# Patient Record
Sex: MALE | Race: WHITE | Employment: OTHER | URBAN - METROPOLITAN AREA
[De-identification: names, ages, dates, MRNs, and addresses within clinical notes are randomized per-mention and may not be internally consistent; named-entity substitution may affect disease eponyms.]

---

## 2017-01-24 ENCOUNTER — TELEPHONE (OUTPATIENT)
Dept: INTERNAL MEDICINE CLINIC | Age: 72
End: 2017-01-24

## 2017-03-14 ENCOUNTER — OFFICE VISIT (OUTPATIENT)
Dept: INTERNAL MEDICINE CLINIC | Age: 72
End: 2017-03-14

## 2017-03-14 VITALS
WEIGHT: 221 LBS | SYSTOLIC BLOOD PRESSURE: 120 MMHG | DIASTOLIC BLOOD PRESSURE: 80 MMHG | BODY MASS INDEX: 30.82 KG/M2 | HEART RATE: 64 BPM

## 2017-03-14 DIAGNOSIS — R31.9 HEMATURIA: ICD-10-CM

## 2017-03-14 DIAGNOSIS — E78.49 OTHER HYPERLIPIDEMIA: ICD-10-CM

## 2017-03-14 DIAGNOSIS — D61.818 PANCYTOPENIA (HCC): ICD-10-CM

## 2017-03-14 DIAGNOSIS — E11.42 TYPE 2 DIABETES MELLITUS WITH DIABETIC POLYNEUROPATHY, WITHOUT LONG-TERM CURRENT USE OF INSULIN (HCC): Primary | ICD-10-CM

## 2017-03-14 LAB
A/G RATIO: 1.2 (ref 1.1–2.2)
ALBUMIN SERPL-MCNC: 3.3 G/DL (ref 3.4–5)
ALP BLD-CCNC: 138 U/L (ref 40–129)
ALT SERPL-CCNC: 19 U/L (ref 10–40)
ANION GAP SERPL CALCULATED.3IONS-SCNC: 17 MMOL/L (ref 3–16)
AST SERPL-CCNC: 21 U/L (ref 15–37)
BASOPHILS ABSOLUTE: 0.1 K/UL (ref 0–0.2)
BASOPHILS RELATIVE PERCENT: 0.8 %
BILIRUB SERPL-MCNC: 5.5 MG/DL (ref 0–1)
BILIRUBIN, POC: ABNORMAL
BLOOD URINE, POC: ABNORMAL
BUN BLDV-MCNC: 18 MG/DL (ref 7–20)
CALCIUM SERPL-MCNC: 8.9 MG/DL (ref 8.3–10.6)
CHLORIDE BLD-SCNC: 97 MMOL/L (ref 99–110)
CHOLESTEROL, TOTAL: 189 MG/DL (ref 0–199)
CLARITY, POC: ABNORMAL
CO2: 22 MMOL/L (ref 21–32)
COLOR, POC: ABNORMAL
CREAT SERPL-MCNC: 0.7 MG/DL (ref 0.8–1.3)
EOSINOPHILS ABSOLUTE: 0.2 K/UL (ref 0–0.6)
EOSINOPHILS RELATIVE PERCENT: 2.4 %
GFR AFRICAN AMERICAN: >60
GFR NON-AFRICAN AMERICAN: >60
GLOBULIN: 2.8 G/DL
GLUCOSE BLD-MCNC: 206 MG/DL (ref 70–99)
GLUCOSE URINE, POC: 250
HCT VFR BLD CALC: 47.4 % (ref 40.5–52.5)
HDLC SERPL-MCNC: 23 MG/DL (ref 40–60)
HEMOGLOBIN: 15.7 G/DL (ref 13.5–17.5)
KETONES, POC: 40
LDL CHOLESTEROL CALCULATED: 137 MG/DL
LEUKOCYTE EST, POC: ABNORMAL
LYMPHOCYTES ABSOLUTE: 1 K/UL (ref 1–5.1)
LYMPHOCYTES RELATIVE PERCENT: 11.4 %
MCH RBC QN AUTO: 34.8 PG (ref 26–34)
MCHC RBC AUTO-ENTMCNC: 33.1 G/DL (ref 31–36)
MCV RBC AUTO: 105.2 FL (ref 80–100)
MONOCYTES ABSOLUTE: 0.9 K/UL (ref 0–1.3)
MONOCYTES RELATIVE PERCENT: 10.7 %
NEUTROPHILS ABSOLUTE: 6.5 K/UL (ref 1.7–7.7)
NEUTROPHILS RELATIVE PERCENT: 74.7 %
NITRITE, POC: ABNORMAL
PDW BLD-RTO: 15.4 % (ref 12.4–15.4)
PH, POC: 5
PLATELET # BLD: 52 K/UL (ref 135–450)
PMV BLD AUTO: 9.2 FL (ref 5–10.5)
POTASSIUM SERPL-SCNC: 4 MMOL/L (ref 3.5–5.1)
PROTEIN, POC: 100
RBC # BLD: 4.5 M/UL (ref 4.2–5.9)
SODIUM BLD-SCNC: 136 MMOL/L (ref 136–145)
SPECIFIC GRAVITY, POC: 1.03
TOTAL PROTEIN: 6.1 G/DL (ref 6.4–8.2)
TRIGL SERPL-MCNC: 143 MG/DL (ref 0–150)
UROBILINOGEN, POC: 0.8
VLDLC SERPL CALC-MCNC: 29 MG/DL
WBC # BLD: 8.6 K/UL (ref 4–11)

## 2017-03-14 PROCEDURE — 81002 URINALYSIS NONAUTO W/O SCOPE: CPT | Performed by: INTERNAL MEDICINE

## 2017-03-14 PROCEDURE — 99214 OFFICE O/P EST MOD 30 MIN: CPT | Performed by: INTERNAL MEDICINE

## 2017-03-14 RX ORDER — CIPROFLOXACIN 500 MG/1
500 TABLET, FILM COATED ORAL 2 TIMES DAILY
Qty: 20 TABLET | Refills: 0 | Status: SHIPPED | OUTPATIENT
Start: 2017-03-14 | End: 2017-03-24

## 2017-03-15 ENCOUNTER — TELEPHONE (OUTPATIENT)
Dept: INTERNAL MEDICINE CLINIC | Age: 72
End: 2017-03-15

## 2017-03-15 DIAGNOSIS — K76.9 LIVER DISEASE: Primary | ICD-10-CM

## 2017-03-15 LAB
ESTIMATED AVERAGE GLUCOSE: 174.3 MG/DL
HBA1C MFR BLD: 7.7 %

## 2017-03-17 RX ORDER — CYANOCOBALAMIN 1000 UG/ML
INJECTION INTRAMUSCULAR; SUBCUTANEOUS
Qty: 1 VIAL | Refills: 11 | Status: SHIPPED | OUTPATIENT
Start: 2017-03-17 | End: 2018-03-17 | Stop reason: SDUPTHER

## 2017-04-10 ENCOUNTER — OFFICE VISIT (OUTPATIENT)
Dept: INTERNAL MEDICINE CLINIC | Age: 72
End: 2017-04-10

## 2017-04-10 ENCOUNTER — HOSPITAL ENCOUNTER (OUTPATIENT)
Dept: OTHER | Age: 72
Discharge: HOME | End: 2017-04-10
Attending: INTERNAL MEDICINE | Admitting: INTERNAL MEDICINE

## 2017-04-10 VITALS
SYSTOLIC BLOOD PRESSURE: 120 MMHG | WEIGHT: 222 LBS | DIASTOLIC BLOOD PRESSURE: 80 MMHG | HEART RATE: 72 BPM | BODY MASS INDEX: 30.96 KG/M2

## 2017-04-10 DIAGNOSIS — D61.818 PANCYTOPENIA (HCC): ICD-10-CM

## 2017-04-10 DIAGNOSIS — K76.9 LIVER DISEASE: ICD-10-CM

## 2017-04-10 DIAGNOSIS — K74.69 OTHER CIRRHOSIS OF LIVER (HCC): ICD-10-CM

## 2017-04-10 DIAGNOSIS — K76.9 LIVER DISEASE: Primary | ICD-10-CM

## 2017-04-10 LAB
A/G RATIO: 1.1 (ref 1.1–2.2)
ALBUMIN SERPL-MCNC: 3.5 G/DL (ref 3.4–5)
ALP BLD-CCNC: 154 U/L (ref 40–129)
ALT SERPL-CCNC: 31 U/L (ref 10–40)
AMMONIA: 73 UMOL/L (ref 16–60)
ANION GAP SERPL CALCULATED.3IONS-SCNC: 15 MMOL/L (ref 3–16)
AST SERPL-CCNC: 31 U/L (ref 15–37)
BILIRUB SERPL-MCNC: 2.3 MG/DL (ref 0–1)
BUN BLDV-MCNC: 11 MG/DL (ref 7–20)
CALCIUM SERPL-MCNC: 9.2 MG/DL (ref 8.3–10.6)
CHLORIDE BLD-SCNC: 100 MMOL/L (ref 99–110)
CO2: 25 MMOL/L (ref 21–32)
CREAT SERPL-MCNC: 0.8 MG/DL (ref 0.8–1.3)
GFR AFRICAN AMERICAN: >60
GFR NON-AFRICAN AMERICAN: >60
GLOBULIN: 3.2 G/DL
GLUCOSE BLD-MCNC: 261 MG/DL (ref 70–99)
POTASSIUM SERPL-SCNC: 4.2 MMOL/L (ref 3.5–5.1)
SODIUM BLD-SCNC: 140 MMOL/L (ref 136–145)
TOTAL PROTEIN: 6.7 G/DL (ref 6.4–8.2)

## 2017-04-10 PROCEDURE — 99214 OFFICE O/P EST MOD 30 MIN: CPT | Performed by: INTERNAL MEDICINE

## 2017-04-11 RX ORDER — LACTULOSE 10 G/15ML
10 SOLUTION ORAL 2 TIMES DAILY
Qty: 473 ML | Refills: 1 | Status: SHIPPED | OUTPATIENT
Start: 2017-04-11 | End: 2017-05-13 | Stop reason: SDUPTHER

## 2017-05-04 ENCOUNTER — TELEPHONE (OUTPATIENT)
Dept: INTERNAL MEDICINE CLINIC | Age: 72
End: 2017-05-04

## 2017-05-15 RX ORDER — LACTULOSE 10 G/15ML
SOLUTION ORAL
Qty: 1 BOTTLE | Refills: 3 | Status: ON HOLD | OUTPATIENT
Start: 2017-05-15 | End: 2018-06-04

## 2017-05-18 ENCOUNTER — OFFICE VISIT (OUTPATIENT)
Dept: INTERNAL MEDICINE CLINIC | Age: 72
End: 2017-05-18

## 2017-05-18 VITALS
HEART RATE: 72 BPM | BODY MASS INDEX: 30.82 KG/M2 | WEIGHT: 221 LBS | DIASTOLIC BLOOD PRESSURE: 78 MMHG | SYSTOLIC BLOOD PRESSURE: 110 MMHG

## 2017-05-18 DIAGNOSIS — M48.061 LUMBAR STENOSIS: ICD-10-CM

## 2017-05-18 DIAGNOSIS — D61.818 PANCYTOPENIA (HCC): ICD-10-CM

## 2017-05-18 DIAGNOSIS — K76.9 LIVER DISEASE: Primary | ICD-10-CM

## 2017-05-18 PROCEDURE — 99214 OFFICE O/P EST MOD 30 MIN: CPT | Performed by: INTERNAL MEDICINE

## 2017-05-18 RX ORDER — RIFAXIMIN 550 MG/1
TABLET ORAL 2 TIMES DAILY
COMMUNITY
Start: 2017-05-05

## 2017-05-18 RX ORDER — OXYCODONE HYDROCHLORIDE 5 MG/1
5 CAPSULE ORAL EVERY 6 HOURS PRN
Qty: 120 CAPSULE | Refills: 0 | Status: SHIPPED | OUTPATIENT
Start: 2017-05-18 | End: 2017-09-13

## 2017-05-18 ASSESSMENT — PATIENT HEALTH QUESTIONNAIRE - PHQ9
1. LITTLE INTEREST OR PLEASURE IN DOING THINGS: 1
SUM OF ALL RESPONSES TO PHQ QUESTIONS 1-9: 1
2. FEELING DOWN, DEPRESSED OR HOPELESS: 0
SUM OF ALL RESPONSES TO PHQ9 QUESTIONS 1 & 2: 1

## 2017-06-15 ENCOUNTER — HOSPITAL ENCOUNTER (OUTPATIENT)
Dept: ENDOSCOPY | Age: 72
Discharge: HOME | End: 2017-06-15
Attending: INTERNAL MEDICINE | Admitting: INTERNAL MEDICINE

## 2017-06-15 LAB
GLUCOSE BLD-MCNC: 157 MG/DL (ref 70–99)
PERFORMED ON: ABNORMAL

## 2017-06-15 RX ORDER — SODIUM CHLORIDE 9 MG/ML
INJECTION, SOLUTION INTRAVENOUS CONTINUOUS
Status: DISCONTINUED | OUTPATIENT
Start: 2017-06-15 | End: 2017-06-16 | Stop reason: HOSPADM

## 2017-06-15 RX ORDER — SODIUM CHLORIDE 0.9 % (FLUSH) 0.9 %
10 SYRINGE (ML) INJECTION EVERY 12 HOURS SCHEDULED
Status: DISCONTINUED | OUTPATIENT
Start: 2017-06-15 | End: 2017-06-16 | Stop reason: HOSPADM

## 2017-06-15 RX ORDER — SODIUM CHLORIDE 0.9 % (FLUSH) 0.9 %
10 SYRINGE (ML) INJECTION PRN
Status: DISCONTINUED | OUTPATIENT
Start: 2017-06-15 | End: 2017-06-16 | Stop reason: HOSPADM

## 2017-08-07 ENCOUNTER — TELEPHONE (OUTPATIENT)
Dept: INTERNAL MEDICINE CLINIC | Age: 72
End: 2017-08-07

## 2017-08-08 ENCOUNTER — HOSPITAL ENCOUNTER (OUTPATIENT)
Dept: OTHER | Age: 72
Discharge: HOME | End: 2017-08-08
Attending: INTERNAL MEDICINE | Admitting: INTERNAL MEDICINE

## 2017-08-08 LAB
A/G RATIO: 1.2 (ref 1.1–2.2)
ALBUMIN SERPL-MCNC: 3.4 G/DL (ref 3.4–5)
ALP BLD-CCNC: 147 U/L (ref 40–129)
ALT SERPL-CCNC: 24 U/L (ref 10–40)
ANION GAP SERPL CALCULATED.3IONS-SCNC: 12 MMOL/L (ref 3–16)
AST SERPL-CCNC: 29 U/L (ref 15–37)
BASOPHILS ABSOLUTE: 0 K/UL (ref 0–0.2)
BASOPHILS RELATIVE PERCENT: 0.8 %
BILIRUB SERPL-MCNC: 2.2 MG/DL (ref 0–1)
BUN BLDV-MCNC: 11 MG/DL (ref 7–20)
CALCIUM SERPL-MCNC: 9 MG/DL (ref 8.3–10.6)
CHLORIDE BLD-SCNC: 103 MMOL/L (ref 99–110)
CHOLESTEROL, TOTAL: 196 MG/DL (ref 0–199)
CO2: 27 MMOL/L (ref 21–32)
CREAT SERPL-MCNC: 0.8 MG/DL (ref 0.8–1.3)
EOSINOPHILS ABSOLUTE: 0.3 K/UL (ref 0–0.6)
EOSINOPHILS RELATIVE PERCENT: 8.4 %
ESTIMATED AVERAGE GLUCOSE: 191.5 MG/DL
GFR AFRICAN AMERICAN: >60
GFR NON-AFRICAN AMERICAN: >60
GLOBULIN: 2.9 G/DL
GLUCOSE BLD-MCNC: 133 MG/DL (ref 70–99)
HBA1C MFR BLD: 8.3 %
HCT VFR BLD CALC: 44.3 % (ref 40.5–52.5)
HDLC SERPL-MCNC: 41 MG/DL (ref 40–60)
HEMOGLOBIN: 14.9 G/DL (ref 13.5–17.5)
INR BLD: 1.24 (ref 0.85–1.15)
LDL CHOLESTEROL CALCULATED: 128 MG/DL
LYMPHOCYTES ABSOLUTE: 0.9 K/UL (ref 1–5.1)
LYMPHOCYTES RELATIVE PERCENT: 26.1 %
MCH RBC QN AUTO: 35.4 PG (ref 26–34)
MCHC RBC AUTO-ENTMCNC: 33.5 G/DL (ref 31–36)
MCV RBC AUTO: 105.5 FL (ref 80–100)
MONOCYTES ABSOLUTE: 0.4 K/UL (ref 0–1.3)
MONOCYTES RELATIVE PERCENT: 10.6 %
NEUTROPHILS ABSOLUTE: 1.8 K/UL (ref 1.7–7.7)
NEUTROPHILS RELATIVE PERCENT: 54.1 %
PDW BLD-RTO: 14.9 % (ref 12.4–15.4)
PLATELET # BLD: 58 K/UL (ref 135–450)
PMV BLD AUTO: 8.5 FL (ref 5–10.5)
POTASSIUM SERPL-SCNC: 5.1 MMOL/L (ref 3.5–5.1)
PROTHROMBIN TIME: 14 SEC (ref 9.6–13)
RBC # BLD: 4.2 M/UL (ref 4.2–5.9)
SODIUM BLD-SCNC: 142 MMOL/L (ref 136–145)
TOTAL PROTEIN: 6.3 G/DL (ref 6.4–8.2)
TRIGL SERPL-MCNC: 137 MG/DL (ref 0–150)
VLDLC SERPL CALC-MCNC: 27 MG/DL
WBC # BLD: 3.4 K/UL (ref 4–11)

## 2017-08-10 LAB — VITAMIN D 1,25-DIHYDROXY: 47.9 PG/ML (ref 19.9–79.3)

## 2017-09-13 ENCOUNTER — OFFICE VISIT (OUTPATIENT)
Dept: INTERNAL MEDICINE CLINIC | Age: 72
End: 2017-09-13

## 2017-09-13 VITALS
SYSTOLIC BLOOD PRESSURE: 120 MMHG | HEART RATE: 72 BPM | BODY MASS INDEX: 31.1 KG/M2 | WEIGHT: 223 LBS | DIASTOLIC BLOOD PRESSURE: 60 MMHG

## 2017-09-13 DIAGNOSIS — G89.4 CHRONIC PAIN SYNDROME: Primary | ICD-10-CM

## 2017-09-13 DIAGNOSIS — K76.9 LIVER DISEASE: ICD-10-CM

## 2017-09-13 DIAGNOSIS — D61.818 PANCYTOPENIA (HCC): ICD-10-CM

## 2017-09-13 PROCEDURE — 99214 OFFICE O/P EST MOD 30 MIN: CPT | Performed by: INTERNAL MEDICINE

## 2017-09-13 RX ORDER — OXYCODONE HYDROCHLORIDE 5 MG/1
5 TABLET ORAL EVERY 6 HOURS PRN
Qty: 120 TABLET | Refills: 0 | Status: SHIPPED | OUTPATIENT
Start: 2017-09-13 | End: 2018-01-17 | Stop reason: SDUPTHER

## 2018-01-17 ENCOUNTER — OFFICE VISIT (OUTPATIENT)
Dept: INTERNAL MEDICINE CLINIC | Age: 73
End: 2018-01-17

## 2018-01-17 VITALS
SYSTOLIC BLOOD PRESSURE: 120 MMHG | DIASTOLIC BLOOD PRESSURE: 74 MMHG | BODY MASS INDEX: 30.13 KG/M2 | WEIGHT: 216 LBS | HEART RATE: 70 BPM

## 2018-01-17 DIAGNOSIS — M48.062 SPINAL STENOSIS OF LUMBAR REGION WITH NEUROGENIC CLAUDICATION: ICD-10-CM

## 2018-01-17 DIAGNOSIS — E11.9 TYPE 2 DIABETES MELLITUS WITHOUT COMPLICATION, WITHOUT LONG-TERM CURRENT USE OF INSULIN (HCC): Primary | ICD-10-CM

## 2018-01-17 DIAGNOSIS — K76.9 LIVER DISEASE: ICD-10-CM

## 2018-01-17 DIAGNOSIS — G89.4 CHRONIC PAIN SYNDROME: ICD-10-CM

## 2018-01-17 DIAGNOSIS — D61.818 PANCYTOPENIA (HCC): ICD-10-CM

## 2018-01-17 PROCEDURE — 99214 OFFICE O/P EST MOD 30 MIN: CPT | Performed by: INTERNAL MEDICINE

## 2018-01-17 RX ORDER — OXYCODONE HYDROCHLORIDE 5 MG/1
5 TABLET ORAL EVERY 6 HOURS PRN
Qty: 120 TABLET | Refills: 0 | Status: SHIPPED | OUTPATIENT
Start: 2018-01-17 | End: 2018-05-30 | Stop reason: SDUPTHER

## 2018-01-17 NOTE — PROGRESS NOTES
Component Value Date    LDLCALC 128 (H) 08/08/2017    LDLCALC 137 (H) 03/14/2017    LDLCALC 151 (H) 02/17/2016     Lab Results   Component Value Date    LABVLDL 27 08/08/2017    LABVLDL 29 03/14/2017    LABVLDL 35 02/17/2016     Lab Results   Component Value Date    CREATININE 0.8 08/08/2017         ASSESSMENT/PLAN[de-identified]        1. Type 2 diabetes mellitus without complication, without long-term current use of insulin (HCC)  Hemoglobin A1C   2. Pancytopenia ([address]     3. Liver disease     4. Chronic pain syndrome  oxyCODONE (ROXICODONE) 5 MG immediate release tablet   5. Spinal stenosis of lumbar region with neurogenic claudication       The patient has chronic severe low back pain. Acetaminophen is contraindicated secondary to his liver disease and nonsteroidal anti-inflammatory drugs are relatively contraindicated. Lacking a better alternative for his severe pain I did renew his oxycodone. Given his pancytopenia invasive procedures such as epidural steroids would have a high complication rate and a relatively contraindicated. Narcotics are being prescribed given the lack of safe alternative     Obtain hemoglobin A1c for chronic diabetic control    Controlled Substances Monitoring: Controlled Substances Monitoring:     Attestation: The Prescription Monitoring Report for this patient was reviewed today. Fidel Rosales MD)  Documentation: No signs of potential drug abuse or diversion identified.  Fidel Rosales MD)

## 2018-03-19 RX ORDER — CYANOCOBALAMIN 1000 UG/ML
INJECTION INTRAMUSCULAR; SUBCUTANEOUS
Qty: 1 VIAL | Refills: 10 | Status: SHIPPED | OUTPATIENT
Start: 2018-03-19 | End: 2019-01-27 | Stop reason: SDUPTHER

## 2018-03-22 ENCOUNTER — TELEPHONE (OUTPATIENT)
Dept: INTERNAL MEDICINE CLINIC | Age: 73
End: 2018-03-22

## 2018-03-22 NOTE — TELEPHONE ENCOUNTER
Pt wife calling pt has 101.4 fever-chest hailey-coughing---worried about him because he has liver and heart issues---doesn't know what to give him----please call the wife---thanks

## 2018-03-27 ENCOUNTER — OFFICE VISIT (OUTPATIENT)
Dept: INTERNAL MEDICINE CLINIC | Age: 73
End: 2018-03-27

## 2018-03-27 ENCOUNTER — HOSPITAL ENCOUNTER (OUTPATIENT)
Dept: OTHER | Age: 73
Discharge: HOME | End: 2018-03-27
Attending: INTERNAL MEDICINE | Admitting: INTERNAL MEDICINE

## 2018-03-27 VITALS
HEART RATE: 72 BPM | BODY MASS INDEX: 30.82 KG/M2 | WEIGHT: 221 LBS | SYSTOLIC BLOOD PRESSURE: 120 MMHG | DIASTOLIC BLOOD PRESSURE: 80 MMHG

## 2018-03-27 DIAGNOSIS — R05.9 COUGH: ICD-10-CM

## 2018-03-27 DIAGNOSIS — G93.40 ENCEPHALOPATHY: ICD-10-CM

## 2018-03-27 DIAGNOSIS — R05.9 COUGH: Primary | ICD-10-CM

## 2018-03-27 LAB — AMMONIA: 45 UMOL/L (ref 16–60)

## 2018-03-27 PROCEDURE — 99214 OFFICE O/P EST MOD 30 MIN: CPT | Performed by: INTERNAL MEDICINE

## 2018-03-27 RX ORDER — AZITHROMYCIN 250 MG/1
TABLET, FILM COATED ORAL
Qty: 1 PACKET | Refills: 0 | Status: SHIPPED | OUTPATIENT
Start: 2018-03-27 | End: 2018-04-06

## 2018-03-27 RX ORDER — LEVOFLOXACIN 500 MG/1
TABLET, FILM COATED ORAL
COMMUNITY
Start: 2018-03-22 | End: 2018-03-27

## 2018-03-27 RX ORDER — DEXTROMETHORPHAN HYDROBROMIDE AND PROMETHAZINE HYDROCHLORIDE 15; 6.25 MG/5ML; MG/5ML
SYRUP ORAL
COMMUNITY
Start: 2018-03-22 | End: 2018-03-27

## 2018-03-28 NOTE — PROGRESS NOTES
Chief Complaint   Patient presents with    Altered Mental Status     was at [facility] for cough , congestion --- given levaquin and cough med - has had episodes of confusion per wife        Jocelyne Singleton 81 y.o. male is here for  An acute appointment    He has had an acute respiratory tract infection recently. He had cough, congestion and was seen at an urgent care    There he was given Levaquin. Soon thereafter his wife advises me he became confused, was hallucinating and had symptoms of psychosis. She advises me that he is having difficulty getting in his medications for his chronic hepatic encephalopathy secondary to cirrhosis. He continues having problems with shortness of breath and productive cough. No head trauma is reported no focal neurological deficits are reported    Current Outpatient Prescriptions on File Prior to Visit   Medication Sig Dispense Refill    cyanocobalamin 1000 MCG/ML injection INJECT ONE MILLILITER INTRAMUSCULARLY EVERY 30 DAYS 1 vial 10    Insulin Syringe-Needle U-100 25G X 1\" 1 ML MISC USE WITH VIT B12 INJECTIONS 12 each 3    XIFAXAN 550 MG tablet       lactulose (CHRONULAC) 10 GM/15ML solution TAKE 15MLS BY MOUTH TWICE DAILY 1 Bottle 3    albuterol sulfate HFA (PROAIR HFA) 108 (90 BASE) MCG/ACT inhaler Inhale 2 puffs into the lungs every 4 hours as needed for Wheezing or Shortness of Breath 1 Inhaler 3    gabapentin (NEURONTIN) 300 MG capsule Take 1 capsule by mouth 3 times daily 90 capsule 1    lisinopril (PRINIVIL;ZESTRIL) 20 MG tablet Take 1 tablet by mouth daily 90 tablet 3    sitaGLIPtin (JANUVIA) 100 MG tablet Take 1 tablet by mouth daily 90 tablet 3    zolpidem (AMBIEN) 5 MG tablet TAKE ONE TABLET BY MOUTH EVERY NIGHT AT BEDTIME 90 tablet 1    glipiZIDE (GLUCOTROL) 5 MG tablet Take 5 mg by mouth daily.  saxagliptin (ONGLYZA) 5 MG TABS tablet Take 5 mg by mouth daily.  vitamin D (ERGOCALCIFEROL) 400 UNITS CAPS Take 400 Units by mouth daily.  Omega-3 Fatty Acids (FISH OIL) 500 MG CAPS Take  by mouth.  aspirin 81 MG tablet Take 81 mg by mouth daily.  pantoprazole (PROTONIX) 40 MG tablet Take 1 tablet by mouth 2 times daily. 180 tablet 3    nadolol (CORGARD) 20 MG tablet Take 1 tablet by mouth daily. 90 tablet 3    ferrous sulfate (FE TABS) 325 (65 FE) MG EC tablet Take 1 tablet by mouth 2 times daily. 60 tablet 3    levothyroxine (SYNTHROID) 150 MCG tablet Take 1 tablet by mouth Daily. 90 tablet 3    citalopram (CELEXA) 20 MG tablet Take 1 tablet by mouth daily. 90 tablet 3    furosemide (LASIX) 20 MG tablet Take 1 tablet by mouth daily. 90 tablet 3    FREESTYLE TEST STRIPS strip TEST TWO TO THREE TIMES DAILY 100 strip 2     No current facility-administered medications on file prior to visit.         Past Medical History:   Diagnosis Date    ANEMIA     CAD (coronary artery disease) 10/12/2011    Depression     Diabetes mellitus ([facility]     Esophageal bleed, non-variceal 11/23/2012    Hypertension     HYPOTHYROIDISM     Liver disease 1/6/2012    Mixed hyperlipidemia     NEUROPATHY     Non-ST elevation MI (NSTEMI) ([facility] 11/21/2012    Paroxysmal atrial fibrillation (HCC) 1/6/2012    Pneumonia     Thyroid disease            /80   Pulse 72   Wt 221 lb (100.2 kg)   BMI 30.82 kg/m²     General Appearance:  He appears chronically but not acutely ill, morbidly obese    Head:  Normocephalic, without obvious abnormality, atraumatic   Neck: Supple, symmetrical, trachea midline, no adenopathy, thyroid: not enlarged, symmetric, no tenderness/mass/nodules, no carotid bruit or JVD   Lungs:   Diffuse wheezing and coarse rhonchi throughout all lung fields    Chest Wall:  No tenderness or deformity   Heart:  Regular rate and rhythm, S1, S2 normal, no murmur, rub or gallop   Abdomen:   Soft, non-tender, bowel sounds active all four quadrants,  no masses, no organomegaly   Skin:  he is not jaundiced    Lymph nodes: Cervical,

## 2018-05-30 ENCOUNTER — OFFICE VISIT (OUTPATIENT)
Dept: INTERNAL MEDICINE CLINIC | Age: 73
End: 2018-05-30

## 2018-05-30 VITALS
WEIGHT: 216 LBS | BODY MASS INDEX: 30.13 KG/M2 | SYSTOLIC BLOOD PRESSURE: 120 MMHG | HEART RATE: 72 BPM | DIASTOLIC BLOOD PRESSURE: 60 MMHG

## 2018-05-30 DIAGNOSIS — E11.42 TYPE 2 DIABETES MELLITUS WITH DIABETIC POLYNEUROPATHY, WITHOUT LONG-TERM CURRENT USE OF INSULIN (HCC): ICD-10-CM

## 2018-05-30 DIAGNOSIS — K74.69 OTHER CIRRHOSIS OF LIVER (HCC): Primary | ICD-10-CM

## 2018-05-30 DIAGNOSIS — D61.818 PANCYTOPENIA (HCC): ICD-10-CM

## 2018-05-30 DIAGNOSIS — G89.4 CHRONIC PAIN SYNDROME: ICD-10-CM

## 2018-05-30 LAB
ALBUMIN SERPL-MCNC: 3.9 G/DL (ref 3.4–5)
ANION GAP SERPL CALCULATED.3IONS-SCNC: 14 MMOL/L (ref 3–16)
BUN BLDV-MCNC: 20 MG/DL (ref 7–20)
CALCIUM SERPL-MCNC: 9.4 MG/DL (ref 8.3–10.6)
CHLORIDE BLD-SCNC: 100 MMOL/L (ref 99–110)
CO2: 24 MMOL/L (ref 21–32)
CREAT SERPL-MCNC: 0.9 MG/DL (ref 0.8–1.3)
GFR AFRICAN AMERICAN: >60
GFR NON-AFRICAN AMERICAN: >60
GLUCOSE BLD-MCNC: 226 MG/DL (ref 70–99)
PHOSPHORUS: 3.7 MG/DL (ref 2.5–4.9)
POTASSIUM SERPL-SCNC: 5.9 MMOL/L (ref 3.5–5.1)
SODIUM BLD-SCNC: 138 MMOL/L (ref 136–145)

## 2018-05-30 PROCEDURE — 99214 OFFICE O/P EST MOD 30 MIN: CPT | Performed by: INTERNAL MEDICINE

## 2018-05-30 RX ORDER — SPIRONOLACTONE 50 MG/1
TABLET, FILM COATED ORAL
Status: ON HOLD | COMMUNITY
Start: 2018-05-18 | End: 2018-06-04 | Stop reason: HOSPADM

## 2018-05-30 RX ORDER — OXYCODONE HYDROCHLORIDE 5 MG/1
5 TABLET ORAL EVERY 6 HOURS PRN
Qty: 120 TABLET | Refills: 0 | Status: SHIPPED | OUTPATIENT
Start: 2018-05-30 | End: 2018-08-31 | Stop reason: SDUPTHER

## 2018-05-30 ASSESSMENT — PATIENT HEALTH QUESTIONNAIRE - PHQ9
SUM OF ALL RESPONSES TO PHQ QUESTIONS 1-9: 1
2. FEELING DOWN, DEPRESSED OR HOPELESS: 0
1. LITTLE INTEREST OR PLEASURE IN DOING THINGS: 1
SUM OF ALL RESPONSES TO PHQ9 QUESTIONS 1 & 2: 1

## 2018-05-31 LAB
ESTIMATED AVERAGE GLUCOSE: 200.1 MG/DL
HBA1C MFR BLD: 8.6 %

## 2018-06-02 PROBLEM — K74.60 CIRRHOSIS OF LIVER WITHOUT ASCITES (HCC): Status: ACTIVE | Noted: 2018-06-02

## 2018-06-02 PROBLEM — R53.1 GENERALIZED WEAKNESS: Status: ACTIVE | Noted: 2018-06-02

## 2018-06-02 PROBLEM — G93.40 ACUTE ENCEPHALOPATHY: Status: ACTIVE | Noted: 2018-06-02

## 2018-06-02 PROBLEM — R79.89 INCREASED AMMONIA LEVEL: Status: ACTIVE | Noted: 2018-06-02

## 2018-06-02 PROBLEM — K76.82 HEPATIC ENCEPHALOPATHY: Status: ACTIVE | Noted: 2018-06-02

## 2018-06-04 PROBLEM — I95.1 HYPOTENSION, POSTURAL: Status: ACTIVE | Noted: 2018-06-04

## 2018-06-07 ENCOUNTER — OFFICE VISIT (OUTPATIENT)
Dept: INTERNAL MEDICINE CLINIC | Age: 73
End: 2018-06-07

## 2018-06-07 VITALS
HEART RATE: 72 BPM | SYSTOLIC BLOOD PRESSURE: 120 MMHG | WEIGHT: 218 LBS | BODY MASS INDEX: 30.4 KG/M2 | DIASTOLIC BLOOD PRESSURE: 80 MMHG

## 2018-06-07 DIAGNOSIS — M48.062 SPINAL STENOSIS OF LUMBAR REGION WITH NEUROGENIC CLAUDICATION: ICD-10-CM

## 2018-06-07 DIAGNOSIS — K76.82 HEPATIC ENCEPHALOPATHY: Primary | ICD-10-CM

## 2018-06-07 DIAGNOSIS — K74.60 CIRRHOSIS OF LIVER WITHOUT ASCITES, UNSPECIFIED HEPATIC CIRRHOSIS TYPE (HCC): ICD-10-CM

## 2018-06-07 PROCEDURE — 1111F DSCHRG MED/CURRENT MED MERGE: CPT | Performed by: INTERNAL MEDICINE

## 2018-06-07 PROCEDURE — 99214 OFFICE O/P EST MOD 30 MIN: CPT | Performed by: INTERNAL MEDICINE

## 2018-08-31 ENCOUNTER — OFFICE VISIT (OUTPATIENT)
Dept: INTERNAL MEDICINE CLINIC | Age: 73
End: 2018-08-31

## 2018-08-31 VITALS
DIASTOLIC BLOOD PRESSURE: 60 MMHG | BODY MASS INDEX: 30.4 KG/M2 | SYSTOLIC BLOOD PRESSURE: 100 MMHG | HEART RATE: 72 BPM | WEIGHT: 218 LBS

## 2018-08-31 DIAGNOSIS — D61.818 PANCYTOPENIA (HCC): ICD-10-CM

## 2018-08-31 DIAGNOSIS — G89.4 CHRONIC PAIN SYNDROME: ICD-10-CM

## 2018-08-31 PROCEDURE — 99214 OFFICE O/P EST MOD 30 MIN: CPT | Performed by: INTERNAL MEDICINE

## 2018-08-31 RX ORDER — OXYCODONE HYDROCHLORIDE 5 MG/1
5 TABLET ORAL EVERY 6 HOURS PRN
Qty: 120 TABLET | Refills: 0 | Status: SHIPPED | OUTPATIENT
Start: 2018-08-31 | End: 2019-01-01 | Stop reason: SDUPTHER

## 2018-08-31 NOTE — LETTER
[facility]  [address] [address]  Phone: [phone]  Fax: [phone]    Narigs Gomez MD        August 31, 2018     Patient: Dwyane Skiff   YOB: 1945   Date of Visit: 8/31/2018       To Whom It May Concern: It is my medical opinion that Mauricio Orozco requires a disability parking placard for the following reasons:  Patient is unable to walk 200 ft without stopping to rest   Duration of need: 5 yrs    If you have any questions or concerns, please don't hesitate to call.     Sincerely,        Nargis Gomez MD

## 2018-08-31 NOTE — PROGRESS NOTES
furosemide (LASIX) 20 MG tablet Take 1 tablet by mouth daily. 90 tablet 3    FREESTYLE TEST STRIPS strip TEST TWO TO THREE TIMES DAILY 100 strip 2     No current facility-administered medications on file prior to visit.         Past Medical History:   Diagnosis Date    ANEMIA     CAD (coronary artery disease) 10/12/2011    Depression     Diabetes mellitus ([facility]     Esophageal bleed, non-variceal 11/23/2012    Hypertension     HYPOTHYROIDISM     Liver disease 1/6/2012    Mixed hyperlipidemia     NEUROPATHY     Non-ST elevation MI (NSTEMI) ([facility] 11/21/2012    Paroxysmal atrial fibrillation ([facility] 1/6/2012    Pneumonia     Thyroid disease      Review of systems no hematemesis, melena, hematochezia, Denies recent jaundice    Known esophageal varices      /60   Pulse 72   Wt 218 lb (98.9 kg)   BMI 30.40 kg/m²     General Appearance:  He is chronically but not acutely ill    Head:  Normocephalic, without obvious abnormality, atraumatic   Neck: Supple, symmetrical, trachea midline, no adenopathy, thyroid: not enlarged, symmetric, no tenderness/mass/nodules, no carotid bruit or JVD   Lungs:   Clear to auscultation bilaterally, respirations unlabored   Chest Wall:  No tenderness or deformity   Heart:  Regular rate and rhythm, S1, S2 normal, no murmur, rub or gallop   Abdomen:   Soft, non-tender, bowel sounds active all four quadrants,  no masses, no organomegaly   Skin:  he is not jaundiced    Lymph nodes: Cervical, supraclavicular  Adenopathy is absent   Musculoskeletal:  Diminished range of motion in lumbar spine      No components found for: CHLPL  Lab Results   Component Value Date    TRIG 137 08/08/2017    TRIG 143 03/14/2017    TRIG 177 (H) 02/17/2016     Lab Results   Component Value Date    HDL 41 08/08/2017    HDL 23 (L) 03/14/2017    HDL 35 (L) 02/17/2016     Lab Results   Component Value Date    LDLCALC 128 (H) 08/08/2017    LDLCALC 137 (H) 03/14/2017    LDLCALC 151 (H) 02/17/2016     Lab

## 2018-10-02 ENCOUNTER — TELEPHONE (OUTPATIENT)
Dept: INTERNAL MEDICINE CLINIC | Age: 73
End: 2018-10-02

## 2018-10-02 RX ORDER — LACTULOSE 10 G/15ML
20 SOLUTION ORAL NIGHTLY
Qty: 1 BOTTLE | Refills: 3 | Status: SHIPPED | OUTPATIENT
Start: 2018-10-02 | End: 2019-04-13 | Stop reason: SDUPTHER

## 2018-10-12 RX ORDER — SYRINGE WITH NEEDLE, 1 ML 25GX5/8"
SYRINGE, EMPTY DISPOSABLE MISCELLANEOUS
Qty: 12 EACH | Refills: 3 | Status: SHIPPED | OUTPATIENT
Start: 2018-10-12 | End: 2020-01-01

## 2018-10-23 ENCOUNTER — TELEPHONE (OUTPATIENT)
Dept: INTERNAL MEDICINE CLINIC | Age: 73
End: 2018-10-23

## 2018-11-30 ENCOUNTER — TELEPHONE (OUTPATIENT)
Dept: INTERNAL MEDICINE CLINIC | Age: 73
End: 2018-11-30

## 2018-12-03 ENCOUNTER — OFFICE VISIT (OUTPATIENT)
Dept: INTERNAL MEDICINE CLINIC | Age: 73
End: 2018-12-03
Payer: COMMERCIAL

## 2018-12-03 VITALS
BODY MASS INDEX: 29.15 KG/M2 | WEIGHT: 209 LBS | SYSTOLIC BLOOD PRESSURE: 110 MMHG | HEART RATE: 64 BPM | DIASTOLIC BLOOD PRESSURE: 60 MMHG

## 2018-12-03 DIAGNOSIS — D61.818 PANCYTOPENIA (HCC): ICD-10-CM

## 2018-12-03 DIAGNOSIS — G89.4 CHRONIC PAIN SYNDROME: ICD-10-CM

## 2018-12-03 DIAGNOSIS — F51.01 PRIMARY INSOMNIA: ICD-10-CM

## 2018-12-03 DIAGNOSIS — K74.69 OTHER CIRRHOSIS OF LIVER (HCC): Primary | ICD-10-CM

## 2018-12-03 PROCEDURE — 99214 OFFICE O/P EST MOD 30 MIN: CPT | Performed by: INTERNAL MEDICINE

## 2018-12-03 RX ORDER — ZOLPIDEM TARTRATE 10 MG/1
10 TABLET ORAL NIGHTLY PRN
Qty: 30 TABLET | Refills: 0 | Status: SHIPPED | OUTPATIENT
Start: 2018-12-03 | End: 2019-03-11 | Stop reason: SDUPTHER

## 2018-12-04 NOTE — PROGRESS NOTES
Chief Complaint   Patient presents with    Diabetes     gets meds thru [place]     Hypothyroidism       Valentina Gómez 72 y.o. male is here for advanced cirrhosis, chronic pain, diabetes mellitus and hypothyroidism         He continues under the chronic active treatment of the hepatologist, he recently changed specialist at the local area. We discussed his ongoing drug treatment. He had been on spironolactone which was discontinued because of significant hyperkalemia. He also takes oxycodone because of severe refractory low back pain. The patient does have chronic recurrent problems with back pain. Review of his record and rationale for current treatment:   He has had a previous history of significant gastrointestinal bleeding from varices, has advanced liver disease thus nonsteroidal anti-inflammatory drugs and acetaminophen are relatively contraindicated. Controlled Substances Monitoring:     RX Monitoring 12/3/2018   Attestation The Prescription Monitoring Report for this patient was reviewed today. Documentation No signs of potential drug abuse or diversion identified. Current Outpatient Prescriptions on File Prior to Visit   Medication Sig Dispense Refill    lactulose (CHRONULAC) 10 GM/15ML solution Take 30 mLs by mouth nightly 1 Bottle 3    levothyroxine (SYNTHROID) 150 MCG tablet Take 1 tablet by mouth Daily 90 tablet 3    cyanocobalamin 1000 MCG/ML injection INJECT ONE MILLILITER INTRAMUSCULARLY EVERY 30 DAYS 1 vial 10    XIFAXAN 550 MG tablet       glipiZIDE (GLUCOTROL) 5 MG tablet Take 5 mg by mouth daily.  saxagliptin (ONGLYZA) 5 MG TABS tablet Take 5 mg by mouth daily.  aspirin 81 MG tablet Take 81 mg by mouth daily.  nadolol (CORGARD) 20 MG tablet Take 1 tablet by mouth daily. 90 tablet 3    citalopram (CELEXA) 20 MG tablet Take 1 tablet by mouth daily.  90 tablet 3    B-D 3CC LUER-ALEXIS SYR 25GX1\" 25G X 1\" 3 ML MISC USE WITH VITAMIN B12 INJECTIONS 12 each 3    vitamin D (ERGOCALCIFEROL) 400 UNITS CAPS Take 400 Units by mouth daily.  Omega-3 Fatty Acids (FISH OIL) 500 MG CAPS Take  by mouth.  furosemide (LASIX) 20 MG tablet Take 1 tablet by mouth daily. 90 tablet 3    FREESTYLE TEST STRIPS strip TEST TWO TO THREE TIMES DAILY 100 strip 2     No current facility-administered medications on file prior to visit.         Past Medical History:   Diagnosis Date    ANEMIA     CAD (coronary artery disease) 10/12/2011    Depression     Diabetes mellitus ([facility]     Esophageal bleed, non-variceal 11/23/2012    Hypertension     HYPOTHYROIDISM     Liver disease 1/6/2012    Mixed hyperlipidemia     NEUROPATHY     Non-ST elevation MI (NSTEMI) ([facility] 11/21/2012    Paroxysmal atrial fibrillation ([facility] 1/6/2012    Pneumonia     Thyroid disease      Review of systems no hematemesis, melena, hematochezia, Denies recent jaundice    Known esophageal varices      /60   Pulse 64   Wt 209 lb (94.8 kg)   BMI 29.15 kg/m²     General Appearance:  He is chronically but not acutely ill    Head:  Normocephalic, without obvious abnormality, atraumatic   Neck: Supple, symmetrical, trachea midline, no adenopathy, thyroid: not enlarged, symmetric, no tenderness/mass/nodules, no carotid bruit or JVD   Lungs:   Clear to auscultation bilaterally, respirations unlabored   Chest Wall:  No tenderness or deformity   Heart:  Regular rate and rhythm, S1, S2 normal, no murmur, rub or gallop   Abdomen:   Soft, non-tender, bowel sounds active all four quadrants,  no masses, no organomegaly   Skin:  he is not jaundiced    Lymph nodes: Cervical, supraclavicular  Adenopathy is absent   Musculoskeletal:  Diminished range of motion in lumbar spine      No components found for: CHLPL  Lab Results   Component Value Date    TRIG 137 08/08/2017    TRIG 143 03/14/2017    TRIG 177 (H) 02/17/2016     Lab Results   Component Value Date    HDL 41 08/08/2017    HDL 23 (L) 03/14/2017    HDL 35 (L) 02/17/2016     Lab Results   Component Value Date    LDLCALC 128 (H) 08/08/2017    LDLCALC 137 (H) 03/14/2017    LDLCALC 151 (H) 02/17/2016     Lab Results   Component Value Date    LABVLDL 27 08/08/2017    LABVLDL 29 03/14/2017    LABVLDL 35 02/17/2016     Lab Results   Component Value Date    CREATININE 0.8 06/04/2018         ASSESSMENT/PLAN[de-identified]         Diagnosis Orders   1. Other cirrhosis of liver ([facility]     2. Primary insomnia  zolpidem (AMBIEN) 10 MG tablet   3. Chronic pain syndrome     4. Pancytopenia ([facility]         Continue the combination of MiraLAX and lactulose for severe recurrent hepatic encephalopathy. Discussed protein restriction, T sounds like he had quite a bit of turkey over Thanksgiving and advised against extreme protein loads    I did refill his Ambien, discussed chronic effects of hypnotic medications    He has spinal stenosis with neurogenic claudication which continues to be moderately severe but controlled as well as can be expected on gabapentin he will continue on same. Controlled Substances Monitoring:     RX Monitoring 12/3/2018   Attestation The Prescription Monitoring Report for this patient was reviewed today. Documentation No signs of potential drug abuse or diversion identified.

## 2019-01-01 ENCOUNTER — OFFICE VISIT (OUTPATIENT)
Dept: INTERNAL MEDICINE CLINIC | Age: 74
End: 2019-01-01
Payer: COMMERCIAL

## 2019-01-01 ENCOUNTER — HOSPITAL ENCOUNTER (OUTPATIENT)
Dept: GENERAL RADIOLOGY | Age: 74
Discharge: HOME | End: 2019-11-18
Payer: COMMERCIAL

## 2019-01-01 ENCOUNTER — TELEPHONE (OUTPATIENT)
Dept: INTERNAL MEDICINE CLINIC | Age: 74
End: 2019-01-01

## 2019-01-01 ENCOUNTER — HOSPITAL ENCOUNTER (OUTPATIENT)
Age: 74
Discharge: HOME | End: 2019-11-18
Payer: COMMERCIAL

## 2019-01-01 VITALS
SYSTOLIC BLOOD PRESSURE: 102 MMHG | BODY MASS INDEX: 27.16 KG/M2 | HEART RATE: 70 BPM | WEIGHT: 194 LBS | HEIGHT: 71 IN | DIASTOLIC BLOOD PRESSURE: 78 MMHG
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
## 2019-01-01 DIAGNOSIS — F51.01 PRIMARY INSOMNIA: ICD-10-CM

## 2019-01-01 DIAGNOSIS — R09.89 CHEST CONGESTION: ICD-10-CM
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
## 2019-01-01 DIAGNOSIS — S20.219A CONTUSION OF CHEST WALL, UNSPECIFIED LATERALITY, INITIAL ENCOUNTER: ICD-10-CM

## 2019-01-01 DIAGNOSIS — S20.219A CONTUSION OF CHEST WALL, UNSPECIFIED LATERALITY, INITIAL ENCOUNTER: Primary | ICD-10-CM

## 2019-01-01 PROCEDURE — 71046 X-RAY EXAM CHEST 2 VIEWS: CPT

## 2019-01-01 PROCEDURE — 99213 OFFICE O/P EST LOW 20 MIN: CPT | Performed by: NURSE PRACTITIONER

## 2019-01-01 RX ORDER — ZOLPIDEM TARTRATE 10 MG/1
10 TABLET ORAL NIGHTLY PRN
Qty: 30 TABLET | Refills: 2 | Status: SHIPPED | OUTPATIENT
Start: 2019-01-01 | End: 2019-01-01

## 2019-01-01 RX ORDER — OXYCODONE HYDROCHLORIDE 5 MG/1
5 TABLET ORAL EVERY 8 HOURS PRN
Qty: 15 TABLET | Refills: 0 | Status: SHIPPED | OUTPATIENT
Start: 2019-01-01 | End: 2019-01-01

## 2019-01-01 ASSESSMENT — ENCOUNTER SYMPTOMS
COUGH: 1
CHEST TIGHTNESS: 1
BACK PAIN: 1

## 2019-01-28 RX ORDER — CYANOCOBALAMIN 1000 UG/ML
INJECTION INTRAMUSCULAR; SUBCUTANEOUS
Qty: 1 VIAL | Refills: 9 | Status: SHIPPED | OUTPATIENT
Start: 2019-01-28 | End: 2019-09-28 | Stop reason: SDUPTHER

## 2019-03-02 ENCOUNTER — APPOINTMENT (OUTPATIENT)
Dept: CT IMAGING | Age: 74
DRG: 443 | End: 2019-03-02
Payer: COMMERCIAL

## 2019-03-02 ENCOUNTER — APPOINTMENT (OUTPATIENT)
Dept: GENERAL RADIOLOGY | Age: 74
DRG: 443 | End: 2019-03-02
Payer: COMMERCIAL

## 2019-03-02 ENCOUNTER — HOSPITAL ENCOUNTER (INPATIENT)
Age: 74
LOS: 1 days | Discharge: HOME | DRG: 443 | End: 2019-03-03
Attending: EMERGENCY MEDICINE | Admitting: FAMILY MEDICINE
Payer: COMMERCIAL

## 2019-03-02 DIAGNOSIS — I65.22 STENOSIS OF LEFT CAROTID ARTERY: ICD-10-CM

## 2019-03-02 DIAGNOSIS — K76.82 HEPATIC ENCEPHALOPATHY: ICD-10-CM

## 2019-03-02 DIAGNOSIS — R41.82 ALTERED MENTAL STATUS, UNSPECIFIED ALTERED MENTAL STATUS TYPE: Primary | ICD-10-CM

## 2019-03-02 DIAGNOSIS — I48.0 PAROXYSMAL ATRIAL FIBRILLATION (HCC): ICD-10-CM

## 2019-03-02 LAB
A/G RATIO: 1 (ref 1.1–2.2)
ALBUMIN SERPL-MCNC: 3.5 G/DL (ref 3.4–5)
ALP BLD-CCNC: 163 U/L (ref 40–129)
ALT SERPL-CCNC: 24 U/L (ref 10–40)
AMMONIA: 157 UMOL/L (ref 16–60)
ANION GAP SERPL CALCULATED.3IONS-SCNC: 10 MMOL/L (ref 3–16)
APTT: 31.5 SEC (ref 26–36)
AST SERPL-CCNC: 32 U/L (ref 15–37)
BASOPHILS ABSOLUTE: 0 K/UL (ref 0–0.2)
BASOPHILS RELATIVE PERCENT: 1 %
BILIRUB SERPL-MCNC: 2.6 MG/DL (ref 0–1)
BILIRUBIN URINE: NEGATIVE
BLOOD, URINE: NEGATIVE
BUN BLDV-MCNC: 13 MG/DL (ref 7–20)
CALCIUM SERPL-MCNC: 9 MG/DL (ref 8.3–10.6)
CHLORIDE BLD-SCNC: 106 MMOL/L (ref 99–110)
CHP ED QC CHECK: YES
CLARITY: ABNORMAL
CO2: 25 MMOL/L (ref 21–32)
COLOR: YELLOW
CREAT SERPL-MCNC: 0.7 MG/DL (ref 0.8–1.3)
EOSINOPHILS ABSOLUTE: 0.2 K/UL (ref 0–0.6)
EOSINOPHILS RELATIVE PERCENT: 6.5 %
EPITHELIAL CELLS, UA: 0 /HPF (ref 0–5)
GFR AFRICAN AMERICAN: >60
GFR NON-AFRICAN AMERICAN: >60
GLOBULIN: 3.4 G/DL
GLUCOSE BLD-MCNC: 155 MG/DL (ref 70–99)
GLUCOSE BLD-MCNC: 231 MG/DL
GLUCOSE BLD-MCNC: 231 MG/DL (ref 70–99)
GLUCOSE BLD-MCNC: 237 MG/DL (ref 70–99)
GLUCOSE URINE: >=1000 MG/DL
HCT VFR BLD CALC: 45.1 % (ref 40.5–52.5)
HEMOGLOBIN: 14.8 G/DL (ref 13.5–17.5)
HYALINE CASTS: 1 /LPF (ref 0–8)
INR BLD: 1.17 (ref 0.86–1.14)
KETONES, URINE: NEGATIVE MG/DL
LACTIC ACID: 1.5 MMOL/L (ref 0.4–2)
LEUKOCYTE ESTERASE, URINE: NEGATIVE
LYMPHOCYTES ABSOLUTE: 0.9 K/UL (ref 1–5.1)
LYMPHOCYTES RELATIVE PERCENT: 27.5 %
MCH RBC QN AUTO: 32.4 PG (ref 26–34)
MCHC RBC AUTO-ENTMCNC: 32.9 G/DL (ref 31–36)
MCV RBC AUTO: 98.6 FL (ref 80–100)
MICROSCOPIC EXAMINATION: YES
MONOCYTES ABSOLUTE: 0.3 K/UL (ref 0–1.3)
MONOCYTES RELATIVE PERCENT: 10.6 %
NEUTROPHILS ABSOLUTE: 1.7 K/UL (ref 1.7–7.7)
NEUTROPHILS RELATIVE PERCENT: 54.4 %
NITRITE, URINE: NEGATIVE
PDW BLD-RTO: 16.4 % (ref 12.4–15.4)
PERFORMED ON: ABNORMAL
PERFORMED ON: ABNORMAL
PH UA: 7.5 (ref 5–8)
PLATELET # BLD: 57 K/UL (ref 135–450)
PLATELET SLIDE REVIEW: ABNORMAL
PMV BLD AUTO: 8.3 FL (ref 5–10.5)
POTASSIUM SERPL-SCNC: 4.4 MMOL/L (ref 3.5–5.1)
PROTEIN UA: NEGATIVE MG/DL
PROTHROMBIN TIME: 13.3 SEC (ref 9.8–13)
RBC # BLD: 4.57 M/UL (ref 4.2–5.9)
RBC UA: 3 /HPF (ref 0–4)
SLIDE REVIEW: ABNORMAL
SODIUM BLD-SCNC: 141 MMOL/L (ref 136–145)
SPECIFIC GRAVITY UA: 1.03 (ref 1–1.03)
TOTAL PROTEIN: 6.9 G/DL (ref 6.4–8.2)
TROPONIN: <0.01 NG/ML
URINE REFLEX TO CULTURE: ABNORMAL
URINE TYPE: ABNORMAL
UROBILINOGEN, URINE: 4 E.U./DL
WBC # BLD: 3.1 K/UL (ref 4–11)
WBC UA: 0 /HPF (ref 0–5)

## 2019-03-02 PROCEDURE — 81001 URINALYSIS AUTO W/SCOPE: CPT

## 2019-03-02 PROCEDURE — 6370000000 HC RX 637 (ALT 250 FOR IP): Performed by: FAMILY MEDICINE

## 2019-03-02 PROCEDURE — 70498 CT ANGIOGRAPHY NECK: CPT

## 2019-03-02 PROCEDURE — 99285 EMERGENCY DEPT VISIT HI MDM: CPT

## 2019-03-02 PROCEDURE — 1200000000 HC SEMI PRIVATE

## 2019-03-02 PROCEDURE — 93005 ELECTROCARDIOGRAM TRACING: CPT | Performed by: EMERGENCY MEDICINE

## 2019-03-02 PROCEDURE — 85610 PROTHROMBIN TIME: CPT

## 2019-03-02 PROCEDURE — 6370000000 HC RX 637 (ALT 250 FOR IP): Performed by: EMERGENCY MEDICINE

## 2019-03-02 PROCEDURE — G0378 HOSPITAL OBSERVATION PER HR: HCPCS

## 2019-03-02 PROCEDURE — 71045 X-RAY EXAM CHEST 1 VIEW: CPT

## 2019-03-02 PROCEDURE — 80053 COMPREHEN METABOLIC PANEL: CPT

## 2019-03-02 PROCEDURE — 85025 COMPLETE CBC W/AUTO DIFF WBC: CPT

## 2019-03-02 PROCEDURE — 70450 CT HEAD/BRAIN W/O DYE: CPT

## 2019-03-02 PROCEDURE — 82140 ASSAY OF AMMONIA: CPT

## 2019-03-02 PROCEDURE — 83036 HEMOGLOBIN GLYCOSYLATED A1C: CPT

## 2019-03-02 PROCEDURE — 85730 THROMBOPLASTIN TIME PARTIAL: CPT

## 2019-03-02 PROCEDURE — 70496 CT ANGIOGRAPHY HEAD: CPT

## 2019-03-02 PROCEDURE — 84484 ASSAY OF TROPONIN QUANT: CPT

## 2019-03-02 PROCEDURE — 6360000004 HC RX CONTRAST MEDICATION: Performed by: EMERGENCY MEDICINE

## 2019-03-02 PROCEDURE — 83605 ASSAY OF LACTIC ACID: CPT

## 2019-03-02 RX ORDER — NADOLOL 20 MG/1
20 TABLET ORAL DAILY
Status: DISCONTINUED | OUTPATIENT
Start: 2019-03-02 | End: 2019-03-02

## 2019-03-02 RX ORDER — ASPIRIN 81 MG/1
81 TABLET ORAL DAILY
Status: DISCONTINUED | OUTPATIENT
Start: 2019-03-02 | End: 2019-03-02

## 2019-03-02 RX ORDER — SODIUM CHLORIDE 0.9 % (FLUSH) 0.9 %
10 SYRINGE (ML) INJECTION PRN
Status: DISCONTINUED | OUTPATIENT
Start: 2019-03-02 | End: 2019-03-03 | Stop reason: HOSPADM

## 2019-03-02 RX ORDER — CITALOPRAM 20 MG/1
20 TABLET ORAL DAILY
Status: DISCONTINUED | OUTPATIENT
Start: 2019-03-03 | End: 2019-03-03 | Stop reason: HOSPADM

## 2019-03-02 RX ORDER — NICOTINE POLACRILEX 4 MG
15 LOZENGE BUCCAL PRN
Status: DISCONTINUED | OUTPATIENT
Start: 2019-03-02 | End: 2019-03-03 | Stop reason: HOSPADM

## 2019-03-02 RX ORDER — TRAMADOL HYDROCHLORIDE 50 MG/1
50 TABLET ORAL EVERY 6 HOURS PRN
Status: DISCONTINUED | OUTPATIENT
Start: 2019-03-02 | End: 2019-03-03 | Stop reason: HOSPADM

## 2019-03-02 RX ORDER — LEVOTHYROXINE SODIUM 0.15 MG/1
150 TABLET ORAL DAILY
Status: DISCONTINUED | OUTPATIENT
Start: 2019-03-03 | End: 2019-03-03 | Stop reason: HOSPADM

## 2019-03-02 RX ORDER — NADOLOL 20 MG/1
20 TABLET ORAL DAILY
Status: DISCONTINUED | OUTPATIENT
Start: 2019-03-03 | End: 2019-03-03 | Stop reason: HOSPADM

## 2019-03-02 RX ORDER — FUROSEMIDE 20 MG/1
20 TABLET ORAL DAILY
Status: DISCONTINUED | OUTPATIENT
Start: 2019-03-02 | End: 2019-03-02

## 2019-03-02 RX ORDER — ASPIRIN 81 MG/1
81 TABLET ORAL DAILY
Status: DISCONTINUED | OUTPATIENT
Start: 2019-03-03 | End: 2019-03-03 | Stop reason: HOSPADM

## 2019-03-02 RX ORDER — LACTULOSE 10 G/15ML
20 SOLUTION ORAL 3 TIMES DAILY
Status: DISCONTINUED | OUTPATIENT
Start: 2019-03-02 | End: 2019-03-03 | Stop reason: HOSPADM

## 2019-03-02 RX ORDER — ASPIRIN 81 MG/1
324 TABLET, CHEWABLE ORAL ONCE
Status: DISCONTINUED | OUTPATIENT
Start: 2019-03-02 | End: 2019-03-02

## 2019-03-02 RX ORDER — FUROSEMIDE 20 MG/1
20 TABLET ORAL DAILY
Status: DISCONTINUED | OUTPATIENT
Start: 2019-03-03 | End: 2019-03-03 | Stop reason: HOSPADM

## 2019-03-02 RX ORDER — CITALOPRAM 20 MG/1
20 TABLET ORAL DAILY
Status: DISCONTINUED | OUTPATIENT
Start: 2019-03-02 | End: 2019-03-02

## 2019-03-02 RX ORDER — DEXTROSE MONOHYDRATE 25 G/50ML
12.5 INJECTION, SOLUTION INTRAVENOUS PRN
Status: DISCONTINUED | OUTPATIENT
Start: 2019-03-02 | End: 2019-03-03 | Stop reason: HOSPADM

## 2019-03-02 RX ORDER — SODIUM CHLORIDE 0.9 % (FLUSH) 0.9 %
10 SYRINGE (ML) INJECTION EVERY 12 HOURS SCHEDULED
Status: DISCONTINUED | OUTPATIENT
Start: 2019-03-02 | End: 2019-03-03 | Stop reason: HOSPADM

## 2019-03-02 RX ORDER — LACTULOSE 10 G/15ML
20 SOLUTION ORAL ONCE
Status: COMPLETED | OUTPATIENT
Start: 2019-03-02 | End: 2019-03-02

## 2019-03-02 RX ORDER — DEXTROSE MONOHYDRATE 50 MG/ML
100 INJECTION, SOLUTION INTRAVENOUS PRN
Status: DISCONTINUED | OUTPATIENT
Start: 2019-03-02 | End: 2019-03-03 | Stop reason: HOSPADM

## 2019-03-02 RX ORDER — ONDANSETRON 2 MG/ML
4 INJECTION INTRAMUSCULAR; INTRAVENOUS EVERY 6 HOURS PRN
Status: DISCONTINUED | OUTPATIENT
Start: 2019-03-02 | End: 2019-03-03 | Stop reason: HOSPADM

## 2019-03-02 RX ADMIN — INSULIN LISPRO 1 UNITS: 100 INJECTION, SOLUTION INTRAVENOUS; SUBCUTANEOUS at 23:56

## 2019-03-02 RX ADMIN — LACTULOSE 20 G: 20 SOLUTION ORAL at 17:51

## 2019-03-02 RX ADMIN — IOPAMIDOL 75 ML: 755 INJECTION, SOLUTION INTRAVENOUS at 16:28

## 2019-03-02 NOTE — ED NOTES
Pt and family aware that a room has been assigned and ED is awaiting call from admitting unit.        Shaw Morrison RN  03/02/19 [id number]

## 2019-03-02 NOTE — ED PROVIDER NOTES
eMERGENCY dEPARTMENT eNCOUnter      PtName: Barby Davis  MRN: [id number]  [place] 1945  Date of evaluation: 3/2/2019  Provider: Gilda Reilly DO     CHIEF COMPLAINT       Chief Complaint   Patient presents with    Altered Mental Status     Patient was not his normal self yesterday per wife. Symptoms were worse this morning when he woke up. They state he does have cirrhosis. Patient c/o feeling shaky. Wife states he had trouble walking this morning         HISTORY OF PRESENT ILLNESS   (Location/Symptom, Timing/Onset,Context/Setting, Quality, Duration, Modifying Factors, Severity) Note limiting factors. HPI    Barby Davis is a 72 y.o. male who presents to the emergency department with chief complaint of altered mental status. He's been more shaky over the past week. He does have a history of nonalcoholic steatohepatitis aand has had similar symptoms in the past.  No slurred speech or facial droop. No numbness or weakness on one side of his body. No recent falls. Nursing Notes were reviewed. REVIEW OF SYSTEMS    (2+ forlevel 4; 10+ for level 5)     Review of Systems  See hpi for further details. Complete 10 point review of all systems performed and is otherwise negative unless noted above.     PAST MEDICAL HISTORY     Past Medical History:   Diagnosis Date    ANEMIA     CAD (coronary artery disease) 10/12/2011    Depression     Diabetes mellitus ([address]     Esophageal bleed, non-variceal 11/23/2012    Hypertension     HYPOTHYROIDISM     Liver disease 1/6/2012    Mixed hyperlipidemia     NEUROPATHY     Non-ST elevation MI (NSTEMI) ([address] 11/21/2012    Paroxysmal atrial fibrillation ([facility] 1/6/2012    Pneumonia     Thyroid disease        SURGICAL HISTORY       Past Surgical History:   Procedure Laterality Date    ACHILLES TENDON SURGERY      CARDIAC SURGERY      3 vessel cabg    CHOLECYSTECTOMY      COLONOSCOPY      CORONARY ARTERY BYPASS GRAFT  10/2011    cabg x3    ENDOSCOPY, COLON, DIAGNOSTIC      PTCA  11/23/2012    PTCA RCA & RPL    TONSILLECTOMY      UPPER GASTROINTESTINAL ENDOSCOPY  11/23/2012    multiple thin linear esophageal mucosal breaks    UPPER GASTROINTESTINAL ENDOSCOPY  3/6/2014       CURRENT MEDICATIONS       Previous Medications    ASPIRIN 81 MG TABLET    Take 81 mg by mouth daily. B-D 3CC LUER-ALEXIS SYR 25GX1\" 25G X 1\" 3 ML MISC    USE WITH VITAMIN B12 INJECTIONS    CITALOPRAM (CELEXA) 20 MG TABLET    Take 1 tablet by mouth daily. CYANOCOBALAMIN 1000 MCG/ML INJECTION    INJECT 1 ML INTRAMUSCULARLY EVERY 30 DAYS    FREESTYLE TEST STRIPS STRIP    TEST TWO TO THREE TIMES DAILY    FUROSEMIDE (LASIX) 20 MG TABLET    Take 1 tablet by mouth daily. GLIPIZIDE (GLUCOTROL) 5 MG TABLET    Take 5 mg by mouth daily. LACTULOSE (CHRONULAC) 10 GM/15ML SOLUTION    Take 30 mLs by mouth nightly    LEVOTHYROXINE (SYNTHROID) 150 MCG TABLET    Take 1 tablet by mouth Daily    MILK THISTLE PO    Take by mouth    NADOLOL (CORGARD) 20 MG TABLET    Take 1 tablet by mouth daily. OMEGA-3 FATTY ACIDS (FISH OIL) 500 MG CAPS    Take  by mouth. SAXAGLIPTIN (ONGLYZA) 5 MG TABS TABLET    Take 5 mg by mouth daily. VITAMIN D (ERGOCALCIFEROL) 400 UNITS CAPS    Take 400 Units by mouth daily. XIFAXAN 550 MG TABLET           ALLERGIES     Cephalexin; Ciprocinonide [fluocinolone]; Quinolones; and Statins    FAMILY HISTORY       Family History   Problem Relation Age of Onset    Heart Disease Mother         arrythmia    Diabetes Father     Cancer Father     Heart Disease Father     Anesth Problems Neg Hx     Malig Hyperten Neg Hx     Hypotension Neg Hx     Malig Hypertherm Neg Hx     Pseudochol.  Deficiency Neg Hx           SOCIAL HISTORY       Social History     Socioeconomic History    Marital status:      Spouse name: None    Number of children: 1    Years of education: None    Highest education level: None   Occupational History    Occupation: retired: /   Social Needs    Financial resource strain: None    Food insecurity:     Worry: None     Inability: None    Transportation needs:     Medical: None     Non-medical: None   Tobacco Use    Smoking status: Former Smoker     Packs/day: 1.00     Years: 43.00     Pack years: 43.00     Types: Cigarettes     Last attempt to quit: 10/11/2011     Years since quittin.3    Smokeless tobacco: Never Used   Substance and Sexual Activity    Alcohol use: No     Comment: RARELY;  approx once every 6 months    Drug use: No    Sexual activity: Yes     Partners: Female   Lifestyle    Physical activity:     Days per week: None     Minutes per session: None    Stress: None   Relationships    Social connections:     Talks on phone: None     Gets together: None     Attends [employer] service: None     Active member of club or organization: None     Attends meetings of clubs or organizations: None     Relationship status: None    Intimate partner violence:     Fear of current or ex partner: None     Emotionally abused: None     Physically abused: None     Forced sexual activity: None   Other Topics Concern    None   Social History Narrative    Has 2 step children and 1 biologic child       SCREENINGS    [place] Coma Scale  Eye Opening: Spontaneous  Best Verbal Response: Oriented  Best Motor Response: Obeys commands  [place] Coma Scale Score: 15      PHYSICAL EXAM    (5+ for level 4, 8+ for level 5)     ED Triage Vitals   BP Temp Temp src Pulse Resp SpO2 Height Weight   19 1431 19 1430 -- 19 1430 19 1430 19 1430 -- 19 1430   (!) 150/75 97.8 °F (36.6 °C)  81 16 95 %  190 lb (86.2 kg)       Physical Exam   Constitutional: He appears well-developed and well-nourished. No distress. HENT:   Head: Normocephalic and atraumatic.    Right Ear: External ear normal.   Left Ear: External ear normal.   Nose: Nose normal.   Mouth/Throat: Oropharynx is clear and moist. No oropharyngeal exudate. Eyes: Pupils are equal, round, and reactive to light. Conjunctivae and EOM are normal. Right eye exhibits no discharge. Left eye exhibits no discharge. No scleral icterus. Neck: Normal range of motion. Neck supple. No JVD present. No tracheal deviation present. Cardiovascular: Normal rate, regular rhythm, normal heart sounds and intact distal pulses. Exam reveals no gallop and no friction rub. No murmur heard. Pulmonary/Chest: Effort normal and breath sounds normal. No respiratory distress. He has no wheezes. He has no rales. He exhibits no tenderness. Abdominal: Soft. He exhibits no distension. There is no tenderness. Musculoskeletal: Normal range of motion. He exhibits no edema, tenderness or deformity. Neurological: He is alert. No cranial nerve deficit. He exhibits normal muscle tone. Coordination normal.   Oriented to person and place. He recognizes that his wife is in front of him but he does not remember her name.  Leena Smithvioleta NIH Stroke Scale upon arrival is:    Level of Consciousness:  0 - alert; keenly responsive    LOC Questions:  0 - answers both questions correctly    LOC Commands:  0 - performs both tasks correctly    Best Gaze:  0 - normal    Visual Fields:  0 - no visual loss    Facial Palsy:  0 - normal symmetric movement    Motor-Arm-Left:  0 - no drift, limb holds 90 (or 45) degrees for full 10 seconds    Motor-Leg-Left:  0 - no drift; leg holds 30 degree position for full 5 seconds    Motor-Arm-Right:  0 - no drift, limb holds 90 (or 45) degrees for full 10 seconds    Motor-Leg-Right:  0 - no drift; leg holds 30 degree position for full 5 seconds    Limb Ataxia:  0 - absent    Sensory:  0 - normal; no sensory loss    Best Language:  0 - no aphasia, normal    Dysarthria:  0 - normal    Extinction and Inattention:  0 - no abnormality    NIHSS = 0    No acute focal neurologic findings but he is slow to answer and follow commands     Skin: Skin is warm and dry. No rash noted. He is not diaphoretic. No erythema. No pallor. Psychiatric: He has a normal mood and affect. His behavior is normal. Judgment and thought content normal.   Nursing note and vitals reviewed. DIAGNOSTIC RESULTS     EKG (Per Emergency Physician):   EKG interpretation by ED physician: Normal axis,  A. Fib at 77 bpm.  Poor EKG quality. Difficult to determine if there ischemic changes. However there is no ST elevation. RADIOLOGY (Per Emergency Physician): Interpretation per the Radiologist below, if available at the time of this note:  Ct Head Wo Contrast    Result Date: 3/2/2019  EXAMINATION: CT OF THE HEAD WITHOUT CONTRAST  3/2/2019 4:20 pm TECHNIQUE: CT of the head was performed without the administration of intravenous contrast. Dose modulation, iterative reconstruction, and/or weight based adjustment of the mA/kV was utilized to reduce the radiation dose to as low as reasonably achievable. COMPARISON: 06/02/2018 HISTORY: ORDERING SYSTEM PROVIDED HISTORY: ams TECHNOLOGIST PROVIDED HISTORY: Has a \"code stroke\" or \"stroke alert\" been called? ->No Ordering Physician Provided Reason for Exam: Altered Mental Status (Patient was not his normal self yesterday per wife. Symptoms were worse this morning when he woke up. They state he does have cirrhosis. Patient c/o feeling shaky. Wife states he had trouble walking this morning) Acuity: Acute Type of Exam: Initial FINDINGS: BRAIN/VENTRICLES: There is no acute intracranial hemorrhage, mass effect or midline shift. No abnormal extra-axial fluid collection. The gray-white differentiation is maintained without evidence of an acute infarct. There is prominence of the ventricles and sulci due to global parenchymal volume loss. There are nonspecific areas of hypoattenuation within the periventricular and subcortical white matter, which likely represent chronic microvascular ischemic change.  ORBITS: The visualized portion of the orbits demonstrate no acute abnormality. SINUSES: The visualized paranasal sinuses and mastoid air cells demonstrate no acute abnormality. SOFT TISSUES/SKULL: No acute abnormality of the visualized skull or soft tissues. No acute intracranial abnormality. Xr Chest Portable    Result Date: 3/2/2019  EXAMINATION: SINGLE XRAY VIEW OF THE CHEST 3/2/2019 2:53 pm COMPARISON: Chest 06/02/2018 HISTORY: ORDERING SYSTEM PROVIDED HISTORY: altered TECHNOLOGIST PROVIDED HISTORY: Reason for exam:->altered Ordering Physician Provided Reason for Exam: Altered Mental Status (Patient was not his normal self yesterday per wife. Symptoms were worse this morning when he woke up. They state he does have cirrhosis. Patient c/o feeling shaky. Wife states he had trouble walking this morning) Acuity: Unknown Type of Exam: Unknown FINDINGS: The cardiac silhouette is enlarged. Small hiatal hernia is evident. Calcifications involving the aorta reflect atherosclerosis. The mediastinal and hilar silhouettes appear unremarkable. Vascular engorgement and cephalization is demonstrated with bilateral peribronchial cuffing and perivascular haziness. No consolidation or pleural effusion is evident. No pneumothorax is seen. No acute osseous abnormality is identified. Previous CABG. 1. Pulmonary findings suspicious for mild congestive heart failure, however this appearance could be physiologic, related to poor inspiration. 2. Calcific atherosclerosis aorta. 3. Cardiomegaly. 4. Previous CABG. Cta Neck W Contrast    Result Date: 3/2/2019  EXAMINATION: CTA OF THE HEAD WITH CONTRAST; CTA OF THE NECK 3/2/2019 4:28 pm; 3/2/2019 4:29 pm: TECHNIQUE: CTA of the head/brain was performed with the administration of intravenous contrast. Multiplanar reformatted images are provided for review. MIP images are provided for review.  Dose modulation, iterative reconstruction, and/or weight based adjustment of the mA/kV was utilized to reduce the radiation dose to as low as reasonably achievable.; CTA of the neck was performed with the administration of intravenous contrast. Multiplanar reformatted images are provided for review. MIP images are provided for review. Stenosis of the internal carotid arteries measured using NASCET criteria. Dose modulation, iterative reconstruction, and/or weight based adjustment of the mA/kV was utilized to reduce the radiation dose to as low as reasonably achievable. COMPARISON: None. HISTORY: ORDERING SYSTEM PROVIDED HISTORY: ams TECHNOLOGIST PROVIDED HISTORY: Has a \"code stroke\" or \"stroke alert\" been called? ->No Ordering Physician Provided Reason for Exam: Altered Mental Status (Patient was not his normal self yesterday per wife. Symptoms were worse this morning when he woke up. They state he does have cirrhosis. Patient c/o feeling shaky. Wife states he had trouble walking this morning) Acuity: Acute Type of Exam: Initial FINDINGS: CTA NECK: AORTIC ARCH/ARCH VESSELS: Vascular calcifications are noted. Otherwise, limited evaluation of the aorta and great vessel origins demonstrates no focal abnormality. Great vessel origin detail is slightly limited due to artifact CAROTID ARTERIES: Left: Proximal left common carotid artery is limited due to artifact. Left common carotid artery is patent. Calcifications are noted along the margin of the distal left common carotid artery extending to the bifurcation. Low-density plaque and calcifications are noted at the origin of the left internal carotid artery. There is focal narrowing of the origin measuring approximately 75%. No additional left internal carotid artery narrowing is noted to the skull base level. Right: Common carotid artery is patent. Small calcifications are noted along the medial aspect of the distal common carotid artery. Calcifications are noted at the bifurcation and proximal right internal carotid artery as well.  A small amount of low-density plaque is noted along the margins of the proximal right internal carotid artery. There is minimal, less than 20%, short-segment narrowing of the proximal right internal carotid artery. VERTEBRAL ARTERIES: Proximal vertebral artery detail is limited due to a large amount of artifact. Both vertebral arteries are otherwise patent without focal significant narrowing SOFT TISSUES: There is no soft tissue mass or fluid collection. Spinal canal detail is limited. Multilevel disc bulging is suggested. BONES: Degenerative changes throughout the cervical spine are noted. Calcifications are noted in the left lung apex. A few additional punctate noncalcified nodular densities are also noted. CTA HEAD: ANTERIOR CIRCULATION: Cavernous carotid segment calcifications are noted bilaterally. There is no focal narrowing of the distal internal carotid arteries. Both anterior cerebral arteries are patent without focal significant narrowing. No focal significant middle cerebral artery narrowing is noted. There is relative decreased number of peripheral branches in the posterosuperior aspect of the left MCA distribution compared to the right. POSTERIOR CIRCULATION: Distal vertebral artery calcifications are noted bilaterally. There is no focal significant associated narrowing. Basilar artery is patent without focal narrowing. Posterior cerebral arteries are patent without focal significant narrowing. BRAIN: Brain detail is limited due to artifact. There is no midline shift. Multifocal small-vessel ischemic changes are suggested bilaterally. There is questionable asymmetric low density in the left parietal vertex region. Focal narrowing of the proximal left internal carotid artery measuring approximately 75%. Minimal short-segment narrowing of the right proximal internal carotid artery measuring less than 20% No focal intracranial arterial narrowing is noted.   There is a suggestion of slight relative decrease in the overall number of peripheral small branches in the left posterior superior MCA distribution. There is no associated occlusive change, however. If stroke like symptoms continue, consider MRI. Cta Head W Contrast    Result Date: 3/2/2019  EXAMINATION: CTA OF THE HEAD WITH CONTRAST; CTA OF THE NECK 3/2/2019 4:28 pm; 3/2/2019 4:29 pm: TECHNIQUE: CTA of the head/brain was performed with the administration of intravenous contrast. Multiplanar reformatted images are provided for review. MIP images are provided for review. Dose modulation, iterative reconstruction, and/or weight based adjustment of the mA/kV was utilized to reduce the radiation dose to as low as reasonably achievable.; CTA of the neck was performed with the administration of intravenous contrast. Multiplanar reformatted images are provided for review. MIP images are provided for review. Stenosis of the internal carotid arteries measured using NASCET criteria. Dose modulation, iterative reconstruction, and/or weight based adjustment of the mA/kV was utilized to reduce the radiation dose to as low as reasonably achievable. COMPARISON: None. HISTORY: ORDERING SYSTEM PROVIDED HISTORY: ams TECHNOLOGIST PROVIDED HISTORY: Has a \"code stroke\" or \"stroke alert\" been called? ->No Ordering Physician Provided Reason for Exam: Altered Mental Status (Patient was not his normal self yesterday per wife. Symptoms were worse this morning when he woke up. They state he does have cirrhosis. Patient c/o feeling shaky. Wife states he had trouble walking this morning) Acuity: Acute Type of Exam: Initial FINDINGS: CTA NECK: AORTIC ARCH/ARCH VESSELS: Vascular calcifications are noted. Otherwise, limited evaluation of the aorta and great vessel origins demonstrates no focal abnormality. Great vessel origin detail is slightly limited due to artifact CAROTID ARTERIES: Left: Proximal left common carotid artery is limited due to artifact. Left common carotid artery is patent.   Calcifications are noted along the margin of the distal left common carotid artery extending to the bifurcation. Low-density plaque and calcifications are noted at the origin of the left internal carotid artery. There is focal narrowing of the origin measuring approximately 75%. No additional left internal carotid artery narrowing is noted to the skull base level. Right: Common carotid artery is patent. Small calcifications are noted along the medial aspect of the distal common carotid artery. Calcifications are noted at the bifurcation and proximal right internal carotid artery as well. A small amount of low-density plaque is noted along the margins of the proximal right internal carotid artery. There is minimal, less than 20%, short-segment narrowing of the proximal right internal carotid artery. VERTEBRAL ARTERIES: Proximal vertebral artery detail is limited due to a large amount of artifact. Both vertebral arteries are otherwise patent without focal significant narrowing SOFT TISSUES: There is no soft tissue mass or fluid collection. Spinal canal detail is limited. Multilevel disc bulging is suggested. BONES: Degenerative changes throughout the cervical spine are noted. Calcifications are noted in the left lung apex. A few additional punctate noncalcified nodular densities are also noted. CTA HEAD: ANTERIOR CIRCULATION: Cavernous carotid segment calcifications are noted bilaterally. There is no focal narrowing of the distal internal carotid arteries. Both anterior cerebral arteries are patent without focal significant narrowing. No focal significant middle cerebral artery narrowing is noted. There is relative decreased number of peripheral branches in the posterosuperior aspect of the left MCA distribution compared to the right. POSTERIOR CIRCULATION: Distal vertebral artery calcifications are noted bilaterally. There is no focal significant associated narrowing. Basilar artery is patent without focal narrowing.   Posterior cerebral arteries are patent without focal significant narrowing. BRAIN: Brain detail is limited due to artifact. There is no midline shift. Multifocal small-vessel ischemic changes are suggested bilaterally. There is questionable asymmetric low density in the left parietal vertex region. Focal narrowing of the proximal left internal carotid artery measuring approximately 75%. Minimal short-segment narrowing of the right proximal internal carotid artery measuring less than 20% No focal intracranial arterial narrowing is noted. There is a suggestion of slight relative decrease in the overall number of peripheral small branches in the left posterior superior MCA distribution. There is no associated occlusive change, however. If stroke like symptoms continue, consider MRI.        LABS:  Labs Reviewed   CBC WITH AUTO DIFFERENTIAL - Abnormal; Notable for the following components:       Result Value    WBC 3.1 (*)     RDW 16.4 (*)     Platelets 57 (*)     Lymphocytes # 0.9 (*)     All other components within normal limits    Narrative:     Performed at:  [facility] [address] [facility] [place]   Phone [phone]   COMPREHENSIVE METABOLIC PANEL - Abnormal; Notable for the following components:    Glucose 237 (*)     CREATININE 0.7 (*)     Albumin/Globulin Ratio 1.0 (*)     Total Bilirubin 2.6 (*)     Alkaline Phosphatase 163 (*)     All other components within normal limits    Narrative:     Performed at:  [facility] [address], [facility] [employer]   Phone [phone]   AMMONIA - Abnormal; Notable for the following components:    Ammonia 157 (*)     All other components within normal limits    Narrative:     Orinda Najjar  [facility] tel. [phone],  Chemistry results called to and read back by Dipesh GRIFFIN, 03/02/2019  16:28, by [facility]  Performed at:  [facility]  [address], [facility] [employer] Phone [phone]   URINE RT REFLEX TO CULTURE - Abnormal; Notable for the following components:    Clarity, UA CLOUDY (*)     Glucose, Ur >=1000 (*)     Urobilinogen, Urine 4.0 (*)     All other components within normal limits    Narrative:     Performed at:  [facility] [email or web address], [address]   Phone [phone]   PROTIME-INR - Abnormal; Notable for the following components:    Protime 13.3 (*)     INR 1.17 (*)     All other components within normal limits    Narrative:     Performed at:  [facility] [email or web address], [address]   Phone [phone]   POCT GLUCOSE - Abnormal; Notable for the following components:    POC Glucose 231 (*)     All other components within normal limits    Narrative:     Performed at:  [facility] [email or web address], [address]   Phone [phone]   POCT GLUCOSE - Normal   LACTIC ACID, PLASMA    Narrative:     Performed at:  [facility] [address], [address]   Phone [phone]   TROPONIN    Narrative:     Performed at:  [facility] [address], [address]   Phone [phone]   APTT    Narrative:     Performed at:  [facility] [address], [employer]   Phone [phone]   MICROSCOPIC URINALYSIS    Narrative:     Performed at:  [facility] [address], [facility]   Phone [phone]       All other labs were within normal range or not returned as of this dictation.     EMERGENCY DEPARTMENT COURSE and DIFFERENTIAL DIAGNOSIS/MDM:   Vitals:    Vitals:    03/02/19 1616 03/02/19 1715 03/02/19 1730 03/02/19 1745   BP:  139/76 116/63 113/69   Pulse: 74 79 82 79   Resp: 18 14 16 15   Temp:       SpO2: 95% 94% 95% 94%   Weight:           Medications iopamidol (ISOVUE-370) 76 % injection 75 mL (75 mLs Intravenous Given 3/2/19 1488)   lactulose (CHRONULAC) 10 GM/15ML solution 20 g (20 g Oral Given 3/2/19 1751)       MDM. Cardiac workup, CTA head and neck, CT head, ammonia level, urine ordered. There is no focal neurologic deficits so I do not feel that a stroke alert is currently indicated. Ct head non-acute. cta shows 75% stenosis L carotid. Pt w elevated ammonia. Lactulose ordered and pt to be admitted. CONSULTS:  IP CONSULT TO HOSPITALIST    PROCEDURES:  Unless otherwise noted below, none     Procedures    FINAL IMPRESSION      1. Altered mental status, unspecified altered mental status type    2. Hepatic encephalopathy (HCC)    3. Stenosis of left carotid artery          DISPOSITION/PLAN   DISPOSITION        PATIENT REFERRED TO:  No follow-up provider specified. DISCHARGE MEDICATIONS:  New Prescriptions    No medications on file          (Please note:  Portions of this note were completed with a voice recognition program. Efforts were made to edit the dictations but occasionally words and phrases are mis-transcribed.)    Form v2016. [id number].[id number]    Angie John DO (electronically signed)  Emergency Medicine Provider              Tushar Levin DO  03/02/19 [id number]

## 2019-03-02 NOTE — ED NOTES
This RN called to inquire about ammonia level - they state they are running it now.       Ina Dominguez, GABY  03/02/19 [id number]

## 2019-03-02 NOTE — ED NOTES
MD at bedside to eval d/t pts altered mental status, MD stated he feels pts altered mental is d/t hepatic reasons and not CVA.       Shaw Morrison RN  03/02/19 [id number]

## 2019-03-03 VITALS
DIASTOLIC BLOOD PRESSURE: 70 MMHG | SYSTOLIC BLOOD PRESSURE: 125 MMHG | HEIGHT: 69 IN | OXYGEN SATURATION: 92 % | HEART RATE: 76 BPM | WEIGHT: 206.3 LBS | RESPIRATION RATE: 16 BRPM | BODY MASS INDEX: 30.56 KG/M2 | TEMPERATURE: 98.3 F

## 2019-03-03 LAB
AMMONIA: 42 UMOL/L (ref 16–60)
EKG ATRIAL RATE: 77 BPM
EKG DIAGNOSIS: NORMAL
EKG Q-T INTERVAL: 398 MS
EKG QRS DURATION: 66 MS
EKG QTC CALCULATION (BAZETT): 450 MS
EKG R AXIS: 33 DEGREES
EKG T AXIS: -48 DEGREES
EKG VENTRICULAR RATE: 77 BPM
ESTIMATED AVERAGE GLUCOSE: 257.5 MG/DL
GLUCOSE BLD-MCNC: 159 MG/DL (ref 70–99)
GLUCOSE BLD-MCNC: 278 MG/DL (ref 70–99)
HBA1C MFR BLD: 10.6 %
PERFORMED ON: ABNORMAL
PERFORMED ON: ABNORMAL

## 2019-03-03 PROCEDURE — 2580000003 HC RX 258: Performed by: FAMILY MEDICINE

## 2019-03-03 PROCEDURE — 96372 THER/PROPH/DIAG INJ SC/IM: CPT

## 2019-03-03 PROCEDURE — 36415 COLL VENOUS BLD VENIPUNCTURE: CPT

## 2019-03-03 PROCEDURE — 99223 1ST HOSP IP/OBS HIGH 75: CPT | Performed by: PSYCHIATRY & NEUROLOGY

## 2019-03-03 PROCEDURE — G0378 HOSPITAL OBSERVATION PER HR: HCPCS

## 2019-03-03 PROCEDURE — 6360000002 HC RX W HCPCS: Performed by: FAMILY MEDICINE

## 2019-03-03 PROCEDURE — 93010 ELECTROCARDIOGRAM REPORT: CPT | Performed by: INTERNAL MEDICINE

## 2019-03-03 PROCEDURE — 82140 ASSAY OF AMMONIA: CPT

## 2019-03-03 PROCEDURE — 6370000000 HC RX 637 (ALT 250 FOR IP): Performed by: FAMILY MEDICINE

## 2019-03-03 RX ORDER — POLYETHYLENE GLYCOL 3350 17 G/17G
17 POWDER, FOR SOLUTION ORAL DAILY PRN
Qty: 1530 G | Refills: 1 | Status: SHIPPED | OUTPATIENT
Start: 2019-03-03 | End: 2019-04-02

## 2019-03-03 RX ADMIN — NADOLOL 20 MG: 20 TABLET ORAL at 00:08

## 2019-03-03 RX ADMIN — CITALOPRAM HYDROBROMIDE 20 MG: 20 TABLET ORAL at 00:06

## 2019-03-03 RX ADMIN — Medication 10 ML: at 00:04

## 2019-03-03 RX ADMIN — LEVOTHYROXINE SODIUM 150 MCG: 150 TABLET ORAL at 07:00

## 2019-03-03 RX ADMIN — FUROSEMIDE 20 MG: 20 TABLET ORAL at 00:07

## 2019-03-03 RX ADMIN — RIFAXIMIN 550 MG: 550 TABLET ORAL at 09:47

## 2019-03-03 RX ADMIN — RIFAXIMIN 550 MG: 550 TABLET ORAL at 00:05

## 2019-03-03 RX ADMIN — LACTULOSE 20 G: 20 SOLUTION ORAL at 09:47

## 2019-03-03 RX ADMIN — ENOXAPARIN SODIUM 40 MG: 40 INJECTION SUBCUTANEOUS at 00:04

## 2019-03-03 RX ADMIN — Medication 10 ML: at 09:48

## 2019-03-03 RX ADMIN — LACTULOSE 20 G: 20 SOLUTION ORAL at 00:05

## 2019-03-03 RX ADMIN — INSULIN LISPRO 2 UNITS: 100 INJECTION, SOLUTION INTRAVENOUS; SUBCUTANEOUS at 09:41

## 2019-03-03 RX ADMIN — ASPIRIN 81 MG: 81 TABLET, COATED ORAL at 00:06

## 2019-03-03 ASSESSMENT — PAIN SCALES - GENERAL
PAINLEVEL_OUTOF10: 0

## 2019-03-03 NOTE — CONSULTS
NEUROLOGY CONSULTATION     Patient's Name :   Sonido Nguyen        YOB: 1945                    Date of Consultation : 3/3/2019 10:50 AM    Referring Physician :  Ashely Wu MD    Reason for Consultation :    Altered mental status    HISTORY OF PRESENT ILLNESS :    Adrian Melendez is a 72 y.o. male   History was obtained from patient and from dictations in the chart. Additional history was obtained from the patient's wife at the bedside. Patient was admitted with acute onset of confusion. He became very lethargic and was unable to follow commands. He was also unable to function normally. He was brought to the hospital and was found to have a severely elevated ammonia level. Patient has known non-alcoholic liver disease. Patient states that he did not miss any lactulose doses but his wife thinks it may be possible. Patient was started back on the lactulose and he is feeling much better today. His wife thinks that he is almost close to baseline. Patient denies any focal weakness numbness vertigo or diplopia. In the emergency room yesterday patient had a CT angiogram which showed 75% stenosis in the left internal carotid artery. Patient also has known atrial fibrillation but is not on anticoagulation since he previously had  esophageal variceal bleeding      REVIEW OF SYSTEMS  Patient denies any chest pain palpitations breathlessness abdominal pain fever or weight loss. Rest of the 14 system review was reviewed and was negative except for the symptoms noted above.     Past Medical History:   Diagnosis Date    ANEMIA     CAD (coronary artery disease) 10/12/2011    Depression     Diabetes mellitus ([facility] [address]     Esophageal bleed, non-variceal 11/23/2012    Hypertension     HYPOTHYROIDISM     Liver disease 1/6/2012    Mixed hyperlipidemia     NEUROPATHY     Non-ST elevation MI (NSTEMI) ([facility] [address] 11/21/2012    Paroxysmal atrial fibrillation ([facility] [address] 2012    Pneumonia     Thyroid disease      Family History   Problem Relation Age of Onset    Heart Disease Mother         arrythmia    Diabetes Father     Cancer Father     Heart Disease Father     Anesth Problems Neg Hx     Malig Hyperten Neg Hx     Hypotension Neg Hx     Malig Hypertherm Neg Hx     Pseudochol.  Deficiency Neg Hx      Social History     Socioeconomic History    Marital status:      Spouse name: None    Number of children: 1    Years of education: None    Highest education level: None   Occupational History    Occupation: retired: /   Social Needs    Financial resource strain: None    Food insecurity:     Worry: None     Inability: None    Transportation needs:     Medical: None     Non-medical: None   Tobacco Use    Smoking status: Former Smoker     Packs/day: 1.00     Years: 43.00     Pack years: 43.00     Types: Cigarettes     Last attempt to quit: 10/11/2011     Years since quittin.3    Smokeless tobacco: Never Used   Substance and Sexual Activity    Alcohol use: No     Comment: RARELY;  approx once every 6 months    Drug use: No    Sexual activity: Yes     Partners: Female   Lifestyle    Physical activity:     Days per week: None     Minutes per session: None    Stress: None   Relationships    Social connections:     Talks on phone: None     Gets together: None     Attends Faith service: None     Active member of club or organization: None     Attends meetings of clubs or organizations: None     Relationship status: None    Intimate partner violence:     Fear of current or ex partner: None     Emotionally abused: None     Physically abused: None     Forced sexual activity: None   Other Topics Concern    None   Social History Narrative    Has 2 step children and 1 biologic child     Current Facility-Administered Medications   Medication Dose Route Frequency Provider Last Rate Last Dose    levothyroxine (SYNTHROID) tablet 150 mcg  150 mcg Oral Daily Luis Christine MD   150 mcg at 03/03/19 0700    rifaximin (XIFAXAN) tablet 550 mg  550 mg Oral BID Luis Christine MD   550 mg at 03/03/19 0947    lactulose (CHRONULAC) 10 GM/15ML solution 20 g  20 g Oral TID Luis Christine MD   20 g at 03/03/19 0947    sodium chloride flush 0.9 % injection 10 mL  10 mL Intravenous 2 times per day Luis Christine MD   10 mL at 03/03/19 0948    sodium chloride flush 0.9 % injection 10 mL  10 mL Intravenous PRN Luis Christine MD        magnesium hydroxide (MILK OF MAGNESIA) 400 MG/5ML suspension 30 mL  30 mL Oral Daily PRN Luis Christine MD        ondansetron [facility]) injection 4 mg  4 mg Intravenous Q6H PRN Luis Christine MD        glucose (GLUTOSE) 40 % oral gel 15 g  15 g Oral PRN Luis Christine MD        dextrose 50 % solution 12.5 g  12.5 g Intravenous PRN Luis Christine MD        glucagon (rDNA) injection 1 mg  1 mg Intramuscular PRN Luis Christine MD        dextrose 5 % solution  100 mL/hr Intravenous PRN Luis Christine MD        insulin lispro (HUMALOG) injection pen 0-12 Units  0-12 Units Subcutaneous TID WC Luis Christine MD   2 Units at 03/03/19 0941    insulin lispro (HUMALOG) injection pen 0-6 Units  0-6 Units Subcutaneous Nightly Luis Christine MD   1 Units at 03/02/19 2356    traMADol (ULTRAM) tablet 50 mg  50 mg Oral Q6H PRN Luis Christine MD        nadolol (CORGARD) tablet 20 mg  20 mg Oral Daily Meryl Powell MD   20 mg at 03/03/19 0008    furosemide (LASIX) tablet 20 mg  20 mg Oral Daily Luis Christine MD   20 mg at 03/03/19 0007    aspirin EC tablet 81 mg  81 mg Oral Daily Meryl Powell MD   81 mg at 03/03/19 0006    citalopram (CELEXA) tablet 20 mg  20 mg Oral Daily Luis Christine MD   20 mg at 03/03/19 0006     Allergies   Allergen Reactions    Cephalexin Hives    Ciprocinonide [Fluocinolone]     Quinolones Other (See Comments)     Confusion      Statins Other (See Comments)     Liver toxicity PHYSICAL EXAMINATION:  /72   Pulse 79   Temp 98.1 °F (36.7 °C) (Oral)   Resp 17   Ht 5' 9\" (1.753 m)   Wt 206 lb 4.8 oz (93.6 kg)   SpO2 93%   BMI 30.47 kg/m²   Appearance: Well appearing, well nourished and in no distress  Mental Status Exam: Patient is alert, oriented to person, place and time. Recent and remote memory is normal  Fund of Knowledge is normal  Attention/concentration is normal.   Speech : No dysarthria  Language : No aphasia  Funduscopic Exam: sharp disc margins  Cranial Nerves:   II: Visual fields:  Full to confrontation  III: Pupils:  equal, round, reactive to light  III,IV,VI: Extra Ocular Movements are intact. No nystagmus  V: Facial sensation is intact to pin prick and light touch  VII: Facial strength and movements: intact and symmetric smile,cheek puffing and eyebrow elevation  VIII: Hearing:  Intact to finger rub bilaterally  IX: Palate  elevation is symmetric  XI: Shoulder shrug is intact  XII: Tongue movements are normal  Motor:  Muscle tone and bulk are normal.   Strength is symmetrical 5/5 in all four extremities. Sensory: Decreased to light touch and  pin prick in bilateral distal lower extremities  Coordination:  Normal  Finger to Nose and Heel to Shin bilaterally    . Reflexes:  DTR +1 and symmetric bilaterally in the upper extremities and diminished in the lower extremities  Plantar response: Flexor bilaterally  Gait: Gait and station is normal.   Romberg: negative  Vascular: No carotid bruit bilaterally        DATA:  LABS:  General Labs:    CBC:   Lab Results   Component Value Date    WBC 3.1 03/02/2019    RBC 4.57 03/02/2019    HGB 14.8 03/02/2019    HCT 45.1 03/02/2019    MCV 98.6 03/02/2019    MCH 32.4 03/02/2019    MCHC 32.9 03/02/2019    RDW 16.4 03/02/2019    PLT 57 03/02/2019    MPV 8.3 03/02/2019     BMP:    Lab Results   Component Value Date     03/02/2019    K 4.4 03/02/2019    K 4.7 06/03/2018     03/02/2019    CO2 25 03/02/2019    BUN 13 03/02/2019    LABALBU 3.5 03/02/2019    CREATININE 0.7 03/02/2019    CALCIUM 9.0 03/02/2019    GFRAA >60 03/02/2019    GFRAA >60 02/26/2013    LABGLOM >60 03/02/2019    LABGLOM 79 03/06/2012    GLUCOSE 237 03/02/2019    GLUCOSE 123 03/06/2012     RADIOLOGY REVIEW:  I have reviewed radiology image(s) and reports(s) of:  CT head and CT angiogram    IMPRESSION :  Hepatic encephalopathy, now improved  Asymptomatic 75% stenosis in the left internal carotid artery  Chronic atrial fibrillation, not on anticoagulation because of previous GI bleeding  Thrombocytopenia  Patient Active Problem List   Diagnosis    Diabetes mellitus ([facility] [address]    CAD (coronary artery disease)    HTN (hypertension)    HLD (hyperlipidemia)    DMITRIY (obstructive sleep apnea)    Depression    Liver disease    Cholelithiasis    Thrombocytopenia (HCC)    Esophageal bleeding not due to varices    Pancytopenia (HCC)    Warfarin-induced coagulopathy (HCC)    Hepatic encephalopathy (HCC)    Acute encephalopathy    Increased ammonia level    Cirrhosis of liver without ascites (HCC)    Generalized weakness    Hypotension, postural     RECOMMENDATIONS :  Discussed at length with patient and his wife  MRI brain may be useful to decide if he has had any previous stroke on the left hemisphere and to decide if the left internal carotid stenosis is symptomatic or asymptomatic lesion at this point. Patient may also benefit from watchman  Procedure (atrial appendage closure) since patient has chronic atrial fibrillation and cannot take anticoagulation  Thrombocytopenia probably due to liver disease but needs to be closely monitored        Please note a portion of this chart was generated using dragon dictation software. Although every effort was made to ensure the accuracy of this automated transcription, some errors in transcription may have occurred.

## 2019-03-03 NOTE — PROGRESS NOTES
Messaged MD regarding pt's platelet count of 57 and low BP reading. questioned if Lasix, Nadolol, ASA 81 mg and  Lovenox should be held. Notified to hold none of the medications.

## 2019-03-03 NOTE — H&P
HOSPITALISTS HISTORY AND PHYSICAL    3/2/2019 8:07 pm     Patient Information:  Ludwin Sommer is a 72 y.o. male [id number]  PCP:  Preethi Pugh MD (Tel: [phone] )    Chief complaint:    Chief Complaint   Patient presents with    Altered Mental Status     Patient was not his normal self yesterday per wife. Symptoms were worse this morning when he woke up. They state he does have cirrhosis. Patient c/o feeling shaky. Wife states he had trouble walking this morning        History of Present Illness:  Karli Poon is a 72 y.o. male with medical h/o Non alcoholic liver cirrhosis ,  CAD, HTN was brought in by the family d/t acute onset confusion , generalized weakness, decreased appetite and lethargy. The wife at the bedside provided with the history . She thinks he might have missed her lactulose dose since he has been ill. The pt is found to have hyperammonemia with ammonia level of 157. He has been given one dose of lactulose. He is a little more conversant now but still is quiet lethargic and did not get the year correct . He is oriented to place and person. CTA head and neck showed 75 % stenosis of carotid artery . CT head is neg for hemorrhage. There is no fever chest pain , abdominal pain , n/v/d/c. Urinary complaints  The pt follows up with dr. Huerta Speaks:   Constitutional: Negative for fever,chills or night sweats  ENT: Negative for rhinorrhea, epistaxis, hoarseness, sore throat. Respiratory: Negative for shortness of breath,wheezing  Cardiovascular: Negative for chest pain, palpitations   Gastrointestinal: Negative for nausea, vomiting, diarrhea  Genitourinary: Negative for polyuria, dysuria   Hematologic/Lymphatic: Negative for bleeding tendency, easy bruising  Musculoskeletal: Negative for myalgias and arthralgias  Neurologic: +ve for confusion,dysarthria.   Skin: Negative for itching,rash  Psychiatric: Negative FREESTYLE TEST STRIPS strip TEST TWO TO THREE TIMES DAILY 100 strip 2     Current Facility-Administered Medications   Medication Dose Route Frequency Provider Last Rate Last Dose    aspirin EC tablet 81 mg  81 mg Oral Daily Renae Blake MD        citalopram (CELEXA) tablet 20 mg  20 mg Oral Daily Renae Blake MD        furosemide (LASIX) tablet 20 mg  20 mg Oral Daily Renae Blake MD       [facility] ON 3/3/2019] levothyroxine (SYNTHROID) tablet 150 mcg  150 mcg Oral Daily Renae Blake MD        nadolol (CORGARD) tablet 20 mg  20 mg Oral Daily Renae Blake MD        rifaximin Dorrine Greg) tablet 550 mg  550 mg Oral BID Renae Blake MD        lactulose (CHRONULAC) 10 GM/15ML solution 20 g  20 g Oral TID Renae Blake MD        sodium chloride flush 0.9 % injection 10 mL  10 mL Intravenous 2 times per day Renae Blake MD        sodium chloride flush 0.9 % injection 10 mL  10 mL Intravenous PRN Renae Blake MD        magnesium hydroxide (MILK OF MAGNESIA) 400 MG/5ML suspension 30 mL  30 mL Oral Daily PRN Renae Blake MD        ondansetron (ZOFRAN) injection 4 mg  4 mg Intravenous Q6H PRN Renae Blake MD        enoxaparin (LOVENOX) injection 40 mg  40 mg Subcutaneous Nightly Meryl Powell MD        glucose (GLUTOSE) 40 % oral gel 15 g  15 g Oral PRN Renae Blake MD        dextrose 50 % solution 12.5 g  12.5 g Intravenous PRN Renae Blake MD        glucagon (rDNA) injection 1 mg  1 mg Intramuscular PRN Renae Blake MD        dextrose 5 % solution  100 mL/hr Intravenous PRN Renae Blake MD       [facility] [START ON 3/3/2019] insulin lispro (HUMALOG) injection pen 0-12 Units  0-12 Units Subcutaneous TID  Meryl Doan MD        insulin lispro (HUMALOG) injection pen 0-6 Units  0-6 Units Subcutaneous Nightly Renae Blake MD        traMADol Cleatis Speck) tablet 50 mg  50 mg Oral Q6H PRN Renae Blake MD           Allergies:   Allergies   Allergen Reactions    Cephalexin Hives    Ciprocinonide [Fluocinolone]  Quinolones Other (See Comments)     Confusion      Statins Other (See Comments)     Liver toxicity        Social History:   reports that he quit smoking about 7 years ago. His smoking use included cigarettes. He has a 43.00 pack-year smoking history. He has never used smokeless tobacco. He reports that he does not drink alcohol or use drugs. Family History:  family history includes Cancer in his father; Diabetes in his father; Heart Disease in his father and mother. ,     Physical Exam:  BP (!) 103/48   Pulse 73   Temp 97.8 °F (36.6 °C)   Resp 14   Ht 5' 9\" (1.753 m)   Wt 206 lb 4.8 oz (93.6 kg)   SpO2 93%   BMI 30.47 kg/m²     General appearance:  Appears comfortable. lethargic  Eyes: Sclera clear, pupils equal  ENT: Moist mucus membranes, no thrush. Trachea midline. Cardiovascular: Regular rhythm, normal S1, S2. No murmur, gallop, rub. No edema in lower extremities  Respiratory: Clear to auscultation bilaterally, no wheeze, good inspiratory effort  Gastrointestinal: Abdomen soft, non-tender, not distended, normal bowel sounds  Musculoskeletal: No cyanosis in digits, neck supple  Neurology: Cranial nerves grossly intact. Alert and oriented in , place and person only. No speech or motor deficits  Psychiatry: Appropriate affect.  Not agitated  Skin: Warm, dry, normal turgor, no rash    Labs:  CBC:   Lab Results   Component Value Date    WBC 3.1 03/02/2019    RBC 4.57 03/02/2019    HGB 14.8 03/02/2019    HCT 45.1 03/02/2019    MCV 98.6 03/02/2019    MCH 32.4 03/02/2019    MCHC 32.9 03/02/2019    RDW 16.4 03/02/2019    PLT 57 03/02/2019    MPV 8.3 03/02/2019     BMP:    Lab Results   Component Value Date     03/02/2019    K 4.4 03/02/2019    K 4.7 06/03/2018     03/02/2019    CO2 25 03/02/2019    BUN 13 03/02/2019    CREATININE 0.7 03/02/2019    CALCIUM 9.0 03/02/2019    GFRAA >60 03/02/2019    GFRAA >60 02/26/2013    LABGLOM >60 03/02/2019    LABGLOM 79 03/06/2012    GLUCOSE 237 03/02/2019 GLUCOSE 123 03/06/2012       Chest Xray:   EKG:         Problem List  Active Problems:    Hepatic encephalopathy (HCC)  Resolved Problems:    * No resolved hospital problems. *        Assessment/Plan:         1. Acute hepatic encephalopathy   Known h/o non alcoholic liver cirrhosis  Ammonia level 157  UA is normal  Cont lactulose  Cont Rifampin, Lasix  GI consulted    Left Carotid artery narrowing seen on CTA head / neck  Cont asa  MRI brain ordered to r/out CVA  Consider neuro consult    Elevated liver enzymes with hyperbilirubinemia  Chronic at baseline      Admit as inpatient I anticipate hospitalization spanning more than two midnights for investigation and treatment of the above medically necessary diagnoses.       Danielle Cuadra MD    3/2/2019

## 2019-03-03 NOTE — DISCHARGE SUMMARY
Hospital Medicine Discharge Summary    Patient: Rohit Lake     Gender: male  : 1945   Age: 72 y.o. MRN: [id number]    Admitting Physician: Sebastien Fowler MD  Discharge Physician: Erika Giordano MD     Code Status: Full Code     Admit Date: 3/2/2019   Discharge Date:   3/3/2019    Disposition:  Home    Discharge Diagnoses: Active Hospital Problems    Diagnosis Date Noted    Hepatic encephalopathy ([facility] [address] [K72.90] 2018       Follow-up appointments:  two weeks    Outpatient to do list: discuss the MRI and possible need with your PCP. See Electrophysiology and talk about watchman  Follow up with GI re: increase lactulose ? Condition at Discharge:  Stable    Hospital Course:   Jian Dodd is a 72 y.o. male with medical h/o Non alcoholic liver cirrhosis ,  CAD, HTN was brought in by the family d/t acute onset confusion , generalized weakness, decreased appetite and lethargy. The wife at the bedside provided with the history . She thinks he might have missed her lactulose dose since he has been ill. The pt is found to have hyperammonemia with ammonia level of 157. He has been given one dose of lactulose. He is a little more conversant now but still is quiet lethargic and did not get the year correct . He is oriented to place and person. CTA head and neck showed 75 % stenosis of carotid artery . CT head is neg for hemorrhage. There is no fever chest pain , abdominal pain , n/v/d/c. Urinary. Patient was worked up for possible stroke and was also noted to have an elevated ammonia level. Today with increased lactulose his ammonia level is decreased and his is back to his baseline. He is noted to be in afib and with the CT findings an MRI will be helpful in determining if intervention on the carotid would be appropriate. He has severe liver disease and cannot be anticoagulated ( he has A.fib). His platelets are chronically low due to the liver disease so he cannot be on DAP.  Because if his lver disease he cannot be on aggressive statin therapy. So he is limited in what can be done to treat he  Apparent asymptomatic carotid stenosis with medical treatment. I have also referred him to EP as he is a candidate for the Watchman procedure as he cannot be anticoagulated for a.fib given hx of variceal bleed in the past.    He is doing well today. He is at his base line and stable to d/c home. Discharge Medications:   Current Discharge Medication List      START taking these medications    Details   polyethylene glycol (GLYCOLAX) powder Take 17 g by mouth daily as needed (needs to have 2-3 loose BMs  day)  Qty: 1530 g, Refills: 1           Current Discharge Medication List        Current Discharge Medication List      CONTINUE these medications which have NOT CHANGED    Details   MILK THISTLE PO Take by mouth      cyanocobalamin 1000 MCG/ML injection INJECT 1 ML INTRAMUSCULARLY EVERY 30 DAYS  Qty: 1 vial, Refills: 9      B-D 3CC LUER-ALEXIS SYR 25GX1\" 25G X 1\" 3 ML MISC USE WITH VITAMIN B12 INJECTIONS  Qty: 12 each, Refills: 3      lactulose (CHRONULAC) 10 GM/15ML solution Take 30 mLs by mouth nightly  Qty: 1 Bottle, Refills: 3      levothyroxine (SYNTHROID) 150 MCG tablet Take 1 tablet by mouth Daily  Qty: 90 tablet, Refills: 3      XIFAXAN 550 MG tablet       glipiZIDE (GLUCOTROL) 5 MG tablet Take 5 mg by mouth daily. vitamin D (ERGOCALCIFEROL) 400 UNITS CAPS Take 400 Units by mouth daily. Omega-3 Fatty Acids (FISH OIL) 500 MG CAPS Take  by mouth. aspirin 81 MG tablet Take 81 mg by mouth daily. nadolol (CORGARD) 20 MG tablet Take 1 tablet by mouth daily. Qty: 90 tablet, Refills: 3      citalopram (CELEXA) 20 MG tablet Take 1 tablet by mouth daily. Qty: 90 tablet, Refills: 3      furosemide (LASIX) 20 MG tablet Take 1 tablet by mouth daily.   Qty: 90 tablet, Refills: 3      FREESTYLE TEST STRIPS strip TEST TWO TO THREE TIMES DAILY  Qty: 100 strip, Refills: 2           Current Contrast    Result Date: 3/2/2019  EXAMINATION: CT OF THE HEAD WITHOUT CONTRAST  3/2/2019 4:20 pm TECHNIQUE: CT of the head was performed without the administration of intravenous contrast. Dose modulation, iterative reconstruction, and/or weight based adjustment of the mA/kV was utilized to reduce the radiation dose to as low as reasonably achievable. COMPARISON: 06/02/2018 HISTORY: ORDERING SYSTEM PROVIDED HISTORY: ams TECHNOLOGIST PROVIDED HISTORY: Has a \"code stroke\" or \"stroke alert\" been called? ->No Ordering Physician Provided Reason for Exam: Altered Mental Status (Patient was not his normal self yesterday per wife. Symptoms were worse this morning when he woke up. They state he does have cirrhosis. Patient c/o feeling shaky. Wife states he had trouble walking this morning) Acuity: Acute Type of Exam: Initial FINDINGS: BRAIN/VENTRICLES: There is no acute intracranial hemorrhage, mass effect or midline shift. No abnormal extra-axial fluid collection. The gray-white differentiation is maintained without evidence of an acute infarct. There is prominence of the ventricles and sulci due to global parenchymal volume loss. There are nonspecific areas of hypoattenuation within the periventricular and subcortical white matter, which likely represent chronic microvascular ischemic change. ORBITS: The visualized portion of the orbits demonstrate no acute abnormality. SINUSES: The visualized paranasal sinuses and mastoid air cells demonstrate no acute abnormality. SOFT TISSUES/SKULL: No acute abnormality of the visualized skull or soft tissues. No acute intracranial abnormality.      Xr Chest Portable    Result Date: 3/2/2019  EXAMINATION: SINGLE XRAY VIEW OF THE CHEST 3/2/2019 2:53 pm COMPARISON: Chest 06/02/2018 HISTORY: ORDERING SYSTEM PROVIDED HISTORY: altered TECHNOLOGIST PROVIDED HISTORY: Reason for exam:->altered Ordering Physician Provided Reason for Exam: Altered Mental Status (Patient was not his normal self yesterday per wife. Symptoms were worse this morning when he woke up. They state he does have cirrhosis. Patient c/o feeling shaky. Wife states he had trouble walking this morning) Acuity: Unknown Type of Exam: Unknown FINDINGS: The cardiac silhouette is enlarged. Small hiatal hernia is evident. Calcifications involving the aorta reflect atherosclerosis. The mediastinal and hilar silhouettes appear unremarkable. Vascular engorgement and cephalization is demonstrated with bilateral peribronchial cuffing and perivascular haziness. No consolidation or pleural effusion is evident. No pneumothorax is seen. No acute osseous abnormality is identified. Previous CABG. 1. Pulmonary findings suspicious for mild congestive heart failure, however this appearance could be physiologic, related to poor inspiration. 2. Calcific atherosclerosis aorta. 3. Cardiomegaly. 4. Previous CABG. Cta Neck W Contrast    Result Date: 3/2/2019  EXAMINATION: CTA OF THE HEAD WITH CONTRAST; CTA OF THE NECK 3/2/2019 4:28 pm; 3/2/2019 4:29 pm: TECHNIQUE: CTA of the head/brain was performed with the administration of intravenous contrast. Multiplanar reformatted images are provided for review. MIP images are provided for review. Dose modulation, iterative reconstruction, and/or weight based adjustment of the mA/kV was utilized to reduce the radiation dose to as low as reasonably achievable.; CTA of the neck was performed with the administration of intravenous contrast. Multiplanar reformatted images are provided for review. MIP images are provided for review. Stenosis of the internal carotid arteries measured using NASCET criteria. Dose modulation, iterative reconstruction, and/or weight based adjustment of the mA/kV was utilized to reduce the radiation dose to as low as reasonably achievable. COMPARISON: None.  HISTORY: ORDERING SYSTEM PROVIDED HISTORY: ams TECHNOLOGIST PROVIDED HISTORY: Has a \"code stroke\" or \"stroke alert\" been apex.  A few additional punctate noncalcified nodular densities are also noted. CTA HEAD: ANTERIOR CIRCULATION: Cavernous carotid segment calcifications are noted bilaterally. There is no focal narrowing of the distal internal carotid arteries. Both anterior cerebral arteries are patent without focal significant narrowing. No focal significant middle cerebral artery narrowing is noted. There is relative decreased number of peripheral branches in the posterosuperior aspect of the left MCA distribution compared to the right. POSTERIOR CIRCULATION: Distal vertebral artery calcifications are noted bilaterally. There is no focal significant associated narrowing. Basilar artery is patent without focal narrowing. Posterior cerebral arteries are patent without focal significant narrowing. BRAIN: Brain detail is limited due to artifact. There is no midline shift. Multifocal small-vessel ischemic changes are suggested bilaterally. There is questionable asymmetric low density in the left parietal vertex region. Focal narrowing of the proximal left internal carotid artery measuring approximately 75%. Minimal short-segment narrowing of the right proximal internal carotid artery measuring less than 20% No focal intracranial arterial narrowing is noted. There is a suggestion of slight relative decrease in the overall number of peripheral small branches in the left posterior superior MCA distribution. There is no associated occlusive change, however. If stroke like symptoms continue, consider MRI. Cta Head W Contrast    Result Date: 3/2/2019  EXAMINATION: CTA OF THE HEAD WITH CONTRAST; CTA OF THE NECK 3/2/2019 4:28 pm; 3/2/2019 4:29 pm: TECHNIQUE: CTA of the head/brain was performed with the administration of intravenous contrast. Multiplanar reformatted images are provided for review. MIP images are provided for review.  Dose modulation, iterative reconstruction, and/or weight based adjustment of the mA/kV was utilized to reduce the radiation dose to as low as reasonably achievable.; CTA of the neck was performed with the administration of intravenous contrast. Multiplanar reformatted images are provided for review. MIP images are provided for review. Stenosis of the internal carotid arteries measured using NASCET criteria. Dose modulation, iterative reconstruction, and/or weight based adjustment of the mA/kV was utilized to reduce the radiation dose to as low as reasonably achievable. COMPARISON: None. HISTORY: ORDERING SYSTEM PROVIDED HISTORY: ams TECHNOLOGIST PROVIDED HISTORY: Has a \"code stroke\" or \"stroke alert\" been called? ->No Ordering Physician Provided Reason for Exam: Altered Mental Status (Patient was not his normal self yesterday per wife. Symptoms were worse this morning when he woke up. They state he does have cirrhosis. Patient c/o feeling shaky. Wife states he had trouble walking this morning) Acuity: Acute Type of Exam: Initial FINDINGS: CTA NECK: AORTIC ARCH/ARCH VESSELS: Vascular calcifications are noted. Otherwise, limited evaluation of the aorta and great vessel origins demonstrates no focal abnormality. Great vessel origin detail is slightly limited due to artifact CAROTID ARTERIES: Left: Proximal left common carotid artery is limited due to artifact. Left common carotid artery is patent. Calcifications are noted along the margin of the distal left common carotid artery extending to the bifurcation. Low-density plaque and calcifications are noted at the origin of the left internal carotid artery. There is focal narrowing of the origin measuring approximately 75%. No additional left internal carotid artery narrowing is noted to the skull base level. Right: Common carotid artery is patent. Small calcifications are noted along the medial aspect of the distal common carotid artery. Calcifications are noted at the bifurcation and proximal right internal carotid artery as well.  A small amount of low-density plaque is noted along the margins of the proximal right internal carotid artery. There is minimal, less than 20%, short-segment narrowing of the proximal right internal carotid artery. VERTEBRAL ARTERIES: Proximal vertebral artery detail is limited due to a large amount of artifact. Both vertebral arteries are otherwise patent without focal significant narrowing SOFT TISSUES: There is no soft tissue mass or fluid collection. Spinal canal detail is limited. Multilevel disc bulging is suggested. BONES: Degenerative changes throughout the cervical spine are noted. Calcifications are noted in the left lung apex. A few additional punctate noncalcified nodular densities are also noted. CTA HEAD: ANTERIOR CIRCULATION: Cavernous carotid segment calcifications are noted bilaterally. There is no focal narrowing of the distal internal carotid arteries. Both anterior cerebral arteries are patent without focal significant narrowing. No focal significant middle cerebral artery narrowing is noted. There is relative decreased number of peripheral branches in the posterosuperior aspect of the left MCA distribution compared to the right. POSTERIOR CIRCULATION: Distal vertebral artery calcifications are noted bilaterally. There is no focal significant associated narrowing. Basilar artery is patent without focal narrowing. Posterior cerebral arteries are patent without focal significant narrowing. BRAIN: Brain detail is limited due to artifact. There is no midline shift. Multifocal small-vessel ischemic changes are suggested bilaterally. There is questionable asymmetric low density in the left parietal vertex region. Focal narrowing of the proximal left internal carotid artery measuring approximately 75%. Minimal short-segment narrowing of the right proximal internal carotid artery measuring less than 20% No focal intracranial arterial narrowing is noted.   There is a suggestion of slight relative decrease in the overall number of peripheral small branches in the left posterior superior MCA distribution. There is no associated occlusive change, however. If stroke like symptoms continue, consider MRI. EKG   Atrial fibrillationSeptal infarct , age undeterminedST & T wave abnormality, consider inferior ischemia or digitalis effectAbnormal ECG        The patient was seen and examined on day of discharge and this discharge summary is in conjunction with any daily progress note from day of discharge. Time Spent on discharge is 45 minutes  in the examination, evaluation, counseling and review of medications and discharge plan. Note that greater than 30 minutes was spent in preparing discharge papers, discussing discharge with patient, medication review, etc.       Signed:    Mamadou Ta MD   3/3/2019      Thank you Carmencita Mosqueda MD for the opportunity to be involved in this patient's care.  If you have any questions or concerns please feel free to contact me at [phone]

## 2019-03-04 ENCOUNTER — TELEPHONE (OUTPATIENT)
Dept: INTERNAL MEDICINE CLINIC | Age: 74
End: 2019-03-04

## 2019-03-08 ENCOUNTER — OFFICE VISIT (OUTPATIENT)
Dept: INTERNAL MEDICINE CLINIC | Age: 74
End: 2019-03-08
Payer: COMMERCIAL

## 2019-03-08 DIAGNOSIS — D61.818 PANCYTOPENIA (HCC): Primary | ICD-10-CM

## 2019-03-08 DIAGNOSIS — D69.6 THROMBOCYTOPENIA (HCC): ICD-10-CM

## 2019-03-08 DIAGNOSIS — K76.82 HEPATIC ENCEPHALOPATHY: ICD-10-CM

## 2019-03-08 DIAGNOSIS — I65.22 STENOSIS OF LEFT CAROTID ARTERY: ICD-10-CM

## 2019-03-08 DIAGNOSIS — K74.60 CIRRHOSIS OF LIVER WITHOUT ASCITES, UNSPECIFIED HEPATIC CIRRHOSIS TYPE (HCC): ICD-10-CM

## 2019-03-08 PROCEDURE — 99496 TRANSJ CARE MGMT HIGH F2F 7D: CPT | Performed by: INTERNAL MEDICINE

## 2019-03-08 PROCEDURE — 1111F DSCHRG MED/CURRENT MED MERGE: CPT | Performed by: INTERNAL MEDICINE

## 2019-03-08 RX ORDER — FUROSEMIDE 20 MG/1
20 TABLET ORAL
COMMUNITY
End: 2019-06-21 | Stop reason: SDUPTHER

## 2019-03-11 DIAGNOSIS — F51.01 PRIMARY INSOMNIA: ICD-10-CM

## 2019-03-11 RX ORDER — ZOLPIDEM TARTRATE 10 MG/1
10 TABLET ORAL NIGHTLY PRN
Qty: 30 TABLET | Refills: 3 | Status: SHIPPED | OUTPATIENT
Start: 2019-03-11 | End: 2019-07-08 | Stop reason: SDUPTHER

## 2019-04-04 ENCOUNTER — TELEPHONE (OUTPATIENT)
Dept: INTERNAL MEDICINE CLINIC | Age: 74
End: 2019-04-04

## 2019-04-04 NOTE — TELEPHONE ENCOUNTER
office calling ---currently seeing pt----they need last office note and labs done----please fax to [phone].

## 2019-04-15 RX ORDER — LACTULOSE 10 G/15ML
SOLUTION ORAL
Qty: 1 BOTTLE | Refills: 5 | Status: SHIPPED | OUTPATIENT
Start: 2019-04-15 | End: 2019-08-09 | Stop reason: SDUPTHER

## 2019-05-13 NOTE — PROGRESS NOTES
Pt.has hx of thrombocytopenia. Reviewing his labs in epic, platelets consistently run below 100. Call to Hedy Herrera at [facility] to see if any additional orders from Dr. Murali Workman.

## 2019-05-16 ENCOUNTER — ANESTHESIA EVENT (OUTPATIENT)
Dept: ENDOSCOPY | Age: 74
End: 2019-05-16
Payer: COMMERCIAL

## 2019-05-16 NOTE — PROGRESS NOTES
Received order from Dr. Selma Galloway for platelet count, T& S 1 week prior to procedure. Notified pt's wife that this would need to be done. They plan to come for labs on 5/30/19.

## 2019-05-30 ENCOUNTER — HOSPITAL ENCOUNTER (OUTPATIENT)
Age: 74
Discharge: HOME | End: 2019-05-30
Payer: COMMERCIAL

## 2019-05-30 DIAGNOSIS — Z01.818 PREOP TESTING: ICD-10-CM

## 2019-05-30 LAB
ABO/RH: NORMAL
ABO/RH: NORMAL
ANTIBODY SCREEN: NORMAL
PLATELET # BLD: 71 K/UL (ref 135–450)

## 2019-05-30 PROCEDURE — 86900 BLOOD TYPING SEROLOGIC ABO: CPT

## 2019-05-30 PROCEDURE — 86850 RBC ANTIBODY SCREEN: CPT

## 2019-05-30 PROCEDURE — 86901 BLOOD TYPING SEROLOGIC RH(D): CPT

## 2019-05-30 PROCEDURE — 85049 AUTOMATED PLATELET COUNT: CPT

## 2019-05-30 PROCEDURE — 36415 COLL VENOUS BLD VENIPUNCTURE: CPT

## 2019-06-04 ENCOUNTER — TELEPHONE (OUTPATIENT)
Dept: INTERNAL MEDICINE CLINIC | Age: 74
End: 2019-06-04

## 2019-06-04 RX ORDER — GLIPIZIDE 5 MG/1
5 TABLET ORAL
Qty: 180 TABLET | Refills: 3 | Status: SHIPPED | OUTPATIENT
Start: 2019-06-04 | End: 2020-01-01

## 2019-06-04 RX ORDER — LEVOTHYROXINE SODIUM 0.15 MG/1
150 TABLET ORAL DAILY
Qty: 90 TABLET | Refills: 3 | Status: ON HOLD | OUTPATIENT
Start: 2019-06-04 | End: 2019-06-07 | Stop reason: HOSPADM

## 2019-06-04 NOTE — TELEPHONE ENCOUNTER
Pt walked in to the office needing refills on his medicines. Glipizide 5 mg take one tab by mouth twice a day before meals     Levothyroxine NA 0.125 mg take one tab by mouth once daily     Pharm [facility] [address]    If any questions please call him thanks.

## 2019-06-07 ENCOUNTER — HOSPITAL ENCOUNTER (OUTPATIENT)
Age: 74
Setting detail: OUTPATIENT SURGERY
Discharge: HOME | End: 2019-06-07
Attending: INTERNAL MEDICINE | Admitting: INTERNAL MEDICINE
Payer: COMMERCIAL

## 2019-06-07 ENCOUNTER — ANESTHESIA (OUTPATIENT)
Dept: ENDOSCOPY | Age: 74
End: 2019-06-07
Payer: COMMERCIAL

## 2019-06-07 ENCOUNTER — APPOINTMENT (OUTPATIENT)
Dept: INTERVENTIONAL RADIOLOGY/VASCULAR | Age: 74
End: 2019-06-07
Attending: INTERNAL MEDICINE
Payer: COMMERCIAL

## 2019-06-07 VITALS — SYSTOLIC BLOOD PRESSURE: 131 MMHG | DIASTOLIC BLOOD PRESSURE: 100 MMHG | OXYGEN SATURATION: 99 %

## 2019-06-07 VITALS
HEIGHT: 72 IN | BODY MASS INDEX: 31.69 KG/M2 | WEIGHT: 234 LBS | HEART RATE: 91 BPM | DIASTOLIC BLOOD PRESSURE: 69 MMHG | TEMPERATURE: 97 F | RESPIRATION RATE: 20 BRPM | OXYGEN SATURATION: 96 % | SYSTOLIC BLOOD PRESSURE: 113 MMHG

## 2019-06-07 DIAGNOSIS — Z01.818 PREOP TESTING: Primary | ICD-10-CM

## 2019-06-07 LAB
A/G RATIO: 1 (ref 1.1–2.2)
ALBUMIN SERPL-MCNC: 2.9 G/DL (ref 3.4–5)
ALP BLD-CCNC: 131 U/L (ref 40–129)
ALT SERPL-CCNC: 23 U/L (ref 10–40)
AMMONIA: 70 UMOL/L (ref 16–60)
ANION GAP SERPL CALCULATED.3IONS-SCNC: 9 MMOL/L (ref 3–16)
AST SERPL-CCNC: 34 U/L (ref 15–37)
BASOPHILS ABSOLUTE: 0 K/UL (ref 0–0.2)
BASOPHILS RELATIVE PERCENT: 0.8 %
BILIRUB SERPL-MCNC: 2 MG/DL (ref 0–1)
BUN BLDV-MCNC: 10 MG/DL (ref 7–20)
CALCIUM SERPL-MCNC: 8.7 MG/DL (ref 8.3–10.6)
CHLORIDE BLD-SCNC: 105 MMOL/L (ref 99–110)
CO2: 26 MMOL/L (ref 21–32)
CREAT SERPL-MCNC: 0.7 MG/DL (ref 0.8–1.3)
EOSINOPHILS ABSOLUTE: 0.3 K/UL (ref 0–0.6)
EOSINOPHILS RELATIVE PERCENT: 8.2 %
GFR AFRICAN AMERICAN: >60
GFR NON-AFRICAN AMERICAN: >60
GLOBULIN: 2.9 G/DL
GLUCOSE BLD-MCNC: 141 MG/DL (ref 70–99)
GLUCOSE BLD-MCNC: 152 MG/DL (ref 70–99)
GLUCOSE BLD-MCNC: 162 MG/DL (ref 70–99)
HCT VFR BLD CALC: 41 % (ref 40.5–52.5)
HEMOGLOBIN: 13.7 G/DL (ref 13.5–17.5)
INR BLD: 1.41 (ref 0.86–1.14)
LYMPHOCYTES ABSOLUTE: 0.9 K/UL (ref 1–5.1)
LYMPHOCYTES RELATIVE PERCENT: 27.3 %
MCH RBC QN AUTO: 33.2 PG (ref 26–34)
MCHC RBC AUTO-ENTMCNC: 33.3 G/DL (ref 31–36)
MCV RBC AUTO: 99.7 FL (ref 80–100)
MONOCYTES ABSOLUTE: 0.4 K/UL (ref 0–1.3)
MONOCYTES RELATIVE PERCENT: 12.7 %
NEUTROPHILS ABSOLUTE: 1.6 K/UL (ref 1.7–7.7)
NEUTROPHILS RELATIVE PERCENT: 51 %
PDW BLD-RTO: 16.1 % (ref 12.4–15.4)
PERFORMED ON: ABNORMAL
PERFORMED ON: ABNORMAL
PLATELET # BLD: 68 K/UL (ref 135–450)
PMV BLD AUTO: 7.8 FL (ref 5–10.5)
POTASSIUM SERPL-SCNC: 4.2 MMOL/L (ref 3.5–5.1)
PROTHROMBIN TIME: 16.1 SEC (ref 9.8–13)
RBC # BLD: 4.12 M/UL (ref 4.2–5.9)
SODIUM BLD-SCNC: 140 MMOL/L (ref 136–145)
TOTAL PROTEIN: 5.8 G/DL (ref 6.4–8.2)
WBC # BLD: 3.2 K/UL (ref 4–11)

## 2019-06-07 PROCEDURE — C1729 CATH, DRAINAGE: HCPCS

## 2019-06-07 PROCEDURE — 7100000011 HC PHASE II RECOVERY - ADDTL 15 MIN

## 2019-06-07 PROCEDURE — 80053 COMPREHEN METABOLIC PANEL: CPT

## 2019-06-07 PROCEDURE — 3700000001 HC ADD 15 MINUTES (ANESTHESIA): Performed by: INTERNAL MEDICINE

## 2019-06-07 PROCEDURE — 6360000002 HC RX W HCPCS: Performed by: NURSE ANESTHETIST, CERTIFIED REGISTERED

## 2019-06-07 PROCEDURE — 7100000010 HC PHASE II RECOVERY - FIRST 15 MIN

## 2019-06-07 PROCEDURE — 6370000000 HC RX 637 (ALT 250 FOR IP): Performed by: INTERNAL MEDICINE

## 2019-06-07 PROCEDURE — 85610 PROTHROMBIN TIME: CPT

## 2019-06-07 PROCEDURE — 7100000010 HC PHASE II RECOVERY - FIRST 15 MIN: Performed by: INTERNAL MEDICINE

## 2019-06-07 PROCEDURE — 2709999900 HC NON-CHARGEABLE SUPPLY: Performed by: INTERNAL MEDICINE

## 2019-06-07 PROCEDURE — 3700000000 HC ANESTHESIA ATTENDED CARE: Performed by: INTERNAL MEDICINE

## 2019-06-07 PROCEDURE — 2500000003 HC RX 250 WO HCPCS: Performed by: NURSE ANESTHETIST, CERTIFIED REGISTERED

## 2019-06-07 PROCEDURE — 6360000002 HC RX W HCPCS: Performed by: ANESTHESIOLOGY

## 2019-06-07 PROCEDURE — 2580000003 HC RX 258: Performed by: FAMILY MEDICINE

## 2019-06-07 PROCEDURE — 3609012300 HC EGD BAND LIGATION ESOPHGEAL/GASTRIC VARICES: Performed by: INTERNAL MEDICINE

## 2019-06-07 PROCEDURE — 7100000000 HC PACU RECOVERY - FIRST 15 MIN: Performed by: INTERNAL MEDICINE

## 2019-06-07 PROCEDURE — 36415 COLL VENOUS BLD VENIPUNCTURE: CPT

## 2019-06-07 PROCEDURE — 2580000003 HC RX 258: Performed by: NURSE ANESTHETIST, CERTIFIED REGISTERED

## 2019-06-07 PROCEDURE — 49083 ABD PARACENTESIS W/IMAGING: CPT

## 2019-06-07 PROCEDURE — 7100000011 HC PHASE II RECOVERY - ADDTL 15 MIN: Performed by: INTERNAL MEDICINE

## 2019-06-07 PROCEDURE — 85025 COMPLETE CBC W/AUTO DIFF WBC: CPT

## 2019-06-07 PROCEDURE — 82140 ASSAY OF AMMONIA: CPT

## 2019-06-07 PROCEDURE — 2720000010 HC SURG SUPPLY STERILE: Performed by: INTERNAL MEDICINE

## 2019-06-07 RX ORDER — ALBUMIN (HUMAN) 12.5 G/50ML
SOLUTION INTRAVENOUS
Status: DISCONTINUED
Start: 2019-06-07 | End: 2019-06-07 | Stop reason: HOSPADM

## 2019-06-07 RX ORDER — VITAMIN B COMPLEX
1 CAPSULE ORAL DAILY
COMMUNITY

## 2019-06-07 RX ORDER — LIDOCAINE HYDROCHLORIDE 20 MG/ML
INJECTION, SOLUTION EPIDURAL; INFILTRATION; INTRACAUDAL; PERINEURAL PRN
Status: DISCONTINUED | OUTPATIENT
Start: 2019-06-07 | End: 2019-06-07 | Stop reason: SDUPTHER

## 2019-06-07 RX ORDER — ONDANSETRON 2 MG/ML
INJECTION INTRAMUSCULAR; INTRAVENOUS
Status: DISCONTINUED
Start: 2019-06-07 | End: 2019-06-07 | Stop reason: HOSPADM

## 2019-06-07 RX ORDER — SODIUM CHLORIDE 0.9 % (FLUSH) 0.9 %
10 SYRINGE (ML) INJECTION EVERY 12 HOURS SCHEDULED
Status: DISCONTINUED | OUTPATIENT
Start: 2019-06-07 | End: 2019-06-07 | Stop reason: HOSPADM

## 2019-06-07 RX ORDER — PROPOFOL 10 MG/ML
INJECTION, EMULSION INTRAVENOUS PRN
Status: DISCONTINUED | OUTPATIENT
Start: 2019-06-07 | End: 2019-06-07 | Stop reason: SDUPTHER

## 2019-06-07 RX ORDER — PANTOPRAZOLE SODIUM 40 MG/1
40 TABLET, DELAYED RELEASE ORAL
Qty: 10 TABLET | Refills: 1 | Status: SHIPPED | OUTPATIENT
Start: 2019-06-07 | End: 2019-06-21 | Stop reason: ALTCHOICE

## 2019-06-07 RX ORDER — ONDANSETRON 2 MG/ML
4 INJECTION INTRAMUSCULAR; INTRAVENOUS ONCE
Status: COMPLETED | OUTPATIENT
Start: 2019-06-07 | End: 2019-06-07

## 2019-06-07 RX ORDER — SPIRONOLACTONE 25 MG/1
25 TABLET ORAL DAILY
Qty: 30 TABLET | Refills: 5 | Status: ON HOLD | OUTPATIENT
Start: 2019-06-07 | End: 2019-07-15 | Stop reason: SDUPTHER

## 2019-06-07 RX ORDER — VIT C/B6/B5/MAGNESIUM/HERB 173 50-5-6-5MG
CAPSULE ORAL DAILY
COMMUNITY
End: 2019-07-08

## 2019-06-07 RX ORDER — SODIUM CHLORIDE 9 MG/ML
INJECTION, SOLUTION INTRAVENOUS CONTINUOUS PRN
Status: DISCONTINUED | OUTPATIENT
Start: 2019-06-07 | End: 2019-06-07 | Stop reason: SDUPTHER

## 2019-06-07 RX ORDER — SODIUM CHLORIDE 0.9 % (FLUSH) 0.9 %
10 SYRINGE (ML) INJECTION PRN
Status: DISCONTINUED | OUTPATIENT
Start: 2019-06-07 | End: 2019-06-07 | Stop reason: HOSPADM

## 2019-06-07 RX ORDER — SODIUM CHLORIDE 9 MG/ML
INJECTION, SOLUTION INTRAVENOUS CONTINUOUS
Status: DISCONTINUED | OUTPATIENT
Start: 2019-06-07 | End: 2019-06-07 | Stop reason: HOSPADM

## 2019-06-07 RX ORDER — LEVOTHYROXINE SODIUM 125 UG/1
CAPSULE ORAL DAILY
COMMUNITY
End: 2020-01-01

## 2019-06-07 RX ADMIN — PROPOFOL 50 MG: 10 INJECTION, EMULSION INTRAVENOUS at 09:27

## 2019-06-07 RX ADMIN — PROPOFOL 20 MG: 10 INJECTION, EMULSION INTRAVENOUS at 09:31

## 2019-06-07 RX ADMIN — PROPOFOL 30 MG: 10 INJECTION, EMULSION INTRAVENOUS at 09:29

## 2019-06-07 RX ADMIN — PROPOFOL 20 MG: 10 INJECTION, EMULSION INTRAVENOUS at 09:35

## 2019-06-07 RX ADMIN — PHENYLEPHRINE HYDROCHLORIDE 50 MCG: 10 INJECTION INTRAVENOUS at 09:27

## 2019-06-07 RX ADMIN — PHENYLEPHRINE HYDROCHLORIDE 50 MCG: 10 INJECTION INTRAVENOUS at 09:37

## 2019-06-07 RX ADMIN — PROPOFOL 30 MG: 10 INJECTION, EMULSION INTRAVENOUS at 09:45

## 2019-06-07 RX ADMIN — PHENYLEPHRINE HYDROCHLORIDE 50 MCG: 10 INJECTION INTRAVENOUS at 09:41

## 2019-06-07 RX ADMIN — ONDANSETRON 4 MG: 2 INJECTION INTRAMUSCULAR; INTRAVENOUS at 10:04

## 2019-06-07 RX ADMIN — PROPOFOL 30 MG: 10 INJECTION, EMULSION INTRAVENOUS at 09:39

## 2019-06-07 RX ADMIN — SODIUM CHLORIDE: 9 INJECTION, SOLUTION INTRAVENOUS at 08:08

## 2019-06-07 RX ADMIN — PROPOFOL 20 MG: 10 INJECTION, EMULSION INTRAVENOUS at 09:42

## 2019-06-07 RX ADMIN — PHENYLEPHRINE HYDROCHLORIDE 50 MCG: 10 INJECTION INTRAVENOUS at 09:33

## 2019-06-07 RX ADMIN — LIDOCAINE HYDROCHLORIDE 100 MG: 20 INJECTION, SOLUTION EPIDURAL; INFILTRATION; INTRACAUDAL; PERINEURAL at 09:27

## 2019-06-07 RX ADMIN — SODIUM CHLORIDE: 9 INJECTION, SOLUTION INTRAVENOUS at 09:20

## 2019-06-07 ASSESSMENT — PAIN SCALES - GENERAL: PAINLEVEL_OUTOF10: 0

## 2019-06-07 ASSESSMENT — PAIN DESCRIPTION - DESCRIPTORS: DESCRIPTORS: DISCOMFORT

## 2019-06-07 ASSESSMENT — PAIN - FUNCTIONAL ASSESSMENT: PAIN_FUNCTIONAL_ASSESSMENT: 0-10

## 2019-06-07 NOTE — ANESTHESIA PRE PROCEDURE
Department of Anesthesiology  Preprocedure Note       Name:  Yvon Camacho   Age:  72 y.o.  :  1945                                          MRN:  [id number]         Date:  2019      Surgeon: Maxim Almodovar):  Prerna Cuellar MD    Procedure: EGD (N/A )    Medications prior to admission:   Prior to Admission medications    Medication Sig Start Date End Date Taking? Authorizing Provider   Levothyroxine Sodium 125 MCG CAPS Take by mouth Daily   Yes Historical Provider, MD   UNABLE TO FIND Indications: pure lvr formula 1 tab daily    Yes Historical Provider, MD   CINNAMON PO Take 1,000 mg by mouth daily   Yes Historical Provider, MD   MAGNESIUM PO Take 25 mg by mouth daily   Yes Historical Provider, MD   Polyethylene Glycol 3350 (MIRALAX PO) Take by mouth 2 times daily   Yes Historical Provider, MD   b complex vitamins capsule Take 1 capsule by mouth daily   Yes Historical Provider, MD   Turmeric 500 MG CAPS Take by mouth daily   Yes Historical Provider, MD   glipiZIDE (GLUCOTROL) 5 MG tablet Take 1 tablet by mouth 2 times daily (before meals) 19  Yes Marilee Whaley MD   lactulose (CHRONULAC) 10 GM/15ML solution TAKE 30 MLS BY MOUTH ONCE NIGHTLY 4/15/19  Yes Marilee Whaley MD   saxagliptin (ONGLYZA) 5 MG TABS tablet Take 2.5 mg by mouth   Yes Historical Provider, MD   furosemide (LASIX) 20 MG tablet Take 20 mg by mouth   Yes Historical Provider, MD   MILK THISTLE PO Take 1,000 mg by mouth daily    Yes Historical Provider, MD   XIFAXAN 550 MG tablet 2 times daily  17  Yes Historical Provider, MD   vitamin D (ERGOCALCIFEROL) 400 UNITS CAPS Take 400 Units by mouth daily. Yes Historical Provider, MD   Omega-3 Fatty Acids (FISH OIL) 500 MG CAPS Take 1,000 mg by mouth daily    Yes Historical Provider, MD   aspirin 81 MG tablet Take 81 mg by mouth daily. Yes Historical Provider, MD   nadolol (CORGARD) 20 MG tablet Take 1 tablet by mouth daily.  14  Yes NABIL Patel - JOVANY   citalopram ascites (HCC) K74.60    Generalized weakness R53.1    Hypotension, postural I95.1       Past Medical History:        Diagnosis Date    ANEMIA     CAD (coronary artery disease) 10/12/2011    [facility]     Depression     Diabetes mellitus ([facility]     Esophageal bleed, non-variceal 2012    Hypertension     HYPOTHYROIDISM     Liver disease 2012    Mixed hyperlipidemia     NEUROPATHY     [email or web address]    Pancytopenia [email or web address]     Paroxysmal atrial fibrillation ([email or web address] 2012    Pneumonia     Sleep apnea     no cpap    Thrombocytopenia ([email or web address]     Thyroid disease        Past Surgical History:        Procedure Laterality Date    ACHILLES TENDON SURGERY      CARDIAC SURGERY      3 vessel cabg    CHOLECYSTECTOMY      COLONOSCOPY      CORONARY ARTERY BYPASS GRAFT  10/2011    cabg x3    ENDOSCOPY, COLON, DIAGNOSTIC      PTCA  2012    PTCA RCA & RPL    TONSILLECTOMY      UPPER GASTROINTESTINAL ENDOSCOPY  2012    multiple thin linear esophageal mucosal breaks    UPPER GASTROINTESTINAL ENDOSCOPY  3/6/2014       Social History:    Social History     Tobacco Use    Smoking status: Current Some Day Smoker     Packs/day: 1.00     Years: 43.00     Pack years: 43.00     Types: Cigarettes     Last attempt to quit: 10/11/2011     Years since quittin.6    Smokeless tobacco: Never Used   Substance Use Topics    Alcohol use: No     Comment: RARELY;  approx once every 6 months                                Ready to quit: Not Answered  Counseling given: Not Answered      Vital Signs (Current):   Vitals:    19 0734   BP: 122/87   Pulse: 101   Resp: 16   Temp: 97.2 °F (36.2 °C)   TempSrc: Temporal   SpO2: 96%   Weight: 234 lb (106.1 kg)   Height: 5' 11.5\" (1.816 m)                                              BP Readings from Last 3 Encounters:   19 122/87   19 125/70   18 110/60       NPO Status: Time of last liquid consumption: 2200                        Time of last solid consumption: 2000                        Date of last liquid consumption: 06/06/19                        Date of last solid food consumption: 06/06/19    BMI:   Wt Readings from Last 3 Encounters:   06/07/19 234 lb (106.1 kg)   03/02/19 206 lb 4.8 oz (93.6 kg)   12/03/18 209 lb (94.8 kg)     Body mass index is 32.18 kg/m².     CBC:   Lab Results   Component Value Date    WBC 3.1 03/02/2019    RBC 4.57 03/02/2019    HGB 14.8 03/02/2019    HCT 45.1 03/02/2019    MCV 98.6 03/02/2019    RDW 16.4 03/02/2019    PLT 71 05/30/2019       CMP:   Lab Results   Component Value Date     03/02/2019    K 4.4 03/02/2019    K 4.7 06/03/2018     03/02/2019    CO2 25 03/02/2019    BUN 13 03/02/2019    CREATININE 0.7 03/02/2019    GFRAA >60 03/02/2019    GFRAA >60 02/26/2013    AGRATIO 1.0 03/02/2019    LABGLOM >60 03/02/2019    LABGLOM 79 03/06/2012    GLUCOSE 237 03/02/2019    GLUCOSE 123 03/06/2012    PROT 6.9 03/02/2019    PROT 6.9 02/26/2013    CALCIUM 9.0 03/02/2019    BILITOT 2.6 03/02/2019    ALKPHOS 163 03/02/2019    AST 32 03/02/2019    ALT 24 03/02/2019       POC Tests:   Recent Labs     06/07/19  0804   POCGLU 152*       Coags:   Lab Results   Component Value Date    PROTIME 13.3 03/02/2019    PROTIME 23 01/08/2014    INR 1.17 03/02/2019    APTT 31.5 03/02/2019       HCG (If Applicable): No results found for: PREGTESTUR, PREGSERUM, HCG, HCGQUANT     ABGs:   Lab Results   Component Value Date    PHART 7.37 10/18/2011    PO2ART 74 10/18/2011    RCJ1YDY 45 10/18/2011    ATA7DNN 26.0 10/18/2011    BEART 1 10/18/2011    Q3XFVTFS 94 10/18/2011        Type & Screen (If Applicable):  Lab Results   Component Value Date    LABABO B 02/04/2013    LABRH Positive 02/04/2013       Anesthesia Evaluation  Patient summary reviewed and Nursing notes reviewed no history of anesthetic complications:   Airway: Mallampati: III        Dental: normal exam         Pulmonary:   (+) sleep apnea:  decreased breath sounds,                             Cardiovascular:  Exercise tolerance: poor (<4 METS),   (+) hypertension:, CAD:, CABG/stent:, dysrhythmias: atrial fibrillation, hyperlipidemia      ECG reviewed  Rhythm: regular    Echocardiogram reviewed               ROS comment: EF 55%     Neuro/Psych:   (+) depression/anxiety             GI/Hepatic/Renal:   (+) GERD:, liver disease (recent ascites and sleeping alot per wife): portal hypertension, coagulopathy from hepatic dysfunction,           Endo/Other:    (+) hypothyroidism, blood dyscrasia: thrombocytopenia, anemia and anticoagulation therapy:., .                 Abdominal:   (+) obese,         Vascular:                                        Anesthesia Plan      MAC     ASA 4       Induction: intravenous. Anesthetic plan and risks discussed with patient. Plan discussed with CRNA.             MEDICATIONS:  Current Facility-Administered Medications   Medication Dose Route Frequency Provider Last Rate Last Dose    0.9 % sodium chloride infusion   Intravenous Continuous Michelle Rodrigues MD 75 mL/hr at 06/07/19 2612      sodium chloride flush 0.9 % injection 10 mL  10 mL Intravenous 2 times per day Michelle Rodrigues MD        sodium chloride flush 0.9 % injection 10 mL  10 mL Intravenous PRN Michelle Rodrigues MD            LABS:  Lab Results   Component Value Date    WBC 3.1 (L) 03/02/2019    HGB 14.8 03/02/2019    HCT 45.1 03/02/2019    MCV 98.6 03/02/2019    PLT 71 (L) 05/30/2019    GLUCOSE 237 (H) 03/02/2019    PROTIME 13.3 (H) 03/02/2019    INR 1.17 (H) 03/02/2019    APTT 31.5 03/02/2019       Lab Results   Component Value Date     03/02/2019    K 4.4 03/02/2019     03/02/2019    CO2 25 03/02/2019    PHOS 3.7 05/30/2018    BUN 13 03/02/2019    CREATININE 0.7 (L) 03/02/2019       Problem List:  Patient Active Problem List   Diagnosis    [facility]    CAD (coronary artery disease)    HTN (hypertension)    HLD (hyperlipidemia)    DMITRIY (obstructive sleep apnea)    Depression    Liver disease    Cholelithiasis    Thrombocytopenia (HCC)    Esophageal bleeding not due to varices    Pancytopenia (HCC)    Warfarin-induced coagulopathy (HCC)    Hepatic encephalopathy (HCC)    Acute encephalopathy    Increased ammonia level    Cirrhosis of liver without ascites (HCC)    Generalized weakness    Hypotension, postural         Stacia MD Lore[place]   6/7/2019

## 2019-06-07 NOTE — PROGRESS NOTES
Pt arrived from [facility] to PACU via cart in stable condition. Drowsy, appears in no distress. abd taught, distended, and with tenderness, pt reports coming in with this. VSS. Heart irregular - a-fib, rate 100-115. Report obtained from BERENICE ORDONEZ RN and CRNA. Will continue to monitor.      Electronically signed by JONES Jenkins, RN, CAPA on 6/7/19 at 1753

## 2019-06-07 NOTE — PROGRESS NOTES
6500ml of fluid removed  Spoke to patients RN sameday and advised her the amount of fluid removed and that patient was returning to the floor.

## 2019-06-07 NOTE — PROGRESS NOTES
Name:  Dayanara Hayes  Age:  72 y.o.  CSN:  [id number]            Past Medical History:        Diagnosis Date    ANEMIA     CAD (coronary artery disease) 10/12/2011    [facility]     Depression     Diabetes mellitus ([facility]     Esophageal bleed, non-variceal 11/23/2012    Hypertension     HYPOTHYROIDISM     Liver disease 1/6/2012    Mixed hyperlipidemia     NEUROPATHY     Non-ST elevation MI (NSTEMI) ([facility] 11/21/2012    P[facility] ([facility]     Paroxysmal atrial fibrillation ([facility] 1/6/2012    Pneumonia     Sleep apnea     no cpap    Thrombocytopenia ([facility]     Thyroid disease        Past Surgical History:      Procedure Laterality Date    ACHILLES TENDON SURGERY      CARDIAC SURGERY      3 vessel cabg    CHOLECYSTECTOMY      COLONOSCOPY      CORONARY ARTERY BYPASS GRAFT  10/2011    cabg x3    ENDOSCOPY, COLON, DIAGNOSTIC      PTCA  11/23/2012    PTCA RCA & RPL    TONSILLECTOMY      UPPER GASTROINTESTINAL ENDOSCOPY  11/23/2012    multiple thin linear esophageal mucosal breaks    UPPER GASTROINTESTINAL ENDOSCOPY  3/6/2014       Medications Prior to Admission:  Medications Prior to Admission: Levothyroxine Sodium 125 MCG CAPS, Take by mouth Daily  UNABLE TO FIND, Indications: pure lvr formula 1 tab daily   CINNAMON PO, Take 1,000 mg by mouth daily  MAGNESIUM PO, Take 25 mg by mouth daily  Polyethylene Glycol 3350 (MIRALAX PO), Take by mouth 2 times daily  b complex vitamins capsule, Take 1 capsule by mouth daily  Turmeric 500 MG CAPS, Take by mouth daily  glipiZIDE (GLUCOTROL) 5 MG tablet, Take 1 tablet by mouth 2 times daily (before meals)  lactulose (CHRONULAC) 10 GM/15ML solution, TAKE 30 MLS BY MOUTH ONCE NIGHTLY  saxagliptin (ONGLYZA) 5 MG TABS tablet, Take 2.5 mg by mouth  furosemide (LASIX) 20 MG tablet, Take 20 mg by mouth  MILK THISTLE PO, Take 1,000 mg by mouth daily   XIFAXAN 550 MG tablet, 2 times daily   vitamin D (ERGOCALCIFEROL) 400 UNITS CAPS, Take 400 Units by mouth daily.  Omega-3 Fatty Acids (FISH OIL) 500 MG CAPS, Take 1,000 mg by mouth daily   aspirin 81 MG tablet, Take 81 mg by mouth daily. nadolol (CORGARD) 20 MG tablet, Take 1 tablet by mouth daily. citalopram (CELEXA) 20 MG tablet, Take 1 tablet by mouth daily. (Patient taking differently: Take 20 mg by mouth daily 1/2 tablet)  levothyroxine (SYNTHROID) 150 MCG tablet, Take 1 tablet by mouth Daily  cyanocobalamin 1000 MCG/ML injection, INJECT 1 ML INTRAMUSCULARLY EVERY 30 DAYS  B-D 3CC LUER-ALEXIS SYR 25GX1\" 25G X 1\" 3 ML MISC, USE WITH VITAMIN B12 INJECTIONS  furosemide (LASIX) 20 MG tablet, Take 1 tablet by mouth daily. FREESTYLE TEST STRIPS strip, TEST TWO TO THREE TIMES DAILY    Allergies:  Cephalexin; Ciprocinonide [fluocinolone];  Quinolones; and Statins    Social History:  Social History     Socioeconomic History    Marital status:      Spouse name: Not on file    Number of children: 1    Years of education: Not on file    Highest education level: Not on file   Occupational History    Occupation: retired: /   Social Needs    Financial resource strain: Not on file    Food insecurity:     Worry: Not on file     Inability: Not on file    Transportation needs:     Medical: Not on file     Non-medical: Not on file   Tobacco Use    Smoking status: Current Some Day Smoker     Packs/day: 1.00     Years: 43.00     Pack years: 43.00     Types: Cigarettes     Last attempt to quit: 10/11/2011     Years since quittin.6    Smokeless tobacco: Never Used   Substance and Sexual Activity    Alcohol use: No     Comment: RARELY;  approx once every 6 months    Drug use: No    Sexual activity: Yes     Partners: Female   Lifestyle    Physical activity:     Days per week: Not on file     Minutes per session: Not on file    Stress: Not on file   Relationships    Social connections:     Talks on phone: Not on file     Gets together: Not on file     Attends [employer] service: Not on file Active member of club or organization: Not on file     Attends meetings of clubs or organizations: Not on file     Relationship status: Not on file    Intimate partner violence:     Fear of current or ex partner: Not on file     Emotionally abused: Not on file     Physically abused: Not on file     Forced sexual activity: Not on file   Other Topics Concern    Not on file   Social History Narrative    Has 2 step children and 1 biologic child       Family History:      Problem Relation Age of Onset    Heart Disease Mother         arrythmia    Diabetes Father     Cancer Father     Heart Disease Father     Anesth Problems Neg Hx     Malig Hyperten Neg Hx     Hypotension Neg Hx     Malig Hypertherm Neg Hx     Pseudochol.  Deficiency Neg Hx        Vital Signs:  Vitals:    06/07/19 0734   BP: 122/87   Pulse: 101   Resp: 16   Temp: 97.2 °F (36.2 °C)   SpO2: 96%

## 2019-06-07 NOTE — PROGRESS NOTES
Patient and wife spoke with Dr. Gifty Camejo at bedside, verbalized understanding of [address]. Discharge instructions reviewed with patient and wife, verbalized understanding. IV left in as patient is discharging straight to radiology for paracentesis. Will discharge to radiology at this time.     Electronically signed by LEONA SouzaN, RN, CAPA on 6/7/19 at 10:37 AM

## 2019-06-07 NOTE — PROGRESS NOTES
Pt resting in bed, alert and oriented. Denies further nausea. abd pain remains at baseline and tolerable. Pt has met criteria to be discharged from PACU phase 1, will prepare for discharge home in phase 2.      Electronically signed by JONES Ponce, RN, CAPA on 6/7/19 at 1

## 2019-06-07 NOTE — PROGRESS NOTES
Per telephone, Dr. Arlen Lindsey stated pt did not need albumin.  Pt to be monitored for 30minutes prior to d/c

## 2019-06-21 RX ORDER — VIT C/B6/B5/MAGNESIUM/HERB 173 50-5-6-5MG
CAPSULE ORAL DAILY
Status: ON HOLD | COMMUNITY
End: 2020-01-01 | Stop reason: ALTCHOICE

## 2019-06-21 NOTE — PROGRESS NOTES
(including no eye makeup) or nail polish on your fingers or toes. 10. DO NOT wear any jewelry or piercings on day of surgery. All body piercing jewelry must be removed. 11. If you have ___dentures, they will be removed before going to the OR; we will provide you a container. If you wear ___contact lenses or ___glasses, they will be removed; please bring a case for them. 12. Please see your family doctor/pediatrician for a history & physical and/or concerning medications. Bring any test results/reports from your physician's office. PCP__________________Phone___________H&P Appt. Date________             13 If you  have a Living Will and Durable Power of  for Healthcare, please bring in a copy. 15. Notify your Surgeon if you develop any illness between now and surgery  time, cough, cold, fever, sore throat, nausea, vomiting, etc.  Please notify your surgeon if you experience dizziness, shortness of breath or blurred vision between now & the time of your surgery             15. DO NOT shave your operative site 96 hours prior to surgery. For face & neck surgery, men may use an electric razor 48 hours prior to surgery. 16. Shower the night before surgery with ___Antibacterial soap ___Hibiclens             17. To provide excellent care visitors will be limited to one in the room at any given time. 18.  Please bring picture ID and insurance card. 19.  Visit our web site for additional information:  "[email or web address]              20.During flu season no children under the age of 15 are permitted in the hospital for the safety of all patients.                               21. If you take a long acting insulin in the evening only  take half of your usual  dose the night  before your procedure              22. If you use a c-pap please bring DOS if staying overnight,             23.For your convenience [address] [facility] has a pharmacy on site to fill your prescriptions. 24. If you use oxygen and have a portable tank please bring it  with you the DOS             25. Bring a complete list of all your medications with name and dose include any supplements. 26. Other__________________________________________   *Please call pre admission testing if you any further questions   Carlos Spring [address] [place] [address]. [place]  [phone]   HOSP [facility] [address] [place]   [phone]       All above information reviewed with patient's wife by phone. Wife verbalizes understanding. All questions and concerns addressed.                                                                                                  Patient/Rep____________________                                                                                                                                    PRE OP INSTRUCTIONS

## 2019-06-24 ENCOUNTER — ANESTHESIA EVENT (OUTPATIENT)
Dept: ENDOSCOPY | Age: 74
End: 2019-06-24
Payer: COMMERCIAL

## 2019-07-08 ENCOUNTER — OFFICE VISIT (OUTPATIENT)
Dept: INTERNAL MEDICINE CLINIC | Age: 74
End: 2019-07-08
Payer: COMMERCIAL

## 2019-07-08 VITALS
DIASTOLIC BLOOD PRESSURE: 64 MMHG | SYSTOLIC BLOOD PRESSURE: 110 MMHG | WEIGHT: 222 LBS | HEART RATE: 64 BPM | BODY MASS INDEX: 30.96 KG/M2

## 2019-07-08 DIAGNOSIS — F51.01 PRIMARY INSOMNIA: ICD-10-CM

## 2019-07-08 DIAGNOSIS — D61.818 PANCYTOPENIA (HCC): Primary | ICD-10-CM

## 2019-07-08 DIAGNOSIS — G89.4 CHRONIC PAIN SYNDROME: ICD-10-CM

## 2019-07-08 DIAGNOSIS — K74.60 CIRRHOSIS OF LIVER WITHOUT ASCITES, UNSPECIFIED HEPATIC CIRRHOSIS TYPE (HCC): ICD-10-CM

## 2019-07-08 PROCEDURE — 99214 OFFICE O/P EST MOD 30 MIN: CPT | Performed by: INTERNAL MEDICINE

## 2019-07-08 RX ORDER — ZOLPIDEM TARTRATE 10 MG/1
10 TABLET ORAL NIGHTLY PRN
Qty: 30 TABLET | Refills: 3 | Status: SHIPPED | OUTPATIENT
Start: 2019-07-08 | End: 2019-01-01 | Stop reason: SDUPTHER

## 2019-07-15 ENCOUNTER — ANESTHESIA (OUTPATIENT)
Dept: ENDOSCOPY | Age: 74
End: 2019-07-15
Payer: COMMERCIAL

## 2019-07-15 ENCOUNTER — HOSPITAL ENCOUNTER (OUTPATIENT)
Age: 74
Setting detail: OUTPATIENT SURGERY
Discharge: HOME | End: 2019-07-15
Attending: INTERNAL MEDICINE | Admitting: INTERNAL MEDICINE
Payer: COMMERCIAL

## 2019-07-15 VITALS
TEMPERATURE: 98.2 F | BODY MASS INDEX: 31.92 KG/M2 | OXYGEN SATURATION: 96 % | DIASTOLIC BLOOD PRESSURE: 90 MMHG | RESPIRATION RATE: 18 BRPM | WEIGHT: 228 LBS | HEART RATE: 73 BPM | HEIGHT: 71 IN | SYSTOLIC BLOOD PRESSURE: 131 MMHG

## 2019-07-15 VITALS
OXYGEN SATURATION: 96 % | SYSTOLIC BLOOD PRESSURE: 109 MMHG | RESPIRATION RATE: 6 BRPM | DIASTOLIC BLOOD PRESSURE: 78 MMHG

## 2019-07-15 PROBLEM — R18.8 CIRRHOSIS OF LIVER WITH ASCITES (HCC): Status: ACTIVE | Noted: 2018-06-02

## 2019-07-15 LAB
GLUCOSE BLD-MCNC: 121 MG/DL (ref 70–99)
GLUCOSE BLD-MCNC: 155 MG/DL (ref 70–99)
PERFORMED ON: ABNORMAL
PERFORMED ON: ABNORMAL

## 2019-07-15 PROCEDURE — 2500000003 HC RX 250 WO HCPCS: Performed by: NURSE ANESTHETIST, CERTIFIED REGISTERED

## 2019-07-15 PROCEDURE — 7100000011 HC PHASE II RECOVERY - ADDTL 15 MIN: Performed by: INTERNAL MEDICINE

## 2019-07-15 PROCEDURE — 2720000010 HC SURG SUPPLY STERILE: Performed by: INTERNAL MEDICINE

## 2019-07-15 PROCEDURE — 88342 IMHCHEM/IMCYTCHM 1ST ANTB: CPT

## 2019-07-15 PROCEDURE — 3700000000 HC ANESTHESIA ATTENDED CARE: Performed by: INTERNAL MEDICINE

## 2019-07-15 PROCEDURE — 3609012300 HC EGD BAND LIGATION ESOPHGEAL/GASTRIC VARICES: Performed by: INTERNAL MEDICINE

## 2019-07-15 PROCEDURE — 3609012400 HC EGD TRANSORAL BIOPSY SINGLE/MULTIPLE: Performed by: INTERNAL MEDICINE

## 2019-07-15 PROCEDURE — 2580000003 HC RX 258: Performed by: FAMILY MEDICINE

## 2019-07-15 PROCEDURE — 2709999900 HC NON-CHARGEABLE SUPPLY: Performed by: INTERNAL MEDICINE

## 2019-07-15 PROCEDURE — 3700000001 HC ADD 15 MINUTES (ANESTHESIA): Performed by: INTERNAL MEDICINE

## 2019-07-15 PROCEDURE — 88305 TISSUE EXAM BY PATHOLOGIST: CPT

## 2019-07-15 PROCEDURE — 7100000010 HC PHASE II RECOVERY - FIRST 15 MIN: Performed by: INTERNAL MEDICINE

## 2019-07-15 PROCEDURE — 6360000002 HC RX W HCPCS: Performed by: NURSE ANESTHETIST, CERTIFIED REGISTERED

## 2019-07-15 RX ORDER — SPIRONOLACTONE 50 MG/1
100 TABLET, FILM COATED ORAL DAILY
Qty: 60 TABLET | Refills: 5 | Status: ON HOLD | OUTPATIENT
Start: 2019-07-15 | End: 2020-01-01 | Stop reason: SDUPTHER

## 2019-07-15 RX ORDER — LIDOCAINE HYDROCHLORIDE 20 MG/ML
INJECTION, SOLUTION INFILTRATION; PERINEURAL PRN
Status: DISCONTINUED | OUTPATIENT
Start: 2019-07-15 | End: 2019-07-15 | Stop reason: SDUPTHER

## 2019-07-15 RX ORDER — SODIUM CHLORIDE 9 MG/ML
INJECTION, SOLUTION INTRAVENOUS CONTINUOUS
Status: DISCONTINUED | OUTPATIENT
Start: 2019-07-15 | End: 2019-07-15 | Stop reason: HOSPADM

## 2019-07-15 RX ORDER — PROPOFOL 10 MG/ML
INJECTION, EMULSION INTRAVENOUS PRN
Status: DISCONTINUED | OUTPATIENT
Start: 2019-07-15 | End: 2019-07-15 | Stop reason: SDUPTHER

## 2019-07-15 RX ORDER — SODIUM CHLORIDE 0.9 % (FLUSH) 0.9 %
10 SYRINGE (ML) INJECTION EVERY 12 HOURS SCHEDULED
Status: DISCONTINUED | OUTPATIENT
Start: 2019-07-15 | End: 2019-07-15 | Stop reason: HOSPADM

## 2019-07-15 RX ORDER — SODIUM CHLORIDE 0.9 % (FLUSH) 0.9 %
10 SYRINGE (ML) INJECTION PRN
Status: DISCONTINUED | OUTPATIENT
Start: 2019-07-15 | End: 2019-07-15 | Stop reason: HOSPADM

## 2019-07-15 RX ORDER — SPIRONOLACTONE 25 MG/1
100 TABLET ORAL DAILY
Qty: 120 TABLET | Refills: 5 | Status: SHIPPED | OUTPATIENT
Start: 2019-07-15 | End: 2019-07-15 | Stop reason: SDUPTHER

## 2019-07-15 RX ADMIN — PROPOFOL 50 MG: 10 INJECTION, EMULSION INTRAVENOUS at 08:41

## 2019-07-15 RX ADMIN — LIDOCAINE HYDROCHLORIDE 100 MG: 20 INJECTION, SOLUTION INFILTRATION; PERINEURAL at 08:27

## 2019-07-15 RX ADMIN — PROPOFOL 50 MG: 10 INJECTION, EMULSION INTRAVENOUS at 08:33

## 2019-07-15 RX ADMIN — PROPOFOL 50 MG: 10 INJECTION, EMULSION INTRAVENOUS at 08:28

## 2019-07-15 RX ADMIN — PROPOFOL 50 MG: 10 INJECTION, EMULSION INTRAVENOUS at 08:46

## 2019-07-15 RX ADMIN — SODIUM CHLORIDE: 9 INJECTION, SOLUTION INTRAVENOUS at 07:07

## 2019-07-15 RX ADMIN — PROPOFOL 50 MG: 10 INJECTION, EMULSION INTRAVENOUS at 08:37

## 2019-07-15 ASSESSMENT — PAIN SCALES - GENERAL
PAINLEVEL_OUTOF10: 0

## 2019-07-15 ASSESSMENT — LIFESTYLE VARIABLES: SMOKING_STATUS: 1

## 2019-07-15 NOTE — PROGRESS NOTES
Patient present for follow up upper endoscopy. Patient had upper endoscopy with banding in June. Teaching / education initiated regarding perioperative experience, expectations, and pain management during stay. Patient verbalized understanding.

## 2019-07-15 NOTE — ANESTHESIA PRE PROCEDURE
 Generalized weakness R53.1    Hypotension, postural I95.1       Past Medical History:        Diagnosis Date    ANEMIA     CAD (coronary artery disease) 10/12/2011    [facility]     Depression     [facility]     Esophageal bleed, non-variceal 2012    Hypertension     HYPOTHYROIDISM     Liver disease 2012    Mixed hyperlipidemia     NEUROPATHY     Non-ST elevation MI (NSTEMI) ([facility] 2012    P[facility]     [facility]    Pneumonia     Sleep apnea     no cpap    Thrombocytopenia [facility]     Thyroid disease        Past Surgical History:        Procedure Laterality Date    ACHILLES TENDON SURGERY      CARDIAC SURGERY      3 vessel cabg    CHOLECYSTECTOMY      COLONOSCOPY      CORONARY ARTERY BYPASS GRAFT  10/2011    cabg x3    ENDOSCOPY, COLON, DIAGNOSTIC      PTCA  2012    PTCA RCA & RPL    TONSILLECTOMY      UPPER GASTROINTESTINAL ENDOSCOPY  2012    multiple thin linear esophageal mucosal breaks    UPPER GASTROINTESTINAL ENDOSCOPY  3/6/2014    UPPER GASTROINTESTINAL ENDOSCOPY N/A 2019    EGD BAND LIGATION performed by Meredith White MD at [address] History:    Social History     Tobacco Use    Smoking status: Current Some Day Smoker     Packs/day: 1.00     Years: 43.00     Pack years: 43.00     Types: Cigarettes     Last attempt to quit: 10/11/2011     Years since quittin.7    Smokeless tobacco: Never Used   Substance Use Topics    Alcohol use: No     Comment: RARELY;  approx once every 6 months                                Ready to quit: Not Answered  Counseling given: Not Answered      Vital Signs (Current):   Vitals:    19 1206 07/15/19 0703   BP:  107/70   Pulse:  62   Resp:  16   Temp:  97.9 °F (36.6 °C)   TempSrc:  Temporal   SpO2:  92%   Weight: 228 lb (103.4 kg)    Height: 5' 11\" (1.803 m)                                               BP Readings from

## 2019-08-08 ENCOUNTER — TELEPHONE (OUTPATIENT)
Dept: INTERNAL MEDICINE CLINIC | Age: 74
End: 2019-08-08

## 2019-08-08 ENCOUNTER — OFFICE VISIT (OUTPATIENT)
Dept: INTERNAL MEDICINE CLINIC | Age: 74
End: 2019-08-08
Payer: COMMERCIAL

## 2019-08-08 VITALS
WEIGHT: 203 LBS | HEART RATE: 61 BPM | DIASTOLIC BLOOD PRESSURE: 73 MMHG | SYSTOLIC BLOOD PRESSURE: 121 MMHG | HEIGHT: 71 IN | BODY MASS INDEX: 28.42 KG/M2

## 2019-08-08 DIAGNOSIS — K74.60 CIRRHOSIS OF LIVER WITHOUT ASCITES, UNSPECIFIED HEPATIC CIRRHOSIS TYPE (HCC): ICD-10-CM

## 2019-08-08 DIAGNOSIS — D61.818 PANCYTOPENIA (HCC): ICD-10-CM

## 2019-08-08 DIAGNOSIS — R41.0 ACUTE CONFUSION: Primary | ICD-10-CM

## 2019-08-08 LAB
A/G RATIO: 1.2 (ref 1.1–2.2)
ALBUMIN SERPL-MCNC: 3.5 G/DL (ref 3.4–5)
ALP BLD-CCNC: 154 U/L (ref 40–129)
ALT SERPL-CCNC: 25 U/L (ref 10–40)
AMMONIA: 156 UMOL/L (ref 16–60)
ANION GAP SERPL CALCULATED.3IONS-SCNC: 10 MMOL/L (ref 3–16)
AST SERPL-CCNC: 32 U/L (ref 15–37)
BASOPHILS ABSOLUTE: 0 K/UL (ref 0–0.2)
BASOPHILS RELATIVE PERCENT: 0.6 %
BILIRUB SERPL-MCNC: 1.7 MG/DL (ref 0–1)
BILIRUBIN URINE: ABNORMAL
BLOOD, URINE: NEGATIVE
BUN BLDV-MCNC: 20 MG/DL (ref 7–20)
CALCIUM SERPL-MCNC: 9.7 MG/DL (ref 8.3–10.6)
CHLORIDE BLD-SCNC: 104 MMOL/L (ref 99–110)
CLARITY: ABNORMAL
CO2: 24 MMOL/L (ref 21–32)
COLOR: ABNORMAL
CREAT SERPL-MCNC: 0.9 MG/DL (ref 0.8–1.3)
EOSINOPHILS ABSOLUTE: 0.2 K/UL (ref 0–0.6)
EOSINOPHILS RELATIVE PERCENT: 5.2 %
EPITHELIAL CELLS, UA: 1 /HPF (ref 0–5)
GFR AFRICAN AMERICAN: >60
GFR NON-AFRICAN AMERICAN: >60
GLOBULIN: 3 G/DL
GLUCOSE BLD-MCNC: 243 MG/DL (ref 70–99)
GLUCOSE URINE: 500 MG/DL
HCT VFR BLD CALC: 39.8 % (ref 40.5–52.5)
HEMOGLOBIN: 13.5 G/DL (ref 13.5–17.5)
HYALINE CASTS: 2 /LPF (ref 0–8)
KETONES, URINE: NEGATIVE MG/DL
LEUKOCYTE ESTERASE, URINE: ABNORMAL
LYMPHOCYTES ABSOLUTE: 0.8 K/UL (ref 1–5.1)
LYMPHOCYTES RELATIVE PERCENT: 21.8 %
MCH RBC QN AUTO: 34.4 PG (ref 26–34)
MCHC RBC AUTO-ENTMCNC: 34.1 G/DL (ref 31–36)
MCV RBC AUTO: 101 FL (ref 80–100)
MICROSCOPIC EXAMINATION: YES
MONOCYTES ABSOLUTE: 0.4 K/UL (ref 0–1.3)
MONOCYTES RELATIVE PERCENT: 10.7 %
NEUTROPHILS ABSOLUTE: 2.3 K/UL (ref 1.7–7.7)
NEUTROPHILS RELATIVE PERCENT: 61.7 %
NITRITE, URINE: NEGATIVE
PDW BLD-RTO: 16.1 % (ref 12.4–15.4)
PH UA: 6.5 (ref 5–8)
PLATELET # BLD: 77 K/UL (ref 135–450)
PMV BLD AUTO: 8.2 FL (ref 5–10.5)
POTASSIUM SERPL-SCNC: 4.9 MMOL/L (ref 3.5–5.1)
PROTEIN UA: NEGATIVE MG/DL
RBC # BLD: 3.94 M/UL (ref 4.2–5.9)
RBC UA: NORMAL /HPF (ref 0–2)
SODIUM BLD-SCNC: 138 MMOL/L (ref 136–145)
SPECIFIC GRAVITY UA: 1.02 (ref 1–1.03)
TOTAL PROTEIN: 6.5 G/DL (ref 6.4–8.2)
URINE REFLEX TO CULTURE: YES
URINE TYPE: ABNORMAL
UROBILINOGEN, URINE: 2 E.U./DL
WBC # BLD: 3.8 K/UL (ref 4–11)
WBC UA: 5 /HPF (ref 0–5)

## 2019-08-08 PROCEDURE — 99214 OFFICE O/P EST MOD 30 MIN: CPT | Performed by: NURSE PRACTITIONER

## 2019-08-08 ASSESSMENT — ENCOUNTER SYMPTOMS
ABDOMINAL DISTENTION: 0
RESPIRATORY NEGATIVE: 1
CONSTIPATION: 0
VOMITING: 1
ABDOMINAL PAIN: 0
BLOOD IN STOOL: 0
DIARRHEA: 0

## 2019-08-08 NOTE — Clinical Note
[facility] patient-family brought him in due to slow speech and gait. My concern was encephalopathy. Ammonia is 156. Confirmed with his wife that he is getting both lactulose and Xifaxan.

## 2019-08-08 NOTE — PROGRESS NOTES
SUBJECTIVE:    Patient ID: Saran Loving is a 69 y.o. male. CC: Vomiting, weakness, confusion, difficulty speaking    HPI: The patient presents to the office for an acute visit. He is accompanied by family. They report 6-day history of vomiting, weakness, confusion, difficulty speaking. Family first noticed him walking slowing. On Friday, he fell at home. On Saturday he had episode of vomiting, they thought was related to stomach bug. He has been more weak than normal.  Speech has been slow. He has been more confused than normal.  He has known cirrhosis of the liver and is seen regularly by GI. Wife is administering his medications and he has been getting lactulose twice daily and Xifaxan twice daily. On 7/15, the patient underwent EGD with findings of large esophageal varices with banding, gastric erosions which were biopsied.       Current Outpatient Medications   Medication Sig Dispense Refill    spironolactone (ALDACTONE) 50 MG tablet Take 2 tablets by mouth daily 60 tablet 5    Turmeric (CURCUMIN 95) 500 MG CAPS Take by mouth daily      Levothyroxine Sodium 125 MCG CAPS Take by mouth Daily      CINNAMON PO Take 1,000 mg by mouth daily      MAGNESIUM PO Take 25 mg by mouth daily      b complex vitamins capsule Take 1 capsule by mouth daily      glipiZIDE (GLUCOTROL) 5 MG tablet Take 1 tablet by mouth 2 times daily (before meals) 180 tablet 3    lactulose (CHRONULAC) 10 GM/15ML solution TAKE 30 MLS BY MOUTH ONCE NIGHTLY (Patient taking differently: TAKE 30 MLS BY MOUTH TID) 1 Bottle 5    saxagliptin (ONGLYZA) 5 MG TABS tablet Take 5 mg by mouth daily       MILK THISTLE PO Take 1,000 mg by mouth daily       cyanocobalamin 1000 MCG/ML injection INJECT 1 ML INTRAMUSCULARLY EVERY 30 DAYS 1 vial 9    B-D 3CC LUER-ALEXIS SYR 25GX1\" 25G X 1\" 3 ML MISC USE WITH VITAMIN B12 INJECTIONS 12 each 3    XIFAXAN 550 MG tablet 2 times daily       vitamin D (ERGOCALCIFEROL) 400 UNITS CAPS Take 400 Units by mouth daily.  Omega-3 Fatty Acids (FISH OIL) 500 MG CAPS Take 1,000 mg by mouth daily       aspirin 81 MG tablet Take 81 mg by mouth daily.  nadolol (CORGARD) 20 MG tablet Take 1 tablet by mouth daily. 90 tablet 3    citalopram (CELEXA) 20 MG tablet Take 1 tablet by mouth daily. (Patient taking differently: Take 20 mg by mouth daily 1/2 tablet) 90 tablet 3    furosemide (LASIX) 20 MG tablet Take 1 tablet by mouth daily. (Patient taking differently: Take 20 mg by mouth 2 times daily ) 90 tablet 3     No current facility-administered medications for this visit. Review of Systems   Constitutional: Negative for fever. Respiratory: Negative. Cardiovascular: Negative. Gastrointestinal: Positive for vomiting. Negative for abdominal distention, abdominal pain, blood in stool, constipation and diarrhea. Skin: Negative. Neurological: Positive for speech difficulty and weakness. Psychiatric/Behavioral: Positive for confusion. OBJECTIVE:  Physical Exam   Constitutional: He is oriented to person, place, and time. He appears well-developed and well-nourished. HENT:   Head: Normocephalic and atraumatic. Eyes: Lids are normal. No scleral icterus. Cardiovascular: Normal rate and regular rhythm. Pulmonary/Chest: Effort normal and breath sounds normal.   Abdominal: Soft. There is no guarding. Neurological: He is alert and oriented to person, place, and time. Appropriately answering orientation questions. Movement and speech are slowed. Skin: Skin is warm and dry. Psychiatric: He has a normal mood and affect.  His behavior is normal.      Ht 5' 11\" (1.803 m)   Wt 203 lb (92.1 kg)   BMI 28.31 kg/m²      PHQ Scores 5/30/2018 5/18/2017   PHQ2 Score 1 1   PHQ9 Score 1 1     Interpretation of Total Score Depression Severity: 1-4 = Minimal depression, 5-9 = Mild depression, 10-14 = Moderate depression, 15-19 = Moderately severe depression, 20-27 =Severe

## 2019-08-09 LAB — URINE CULTURE, ROUTINE: NORMAL

## 2019-09-19 ENCOUNTER — TELEPHONE (OUTPATIENT)
Dept: INTERNAL MEDICINE CLINIC | Age: 74
End: 2019-09-19

## 2019-09-20 ENCOUNTER — TELEPHONE (OUTPATIENT)
Dept: INTERNAL MEDICINE CLINIC | Age: 74
End: 2019-09-20

## 2019-09-20 RX ORDER — NADOLOL 20 MG/1
20 TABLET ORAL DAILY
Qty: 90 TABLET | Refills: 3 | Status: ON HOLD | OUTPATIENT
Start: 2019-09-20 | End: 2020-01-01 | Stop reason: SDUPTHER

## 2019-09-20 RX ORDER — CITALOPRAM 20 MG/1
20 TABLET ORAL DAILY
Qty: 90 TABLET | Refills: 3 | Status: SHIPPED | OUTPATIENT
Start: 2019-09-20 | End: 2020-01-01

## 2019-09-30 ENCOUNTER — TELEPHONE (OUTPATIENT)
Dept: INTERNAL MEDICINE CLINIC | Age: 74
End: 2019-09-30

## 2019-09-30 RX ORDER — CYANOCOBALAMIN 1000 UG/ML
INJECTION INTRAMUSCULAR; SUBCUTANEOUS
Qty: 3 VIAL | Refills: 2 | Status: SHIPPED | OUTPATIENT
Start: 2019-09-30 | End: 2020-01-01

## 2019-09-30 RX ORDER — FUROSEMIDE 20 MG/1
20 TABLET ORAL DAILY
Qty: 90 TABLET | Refills: 3 | Status: ON HOLD | OUTPATIENT
Start: 2019-09-30 | End: 2020-01-01 | Stop reason: SDUPTHER

## 2019-10-02 ENCOUNTER — APPOINTMENT (OUTPATIENT)
Dept: GENERAL RADIOLOGY | Age: 74
DRG: 443 | End: 2019-10-02
Payer: COMMERCIAL

## 2019-10-02 ENCOUNTER — APPOINTMENT (OUTPATIENT)
Dept: CT IMAGING | Age: 74
DRG: 443 | End: 2019-10-02
Payer: COMMERCIAL

## 2019-10-02 ENCOUNTER — OFFICE VISIT (OUTPATIENT)
Dept: INTERNAL MEDICINE CLINIC | Age: 74
End: 2019-10-02
Payer: COMMERCIAL

## 2019-10-02 ENCOUNTER — HOSPITAL ENCOUNTER (INPATIENT)
Age: 74
LOS: 1 days | Discharge: HOME | DRG: 443 | End: 2019-10-03
Attending: EMERGENCY MEDICINE | Admitting: INTERNAL MEDICINE
Payer: COMMERCIAL

## 2019-10-02 VITALS
WEIGHT: 197 LBS | BODY MASS INDEX: 27.48 KG/M2 | SYSTOLIC BLOOD PRESSURE: 90 MMHG | DIASTOLIC BLOOD PRESSURE: 70 MMHG | HEART RATE: 78 BPM

## 2019-10-02 DIAGNOSIS — R41.0 ACUTE CONFUSION: Primary | ICD-10-CM

## 2019-10-02 DIAGNOSIS — R79.89 INCREASED AMMONIA LEVEL: Primary | ICD-10-CM

## 2019-10-02 DIAGNOSIS — R41.82 ALTERED MENTAL STATUS, UNSPECIFIED ALTERED MENTAL STATUS TYPE: ICD-10-CM

## 2019-10-02 DIAGNOSIS — I95.9 HYPOTENSION, UNSPECIFIED HYPOTENSION TYPE: ICD-10-CM

## 2019-10-02 DIAGNOSIS — K76.82 HEPATIC ENCEPHALOPATHY: ICD-10-CM

## 2019-10-02 DIAGNOSIS — R53.1 GENERALIZED WEAKNESS: ICD-10-CM

## 2019-10-02 DIAGNOSIS — R53.83 FATIGUE, UNSPECIFIED TYPE: ICD-10-CM

## 2019-10-02 PROBLEM — E72.20 HYPERAMMONEMIA (HCC): Status: ACTIVE | Noted: 2019-10-02

## 2019-10-02 LAB
A/G RATIO: 1.1 (ref 1.1–2.2)
ALBUMIN SERPL-MCNC: 3.4 G/DL (ref 3.4–5)
ALP BLD-CCNC: 161 U/L (ref 40–129)
ALT SERPL-CCNC: 26 U/L (ref 10–40)
AMMONIA: 151 UMOL/L (ref 16–60)
ANION GAP SERPL CALCULATED.3IONS-SCNC: 9 MMOL/L (ref 3–16)
APTT: 31.7 SEC (ref 26–36)
AST SERPL-CCNC: 32 U/L (ref 15–37)
BASOPHILS ABSOLUTE: 0 K/UL (ref 0–0.2)
BASOPHILS RELATIVE PERCENT: 1.2 %
BILIRUB SERPL-MCNC: 2.4 MG/DL (ref 0–1)
BILIRUBIN URINE: ABNORMAL
BLOOD, URINE: NEGATIVE
BUN BLDV-MCNC: 17 MG/DL (ref 7–20)
CALCIUM SERPL-MCNC: 9 MG/DL (ref 8.3–10.6)
CHLORIDE BLD-SCNC: 102 MMOL/L (ref 99–110)
CLARITY: CLEAR
CO2: 24 MMOL/L (ref 21–32)
COLOR: ABNORMAL
CREAT SERPL-MCNC: 1 MG/DL (ref 0.8–1.3)
EOSINOPHILS ABSOLUTE: 0.4 K/UL (ref 0–0.6)
EOSINOPHILS RELATIVE PERCENT: 9.6 %
EPITHELIAL CELLS, UA: 1 /HPF (ref 0–5)
GFR AFRICAN AMERICAN: >60
GFR NON-AFRICAN AMERICAN: >60
GLOBULIN: 3.2 G/DL
GLUCOSE BLD-MCNC: 362 MG/DL (ref 70–99)
GLUCOSE BLD-MCNC: 371 MG/DL (ref 70–99)
GLUCOSE BLD-MCNC: 373 MG/DL (ref 70–99)
GLUCOSE URINE: 250 MG/DL
HCT VFR BLD CALC: 41.4 % (ref 40.5–52.5)
HEMOGLOBIN: 13.5 G/DL (ref 13.5–17.5)
HYALINE CASTS: 6 /LPF (ref 0–8)
INR BLD: 1.25 (ref 0.86–1.14)
KETONES, URINE: ABNORMAL MG/DL
LACTIC ACID, SEPSIS: 2 MMOL/L (ref 0.4–1.9)
LACTIC ACID, SEPSIS: 2.3 MMOL/L (ref 0.4–1.9)
LEUKOCYTE ESTERASE, URINE: ABNORMAL
LIPASE: 56 U/L (ref 13–60)
LYMPHOCYTES ABSOLUTE: 1.1 K/UL (ref 1–5.1)
LYMPHOCYTES RELATIVE PERCENT: 27.5 %
MCH RBC QN AUTO: 33.3 PG (ref 26–34)
MCHC RBC AUTO-ENTMCNC: 32.6 G/DL (ref 31–36)
MCV RBC AUTO: 102.1 FL (ref 80–100)
MICROSCOPIC EXAMINATION: YES
MONOCYTES ABSOLUTE: 0.5 K/UL (ref 0–1.3)
MONOCYTES RELATIVE PERCENT: 11.3 %
NEUTROPHILS ABSOLUTE: 2.1 K/UL (ref 1.7–7.7)
NEUTROPHILS RELATIVE PERCENT: 50.4 %
NITRITE, URINE: NEGATIVE
PDW BLD-RTO: 15 % (ref 12.4–15.4)
PERFORMED ON: ABNORMAL
PERFORMED ON: ABNORMAL
PH UA: 5 (ref 5–8)
PLATELET # BLD: 72 K/UL (ref 135–450)
PMV BLD AUTO: 8.7 FL (ref 5–10.5)
POTASSIUM REFLEX MAGNESIUM: 4.6 MMOL/L (ref 3.5–5.1)
PRO-BNP: 632 PG/ML (ref 0–124)
PROTEIN UA: NEGATIVE MG/DL
PROTHROMBIN TIME: 14.2 SEC (ref 9.8–13)
RBC # BLD: 4.06 M/UL (ref 4.2–5.9)
RBC UA: 3 /HPF (ref 0–4)
SODIUM BLD-SCNC: 135 MMOL/L (ref 136–145)
SPECIFIC GRAVITY UA: 1.02 (ref 1–1.03)
TOTAL PROTEIN: 6.6 G/DL (ref 6.4–8.2)
TROPONIN: <0.01 NG/ML
URINE TYPE: ABNORMAL
UROBILINOGEN, URINE: 1 E.U./DL
WBC # BLD: 4.1 K/UL (ref 4–11)
WBC UA: 6 /HPF (ref 0–5)

## 2019-10-02 PROCEDURE — 83690 ASSAY OF LIPASE: CPT

## 2019-10-02 PROCEDURE — 82140 ASSAY OF AMMONIA: CPT

## 2019-10-02 PROCEDURE — 83880 ASSAY OF NATRIURETIC PEPTIDE: CPT

## 2019-10-02 PROCEDURE — 2580000003 HC RX 258: Performed by: NURSE PRACTITIONER

## 2019-10-02 PROCEDURE — 6370000000 HC RX 637 (ALT 250 FOR IP): Performed by: INTERNAL MEDICINE

## 2019-10-02 PROCEDURE — 71045 X-RAY EXAM CHEST 1 VIEW: CPT

## 2019-10-02 PROCEDURE — 85025 COMPLETE CBC W/AUTO DIFF WBC: CPT

## 2019-10-02 PROCEDURE — 85610 PROTHROMBIN TIME: CPT

## 2019-10-02 PROCEDURE — 70450 CT HEAD/BRAIN W/O DYE: CPT

## 2019-10-02 PROCEDURE — 2060000000 HC ICU INTERMEDIATE R&B

## 2019-10-02 PROCEDURE — 99285 EMERGENCY DEPT VISIT HI MDM: CPT

## 2019-10-02 PROCEDURE — 96374 THER/PROPH/DIAG INJ IV PUSH: CPT

## 2019-10-02 PROCEDURE — 2580000003 HC RX 258: Performed by: INTERNAL MEDICINE

## 2019-10-02 PROCEDURE — 6370000000 HC RX 637 (ALT 250 FOR IP): Performed by: EMERGENCY MEDICINE

## 2019-10-02 PROCEDURE — 6370000000 HC RX 637 (ALT 250 FOR IP): Performed by: PHYSICIAN ASSISTANT

## 2019-10-02 PROCEDURE — 99214 OFFICE O/P EST MOD 30 MIN: CPT | Performed by: INTERNAL MEDICINE

## 2019-10-02 PROCEDURE — 87040 BLOOD CULTURE FOR BACTERIA: CPT

## 2019-10-02 PROCEDURE — 85730 THROMBOPLASTIN TIME PARTIAL: CPT

## 2019-10-02 PROCEDURE — 81001 URINALYSIS AUTO W/SCOPE: CPT

## 2019-10-02 PROCEDURE — 93005 ELECTROCARDIOGRAM TRACING: CPT | Performed by: EMERGENCY MEDICINE

## 2019-10-02 PROCEDURE — 83605 ASSAY OF LACTIC ACID: CPT

## 2019-10-02 PROCEDURE — 2580000003 HC RX 258: Performed by: PHYSICIAN ASSISTANT

## 2019-10-02 PROCEDURE — 84484 ASSAY OF TROPONIN QUANT: CPT

## 2019-10-02 PROCEDURE — 80053 COMPREHEN METABOLIC PANEL: CPT

## 2019-10-02 RX ORDER — NICOTINE POLACRILEX 4 MG
15 LOZENGE BUCCAL PRN
Status: DISCONTINUED | OUTPATIENT
Start: 2019-10-02 | End: 2019-10-03 | Stop reason: HOSPADM

## 2019-10-02 RX ORDER — SODIUM CHLORIDE 0.9 % (FLUSH) 0.9 %
10 SYRINGE (ML) INJECTION PRN
Status: DISCONTINUED | OUTPATIENT
Start: 2019-10-02 | End: 2019-10-03 | Stop reason: HOSPADM

## 2019-10-02 RX ORDER — LEVOTHYROXINE SODIUM 0.12 MG/1
125 TABLET ORAL DAILY
Status: DISCONTINUED | OUTPATIENT
Start: 2019-10-03 | End: 2019-10-03 | Stop reason: HOSPADM

## 2019-10-02 RX ORDER — LACTULOSE 10 G/15ML
20 SOLUTION ORAL 3 TIMES DAILY
Status: DISCONTINUED | OUTPATIENT
Start: 2019-10-02 | End: 2019-10-03

## 2019-10-02 RX ORDER — SPIRONOLACTONE 25 MG/1
100 TABLET ORAL DAILY
Status: DISCONTINUED | OUTPATIENT
Start: 2019-10-02 | End: 2019-10-03 | Stop reason: HOSPADM

## 2019-10-02 RX ORDER — FUROSEMIDE 20 MG/1
20 TABLET ORAL DAILY
Status: DISCONTINUED | OUTPATIENT
Start: 2019-10-02 | End: 2019-10-03 | Stop reason: HOSPADM

## 2019-10-02 RX ORDER — GLIPIZIDE 5 MG/1
5 TABLET ORAL
Status: DISCONTINUED | OUTPATIENT
Start: 2019-10-03 | End: 2019-10-03 | Stop reason: HOSPADM

## 2019-10-02 RX ORDER — DEXTROSE MONOHYDRATE 25 G/50ML
12.5 INJECTION, SOLUTION INTRAVENOUS PRN
Status: DISCONTINUED | OUTPATIENT
Start: 2019-10-02 | End: 2019-10-03 | Stop reason: HOSPADM

## 2019-10-02 RX ORDER — DEXTROSE MONOHYDRATE 50 MG/ML
100 INJECTION, SOLUTION INTRAVENOUS PRN
Status: DISCONTINUED | OUTPATIENT
Start: 2019-10-02 | End: 2019-10-03 | Stop reason: HOSPADM

## 2019-10-02 RX ORDER — CITALOPRAM 20 MG/1
20 TABLET ORAL DAILY
Status: DISCONTINUED | OUTPATIENT
Start: 2019-10-02 | End: 2019-10-03 | Stop reason: HOSPADM

## 2019-10-02 RX ORDER — NADOLOL 40 MG/1
20 TABLET ORAL DAILY
Status: DISCONTINUED | OUTPATIENT
Start: 2019-10-02 | End: 2019-10-03 | Stop reason: HOSPADM

## 2019-10-02 RX ORDER — SODIUM CHLORIDE 9 MG/ML
INJECTION, SOLUTION INTRAVENOUS CONTINUOUS
Status: DISCONTINUED | OUTPATIENT
Start: 2019-10-02 | End: 2019-10-03 | Stop reason: HOSPADM

## 2019-10-02 RX ORDER — LACTULOSE 10 G/15ML
20 SOLUTION ORAL ONCE
Status: COMPLETED | OUTPATIENT
Start: 2019-10-02 | End: 2019-10-02

## 2019-10-02 RX ORDER — 0.9 % SODIUM CHLORIDE 0.9 %
1000 INTRAVENOUS SOLUTION INTRAVENOUS ONCE
Status: COMPLETED | OUTPATIENT
Start: 2019-10-02 | End: 2019-10-02

## 2019-10-02 RX ORDER — SODIUM CHLORIDE 0.9 % (FLUSH) 0.9 %
10 SYRINGE (ML) INJECTION EVERY 12 HOURS SCHEDULED
Status: DISCONTINUED | OUTPATIENT
Start: 2019-10-02 | End: 2019-10-03 | Stop reason: HOSPADM

## 2019-10-02 RX ORDER — ASPIRIN 81 MG/1
81 TABLET ORAL DAILY
Status: DISCONTINUED | OUTPATIENT
Start: 2019-10-02 | End: 2019-10-03 | Stop reason: HOSPADM

## 2019-10-02 RX ADMIN — INSULIN HUMAN 4 UNITS: 100 INJECTION, SOLUTION PARENTERAL at 19:20

## 2019-10-02 RX ADMIN — Medication 10 ML: at 21:44

## 2019-10-02 RX ADMIN — FUROSEMIDE 20 MG: 20 TABLET ORAL at 21:44

## 2019-10-02 RX ADMIN — CITALOPRAM HYDROBROMIDE 20 MG: 20 TABLET ORAL at 21:44

## 2019-10-02 RX ADMIN — SPIRONOLACTONE 100 MG: 25 TABLET ORAL at 21:44

## 2019-10-02 RX ADMIN — LACTULOSE 20 G: 20 SOLUTION ORAL at 21:42

## 2019-10-02 RX ADMIN — LACTULOSE 20 G: 20 SOLUTION ORAL at 19:16

## 2019-10-02 RX ADMIN — ASPIRIN 81 MG: 81 TABLET, COATED ORAL at 21:43

## 2019-10-02 RX ADMIN — SODIUM CHLORIDE 1000 ML: 9 INJECTION, SOLUTION INTRAVENOUS at 19:16

## 2019-10-02 RX ADMIN — NADOLOL 20 MG: 40 TABLET ORAL at 21:43

## 2019-10-02 RX ADMIN — SODIUM CHLORIDE: 9 INJECTION, SOLUTION INTRAVENOUS at 21:58

## 2019-10-02 RX ADMIN — RIFAXIMIN 550 MG: 550 TABLET ORAL at 21:44

## 2019-10-02 ASSESSMENT — ENCOUNTER SYMPTOMS
ABDOMINAL PAIN: 0
NAUSEA: 1
NAUSEA: 0
VOMITING: 0
DIARRHEA: 0
RHINORRHEA: 0
VOICE CHANGE: 0
WHEEZING: 0
ABDOMINAL PAIN: 0
SHORTNESS OF BREATH: 0
TROUBLE SWALLOWING: 0
COUGH: 0

## 2019-10-02 ASSESSMENT — PAIN SCALES - GENERAL: PAINLEVEL_OUTOF10: 0

## 2019-10-03 ENCOUNTER — APPOINTMENT (OUTPATIENT)
Dept: ULTRASOUND IMAGING | Age: 74
DRG: 443 | End: 2019-10-03
Payer: COMMERCIAL

## 2019-10-03 VITALS
RESPIRATION RATE: 16 BRPM | SYSTOLIC BLOOD PRESSURE: 116 MMHG | TEMPERATURE: 97.8 F | WEIGHT: 194.8 LBS | HEIGHT: 71 IN | HEART RATE: 71 BPM | OXYGEN SATURATION: 95 % | BODY MASS INDEX: 27.27 KG/M2 | DIASTOLIC BLOOD PRESSURE: 49 MMHG

## 2019-10-03 PROBLEM — R53.81 MALAISE AND FATIGUE: Status: ACTIVE | Noted: 2019-10-03

## 2019-10-03 PROBLEM — R53.83 MALAISE AND FATIGUE: Status: ACTIVE | Noted: 2019-10-03

## 2019-10-03 LAB
GLUCOSE BLD-MCNC: 270 MG/DL (ref 70–99)
GLUCOSE BLD-MCNC: 277 MG/DL (ref 70–99)
GLUCOSE BLD-MCNC: 307 MG/DL (ref 70–99)
LACTIC ACID: 1.4 MMOL/L (ref 0.4–2)
PERFORMED ON: ABNORMAL

## 2019-10-03 PROCEDURE — 6370000000 HC RX 637 (ALT 250 FOR IP): Performed by: PHYSICIAN ASSISTANT

## 2019-10-03 PROCEDURE — 76705 ECHO EXAM OF ABDOMEN: CPT

## 2019-10-03 PROCEDURE — 6370000000 HC RX 637 (ALT 250 FOR IP): Performed by: INTERNAL MEDICINE

## 2019-10-03 PROCEDURE — 97161 PT EVAL LOW COMPLEX 20 MIN: CPT

## 2019-10-03 PROCEDURE — 83605 ASSAY OF LACTIC ACID: CPT

## 2019-10-03 PROCEDURE — 97530 THERAPEUTIC ACTIVITIES: CPT

## 2019-10-03 PROCEDURE — 97165 OT EVAL LOW COMPLEX 30 MIN: CPT

## 2019-10-03 PROCEDURE — 36415 COLL VENOUS BLD VENIPUNCTURE: CPT

## 2019-10-03 PROCEDURE — 94760 N-INVAS EAR/PLS OXIMETRY 1: CPT

## 2019-10-03 PROCEDURE — 97116 GAIT TRAINING THERAPY: CPT

## 2019-10-03 RX ORDER — LACTULOSE 10 G/15ML
20 SOLUTION ORAL
Status: DISCONTINUED | OUTPATIENT
Start: 2019-10-03 | End: 2019-10-03 | Stop reason: HOSPADM

## 2019-10-03 RX ORDER — LACTULOSE 10 G/15ML
20 SOLUTION ORAL 3 TIMES DAILY
Qty: 5 ML | Refills: 0
Start: 2019-10-03

## 2019-10-03 RX ADMIN — LEVOTHYROXINE SODIUM 125 MCG: 125 TABLET ORAL at 05:13

## 2019-10-03 RX ADMIN — RIFAXIMIN 550 MG: 550 TABLET ORAL at 08:47

## 2019-10-03 RX ADMIN — CITALOPRAM HYDROBROMIDE 20 MG: 20 TABLET ORAL at 08:47

## 2019-10-03 RX ADMIN — ASPIRIN 81 MG: 81 TABLET, COATED ORAL at 08:47

## 2019-10-03 RX ADMIN — LACTULOSE 20 G: 20 SOLUTION ORAL at 12:48

## 2019-10-03 RX ADMIN — LACTULOSE 20 G: 20 SOLUTION ORAL at 08:47

## 2019-10-03 RX ADMIN — FUROSEMIDE 20 MG: 20 TABLET ORAL at 08:46

## 2019-10-03 RX ADMIN — NADOLOL 20 MG: 40 TABLET ORAL at 08:47

## 2019-10-03 RX ADMIN — GLIPIZIDE 5 MG: 5 TABLET ORAL at 08:51

## 2019-10-03 RX ADMIN — LACTULOSE 20 G: 20 SOLUTION ORAL at 16:49

## 2019-10-03 RX ADMIN — GLIPIZIDE 5 MG: 5 TABLET ORAL at 16:49

## 2019-10-03 RX ADMIN — SPIRONOLACTONE 100 MG: 25 TABLET ORAL at 08:46

## 2019-10-03 ASSESSMENT — PAIN SCALES - GENERAL
PAINLEVEL_OUTOF10: 0

## 2019-10-04 ENCOUNTER — TELEPHONE (OUTPATIENT)
Dept: INTERNAL MEDICINE CLINIC | Age: 74
End: 2019-10-04

## 2019-10-04 LAB
EKG ATRIAL RATE: 79 BPM
EKG DIAGNOSIS: NORMAL
EKG Q-T INTERVAL: 416 MS
EKG QRS DURATION: 64 MS
EKG QTC CALCULATION (BAZETT): 468 MS
EKG R AXIS: -13 DEGREES
EKG T AXIS: 33 DEGREES
EKG VENTRICULAR RATE: 76 BPM

## 2019-10-04 PROCEDURE — 93010 ELECTROCARDIOGRAM REPORT: CPT | Performed by: INTERNAL MEDICINE

## 2019-10-07 LAB
BLOOD CULTURE, ROUTINE: NORMAL
CULTURE, BLOOD 2: NORMAL

## 2019-10-09 ENCOUNTER — OFFICE VISIT (OUTPATIENT)
Dept: INTERNAL MEDICINE CLINIC | Age: 74
End: 2019-10-09
Payer: COMMERCIAL

## 2019-10-09 VITALS
DIASTOLIC BLOOD PRESSURE: 62 MMHG | BODY MASS INDEX: 27.72 KG/M2 | SYSTOLIC BLOOD PRESSURE: 92 MMHG | HEART RATE: 68 BPM | WEIGHT: 198 LBS | HEIGHT: 71 IN

## 2019-10-09 DIAGNOSIS — K76.82 HEPATIC ENCEPHALOPATHY: ICD-10-CM

## 2019-10-09 DIAGNOSIS — E11.42 TYPE 2 DIABETES MELLITUS WITH DIABETIC POLYNEUROPATHY, WITHOUT LONG-TERM CURRENT USE OF INSULIN (HCC): ICD-10-CM

## 2019-10-09 DIAGNOSIS — F33.1 MODERATE EPISODE OF RECURRENT MAJOR DEPRESSIVE DISORDER (HCC): ICD-10-CM

## 2019-10-09 DIAGNOSIS — K74.60 CIRRHOSIS OF LIVER WITHOUT ASCITES, UNSPECIFIED HEPATIC CIRRHOSIS TYPE (HCC): ICD-10-CM

## 2019-10-09 DIAGNOSIS — Z09 HOSPITAL DISCHARGE FOLLOW-UP: Primary | ICD-10-CM

## 2019-10-09 DIAGNOSIS — R41.82 ALTERED MENTAL STATUS, UNSPECIFIED ALTERED MENTAL STATUS TYPE: ICD-10-CM

## 2019-10-09 DIAGNOSIS — E72.20 HYPERAMMONEMIA (HCC): ICD-10-CM

## 2019-10-09 PROCEDURE — 1111F DSCHRG MED/CURRENT MED MERGE: CPT | Performed by: NURSE PRACTITIONER

## 2019-10-09 PROCEDURE — 99496 TRANSJ CARE MGMT HIGH F2F 7D: CPT | Performed by: NURSE PRACTITIONER

## 2019-11-06 ENCOUNTER — OFFICE VISIT (OUTPATIENT)
Dept: INTERNAL MEDICINE CLINIC | Age: 74
End: 2019-11-06
Payer: COMMERCIAL

## 2019-11-06 VITALS
HEART RATE: 64 BPM | DIASTOLIC BLOOD PRESSURE: 80 MMHG | HEIGHT: 71 IN | SYSTOLIC BLOOD PRESSURE: 124 MMHG | BODY MASS INDEX: 27.02 KG/M2 | WEIGHT: 193 LBS

## 2019-11-06 DIAGNOSIS — Z23 NEED FOR INFLUENZA VACCINATION: ICD-10-CM

## 2019-11-06 DIAGNOSIS — K74.60 CIRRHOSIS OF LIVER WITH ASCITES, UNSPECIFIED HEPATIC CIRRHOSIS TYPE (HCC): Primary | ICD-10-CM

## 2019-11-06 DIAGNOSIS — D61.818 PANCYTOPENIA (HCC): ICD-10-CM

## 2019-11-06 DIAGNOSIS — R18.8 CIRRHOSIS OF LIVER WITH ASCITES, UNSPECIFIED HEPATIC CIRRHOSIS TYPE (HCC): Primary | ICD-10-CM

## 2019-11-06 DIAGNOSIS — K76.82 HEPATIC ENCEPHALOPATHY: ICD-10-CM

## 2019-11-06 PROCEDURE — 90653 IIV ADJUVANT VACCINE IM: CPT | Performed by: INTERNAL MEDICINE

## 2019-11-06 PROCEDURE — 90471 IMMUNIZATION ADMIN: CPT | Performed by: INTERNAL MEDICINE

## 2019-11-06 PROCEDURE — 99214 OFFICE O/P EST MOD 30 MIN: CPT | Performed by: INTERNAL MEDICINE

## 2020-01-01 ENCOUNTER — VIRTUAL VISIT (OUTPATIENT)
Dept: INTERNAL MEDICINE CLINIC | Age: 75
End: 2020-01-01
Payer: COMMERCIAL

## 2020-01-01 ENCOUNTER — TELEPHONE (OUTPATIENT)
Dept: INTERNAL MEDICINE CLINIC | Age: 75
End: 2020-01-01

## 2020-01-01 ENCOUNTER — CARE COORDINATION (OUTPATIENT)
Dept: CASE MANAGEMENT | Age: 75
End: 2020-01-01

## 2020-01-01 ENCOUNTER — APPOINTMENT (OUTPATIENT)
Dept: GENERAL RADIOLOGY | Age: 75
DRG: 291 | End: 2020-01-01
Payer: COMMERCIAL

## 2020-01-01 ENCOUNTER — HOSPITAL ENCOUNTER (INPATIENT)
Age: 75
LOS: 7 days | Discharge: HOME | DRG: 432 | End: 2020-09-07
Attending: EMERGENCY MEDICINE | Admitting: HOSPITALIST
Payer: COMMERCIAL

## 2020-01-01 ENCOUNTER — OFFICE VISIT (OUTPATIENT)
Dept: PULMONOLOGY | Age: 75
End: 2020-01-01
Payer: COMMERCIAL

## 2020-01-01 ENCOUNTER — APPOINTMENT (OUTPATIENT)
Dept: CT IMAGING | Age: 75
DRG: 291 | End: 2020-01-01
Payer: COMMERCIAL

## 2020-01-01 ENCOUNTER — APPOINTMENT (OUTPATIENT)
Dept: ULTRASOUND IMAGING | Age: 75
DRG: 291 | End: 2020-01-01
Payer: COMMERCIAL

## 2020-01-01 ENCOUNTER — TELEPHONE (OUTPATIENT)
Dept: OTHER | Age: 75
End: 2020-01-01

## 2020-01-01 ENCOUNTER — OFFICE VISIT (OUTPATIENT)
Dept: CARDIOLOGY CLINIC | Age: 75
End: 2020-01-01
Payer: COMMERCIAL

## 2020-01-01 ENCOUNTER — TELEPHONE (OUTPATIENT)
Dept: CARDIOLOGY CLINIC | Age: 75
End: 2020-01-01

## 2020-01-01 ENCOUNTER — NURSE TRIAGE (OUTPATIENT)
Dept: OTHER | Facility: CLINIC | Age: 75
End: 2020-01-01

## 2020-01-01 ENCOUNTER — APPOINTMENT (OUTPATIENT)
Dept: ULTRASOUND IMAGING | Age: 75
DRG: 432 | End: 2020-01-01
Payer: COMMERCIAL

## 2020-01-01 ENCOUNTER — HOSPITAL ENCOUNTER (INPATIENT)
Age: 75
LOS: 11 days | Discharge: HOME | DRG: 291 | End: 2020-04-19
Attending: EMERGENCY MEDICINE | Admitting: INTERNAL MEDICINE
Payer: COMMERCIAL

## 2020-01-01 ENCOUNTER — APPOINTMENT (OUTPATIENT)
Dept: GENERAL RADIOLOGY | Age: 75
DRG: 441 | End: 2020-01-01
Payer: COMMERCIAL

## 2020-01-01 ENCOUNTER — HOSPITAL ENCOUNTER (OUTPATIENT)
Dept: GENERAL RADIOLOGY | Age: 75
Discharge: HOME | End: 2020-05-07
Payer: COMMERCIAL

## 2020-01-01 ENCOUNTER — HOSPITAL ENCOUNTER (INPATIENT)
Age: 75
LOS: 4 days | Discharge: HOME | DRG: 441 | End: 2020-10-29
Attending: EMERGENCY MEDICINE | Admitting: INTERNAL MEDICINE
Payer: COMMERCIAL

## 2020-01-01 ENCOUNTER — OFFICE VISIT (OUTPATIENT)
Dept: PRIMARY CARE CLINIC | Age: 75
End: 2020-01-01
Payer: COMMERCIAL

## 2020-01-01 ENCOUNTER — VIRTUAL VISIT (OUTPATIENT)
Dept: CARDIOLOGY CLINIC | Age: 75
End: 2020-01-01
Payer: COMMERCIAL

## 2020-01-01 ENCOUNTER — OFFICE VISIT (OUTPATIENT)
Dept: INTERNAL MEDICINE CLINIC | Age: 75
End: 2020-01-01
Payer: COMMERCIAL

## 2020-01-01 ENCOUNTER — HOSPITAL ENCOUNTER (OUTPATIENT)
Age: 75
Discharge: HOME | End: 2020-09-11
Payer: COMMERCIAL

## 2020-01-01 ENCOUNTER — APPOINTMENT (OUTPATIENT)
Dept: GENERAL RADIOLOGY | Age: 75
DRG: 432 | End: 2020-01-01
Payer: COMMERCIAL

## 2020-01-01 ENCOUNTER — APPOINTMENT (OUTPATIENT)
Dept: ULTRASOUND IMAGING | Age: 75
DRG: 441 | End: 2020-01-01
Payer: COMMERCIAL

## 2020-01-01 ENCOUNTER — HOSPITAL ENCOUNTER (OUTPATIENT)
Age: 75
Discharge: HOME | End: 2020-05-07
Payer: COMMERCIAL

## 2020-01-01 ENCOUNTER — APPOINTMENT (OUTPATIENT)
Dept: INTERVENTIONAL RADIOLOGY/VASCULAR | Age: 75
DRG: 432 | End: 2020-01-01
Payer: COMMERCIAL

## 2020-01-01 VITALS — HEART RATE: 67 BPM | SYSTOLIC BLOOD PRESSURE: 118 MMHG | OXYGEN SATURATION: 93 % | DIASTOLIC BLOOD PRESSURE: 62 MMHG

## 2020-01-01 VITALS
BODY MASS INDEX: 25.49 KG/M2 | DIASTOLIC BLOOD PRESSURE: 69 MMHG | HEIGHT: 71 IN | OXYGEN SATURATION: 92 % | TEMPERATURE: 98.3 F | RESPIRATION RATE: 18 BRPM | SYSTOLIC BLOOD PRESSURE: 95 MMHG | HEART RATE: 69 BPM | WEIGHT: 182.1 LBS

## 2020-01-01 VITALS
DIASTOLIC BLOOD PRESSURE: 66 MMHG | TEMPERATURE: 98.2 F | OXYGEN SATURATION: 92 % | WEIGHT: 191 LBS | SYSTOLIC BLOOD PRESSURE: 120 MMHG | BODY MASS INDEX: 26.64 KG/M2 | HEART RATE: 76 BPM

## 2020-01-01 VITALS
WEIGHT: 201.8 LBS | TEMPERATURE: 98.5 F | SYSTOLIC BLOOD PRESSURE: 103 MMHG | HEART RATE: 78 BPM | DIASTOLIC BLOOD PRESSURE: 69 MMHG | HEIGHT: 71 IN | OXYGEN SATURATION: 94 % | RESPIRATION RATE: 16 BRPM | BODY MASS INDEX: 28.25 KG/M2

## 2020-01-01 VITALS
WEIGHT: 207.23 LBS | DIASTOLIC BLOOD PRESSURE: 72 MMHG | OXYGEN SATURATION: 98 % | RESPIRATION RATE: 18 BRPM | TEMPERATURE: 98.2 F | HEIGHT: 71 IN | SYSTOLIC BLOOD PRESSURE: 106 MMHG | BODY MASS INDEX: 29.01 KG/M2 | HEART RATE: 99 BPM

## 2020-01-01 VITALS
HEIGHT: 71 IN | SYSTOLIC BLOOD PRESSURE: 94 MMHG | DIASTOLIC BLOOD PRESSURE: 68 MMHG | WEIGHT: 196 LBS | HEART RATE: 80 BPM | BODY MASS INDEX: 27.44 KG/M2

## 2020-01-01 VITALS
HEART RATE: 89 BPM | DIASTOLIC BLOOD PRESSURE: 74 MMHG | SYSTOLIC BLOOD PRESSURE: 102 MMHG | OXYGEN SATURATION: 99 % | BODY MASS INDEX: 28.42 KG/M2 | WEIGHT: 203 LBS | HEIGHT: 71 IN

## 2020-01-01 VITALS
HEART RATE: 67 BPM | DIASTOLIC BLOOD PRESSURE: 64 MMHG | BODY MASS INDEX: 25.9 KG/M2 | WEIGHT: 185 LBS | OXYGEN SATURATION: 99 % | SYSTOLIC BLOOD PRESSURE: 98 MMHG | HEIGHT: 71 IN

## 2020-01-01 VITALS
OXYGEN SATURATION: 97 % | SYSTOLIC BLOOD PRESSURE: 106 MMHG | TEMPERATURE: 98.3 F | HEART RATE: 83 BPM | DIASTOLIC BLOOD PRESSURE: 60 MMHG

## 2020-01-01 DIAGNOSIS — I10 ESSENTIAL HYPERTENSION: ICD-10-CM

## 2020-01-01 DIAGNOSIS — I25.10 CORONARY ARTERY DISEASE INVOLVING NATIVE CORONARY ARTERY OF NATIVE HEART WITHOUT ANGINA PECTORIS: ICD-10-CM

## 2020-01-01 DIAGNOSIS — I50.33 ACUTE ON CHRONIC DIASTOLIC CONGESTIVE HEART FAILURE (HCC): ICD-10-CM

## 2020-01-01 DIAGNOSIS — I50.32 CHRONIC DIASTOLIC HEART FAILURE (HCC): ICD-10-CM

## 2020-01-01 LAB
24HR URINE VOLUME (ML): 1600 ML
A/G RATIO: 0.9 (ref 1.1–2.2)
A/G RATIO: 1 (ref 1.1–2.2)
A/G RATIO: 1.1 (ref 1.1–2.2)
A/G RATIO: 1.2 (ref 1.1–2.2)
A/G RATIO: 1.6 (ref 1.1–2.2)
A/G RATIO: 1.9 (ref 1.1–2.2)
ALBUMIN FLUID: <0.2 G/DL
ALBUMIN SERPL-MCNC: 2.6 G/DL (ref 3.1–4.9)
ALBUMIN SERPL-MCNC: 2.7 G/DL (ref 3.4–5)
ALBUMIN SERPL-MCNC: 2.8 G/DL (ref 3.4–5)
ALBUMIN SERPL-MCNC: 2.8 G/DL (ref 3.4–5)
ALBUMIN SERPL-MCNC: 2.9 G/DL (ref 3.4–5)
ALBUMIN SERPL-MCNC: 2.9 G/DL (ref 3.4–5)
ALBUMIN SERPL-MCNC: 3 G/DL (ref 3.4–5)
ALBUMIN SERPL-MCNC: 3.6 G/DL (ref 3.4–5)
ALBUMIN SERPL-MCNC: 3.8 G/DL (ref 3.4–5)
ALBUMIN SERPL-MCNC: 4.2 G/DL (ref 3.4–5)
ALP BLD-CCNC: 103 U/L (ref 40–129)
ALP BLD-CCNC: 103 U/L (ref 40–129)
ALP BLD-CCNC: 107 U/L (ref 40–129)
ALP BLD-CCNC: 108 U/L (ref 40–129)
ALP BLD-CCNC: 109 U/L (ref 40–129)
ALP BLD-CCNC: 114 U/L (ref 40–129)
ALP BLD-CCNC: 134 U/L (ref 40–129)
ALP BLD-CCNC: 144 U/L (ref 40–129)
ALP BLD-CCNC: 148 U/L (ref 40–129)
ALP BLD-CCNC: 89 U/L (ref 40–129)
ALP BLD-CCNC: 91 U/L (ref 40–129)
ALP BLD-CCNC: 99 U/L (ref 40–129)
ALPHA-1-GLOBULIN: 0.2 G/DL (ref 0.2–0.4)
ALPHA-2-GLOBULIN: 0.6 G/DL (ref 0.4–1.1)
ALT SERPL-CCNC: 16 U/L (ref 10–40)
ALT SERPL-CCNC: 16 U/L (ref 10–40)
ALT SERPL-CCNC: 18 U/L (ref 10–40)
ALT SERPL-CCNC: 18 U/L (ref 10–40)
ALT SERPL-CCNC: 19 U/L (ref 10–40)
ALT SERPL-CCNC: 19 U/L (ref 10–40)
ALT SERPL-CCNC: 20 U/L (ref 10–40)
ALT SERPL-CCNC: 21 U/L (ref 10–40)
ALT SERPL-CCNC: 22 U/L (ref 10–40)
ALT SERPL-CCNC: 27 U/L (ref 10–40)
ALT SERPL-CCNC: 30 U/L (ref 10–40)
ALT SERPL-CCNC: 33 U/L (ref 10–40)
AMMONIA: 22 UMOL/L (ref 16–60)
AMMONIA: 57 UMOL/L (ref 16–60)
ANION GAP SERPL CALCULATED.3IONS-SCNC: 10 MMOL/L (ref 3–16)
ANION GAP SERPL CALCULATED.3IONS-SCNC: 11 MMOL/L (ref 3–16)
ANION GAP SERPL CALCULATED.3IONS-SCNC: 12 MMOL/L (ref 3–16)
ANION GAP SERPL CALCULATED.3IONS-SCNC: 5 MMOL/L (ref 3–16)
ANION GAP SERPL CALCULATED.3IONS-SCNC: 6 MMOL/L (ref 3–16)
ANION GAP SERPL CALCULATED.3IONS-SCNC: 7 MMOL/L (ref 3–16)
ANION GAP SERPL CALCULATED.3IONS-SCNC: 8 MMOL/L (ref 3–16)
ANION GAP SERPL CALCULATED.3IONS-SCNC: 9 MMOL/L (ref 3–16)
ANISOCYTOSIS: ABNORMAL
ANISOCYTOSIS: ABNORMAL
APPEARANCE FLUID: NORMAL
APTT: 20.8 SEC (ref 24.2–36.2)
APTT: 32 SEC (ref 24.2–36.2)
APTT: 32.5 SEC (ref 24.2–36.2)
AST SERPL-CCNC: 19 U/L (ref 15–37)
AST SERPL-CCNC: 20 U/L (ref 15–37)
AST SERPL-CCNC: 22 U/L (ref 15–37)
AST SERPL-CCNC: 23 U/L (ref 15–37)
AST SERPL-CCNC: 24 U/L (ref 15–37)
AST SERPL-CCNC: 25 U/L (ref 15–37)
AST SERPL-CCNC: 25 U/L (ref 15–37)
AST SERPL-CCNC: 26 U/L (ref 15–37)
AST SERPL-CCNC: 27 U/L (ref 15–37)
AST SERPL-CCNC: 30 U/L (ref 15–37)
AST SERPL-CCNC: 35 U/L (ref 15–37)
AST SERPL-CCNC: 40 U/L (ref 15–37)
BANDED NEUTROPHILS RELATIVE PERCENT: 5 % (ref 0–7)
BASOPHILS ABSOLUTE: 0 K/UL (ref 0–0.2)
BASOPHILS ABSOLUTE: 0.1 K/UL (ref 0–0.2)
BASOPHILS RELATIVE PERCENT: 0 %
BASOPHILS RELATIVE PERCENT: 0.1 %
BASOPHILS RELATIVE PERCENT: 0.1 %
BASOPHILS RELATIVE PERCENT: 0.2 %
BASOPHILS RELATIVE PERCENT: 0.3 %
BASOPHILS RELATIVE PERCENT: 0.3 %
BASOPHILS RELATIVE PERCENT: 0.4 %
BASOPHILS RELATIVE PERCENT: 0.5 %
BASOPHILS RELATIVE PERCENT: 0.6 %
BASOPHILS RELATIVE PERCENT: 0.7 %
BASOPHILS RELATIVE PERCENT: 0.8 %
BASOPHILS RELATIVE PERCENT: 0.9 %
BASOPHILS RELATIVE PERCENT: 1.3 %
BETA GLOBULIN: 0.7 G/DL (ref 0.9–1.6)
BILIRUB SERPL-MCNC: 1.7 MG/DL (ref 0–1)
BILIRUB SERPL-MCNC: 1.9 MG/DL (ref 0–1)
BILIRUB SERPL-MCNC: 2.1 MG/DL (ref 0–1)
BILIRUB SERPL-MCNC: 2.1 MG/DL (ref 0–1)
BILIRUB SERPL-MCNC: 2.3 MG/DL (ref 0–1)
BILIRUB SERPL-MCNC: 2.3 MG/DL (ref 0–1)
BILIRUB SERPL-MCNC: 2.7 MG/DL (ref 0–1)
BILIRUB SERPL-MCNC: 2.9 MG/DL (ref 0–1)
BILIRUB SERPL-MCNC: 3.2 MG/DL (ref 0–1)
BILIRUB SERPL-MCNC: 3.7 MG/DL (ref 0–1)
BILIRUBIN DIRECT: 0.6 MG/DL (ref 0–0.3)
BILIRUBIN DIRECT: 0.7 MG/DL (ref 0–0.3)
BILIRUBIN DIRECT: 0.7 MG/DL (ref 0–0.3)
BILIRUBIN DIRECT: 0.8 MG/DL (ref 0–0.3)
BILIRUBIN DIRECT: 1 MG/DL (ref 0–0.3)
BILIRUBIN DIRECT: 1 MG/DL (ref 0–0.3)
BILIRUBIN, INDIRECT: 1.1 MG/DL (ref 0–1)
BILIRUBIN, INDIRECT: 1.2 MG/DL (ref 0–1)
BILIRUBIN, INDIRECT: 1.4 MG/DL (ref 0–1)
BILIRUBIN, INDIRECT: 1.5 MG/DL (ref 0–1)
BILIRUBIN, INDIRECT: 2.2 MG/DL (ref 0–1)
BILIRUBIN, INDIRECT: 2.7 MG/DL (ref 0–1)
BLOOD CULTURE, ROUTINE: NORMAL
BLOOD CULTURE, ROUTINE: NORMAL
BODY FLUID CULTURE, STERILE: NORMAL
BUN BLDV-MCNC: 14 MG/DL (ref 7–20)
BUN BLDV-MCNC: 14 MG/DL (ref 7–20)
BUN BLDV-MCNC: 15 MG/DL (ref 7–20)
BUN BLDV-MCNC: 15 MG/DL (ref 7–20)
BUN BLDV-MCNC: 16 MG/DL (ref 7–20)
BUN BLDV-MCNC: 18 MG/DL (ref 7–20)
BUN BLDV-MCNC: 19 MG/DL (ref 7–20)
BUN BLDV-MCNC: 19 MG/DL (ref 7–20)
BUN BLDV-MCNC: 20 MG/DL (ref 7–20)
BUN BLDV-MCNC: 22 MG/DL (ref 7–20)
BUN BLDV-MCNC: 22 MG/DL (ref 7–20)
BUN BLDV-MCNC: 23 MG/DL (ref 7–20)
BUN BLDV-MCNC: 24 MG/DL (ref 7–20)
BUN BLDV-MCNC: 30 MG/DL (ref 7–20)
BUN BLDV-MCNC: 34 MG/DL (ref 7–20)
BUN BLDV-MCNC: 35 MG/DL (ref 7–20)
BUN BLDV-MCNC: 36 MG/DL (ref 7–20)
BUN BLDV-MCNC: 38 MG/DL (ref 7–20)
BUN BLDV-MCNC: 40 MG/DL (ref 7–20)
BUN BLDV-MCNC: 43 MG/DL (ref 7–20)
BUN BLDV-MCNC: 43 MG/DL (ref 7–20)
BUN BLDV-MCNC: 44 MG/DL (ref 7–20)
BUN BLDV-MCNC: 46 MG/DL (ref 7–20)
CALCIUM SERPL-MCNC: 8.4 MG/DL (ref 8.3–10.6)
CALCIUM SERPL-MCNC: 8.4 MG/DL (ref 8.3–10.6)
CALCIUM SERPL-MCNC: 8.5 MG/DL (ref 8.3–10.6)
CALCIUM SERPL-MCNC: 8.6 MG/DL (ref 8.3–10.6)
CALCIUM SERPL-MCNC: 8.7 MG/DL (ref 8.3–10.6)
CALCIUM SERPL-MCNC: 8.8 MG/DL (ref 8.3–10.6)
CALCIUM SERPL-MCNC: 8.9 MG/DL (ref 8.3–10.6)
CALCIUM SERPL-MCNC: 9 MG/DL (ref 8.3–10.6)
CALCIUM SERPL-MCNC: 9.1 MG/DL (ref 8.3–10.6)
CALCIUM SERPL-MCNC: 9.2 MG/DL (ref 8.3–10.6)
CALCIUM SERPL-MCNC: 9.2 MG/DL (ref 8.3–10.6)
CALCIUM SERPL-MCNC: 9.4 MG/DL (ref 8.3–10.6)
CALCIUM SERPL-MCNC: 9.5 MG/DL (ref 8.3–10.6)
CALCIUM SERPL-MCNC: 9.6 MG/DL (ref 8.3–10.6)
CELL COUNT FLUID TYPE: NORMAL
CHLORIDE BLD-SCNC: 100 MMOL/L (ref 99–110)
CHLORIDE BLD-SCNC: 101 MMOL/L (ref 99–110)
CHLORIDE BLD-SCNC: 90 MMOL/L (ref 99–110)
CHLORIDE BLD-SCNC: 91 MMOL/L (ref 99–110)
CHLORIDE BLD-SCNC: 92 MMOL/L (ref 99–110)
CHLORIDE BLD-SCNC: 93 MMOL/L (ref 99–110)
CHLORIDE BLD-SCNC: 94 MMOL/L (ref 99–110)
CHLORIDE BLD-SCNC: 95 MMOL/L (ref 99–110)
CHLORIDE BLD-SCNC: 95 MMOL/L (ref 99–110)
CHLORIDE BLD-SCNC: 96 MMOL/L (ref 99–110)
CHLORIDE BLD-SCNC: 97 MMOL/L (ref 99–110)
CHLORIDE BLD-SCNC: 98 MMOL/L (ref 99–110)
CHLORIDE BLD-SCNC: 98 MMOL/L (ref 99–110)
CHLORIDE BLD-SCNC: 99 MMOL/L (ref 99–110)
CLOT EVALUATION: NORMAL
CO2: 27 MMOL/L (ref 21–32)
CO2: 29 MMOL/L (ref 21–32)
CO2: 30 MMOL/L (ref 21–32)
CO2: 31 MMOL/L (ref 21–32)
CO2: 32 MMOL/L (ref 21–32)
CO2: 33 MMOL/L (ref 21–32)
CO2: 34 MMOL/L (ref 21–32)
CO2: 37 MMOL/L (ref 21–32)
CO2: 38 MMOL/L (ref 21–32)
CO2: 38 MMOL/L (ref 21–32)
CO2: 39 MMOL/L (ref 21–32)
CO2: 39 MMOL/L (ref 21–32)
COLOR FLUID: NORMAL
COLOR FLUID: NORMAL
COLOR FLUID: YELLOW
CREAT SERPL-MCNC: 0.8 MG/DL (ref 0.8–1.3)
CREAT SERPL-MCNC: 1 MG/DL (ref 0.8–1.3)
CREAT SERPL-MCNC: 1.1 MG/DL (ref 0.8–1.3)
CREAT SERPL-MCNC: 1.2 MG/DL (ref 0.8–1.3)
CREAT SERPL-MCNC: 1.3 MG/DL (ref 0.8–1.3)
CREAT SERPL-MCNC: 1.4 MG/DL (ref 0.8–1.3)
CREAT SERPL-MCNC: 1.5 MG/DL (ref 0.8–1.3)
CREAT SERPL-MCNC: 1.5 MG/DL (ref 0.8–1.3)
CREAT SERPL-MCNC: 1.6 MG/DL (ref 0.8–1.3)
CREAT SERPL-MCNC: 1.6 MG/DL (ref 0.8–1.3)
CREATININE URINE: 240.5 MG/DL (ref 39–259)
CRYSTALS, UA: ABNORMAL /HPF
CULTURE, BLOOD 2: NORMAL
CULTURE, BLOOD 2: NORMAL
D DIMER: 2678 NG/ML DDU (ref 0–229)
EKG ATRIAL RATE: 111 BPM
EKG ATRIAL RATE: 113 BPM
EKG ATRIAL RATE: 56 BPM
EKG DIAGNOSIS: NORMAL
EKG Q-T INTERVAL: 340 MS
EKG Q-T INTERVAL: 362 MS
EKG Q-T INTERVAL: 410 MS
EKG QRS DURATION: 62 MS
EKG QRS DURATION: 62 MS
EKG QRS DURATION: 78 MS
EKG QTC CALCULATION (BAZETT): 455 MS
EKG QTC CALCULATION (BAZETT): 457 MS
EKG QTC CALCULATION (BAZETT): 501 MS
EKG R AXIS: -17 DEGREES
EKG R AXIS: -4 DEGREES
EKG R AXIS: -7 DEGREES
EKG T AXIS: -21 DEGREES
EKG T AXIS: 0 DEGREES
EKG T AXIS: 4 DEGREES
EKG VENTRICULAR RATE: 108 BPM
EKG VENTRICULAR RATE: 90 BPM
EKG VENTRICULAR RATE: 96 BPM
EOSINOPHILS ABSOLUTE: 0 K/UL (ref 0–0.6)
EOSINOPHILS ABSOLUTE: 0.2 K/UL (ref 0–0.6)
EOSINOPHILS ABSOLUTE: 0.3 K/UL (ref 0–0.6)
EOSINOPHILS ABSOLUTE: 0.3 K/UL (ref 0–0.6)
EOSINOPHILS ABSOLUTE: 0.4 K/UL (ref 0–0.6)
EOSINOPHILS ABSOLUTE: 0.5 K/UL (ref 0–0.6)
EOSINOPHILS RELATIVE PERCENT: 0 %
EOSINOPHILS RELATIVE PERCENT: 0 %
EOSINOPHILS RELATIVE PERCENT: 0.1 %
EOSINOPHILS RELATIVE PERCENT: 0.1 %
EOSINOPHILS RELATIVE PERCENT: 10 %
EOSINOPHILS RELATIVE PERCENT: 10.7 %
EOSINOPHILS RELATIVE PERCENT: 11.6 %
EOSINOPHILS RELATIVE PERCENT: 3.3 %
EOSINOPHILS RELATIVE PERCENT: 4 %
EOSINOPHILS RELATIVE PERCENT: 5.1 %
EOSINOPHILS RELATIVE PERCENT: 6.4 %
EOSINOPHILS RELATIVE PERCENT: 8 %
EOSINOPHILS RELATIVE PERCENT: 8 %
EOSINOPHILS RELATIVE PERCENT: 9.7 %
EOSINOPHILS RELATIVE PERCENT: 9.7 %
EPITHELIAL CELLS, UA: 2 /HPF (ref 0–5)
ESTIMATED AVERAGE GLUCOSE: 102.5 MG/DL
FERRITIN: 44 NG/ML (ref 30–400)
FLUID PATH CONSULT: YES
FLUID TYPE: NORMAL
GAMMA GLOBULIN: 1.3 G/DL (ref 0.6–1.8)
GFR AFRICAN AMERICAN: 51
GFR AFRICAN AMERICAN: 51
GFR AFRICAN AMERICAN: 55
GFR AFRICAN AMERICAN: 55
GFR AFRICAN AMERICAN: >60
GFR NON-AFRICAN AMERICAN: 43
GFR NON-AFRICAN AMERICAN: 43
GFR NON-AFRICAN AMERICAN: 46
GFR NON-AFRICAN AMERICAN: 46
GFR NON-AFRICAN AMERICAN: 50
GFR NON-AFRICAN AMERICAN: 54
GFR NON-AFRICAN AMERICAN: 59
GFR NON-AFRICAN AMERICAN: >60
GLOBULIN: 1.5 G/DL
GLOBULIN: 1.7 G/DL
GLOBULIN: 2.6 G/DL
GLOBULIN: 2.8 G/DL
GLOBULIN: 2.8 G/DL
GLOBULIN: 3.2 G/DL
GLUCOSE BLD-MCNC: 109 MG/DL (ref 70–99)
GLUCOSE BLD-MCNC: 111 MG/DL (ref 70–99)
GLUCOSE BLD-MCNC: 116 MG/DL (ref 70–99)
GLUCOSE BLD-MCNC: 120 MG/DL (ref 70–99)
GLUCOSE BLD-MCNC: 120 MG/DL (ref 70–99)
GLUCOSE BLD-MCNC: 121 MG/DL (ref 70–99)
GLUCOSE BLD-MCNC: 132 MG/DL (ref 70–99)
GLUCOSE BLD-MCNC: 136 MG/DL (ref 70–99)
GLUCOSE BLD-MCNC: 137 MG/DL (ref 70–99)
GLUCOSE BLD-MCNC: 138 MG/DL (ref 70–99)
GLUCOSE BLD-MCNC: 138 MG/DL (ref 70–99)
GLUCOSE BLD-MCNC: 142 MG/DL (ref 70–99)
GLUCOSE BLD-MCNC: 143 MG/DL (ref 70–99)
GLUCOSE BLD-MCNC: 143 MG/DL (ref 70–99)
GLUCOSE BLD-MCNC: 144 MG/DL (ref 70–99)
GLUCOSE BLD-MCNC: 144 MG/DL (ref 70–99)
GLUCOSE BLD-MCNC: 146 MG/DL (ref 70–99)
GLUCOSE BLD-MCNC: 147 MG/DL (ref 70–99)
GLUCOSE BLD-MCNC: 151 MG/DL (ref 70–99)
GLUCOSE BLD-MCNC: 152 MG/DL (ref 70–99)
GLUCOSE BLD-MCNC: 154 MG/DL (ref 70–99)
GLUCOSE BLD-MCNC: 155 MG/DL (ref 70–99)
GLUCOSE BLD-MCNC: 157 MG/DL (ref 70–99)
GLUCOSE BLD-MCNC: 159 MG/DL (ref 70–99)
GLUCOSE BLD-MCNC: 160 MG/DL (ref 70–99)
GLUCOSE BLD-MCNC: 161 MG/DL (ref 70–99)
GLUCOSE BLD-MCNC: 162 MG/DL (ref 70–99)
GLUCOSE BLD-MCNC: 166 MG/DL (ref 70–99)
GLUCOSE BLD-MCNC: 168 MG/DL (ref 70–99)
GLUCOSE BLD-MCNC: 169 MG/DL (ref 70–99)
GLUCOSE BLD-MCNC: 170 MG/DL (ref 70–99)
GLUCOSE BLD-MCNC: 171 MG/DL (ref 70–99)
GLUCOSE BLD-MCNC: 172 MG/DL (ref 70–99)
GLUCOSE BLD-MCNC: 174 MG/DL (ref 70–99)
GLUCOSE BLD-MCNC: 175 MG/DL (ref 70–99)
GLUCOSE BLD-MCNC: 176 MG/DL (ref 70–99)
GLUCOSE BLD-MCNC: 177 MG/DL (ref 70–99)
GLUCOSE BLD-MCNC: 177 MG/DL (ref 70–99)
GLUCOSE BLD-MCNC: 178 MG/DL (ref 70–99)
GLUCOSE BLD-MCNC: 179 MG/DL (ref 70–99)
GLUCOSE BLD-MCNC: 180 MG/DL (ref 70–99)
GLUCOSE BLD-MCNC: 181 MG/DL (ref 70–99)
GLUCOSE BLD-MCNC: 182 MG/DL (ref 70–99)
GLUCOSE BLD-MCNC: 183 MG/DL (ref 70–99)
GLUCOSE BLD-MCNC: 183 MG/DL (ref 70–99)
GLUCOSE BLD-MCNC: 184 MG/DL (ref 70–99)
GLUCOSE BLD-MCNC: 189 MG/DL (ref 70–99)
GLUCOSE BLD-MCNC: 189 MG/DL (ref 70–99)
GLUCOSE BLD-MCNC: 194 MG/DL (ref 70–99)
GLUCOSE BLD-MCNC: 194 MG/DL (ref 70–99)
GLUCOSE BLD-MCNC: 196 MG/DL (ref 70–99)
GLUCOSE BLD-MCNC: 199 MG/DL (ref 70–99)
GLUCOSE BLD-MCNC: 199 MG/DL (ref 70–99)
GLUCOSE BLD-MCNC: 203 MG/DL (ref 70–99)
GLUCOSE BLD-MCNC: 204 MG/DL (ref 70–99)
GLUCOSE BLD-MCNC: 205 MG/DL (ref 70–99)
GLUCOSE BLD-MCNC: 205 MG/DL (ref 70–99)
GLUCOSE BLD-MCNC: 209 MG/DL (ref 70–99)
GLUCOSE BLD-MCNC: 210 MG/DL (ref 70–99)
GLUCOSE BLD-MCNC: 211 MG/DL (ref 70–99)
GLUCOSE BLD-MCNC: 216 MG/DL (ref 70–99)
GLUCOSE BLD-MCNC: 221 MG/DL (ref 70–99)
GLUCOSE BLD-MCNC: 225 MG/DL (ref 70–99)
GLUCOSE BLD-MCNC: 226 MG/DL (ref 70–99)
GLUCOSE BLD-MCNC: 227 MG/DL (ref 70–99)
GLUCOSE BLD-MCNC: 228 MG/DL (ref 70–99)
GLUCOSE BLD-MCNC: 231 MG/DL (ref 70–99)
GLUCOSE BLD-MCNC: 234 MG/DL (ref 70–99)
GLUCOSE BLD-MCNC: 237 MG/DL (ref 70–99)
GLUCOSE BLD-MCNC: 237 MG/DL (ref 70–99)
GLUCOSE BLD-MCNC: 242 MG/DL (ref 70–99)
GLUCOSE BLD-MCNC: 243 MG/DL (ref 70–99)
GLUCOSE BLD-MCNC: 244 MG/DL (ref 70–99)
GLUCOSE BLD-MCNC: 244 MG/DL (ref 70–99)
GLUCOSE BLD-MCNC: 245 MG/DL (ref 70–99)
GLUCOSE BLD-MCNC: 249 MG/DL (ref 70–99)
GLUCOSE BLD-MCNC: 253 MG/DL (ref 70–99)
GLUCOSE BLD-MCNC: 255 MG/DL (ref 70–99)
GLUCOSE BLD-MCNC: 256 MG/DL (ref 70–99)
GLUCOSE BLD-MCNC: 257 MG/DL (ref 70–99)
GLUCOSE BLD-MCNC: 262 MG/DL (ref 70–99)
GLUCOSE BLD-MCNC: 264 MG/DL (ref 70–99)
GLUCOSE BLD-MCNC: 266 MG/DL (ref 70–99)
GLUCOSE BLD-MCNC: 274 MG/DL (ref 70–99)
GLUCOSE BLD-MCNC: 275 MG/DL (ref 70–99)
GLUCOSE BLD-MCNC: 282 MG/DL (ref 70–99)
GLUCOSE BLD-MCNC: 290 MG/DL (ref 70–99)
GLUCOSE BLD-MCNC: 295 MG/DL (ref 70–99)
GLUCOSE BLD-MCNC: 301 MG/DL (ref 70–99)
GLUCOSE BLD-MCNC: 307 MG/DL (ref 70–99)
GLUCOSE BLD-MCNC: 315 MG/DL (ref 70–99)
GLUCOSE BLD-MCNC: 316 MG/DL (ref 70–99)
GLUCOSE BLD-MCNC: 318 MG/DL (ref 70–99)
GLUCOSE BLD-MCNC: 332 MG/DL (ref 70–99)
GLUCOSE BLD-MCNC: 339 MG/DL (ref 70–99)
GLUCOSE BLD-MCNC: 340 MG/DL (ref 70–99)
GLUCOSE BLD-MCNC: 351 MG/DL (ref 70–99)
GLUCOSE BLD-MCNC: 362 MG/DL (ref 70–99)
GLUCOSE BLD-MCNC: 364 MG/DL (ref 70–99)
GLUCOSE BLD-MCNC: 366 MG/DL (ref 70–99)
GLUCOSE BLD-MCNC: 471 MG/DL (ref 70–99)
GLUCOSE BLD-MCNC: 60 MG/DL (ref 70–99)
GLUCOSE BLD-MCNC: 69 MG/DL (ref 70–99)
GLUCOSE BLD-MCNC: 79 MG/DL (ref 70–99)
GLUCOSE BLD-MCNC: 81 MG/DL (ref 70–99)
GLUCOSE BLD-MCNC: 96 MG/DL (ref 70–99)
GLUCOSE BLD-MCNC: 97 MG/DL (ref 70–99)
GLUCOSE BLD-MCNC: 98 MG/DL (ref 70–99)
GLUCOSE, FLUID: 157 MG/DL
GLUCOSE, FLUID: 243 MG/DL
GLUCOSE, FLUID: 372 MG/DL
GRAM STAIN RESULT: NORMAL
HBA1C MFR BLD: 5.2 %
HBA1C MFR BLD: 6.3 %
HCT VFR BLD CALC: 28.5 % (ref 40.5–52.5)
HCT VFR BLD CALC: 29.8 % (ref 40.5–52.5)
HCT VFR BLD CALC: 31 % (ref 40.5–52.5)
HCT VFR BLD CALC: 31.4 % (ref 40.5–52.5)
HCT VFR BLD CALC: 31.5 % (ref 40.5–52.5)
HCT VFR BLD CALC: 31.6 % (ref 40.5–52.5)
HCT VFR BLD CALC: 32.5 % (ref 40.5–52.5)
HCT VFR BLD CALC: 34 % (ref 40.5–52.5)
HCT VFR BLD CALC: 34.2 % (ref 40.5–52.5)
HCT VFR BLD CALC: 35.7 % (ref 40.5–52.5)
HCT VFR BLD CALC: 36.8 % (ref 40.5–52.5)
HCT VFR BLD CALC: 37 % (ref 40.5–52.5)
HCT VFR BLD CALC: 38.1 % (ref 40.5–52.5)
HCT VFR BLD CALC: 38.3 % (ref 40.5–52.5)
HCT VFR BLD CALC: 38.9 % (ref 40.5–52.5)
HCT VFR BLD CALC: 39.2 % (ref 40.5–52.5)
HCT VFR BLD CALC: 42 % (ref 40.5–52.5)
HCT VFR BLD CALC: 43.3 % (ref 40.5–52.5)
HEMOGLOBIN: 10.1 G/DL (ref 13.5–17.5)
HEMOGLOBIN: 10.1 G/DL (ref 13.5–17.5)
HEMOGLOBIN: 10.3 G/DL (ref 13.5–17.5)
HEMOGLOBIN: 10.4 G/DL (ref 13.5–17.5)
HEMOGLOBIN: 11 G/DL (ref 13.5–17.5)
HEMOGLOBIN: 11.2 G/DL (ref 13.5–17.5)
HEMOGLOBIN: 11.6 G/DL (ref 13.5–17.5)
HEMOGLOBIN: 11.7 G/DL (ref 13.5–17.5)
HEMOGLOBIN: 12.2 G/DL (ref 13.5–17.5)
HEMOGLOBIN: 12.4 G/DL (ref 13.5–17.5)
HEMOGLOBIN: 12.7 G/DL (ref 13.5–17.5)
HEMOGLOBIN: 12.7 G/DL (ref 13.5–17.5)
HEMOGLOBIN: 13.1 G/DL (ref 13.5–17.5)
HEMOGLOBIN: 13.8 G/DL (ref 13.5–17.5)
HEMOGLOBIN: 14.2 G/DL (ref 13.5–17.5)
HEMOGLOBIN: 9.1 G/DL (ref 13.5–17.5)
HEMOGLOBIN: 9.8 G/DL (ref 13.5–17.5)
HEMOGLOBIN: 9.9 G/DL (ref 13.5–17.5)
HYALINE CASTS: 15 /LPF (ref 0–8)
INR BLD: 1.3 (ref 0.86–1.14)
INR BLD: 1.32 (ref 0.86–1.14)
INR BLD: 1.34 (ref 0.86–1.14)
INR BLD: 1.78 (ref 0.86–1.14)
INR BLD: 1.84 (ref 0.86–1.14)
IRON SATURATION: 37 % (ref 20–50)
IRON: 89 UG/DL (ref 59–158)
KAPPA, FREE LIGHT CHAINS, SERUM: 41.37 MG/L (ref 3.3–19.4)
KAPPA/LAMBDA RATIO: 1.54 (ref 0.26–1.65)
KAPPA/LAMBDA TEST COMMENT: ABNORMAL
L. PNEUMOPHILA SEROGP 1 UR AG: NORMAL
LACTATE DEHYDROGENASE: 219 U/L (ref 100–190)
LACTATE DEHYDROGENASE: 227 U/L (ref 100–190)
LACTIC ACID, SEPSIS: 1.3 MMOL/L (ref 0.4–1.9)
LACTIC ACID, SEPSIS: 1.5 MMOL/L (ref 0.4–1.9)
LACTIC ACID, SEPSIS: 2.1 MMOL/L (ref 0.4–1.9)
LACTIC ACID: 1.9 MMOL/L (ref 0.4–2)
LAMBDA, FREE LIGHT CHAINS, SERUM: 26.93 MG/L (ref 5.71–26.3)
LD, FLUID: 32 U/L
LD, FLUID: 55 U/L
LD, FLUID: 84 U/L
LYMPHOCYTES ABSOLUTE: 0.5 K/UL (ref 1–5.1)
LYMPHOCYTES ABSOLUTE: 0.6 K/UL (ref 1–5.1)
LYMPHOCYTES ABSOLUTE: 0.6 K/UL (ref 1–5.1)
LYMPHOCYTES ABSOLUTE: 0.8 K/UL (ref 1–5.1)
LYMPHOCYTES ABSOLUTE: 0.8 K/UL (ref 1–5.1)
LYMPHOCYTES ABSOLUTE: 0.9 K/UL (ref 1–5.1)
LYMPHOCYTES ABSOLUTE: 0.9 K/UL (ref 1–5.1)
LYMPHOCYTES ABSOLUTE: 1 K/UL (ref 1–5.1)
LYMPHOCYTES ABSOLUTE: 1 K/UL (ref 1–5.1)
LYMPHOCYTES ABSOLUTE: 1.1 K/UL (ref 1–5.1)
LYMPHOCYTES ABSOLUTE: 1.3 K/UL (ref 1–5.1)
LYMPHOCYTES RELATIVE PERCENT: 11.2 %
LYMPHOCYTES RELATIVE PERCENT: 12.8 %
LYMPHOCYTES RELATIVE PERCENT: 13.1 %
LYMPHOCYTES RELATIVE PERCENT: 14.6 %
LYMPHOCYTES RELATIVE PERCENT: 18.6 %
LYMPHOCYTES RELATIVE PERCENT: 19.6 %
LYMPHOCYTES RELATIVE PERCENT: 21.1 %
LYMPHOCYTES RELATIVE PERCENT: 21.1 %
LYMPHOCYTES RELATIVE PERCENT: 22.5 %
LYMPHOCYTES RELATIVE PERCENT: 24 %
LYMPHOCYTES RELATIVE PERCENT: 24.2 %
LYMPHOCYTES RELATIVE PERCENT: 27.1 %
LYMPHOCYTES RELATIVE PERCENT: 7 %
LYMPHOCYTES RELATIVE PERCENT: 7 %
LYMPHOCYTES RELATIVE PERCENT: 8.8 %
LYMPHOCYTES, BODY FLUID: 19 %
LYMPHOCYTES, BODY FLUID: 19 %
LYMPHOCYTES, BODY FLUID: 79 %
Lab: NORMAL
MACROCYTES: ABNORMAL
MACROPHAGE FLUID: 7 %
MACROPHAGE FLUID: 76 %
MACROPHAGE FLUID: 79 %
MAGNESIUM: 1.9 MG/DL (ref 1.8–2.4)
MAGNESIUM: 2 MG/DL (ref 1.8–2.4)
MAGNESIUM: 2 MG/DL (ref 1.8–2.4)
MAGNESIUM: 2.2 MG/DL (ref 1.8–2.4)
MAGNESIUM: 2.5 MG/DL (ref 1.8–2.4)
MAGNESIUM: 2.5 MG/DL (ref 1.8–2.4)
MAGNESIUM: 2.6 MG/DL (ref 1.8–2.4)
MCH RBC QN AUTO: 30.2 PG (ref 26–34)
MCH RBC QN AUTO: 30.3 PG (ref 26–34)
MCH RBC QN AUTO: 30.3 PG (ref 26–34)
MCH RBC QN AUTO: 30.5 PG (ref 26–34)
MCH RBC QN AUTO: 30.7 PG (ref 26–34)
MCH RBC QN AUTO: 30.8 PG (ref 26–34)
MCH RBC QN AUTO: 31.1 PG (ref 26–34)
MCH RBC QN AUTO: 31.2 PG (ref 26–34)
MCH RBC QN AUTO: 31.3 PG (ref 26–34)
MCH RBC QN AUTO: 32 PG (ref 26–34)
MCH RBC QN AUTO: 32.4 PG (ref 26–34)
MCH RBC QN AUTO: 32.5 PG (ref 26–34)
MCH RBC QN AUTO: 32.5 PG (ref 26–34)
MCH RBC QN AUTO: 32.6 PG (ref 26–34)
MCH RBC QN AUTO: 32.6 PG (ref 26–34)
MCH RBC QN AUTO: 32.9 PG (ref 26–34)
MCHC RBC AUTO-ENTMCNC: 31.8 G/DL (ref 31–36)
MCHC RBC AUTO-ENTMCNC: 31.9 G/DL (ref 31–36)
MCHC RBC AUTO-ENTMCNC: 31.9 G/DL (ref 31–36)
MCHC RBC AUTO-ENTMCNC: 32 G/DL (ref 31–36)
MCHC RBC AUTO-ENTMCNC: 32 G/DL (ref 31–36)
MCHC RBC AUTO-ENTMCNC: 32.2 G/DL (ref 31–36)
MCHC RBC AUTO-ENTMCNC: 32.3 G/DL (ref 31–36)
MCHC RBC AUTO-ENTMCNC: 32.5 G/DL (ref 31–36)
MCHC RBC AUTO-ENTMCNC: 32.5 G/DL (ref 31–36)
MCHC RBC AUTO-ENTMCNC: 32.7 G/DL (ref 31–36)
MCHC RBC AUTO-ENTMCNC: 32.7 G/DL (ref 31–36)
MCHC RBC AUTO-ENTMCNC: 32.8 G/DL (ref 31–36)
MCHC RBC AUTO-ENTMCNC: 32.8 G/DL (ref 31–36)
MCHC RBC AUTO-ENTMCNC: 32.9 G/DL (ref 31–36)
MCHC RBC AUTO-ENTMCNC: 33 G/DL (ref 31–36)
MCHC RBC AUTO-ENTMCNC: 33 G/DL (ref 31–36)
MCHC RBC AUTO-ENTMCNC: 33.3 G/DL (ref 31–36)
MCHC RBC AUTO-ENTMCNC: 33.4 G/DL (ref 31–36)
MCV RBC AUTO: 94.2 FL (ref 80–100)
MCV RBC AUTO: 94.4 FL (ref 80–100)
MCV RBC AUTO: 94.4 FL (ref 80–100)
MCV RBC AUTO: 94.9 FL (ref 80–100)
MCV RBC AUTO: 94.9 FL (ref 80–100)
MCV RBC AUTO: 95.1 FL (ref 80–100)
MCV RBC AUTO: 95.3 FL (ref 80–100)
MCV RBC AUTO: 95.6 FL (ref 80–100)
MCV RBC AUTO: 96.1 FL (ref 80–100)
MCV RBC AUTO: 96.3 FL (ref 80–100)
MCV RBC AUTO: 97.8 FL (ref 80–100)
MCV RBC AUTO: 98.1 FL (ref 80–100)
MCV RBC AUTO: 98.3 FL (ref 80–100)
MCV RBC AUTO: 98.4 FL (ref 80–100)
MCV RBC AUTO: 98.6 FL (ref 80–100)
MCV RBC AUTO: 98.7 FL (ref 80–100)
MCV RBC AUTO: 99 FL (ref 80–100)
MCV RBC AUTO: 99.2 FL (ref 80–100)
MESOTHELIAL FLUID: 1 %
MESOTHELIAL FLUID: 1 %
MICROCYTES: ABNORMAL
MONOCYTE, FLUID: 1 %
MONOCYTES ABSOLUTE: 0.1 K/UL (ref 0–1.3)
MONOCYTES ABSOLUTE: 0.4 K/UL (ref 0–1.3)
MONOCYTES ABSOLUTE: 0.4 K/UL (ref 0–1.3)
MONOCYTES ABSOLUTE: 0.5 K/UL (ref 0–1.3)
MONOCYTES ABSOLUTE: 0.6 K/UL (ref 0–1.3)
MONOCYTES ABSOLUTE: 0.7 K/UL (ref 0–1.3)
MONOCYTES ABSOLUTE: 0.8 K/UL (ref 0–1.3)
MONOCYTES ABSOLUTE: 0.9 K/UL (ref 0–1.3)
MONOCYTES RELATIVE PERCENT: 10.8 %
MONOCYTES RELATIVE PERCENT: 11.6 %
MONOCYTES RELATIVE PERCENT: 12.6 %
MONOCYTES RELATIVE PERCENT: 12.7 %
MONOCYTES RELATIVE PERCENT: 12.8 %
MONOCYTES RELATIVE PERCENT: 12.9 %
MONOCYTES RELATIVE PERCENT: 13.7 %
MONOCYTES RELATIVE PERCENT: 13.9 %
MONOCYTES RELATIVE PERCENT: 14.3 %
MONOCYTES RELATIVE PERCENT: 14.4 %
MONOCYTES RELATIVE PERCENT: 14.6 %
MONOCYTES RELATIVE PERCENT: 3.5 %
MONOCYTES RELATIVE PERCENT: 5.7 %
MONOCYTES RELATIVE PERCENT: 6 %
MONOCYTES RELATIVE PERCENT: 8 %
NEUTROPHIL, FLUID: 13 %
NEUTROPHIL, FLUID: 2 %
NEUTROPHIL, FLUID: 3 %
NEUTROPHILS ABSOLUTE: 2 K/UL (ref 1.7–7.7)
NEUTROPHILS ABSOLUTE: 2 K/UL (ref 1.7–7.7)
NEUTROPHILS ABSOLUTE: 2.3 K/UL (ref 1.7–7.7)
NEUTROPHILS ABSOLUTE: 2.6 K/UL (ref 1.7–7.7)
NEUTROPHILS ABSOLUTE: 2.7 K/UL (ref 1.7–7.7)
NEUTROPHILS ABSOLUTE: 2.8 K/UL (ref 1.7–7.7)
NEUTROPHILS ABSOLUTE: 3.3 K/UL (ref 1.7–7.7)
NEUTROPHILS ABSOLUTE: 4 K/UL (ref 1.7–7.7)
NEUTROPHILS ABSOLUTE: 4.3 K/UL (ref 1.7–7.7)
NEUTROPHILS ABSOLUTE: 5 K/UL (ref 1.7–7.7)
NEUTROPHILS ABSOLUTE: 5.7 K/UL (ref 1.7–7.7)
NEUTROPHILS ABSOLUTE: 6.2 K/UL (ref 1.7–7.7)
NEUTROPHILS ABSOLUTE: 6.4 K/UL (ref 1.7–7.7)
NEUTROPHILS RELATIVE PERCENT: 48.6 %
NEUTROPHILS RELATIVE PERCENT: 49.7 %
NEUTROPHILS RELATIVE PERCENT: 52.4 %
NEUTROPHILS RELATIVE PERCENT: 53.9 %
NEUTROPHILS RELATIVE PERCENT: 54.1 %
NEUTROPHILS RELATIVE PERCENT: 55.5 %
NEUTROPHILS RELATIVE PERCENT: 57.2 %
NEUTROPHILS RELATIVE PERCENT: 68.1 %
NEUTROPHILS RELATIVE PERCENT: 70.4 %
NEUTROPHILS RELATIVE PERCENT: 70.7 %
NEUTROPHILS RELATIVE PERCENT: 74.4 %
NEUTROPHILS RELATIVE PERCENT: 76 %
NEUTROPHILS RELATIVE PERCENT: 77.6 %
NEUTROPHILS RELATIVE PERCENT: 84.9 %
NEUTROPHILS RELATIVE PERCENT: 86.8 %
NUCLEATED CELLS FLUID: 275 /CUMM
NUCLEATED CELLS FLUID: 436 /CUMM
NUCLEATED CELLS FLUID: 94 /CUMM
NUMBER OF CELLS COUNTED FLUID: 100
OSMOLALITY URINE: 577 MOSM/KG (ref 390–1070)
OVALOCYTES: ABNORMAL
PDW BLD-RTO: 16.8 % (ref 12.4–15.4)
PDW BLD-RTO: 17.1 % (ref 12.4–15.4)
PDW BLD-RTO: 17.2 % (ref 12.4–15.4)
PDW BLD-RTO: 17.2 % (ref 12.4–15.4)
PDW BLD-RTO: 17.3 % (ref 12.4–15.4)
PDW BLD-RTO: 17.4 % (ref 12.4–15.4)
PDW BLD-RTO: 17.5 % (ref 12.4–15.4)
PDW BLD-RTO: 17.6 % (ref 12.4–15.4)
PDW BLD-RTO: 17.7 % (ref 12.4–15.4)
PDW BLD-RTO: 17.8 % (ref 12.4–15.4)
PDW BLD-RTO: 17.8 % (ref 12.4–15.4)
PDW BLD-RTO: 17.9 % (ref 12.4–15.4)
PDW BLD-RTO: 22.8 % (ref 12.4–15.4)
PDW BLD-RTO: 22.9 % (ref 12.4–15.4)
PERFORMED ON: ABNORMAL
PERFORMED ON: NORMAL
PH, BODY FLUID: 7.9
PHOSPHORUS: 2.6 MG/DL (ref 2.5–4.9)
PHOSPHORUS: 2.9 MG/DL (ref 2.5–4.9)
PHOSPHORUS: 4 MG/DL (ref 2.5–4.9)
PLATELET # BLD: 45 K/UL (ref 135–450)
PLATELET # BLD: 55 K/UL (ref 135–450)
PLATELET # BLD: 58 K/UL (ref 135–450)
PLATELET # BLD: 64 K/UL (ref 135–450)
PLATELET # BLD: 66 K/UL (ref 135–450)
PLATELET # BLD: 67 K/UL (ref 135–450)
PLATELET # BLD: 69 K/UL (ref 135–450)
PLATELET # BLD: 69 K/UL (ref 135–450)
PLATELET # BLD: 72 K/UL (ref 135–450)
PLATELET # BLD: 73 K/UL (ref 135–450)
PLATELET # BLD: 76 K/UL (ref 135–450)
PLATELET # BLD: 80 K/UL (ref 135–450)
PLATELET # BLD: 81 K/UL (ref 135–450)
PLATELET # BLD: 83 K/UL (ref 135–450)
PLATELET # BLD: 84 K/UL (ref 135–450)
PLATELET # BLD: 85 K/UL (ref 135–450)
PLATELET # BLD: 85 K/UL (ref 135–450)
PLATELET # BLD: 90 K/UL (ref 135–450)
PLATELET SLIDE REVIEW: ABNORMAL
PMV BLD AUTO: 7.2 FL (ref 5–10.5)
PMV BLD AUTO: 7.2 FL (ref 5–10.5)
PMV BLD AUTO: 7.4 FL (ref 5–10.5)
PMV BLD AUTO: 7.6 FL (ref 5–10.5)
PMV BLD AUTO: 7.6 FL (ref 5–10.5)
PMV BLD AUTO: 7.7 FL (ref 5–10.5)
PMV BLD AUTO: 7.8 FL (ref 5–10.5)
PMV BLD AUTO: 7.8 FL (ref 5–10.5)
PMV BLD AUTO: 7.9 FL (ref 5–10.5)
PMV BLD AUTO: 7.9 FL (ref 5–10.5)
PMV BLD AUTO: 8 FL (ref 5–10.5)
PMV BLD AUTO: 8.2 FL (ref 5–10.5)
PMV BLD AUTO: 8.2 FL (ref 5–10.5)
PMV BLD AUTO: 8.3 FL (ref 5–10.5)
PMV BLD AUTO: 8.4 FL (ref 5–10.5)
PMV BLD AUTO: 8.6 FL (ref 5–10.5)
POTASSIUM REFLEX MAGNESIUM: 4 MMOL/L (ref 3.5–5.1)
POTASSIUM REFLEX MAGNESIUM: 4.4 MMOL/L (ref 3.5–5.1)
POTASSIUM SERPL-SCNC: 3.7 MMOL/L (ref 3.5–5.1)
POTASSIUM SERPL-SCNC: 3.8 MMOL/L (ref 3.5–5.1)
POTASSIUM SERPL-SCNC: 3.9 MMOL/L (ref 3.5–5.1)
POTASSIUM SERPL-SCNC: 3.9 MMOL/L (ref 3.5–5.1)
POTASSIUM SERPL-SCNC: 4 MMOL/L (ref 3.5–5.1)
POTASSIUM SERPL-SCNC: 4.1 MMOL/L (ref 3.5–5.1)
POTASSIUM SERPL-SCNC: 4.2 MMOL/L (ref 3.5–5.1)
POTASSIUM SERPL-SCNC: 4.3 MMOL/L (ref 3.5–5.1)
POTASSIUM SERPL-SCNC: 4.4 MMOL/L (ref 3.5–5.1)
POTASSIUM SERPL-SCNC: 4.5 MMOL/L (ref 3.5–5.1)
POTASSIUM SERPL-SCNC: 4.6 MMOL/L (ref 3.5–5.1)
POTASSIUM SERPL-SCNC: 4.7 MMOL/L (ref 3.5–5.1)
POTASSIUM SERPL-SCNC: 4.7 MMOL/L (ref 3.5–5.1)
POTASSIUM SERPL-SCNC: 4.8 MMOL/L (ref 3.5–5.1)
POTASSIUM SERPL-SCNC: 5 MMOL/L (ref 3.5–5.1)
POTASSIUM SERPL-SCNC: 5.1 MMOL/L (ref 3.5–5.1)
POTASSIUM SERPL-SCNC: 5.2 MMOL/L (ref 3.5–5.1)
PRO-BNP: 1106 PG/ML (ref 0–124)
PRO-BNP: 1247 PG/ML (ref 0–124)
PRO-BNP: 2184 PG/ML (ref 0–124)
PRO-BNP: 336 PG/ML (ref 0–124)
PRO-BNP: 540 PG/ML (ref 0–124)
PRO-BNP: 650 PG/ML (ref 0–124)
PRO-BNP: 661 PG/ML (ref 0–124)
PRO-BNP: 900 PG/ML (ref 0–124)
PRO-BNP: 927 PG/ML (ref 0–124)
PROCALCITONIN: 0.06 NG/ML (ref 0–0.15)
PROTEIN 24 HOUR URINE: 0.13 G/24HR
PROTEIN FLUID: 0.3 G/DL
PROTEIN FLUID: 0.9 G/DL
PROTEIN FLUID: 1.6 G/DL
PROTHROMBIN TIME: 15.1 SEC (ref 10–13.2)
PROTHROMBIN TIME: 15.4 SEC (ref 10–13.2)
PROTHROMBIN TIME: 15.6 SEC (ref 10–13.2)
PROTHROMBIN TIME: 20.8 SEC (ref 10–13.2)
PROTHROMBIN TIME: 21.5 SEC (ref 10–13.2)
RBC # BLD: 2.96 M/UL (ref 4.2–5.9)
RBC # BLD: 3.14 M/UL (ref 4.2–5.9)
RBC # BLD: 3.28 M/UL (ref 4.2–5.9)
RBC # BLD: 3.29 M/UL (ref 4.2–5.9)
RBC # BLD: 3.3 M/UL (ref 4.2–5.9)
RBC # BLD: 3.34 M/UL (ref 4.2–5.9)
RBC # BLD: 3.44 M/UL (ref 4.2–5.9)
RBC # BLD: 3.59 M/UL (ref 4.2–5.9)
RBC # BLD: 3.61 M/UL (ref 4.2–5.9)
RBC # BLD: 3.73 M/UL (ref 4.2–5.9)
RBC # BLD: 3.75 M/UL (ref 4.2–5.9)
RBC # BLD: 3.76 M/UL (ref 4.2–5.9)
RBC # BLD: 3.88 M/UL (ref 4.2–5.9)
RBC # BLD: 3.9 M/UL (ref 4.2–5.9)
RBC # BLD: 3.93 M/UL (ref 4.2–5.9)
RBC # BLD: 3.98 M/UL (ref 4.2–5.9)
RBC # BLD: 4.27 M/UL (ref 4.2–5.9)
RBC # BLD: 4.36 M/UL (ref 4.2–5.9)
RBC FLUID: 2500 /CUMM
RBC FLUID: 6800 /CUMM
RBC FLUID: <1000 /CUMM
RBC UA: 12 /HPF (ref 0–4)
RENAL EPITHELIAL, UA: ABNORMAL /HPF (ref 0–1)
REPORT: NORMAL
SARS-COV-2: NOT DETECTED
SLIDE REVIEW: ABNORMAL
SODIUM BLD-SCNC: 133 MMOL/L (ref 136–145)
SODIUM BLD-SCNC: 134 MMOL/L (ref 136–145)
SODIUM BLD-SCNC: 135 MMOL/L (ref 136–145)
SODIUM BLD-SCNC: 136 MMOL/L (ref 136–145)
SODIUM BLD-SCNC: 137 MMOL/L (ref 136–145)
SODIUM BLD-SCNC: 139 MMOL/L (ref 136–145)
SODIUM BLD-SCNC: 139 MMOL/L (ref 136–145)
SODIUM BLD-SCNC: 140 MMOL/L (ref 136–145)
SODIUM BLD-SCNC: 140 MMOL/L (ref 136–145)
SODIUM BLD-SCNC: 141 MMOL/L (ref 136–145)
SODIUM BLD-SCNC: 141 MMOL/L (ref 136–145)
SODIUM BLD-SCNC: 143 MMOL/L (ref 136–145)
SODIUM BLD-SCNC: 143 MMOL/L (ref 136–145)
SODIUM URINE: <20 MMOL/L
SPE/IFE INTERPRETATION: NORMAL
STOMATOCYTES: ABNORMAL
STREP PNEUMONIAE ANTIGEN, URINE: NORMAL
T4 FREE: 1.4 NG/DL (ref 0.9–1.8)
THIS TEST SENT TO: NORMAL
TOTAL CK: 78 U/L (ref 39–308)
TOTAL IRON BINDING CAPACITY: 240 UG/DL (ref 260–445)
TOTAL PROTEIN: 4.4 G/DL (ref 6.4–8.2)
TOTAL PROTEIN: 4.5 G/DL (ref 6.4–8.2)
TOTAL PROTEIN: 5.2 G/DL (ref 6.4–8.2)
TOTAL PROTEIN: 5.3 G/DL (ref 6.4–8.2)
TOTAL PROTEIN: 5.4 G/DL (ref 6.4–8.2)
TOTAL PROTEIN: 5.5 G/DL (ref 6.4–8.2)
TOTAL PROTEIN: 5.5 G/DL (ref 6.4–8.2)
TOTAL PROTEIN: 5.6 G/DL (ref 6.4–8.2)
TOTAL PROTEIN: 5.8 G/DL (ref 6.4–8.2)
TOTAL PROTEIN: 6 G/DL (ref 6.4–8.2)
TOTAL PROTEIN: 6.1 G/DL (ref 6.4–8.2)
TOTAL PROTEIN: 6.1 G/DL (ref 6.4–8.2)
TOTAL PROTEIN: 6.2 G/DL (ref 6.4–8.2)
TROPONIN: 0.01 NG/ML
TROPONIN: 0.02 NG/ML
TROPONIN: 0.02 NG/ML
TROPONIN: 0.03 NG/ML
TROPONIN: 0.03 NG/ML
TROPONIN: <0.01 NG/ML
TSH REFLEX FT4: 0.04 UIU/ML (ref 0.27–4.2)
UREA NITROGEN, UR: 732.8 MG/DL (ref 800–1666)
URINE ELECTROPHORESIS INTERP: NORMAL
URINE TYPE: ABNORMAL
WBC # BLD: 2.5 K/UL (ref 4–11)
WBC # BLD: 4.1 K/UL (ref 4–11)
WBC # BLD: 4.6 K/UL (ref 4–11)
WBC # BLD: 4.7 K/UL (ref 4–11)
WBC # BLD: 4.7 K/UL (ref 4–11)
WBC # BLD: 4.8 K/UL (ref 4–11)
WBC # BLD: 4.9 K/UL (ref 4–11)
WBC # BLD: 5.3 K/UL (ref 4–11)
WBC # BLD: 5.3 K/UL (ref 4–11)
WBC # BLD: 5.9 K/UL (ref 4–11)
WBC # BLD: 6 K/UL (ref 4–11)
WBC # BLD: 6.2 K/UL (ref 4–11)
WBC # BLD: 6.7 K/UL (ref 4–11)
WBC # BLD: 6.8 K/UL (ref 4–11)
WBC # BLD: 7.4 K/UL (ref 4–11)
WBC # BLD: 7.6 K/UL (ref 4–11)
WBC UA: 5 /HPF (ref 0–5)

## 2020-01-01 PROCEDURE — 2700000000 HC OXYGEN THERAPY PER DAY

## 2020-01-01 PROCEDURE — 6370000000 HC RX 637 (ALT 250 FOR IP): Performed by: INTERNAL MEDICINE

## 2020-01-01 PROCEDURE — 80048 BASIC METABOLIC PNL TOTAL CA: CPT

## 2020-01-01 PROCEDURE — 94761 N-INVAS EAR/PLS OXIMETRY MLT: CPT

## 2020-01-01 PROCEDURE — 84300 ASSAY OF URINE SODIUM: CPT

## 2020-01-01 PROCEDURE — 88112 CYTOPATH CELL ENHANCE TECH: CPT

## 2020-01-01 PROCEDURE — 6360000002 HC RX W HCPCS: Performed by: INTERNAL MEDICINE

## 2020-01-01 PROCEDURE — 99214 OFFICE O/P EST MOD 30 MIN: CPT | Performed by: NURSE PRACTITIONER

## 2020-01-01 PROCEDURE — C1729 CATH, DRAINAGE: HCPCS

## 2020-01-01 PROCEDURE — 2580000003 HC RX 258: Performed by: INTERNAL MEDICINE

## 2020-01-01 PROCEDURE — 80053 COMPREHEN METABOLIC PANEL: CPT

## 2020-01-01 PROCEDURE — 6370000000 HC RX 637 (ALT 250 FOR IP): Performed by: HOSPITALIST

## 2020-01-01 PROCEDURE — 97162 PT EVAL MOD COMPLEX 30 MIN: CPT

## 2020-01-01 PROCEDURE — 6370000000 HC RX 637 (ALT 250 FOR IP): Performed by: NURSE PRACTITIONER

## 2020-01-01 PROCEDURE — 85610 PROTHROMBIN TIME: CPT

## 2020-01-01 PROCEDURE — 83735 ASSAY OF MAGNESIUM: CPT

## 2020-01-01 PROCEDURE — 85730 THROMBOPLASTIN TIME PARTIAL: CPT

## 2020-01-01 PROCEDURE — 82042 OTHER SOURCE ALBUMIN QUAN EA: CPT

## 2020-01-01 PROCEDURE — 6360000002 HC RX W HCPCS: Performed by: HOSPITALIST

## 2020-01-01 PROCEDURE — 88305 TISSUE EXAM BY PATHOLOGIST: CPT

## 2020-01-01 PROCEDURE — 0W9G3ZZ DRAINAGE OF PERITONEAL CAVITY, PERCUTANEOUS APPROACH: ICD-10-PCS | Performed by: FAMILY MEDICINE

## 2020-01-01 PROCEDURE — 99232 SBSQ HOSP IP/OBS MODERATE 35: CPT | Performed by: NURSE PRACTITIONER

## 2020-01-01 PROCEDURE — 97161 PT EVAL LOW COMPLEX 20 MIN: CPT

## 2020-01-01 PROCEDURE — 71045 X-RAY EXAM CHEST 1 VIEW: CPT

## 2020-01-01 PROCEDURE — 1200000000 HC SEMI PRIVATE

## 2020-01-01 PROCEDURE — 85025 COMPLETE CBC W/AUTO DIFF WBC: CPT

## 2020-01-01 PROCEDURE — 89051 BODY FLUID CELL COUNT: CPT

## 2020-01-01 PROCEDURE — 36415 COLL VENOUS BLD VENIPUNCTURE: CPT

## 2020-01-01 PROCEDURE — 71046 X-RAY EXAM CHEST 2 VIEWS: CPT

## 2020-01-01 PROCEDURE — 97530 THERAPEUTIC ACTIVITIES: CPT

## 2020-01-01 PROCEDURE — 83036 HEMOGLOBIN GLYCOSYLATED A1C: CPT | Performed by: INTERNAL MEDICINE

## 2020-01-01 PROCEDURE — 83615 LACTATE (LD) (LDH) ENZYME: CPT

## 2020-01-01 PROCEDURE — 2060000000 HC ICU INTERMEDIATE R&B

## 2020-01-01 PROCEDURE — 99255 IP/OBS CONSLTJ NEW/EST HI 80: CPT | Performed by: INTERNAL MEDICINE

## 2020-01-01 PROCEDURE — 83605 ASSAY OF LACTIC ACID: CPT

## 2020-01-01 PROCEDURE — 83880 ASSAY OF NATRIURETIC PEPTIDE: CPT

## 2020-01-01 PROCEDURE — 2580000003 HC RX 258: Performed by: FAMILY MEDICINE

## 2020-01-01 PROCEDURE — 80076 HEPATIC FUNCTION PANEL: CPT

## 2020-01-01 PROCEDURE — 99233 SBSQ HOSP IP/OBS HIGH 50: CPT | Performed by: NURSE PRACTITIONER

## 2020-01-01 PROCEDURE — 94640 AIRWAY INHALATION TREATMENT: CPT

## 2020-01-01 PROCEDURE — 99285 EMERGENCY DEPT VISIT HI MDM: CPT

## 2020-01-01 PROCEDURE — 87040 BLOOD CULTURE FOR BACTERIA: CPT

## 2020-01-01 PROCEDURE — 99232 SBSQ HOSP IP/OBS MODERATE 35: CPT | Performed by: INTERNAL MEDICINE

## 2020-01-01 PROCEDURE — 82570 ASSAY OF URINE CREATININE: CPT

## 2020-01-01 PROCEDURE — 85027 COMPLETE CBC AUTOMATED: CPT

## 2020-01-01 PROCEDURE — 96374 THER/PROPH/DIAG INJ IV PUSH: CPT

## 2020-01-01 PROCEDURE — 99496 TRANSJ CARE MGMT HIGH F2F 7D: CPT | Performed by: CLINICAL NURSE SPECIALIST

## 2020-01-01 PROCEDURE — 71250 CT THORAX DX C-: CPT

## 2020-01-01 PROCEDURE — 2580000003 HC RX 258: Performed by: HOSPITALIST

## 2020-01-01 PROCEDURE — 87205 SMEAR GRAM STAIN: CPT

## 2020-01-01 PROCEDURE — 99214 OFFICE O/P EST MOD 30 MIN: CPT | Performed by: INTERNAL MEDICINE

## 2020-01-01 PROCEDURE — 97116 GAIT TRAINING THERAPY: CPT

## 2020-01-01 PROCEDURE — 97110 THERAPEUTIC EXERCISES: CPT

## 2020-01-01 PROCEDURE — 99233 SBSQ HOSP IP/OBS HIGH 50: CPT | Performed by: INTERNAL MEDICINE

## 2020-01-01 PROCEDURE — 84166 PROTEIN E-PHORESIS/URINE/CSF: CPT

## 2020-01-01 PROCEDURE — 97535 SELF CARE MNGMENT TRAINING: CPT

## 2020-01-01 PROCEDURE — 94760 N-INVAS EAR/PLS OXIMETRY 1: CPT

## 2020-01-01 PROCEDURE — 84100 ASSAY OF PHOSPHORUS: CPT

## 2020-01-01 PROCEDURE — 84155 ASSAY OF PROTEIN SERUM: CPT

## 2020-01-01 PROCEDURE — 99223 1ST HOSP IP/OBS HIGH 75: CPT | Performed by: INTERNAL MEDICINE

## 2020-01-01 PROCEDURE — 82945 GLUCOSE OTHER FLUID: CPT

## 2020-01-01 PROCEDURE — P9047 ALBUMIN (HUMAN), 25%, 50ML: HCPCS | Performed by: INTERNAL MEDICINE

## 2020-01-01 PROCEDURE — 84156 ASSAY OF PROTEIN URINE: CPT

## 2020-01-01 PROCEDURE — 99213 OFFICE O/P EST LOW 20 MIN: CPT | Performed by: INTERNAL MEDICINE

## 2020-01-01 PROCEDURE — 87070 CULTURE OTHR SPECIMN AEROBIC: CPT

## 2020-01-01 PROCEDURE — 83986 ASSAY PH BODY FLUID NOS: CPT

## 2020-01-01 PROCEDURE — 84157 ASSAY OF PROTEIN OTHER: CPT

## 2020-01-01 PROCEDURE — 93005 ELECTROCARDIOGRAM TRACING: CPT | Performed by: EMERGENCY MEDICINE

## 2020-01-01 PROCEDURE — 81015 MICROSCOPIC EXAM OF URINE: CPT

## 2020-01-01 PROCEDURE — 82728 ASSAY OF FERRITIN: CPT

## 2020-01-01 PROCEDURE — 2500000003 HC RX 250 WO HCPCS: Performed by: FAMILY MEDICINE

## 2020-01-01 PROCEDURE — 0W993ZZ DRAINAGE OF RIGHT PLEURAL CAVITY, PERCUTANEOUS APPROACH: ICD-10-PCS | Performed by: RADIOLOGY

## 2020-01-01 PROCEDURE — 96375 TX/PRO/DX INJ NEW DRUG ADDON: CPT

## 2020-01-01 PROCEDURE — 83540 ASSAY OF IRON: CPT

## 2020-01-01 PROCEDURE — 93306 TTE W/DOPPLER COMPLETE: CPT

## 2020-01-01 PROCEDURE — 99291 CRITICAL CARE FIRST HOUR: CPT

## 2020-01-01 PROCEDURE — 84145 PROCALCITONIN (PCT): CPT

## 2020-01-01 PROCEDURE — 2500000003 HC RX 250 WO HCPCS: Performed by: INTERNAL MEDICINE

## 2020-01-01 PROCEDURE — P9047 ALBUMIN (HUMAN), 25%, 50ML: HCPCS | Performed by: PHYSICIAN ASSISTANT

## 2020-01-01 PROCEDURE — 6370000000 HC RX 637 (ALT 250 FOR IP): Performed by: PHYSICIAN ASSISTANT

## 2020-01-01 PROCEDURE — 97166 OT EVAL MOD COMPLEX 45 MIN: CPT

## 2020-01-01 PROCEDURE — 2709999900 US THORACENTESIS

## 2020-01-01 PROCEDURE — 84484 ASSAY OF TROPONIN QUANT: CPT

## 2020-01-01 PROCEDURE — 93975 VASCULAR STUDY: CPT

## 2020-01-01 PROCEDURE — 76700 US EXAM ABDOM COMPLETE: CPT

## 2020-01-01 PROCEDURE — 93010 ELECTROCARDIOGRAM REPORT: CPT | Performed by: INTERNAL MEDICINE

## 2020-01-01 PROCEDURE — 99442 PR PHYS/QHP TELEPHONE EVALUATION 11-20 MIN: CPT | Performed by: NURSE PRACTITIONER

## 2020-01-01 PROCEDURE — 76705 ECHO EXAM OF ABDOMEN: CPT

## 2020-01-01 PROCEDURE — 87015 SPECIMEN INFECT AGNT CONCNTJ: CPT

## 2020-01-01 PROCEDURE — 97165 OT EVAL LOW COMPLEX 30 MIN: CPT

## 2020-01-01 PROCEDURE — 2580000003 HC RX 258: Performed by: NURSE PRACTITIONER

## 2020-01-01 PROCEDURE — 99496 TRANSJ CARE MGMT HIGH F2F 7D: CPT | Performed by: NURSE PRACTITIONER

## 2020-01-01 PROCEDURE — 82550 ASSAY OF CK (CPK): CPT

## 2020-01-01 PROCEDURE — 6360000002 HC RX W HCPCS: Performed by: PHYSICIAN ASSISTANT

## 2020-01-01 PROCEDURE — 84540 ASSAY OF URINE/UREA-N: CPT

## 2020-01-01 PROCEDURE — 85379 FIBRIN DEGRADATION QUANT: CPT

## 2020-01-01 PROCEDURE — 96365 THER/PROPH/DIAG IV INF INIT: CPT

## 2020-01-01 PROCEDURE — 82140 ASSAY OF AMMONIA: CPT

## 2020-01-01 PROCEDURE — 84165 PROTEIN E-PHORESIS SERUM: CPT

## 2020-01-01 PROCEDURE — 87449 NOS EACH ORGANISM AG IA: CPT

## 2020-01-01 PROCEDURE — 94150 VITAL CAPACITY TEST: CPT

## 2020-01-01 PROCEDURE — 0W993ZZ DRAINAGE OF RIGHT PLEURAL CAVITY, PERCUTANEOUS APPROACH: ICD-10-PCS | Performed by: INTERNAL MEDICINE

## 2020-01-01 PROCEDURE — 6360000002 HC RX W HCPCS: Performed by: NURSE PRACTITIONER

## 2020-01-01 PROCEDURE — 99214 OFFICE O/P EST MOD 30 MIN: CPT | Performed by: FAMILY MEDICINE

## 2020-01-01 PROCEDURE — 83036 HEMOGLOBIN GLYCOSYLATED A1C: CPT

## 2020-01-01 PROCEDURE — 93308 TTE F-UP OR LMTD: CPT

## 2020-01-01 PROCEDURE — 83550 IRON BINDING TEST: CPT

## 2020-01-01 PROCEDURE — 83935 ASSAY OF URINE OSMOLALITY: CPT

## 2020-01-01 PROCEDURE — 32554 ASPIRATE PLEURA W/O IMAGING: CPT | Performed by: INTERNAL MEDICINE

## 2020-01-01 PROCEDURE — 49083 ABD PARACENTESIS W/IMAGING: CPT

## 2020-01-01 PROCEDURE — 83883 ASSAY NEPHELOMETRY NOT SPEC: CPT

## 2020-01-01 PROCEDURE — 94680 O2 UPTK RST&XERS DIR SIMPLE: CPT

## 2020-01-01 RX ORDER — OMEGA-3S/DHA/EPA/FISH OIL/D3 300MG-1000
400 CAPSULE ORAL DAILY
Qty: 30 TABLET | Refills: 0 | Status: CANCELLED | COMMUNITY
Start: 2020-01-01

## 2020-01-01 RX ORDER — METHYLPREDNISOLONE SODIUM SUCCINATE 40 MG/ML
40 INJECTION, POWDER, LYOPHILIZED, FOR SOLUTION INTRAMUSCULAR; INTRAVENOUS EVERY 8 HOURS
Status: DISCONTINUED | OUTPATIENT
Start: 2020-01-01 | End: 2020-01-01

## 2020-01-01 RX ORDER — NADOLOL 20 MG/1
TABLET ORAL
Qty: 90 TABLET | Refills: 2 | Status: SHIPPED | OUTPATIENT
Start: 2020-01-01 | End: 2020-01-01

## 2020-01-01 RX ORDER — POTASSIUM CHLORIDE 20 MEQ/1
40 TABLET, EXTENDED RELEASE ORAL PRN
Status: DISCONTINUED | OUTPATIENT
Start: 2020-01-01 | End: 2020-01-01 | Stop reason: HOSPADM

## 2020-01-01 RX ORDER — ACETAMINOPHEN 650 MG/1
650 SUPPOSITORY RECTAL EVERY 6 HOURS PRN
Status: DISCONTINUED | OUTPATIENT
Start: 2020-01-01 | End: 2020-01-01 | Stop reason: HOSPADM

## 2020-01-01 RX ORDER — AZITHROMYCIN 250 MG/1
TABLET, FILM COATED ORAL
Qty: 1 PACKET | Refills: 0 | Status: ON HOLD
Start: 2020-01-01 | End: 2020-01-01 | Stop reason: HOSPADM

## 2020-01-01 RX ORDER — PANTOPRAZOLE SODIUM 40 MG/1
40 TABLET, DELAYED RELEASE ORAL
Qty: 30 TABLET | Refills: 3 | Status: SHIPPED | OUTPATIENT
Start: 2020-01-01

## 2020-01-01 RX ORDER — AZITHROMYCIN 250 MG/1
500 TABLET, FILM COATED ORAL DAILY
Status: COMPLETED | OUTPATIENT
Start: 2020-01-01 | End: 2020-01-01

## 2020-01-01 RX ORDER — ACETAMINOPHEN 325 MG/1
650 TABLET ORAL EVERY 6 HOURS PRN
Status: DISCONTINUED | OUTPATIENT
Start: 2020-01-01 | End: 2020-01-01 | Stop reason: HOSPADM

## 2020-01-01 RX ORDER — SODIUM CHLORIDE 0.9 % (FLUSH) 0.9 %
10 SYRINGE (ML) INJECTION PRN
Status: DISCONTINUED | OUTPATIENT
Start: 2020-01-01 | End: 2020-01-01 | Stop reason: HOSPADM

## 2020-01-01 RX ORDER — LIDOCAINE HYDROCHLORIDE 10 MG/ML
5 INJECTION, SOLUTION EPIDURAL; INFILTRATION; INTRACAUDAL; PERINEURAL ONCE
Status: COMPLETED | OUTPATIENT
Start: 2020-01-01 | End: 2020-01-01

## 2020-01-01 RX ORDER — LORAZEPAM 1 MG/1
1 TABLET ORAL EVERY 6 HOURS PRN
Qty: 10 TABLET | Refills: 0 | Status: SHIPPED | OUTPATIENT
Start: 2020-01-01 | End: 2020-11-27

## 2020-01-01 RX ORDER — GLIPIZIDE 5 MG/1
TABLET ORAL
Qty: 180 TABLET | Refills: 2 | Status: SHIPPED | OUTPATIENT
Start: 2020-01-01 | End: 2020-01-01

## 2020-01-01 RX ORDER — INSULIN LISPRO 100 [IU]/ML
0-9 INJECTION, SOLUTION INTRAVENOUS; SUBCUTANEOUS NIGHTLY
Status: DISCONTINUED | OUTPATIENT
Start: 2020-01-01 | End: 2020-01-01 | Stop reason: HOSPADM

## 2020-01-01 RX ORDER — CYANOCOBALAMIN 1000 UG/ML
INJECTION INTRAMUSCULAR; SUBCUTANEOUS
Qty: 10 ML | Refills: 5 | Status: SHIPPED | OUTPATIENT
Start: 2020-01-01 | End: 2020-01-01

## 2020-01-01 RX ORDER — MIDODRINE HYDROCHLORIDE 5 MG/1
15 TABLET ORAL
Qty: 90 TABLET | Refills: 3 | Status: SHIPPED | OUTPATIENT
Start: 2020-01-01

## 2020-01-01 RX ORDER — MIDODRINE HYDROCHLORIDE 10 MG/1
10 TABLET ORAL
Qty: 90 TABLET | Refills: 1 | Status: ON HOLD | OUTPATIENT
Start: 2020-01-01 | End: 2020-01-01 | Stop reason: HOSPADM

## 2020-01-01 RX ORDER — GLIPIZIDE 5 MG/1
TABLET ORAL
Qty: 180 TABLET | Refills: 2 | Status: SHIPPED | OUTPATIENT
Start: 2020-01-01

## 2020-01-01 RX ORDER — SPIRONOLACTONE 50 MG/1
25 TABLET, FILM COATED ORAL 2 TIMES DAILY
Qty: 60 TABLET | Refills: 5 | Status: ON HOLD
Start: 2020-01-01 | End: 2020-01-01 | Stop reason: SDUPTHER

## 2020-01-01 RX ORDER — LACTULOSE 10 G/15ML
20 SOLUTION ORAL 3 TIMES DAILY
Status: DISCONTINUED | OUTPATIENT
Start: 2020-01-01 | End: 2020-01-01 | Stop reason: HOSPADM

## 2020-01-01 RX ORDER — DEXTROSE MONOHYDRATE 25 G/50ML
12.5 INJECTION, SOLUTION INTRAVENOUS PRN
Status: DISCONTINUED | OUTPATIENT
Start: 2020-01-01 | End: 2020-01-01 | Stop reason: HOSPADM

## 2020-01-01 RX ORDER — ALBUMIN (HUMAN) 12.5 G/50ML
25 SOLUTION INTRAVENOUS EVERY 6 HOURS
Status: COMPLETED | OUTPATIENT
Start: 2020-01-01 | End: 2020-01-01

## 2020-01-01 RX ORDER — LEVOTHYROXINE SODIUM 0.15 MG/1
TABLET ORAL
Qty: 90 TABLET | Refills: 2 | Status: SHIPPED
Start: 2020-01-01 | End: 2020-01-01 | Stop reason: DRUGHIGH

## 2020-01-01 RX ORDER — NICOTINE POLACRILEX 4 MG
15 LOZENGE BUCCAL PRN
Status: DISCONTINUED | OUTPATIENT
Start: 2020-01-01 | End: 2020-01-01 | Stop reason: HOSPADM

## 2020-01-01 RX ORDER — SPIRONOLACTONE 25 MG/1
100 TABLET ORAL DAILY
Status: DISCONTINUED | OUTPATIENT
Start: 2020-01-01 | End: 2020-01-01

## 2020-01-01 RX ORDER — FUROSEMIDE 20 MG/1
20 TABLET ORAL DAILY
Status: DISCONTINUED | OUTPATIENT
Start: 2020-01-01 | End: 2020-01-01

## 2020-01-01 RX ORDER — GLIPIZIDE 5 MG/1
5 TABLET ORAL
Status: DISCONTINUED | OUTPATIENT
Start: 2020-01-01 | End: 2020-01-01

## 2020-01-01 RX ORDER — FUROSEMIDE 20 MG/1
40 TABLET ORAL DAILY
Qty: 180 TABLET | Refills: 3 | Status: ON HOLD
Start: 2020-01-01 | End: 2020-01-01 | Stop reason: HOSPADM

## 2020-01-01 RX ORDER — FUROSEMIDE 10 MG/ML
20 INJECTION INTRAMUSCULAR; INTRAVENOUS ONCE
Status: COMPLETED | OUTPATIENT
Start: 2020-01-01 | End: 2020-01-01

## 2020-01-01 RX ORDER — INSULIN LISPRO 100 [IU]/ML
0-18 INJECTION, SOLUTION INTRAVENOUS; SUBCUTANEOUS
Status: DISCONTINUED | OUTPATIENT
Start: 2020-01-01 | End: 2020-01-01 | Stop reason: HOSPADM

## 2020-01-01 RX ORDER — NADOLOL 20 MG/1
TABLET ORAL
Qty: 90 TABLET | Refills: 2 | Status: ON HOLD
Start: 2020-01-01 | End: 2020-01-01 | Stop reason: HOSPADM

## 2020-01-01 RX ORDER — ASPIRIN 81 MG/1
81 TABLET, CHEWABLE ORAL DAILY
Status: DISCONTINUED | OUTPATIENT
Start: 2020-01-01 | End: 2020-01-01 | Stop reason: HOSPADM

## 2020-01-01 RX ORDER — IPRATROPIUM BROMIDE AND ALBUTEROL SULFATE 2.5; .5 MG/3ML; MG/3ML
1 SOLUTION RESPIRATORY (INHALATION) 4 TIMES DAILY
Status: DISCONTINUED | OUTPATIENT
Start: 2020-01-01 | End: 2020-01-01 | Stop reason: HOSPADM

## 2020-01-01 RX ORDER — LIDOCAINE HYDROCHLORIDE 10 MG/ML
10 INJECTION, SOLUTION EPIDURAL; INFILTRATION; INTRACAUDAL; PERINEURAL ONCE
Status: COMPLETED | OUTPATIENT
Start: 2020-01-01 | End: 2020-01-01

## 2020-01-01 RX ORDER — SODIUM CHLORIDE 0.9 % (FLUSH) 0.9 %
10 SYRINGE (ML) INJECTION EVERY 12 HOURS SCHEDULED
Status: DISCONTINUED | OUTPATIENT
Start: 2020-01-01 | End: 2020-01-01 | Stop reason: HOSPADM

## 2020-01-01 RX ORDER — LEVOTHYROXINE SODIUM 137 UG/1
137 TABLET ORAL DAILY
Qty: 90 TABLET | Refills: 3 | Status: SHIPPED | OUTPATIENT
Start: 2020-01-01 | End: 2020-01-01

## 2020-01-01 RX ORDER — DEXTROSE MONOHYDRATE 50 MG/ML
100 INJECTION, SOLUTION INTRAVENOUS PRN
Status: DISCONTINUED | OUTPATIENT
Start: 2020-01-01 | End: 2020-01-01 | Stop reason: HOSPADM

## 2020-01-01 RX ORDER — HYDROXYCHLOROQUINE SULFATE 200 MG/1
200 TABLET, FILM COATED ORAL 2 TIMES DAILY
Status: DISCONTINUED | OUTPATIENT
Start: 2020-01-01 | End: 2020-01-01

## 2020-01-01 RX ORDER — OMEGA-3S/DHA/EPA/FISH OIL/D3 300MG-1000
400 CAPSULE ORAL DAILY
Status: DISCONTINUED | OUTPATIENT
Start: 2020-01-01 | End: 2020-01-01 | Stop reason: HOSPADM

## 2020-01-01 RX ORDER — ONDANSETRON 4 MG/1
4 TABLET, ORALLY DISINTEGRATING ORAL EVERY 8 HOURS PRN
Status: DISCONTINUED | OUTPATIENT
Start: 2020-01-01 | End: 2020-01-01 | Stop reason: HOSPADM

## 2020-01-01 RX ORDER — FUROSEMIDE 10 MG/ML
40 INJECTION INTRAMUSCULAR; INTRAVENOUS 2 TIMES DAILY
Status: DISCONTINUED | OUTPATIENT
Start: 2020-01-01 | End: 2020-01-01

## 2020-01-01 RX ORDER — HYDROXYCHLOROQUINE SULFATE 200 MG/1
400 TABLET, FILM COATED ORAL ONCE
Status: COMPLETED | OUTPATIENT
Start: 2020-01-01 | End: 2020-01-01

## 2020-01-01 RX ORDER — LEVOTHYROXINE SODIUM 0.12 MG/1
125 TABLET ORAL DAILY
Status: DISCONTINUED | OUTPATIENT
Start: 2020-01-01 | End: 2020-01-01 | Stop reason: HOSPADM

## 2020-01-01 RX ORDER — AZITHROMYCIN 250 MG/1
250 TABLET, FILM COATED ORAL DAILY
Status: COMPLETED | OUTPATIENT
Start: 2020-01-01 | End: 2020-01-01

## 2020-01-01 RX ORDER — MAGNESIUM SULFATE 1 G/100ML
1 INJECTION INTRAVENOUS PRN
Status: DISCONTINUED | OUTPATIENT
Start: 2020-01-01 | End: 2020-01-01 | Stop reason: HOSPADM

## 2020-01-01 RX ORDER — ONDANSETRON 2 MG/ML
4 INJECTION INTRAMUSCULAR; INTRAVENOUS EVERY 6 HOURS PRN
Status: DISCONTINUED | OUTPATIENT
Start: 2020-01-01 | End: 2020-01-01 | Stop reason: HOSPADM

## 2020-01-01 RX ORDER — CYANOCOBALAMIN 1000 UG/ML
INJECTION INTRAMUSCULAR; SUBCUTANEOUS
Qty: 10 ML | Refills: 1 | Status: SHIPPED | OUTPATIENT
Start: 2020-01-01

## 2020-01-01 RX ORDER — NADOLOL 20 MG/1
20 TABLET ORAL DAILY
Status: DISCONTINUED | OUTPATIENT
Start: 2020-01-01 | End: 2020-01-01

## 2020-01-01 RX ORDER — INSULIN LISPRO 100 [IU]/ML
0-6 INJECTION, SOLUTION INTRAVENOUS; SUBCUTANEOUS
Status: DISCONTINUED | OUTPATIENT
Start: 2020-01-01 | End: 2020-01-01 | Stop reason: HOSPADM

## 2020-01-01 RX ORDER — PANTOPRAZOLE SODIUM 40 MG/1
40 TABLET, DELAYED RELEASE ORAL
Status: DISCONTINUED | OUTPATIENT
Start: 2020-01-01 | End: 2020-01-01 | Stop reason: HOSPADM

## 2020-01-01 RX ORDER — SPIRONOLACTONE 25 MG/1
25 TABLET ORAL DAILY
Qty: 30 TABLET | Refills: 0 | Status: SHIPPED | OUTPATIENT
Start: 2020-01-01

## 2020-01-01 RX ORDER — 0.9 % SODIUM CHLORIDE 0.9 %
1000 INTRAVENOUS SOLUTION INTRAVENOUS ONCE
Status: COMPLETED | OUTPATIENT
Start: 2020-01-01 | End: 2020-01-01

## 2020-01-01 RX ORDER — VITAMIN C
1 TAB ORAL DAILY
Status: DISCONTINUED | OUTPATIENT
Start: 2020-01-01 | End: 2020-01-01 | Stop reason: HOSPADM

## 2020-01-01 RX ORDER — NADOLOL 20 MG/1
40 TABLET ORAL DAILY
Qty: 90 TABLET | Refills: 3 | Status: SHIPPED
Start: 2020-01-01 | End: 2020-01-01

## 2020-01-01 RX ORDER — MIDODRINE HYDROCHLORIDE 5 MG/1
15 TABLET ORAL
Status: DISCONTINUED | OUTPATIENT
Start: 2020-01-01 | End: 2020-01-01 | Stop reason: HOSPADM

## 2020-01-01 RX ORDER — MIDODRINE HYDROCHLORIDE 5 MG/1
10 TABLET ORAL
Status: DISCONTINUED | OUTPATIENT
Start: 2020-01-01 | End: 2020-01-01 | Stop reason: HOSPADM

## 2020-01-01 RX ORDER — SPIRONOLACTONE 25 MG/1
150 TABLET ORAL DAILY
Status: DISCONTINUED | OUTPATIENT
Start: 2020-01-01 | End: 2020-01-01 | Stop reason: HOSPADM

## 2020-01-01 RX ORDER — SPIRONOLACTONE 25 MG/1
25 TABLET ORAL DAILY
Status: DISCONTINUED | OUTPATIENT
Start: 2020-01-01 | End: 2020-01-01 | Stop reason: HOSPADM

## 2020-01-01 RX ORDER — BACITRACIN, NEOMYCIN, POLYMYXIN B 400; 3.5; 5 [USP'U]/G; MG/G; [USP'U]/G
OINTMENT TOPICAL ONCE
Status: COMPLETED | OUTPATIENT
Start: 2020-01-01 | End: 2020-01-01

## 2020-01-01 RX ORDER — SPIRONOLACTONE 25 MG/1
25 TABLET ORAL 2 TIMES DAILY
Status: DISCONTINUED | OUTPATIENT
Start: 2020-01-01 | End: 2020-01-01 | Stop reason: HOSPADM

## 2020-01-01 RX ORDER — FUROSEMIDE 10 MG/ML
60 INJECTION INTRAMUSCULAR; INTRAVENOUS 2 TIMES DAILY
Status: DISCONTINUED | OUTPATIENT
Start: 2020-01-01 | End: 2020-01-01

## 2020-01-01 RX ORDER — FUROSEMIDE 10 MG/ML
80 INJECTION INTRAMUSCULAR; INTRAVENOUS 2 TIMES DAILY
Status: DISCONTINUED | OUTPATIENT
Start: 2020-01-01 | End: 2020-01-01

## 2020-01-01 RX ORDER — ASPIRIN 81 MG/1
81 TABLET ORAL DAILY
Status: DISCONTINUED | OUTPATIENT
Start: 2020-01-01 | End: 2020-01-01 | Stop reason: HOSPADM

## 2020-01-01 RX ORDER — ALBUMIN (HUMAN) 12.5 G/50ML
25 SOLUTION INTRAVENOUS EVERY 8 HOURS
Status: COMPLETED | OUTPATIENT
Start: 2020-01-01 | End: 2020-01-01

## 2020-01-01 RX ORDER — HYDROXYCHLOROQUINE SULFATE 200 MG/1
400 TABLET, FILM COATED ORAL EVERY 12 HOURS
Status: DISCONTINUED | OUTPATIENT
Start: 2020-01-01 | End: 2020-01-01 | Stop reason: SDUPTHER

## 2020-01-01 RX ORDER — CITALOPRAM 20 MG/1
TABLET ORAL
Qty: 90 TABLET | Refills: 2 | Status: SHIPPED | OUTPATIENT
Start: 2020-01-01

## 2020-01-01 RX ORDER — INSULIN LISPRO 100 [IU]/ML
0-3 INJECTION, SOLUTION INTRAVENOUS; SUBCUTANEOUS NIGHTLY
Status: DISCONTINUED | OUTPATIENT
Start: 2020-01-01 | End: 2020-01-01 | Stop reason: HOSPADM

## 2020-01-01 RX ORDER — SAXAGLIPTIN 5 MG/1
5 TABLET, FILM COATED ORAL DAILY
COMMUNITY
Start: 2020-01-01

## 2020-01-01 RX ORDER — IPRATROPIUM BROMIDE AND ALBUTEROL SULFATE 2.5; .5 MG/3ML; MG/3ML
1 SOLUTION RESPIRATORY (INHALATION) EVERY 4 HOURS PRN
Status: DISCONTINUED | OUTPATIENT
Start: 2020-01-01 | End: 2020-01-01 | Stop reason: HOSPADM

## 2020-01-01 RX ORDER — IPRATROPIUM BROMIDE AND ALBUTEROL SULFATE 2.5; .5 MG/3ML; MG/3ML
SOLUTION RESPIRATORY (INHALATION)
Status: DISPENSED
Start: 2020-01-01 | End: 2020-01-01

## 2020-01-01 RX ORDER — MIDODRINE HYDROCHLORIDE 5 MG/1
2.5 TABLET ORAL
Status: DISCONTINUED | OUTPATIENT
Start: 2020-01-01 | End: 2020-01-01

## 2020-01-01 RX ORDER — PROMETHAZINE HYDROCHLORIDE 25 MG/1
12.5 TABLET ORAL EVERY 6 HOURS PRN
Status: DISCONTINUED | OUTPATIENT
Start: 2020-01-01 | End: 2020-01-01 | Stop reason: HOSPADM

## 2020-01-01 RX ORDER — LEVOTHYROXINE SODIUM 0.15 MG/1
TABLET ORAL
Qty: 90 TABLET | Refills: 2 | Status: ON HOLD
Start: 2020-01-01 | End: 2020-01-01 | Stop reason: HOSPADM

## 2020-01-01 RX ORDER — METHYLPREDNISOLONE SODIUM SUCCINATE 125 MG/2ML
125 INJECTION, POWDER, LYOPHILIZED, FOR SOLUTION INTRAMUSCULAR; INTRAVENOUS ONCE
Status: COMPLETED | OUTPATIENT
Start: 2020-01-01 | End: 2020-01-01

## 2020-01-01 RX ORDER — IPRATROPIUM BROMIDE AND ALBUTEROL SULFATE 2.5; .5 MG/3ML; MG/3ML
1 SOLUTION RESPIRATORY (INHALATION) ONCE
Status: COMPLETED | OUTPATIENT
Start: 2020-01-01 | End: 2020-01-01

## 2020-01-01 RX ORDER — MIDODRINE HYDROCHLORIDE 5 MG/1
7.5 TABLET ORAL ONCE
Status: COMPLETED | OUTPATIENT
Start: 2020-01-01 | End: 2020-01-01

## 2020-01-01 RX ORDER — MIDODRINE HYDROCHLORIDE 5 MG/1
15 TABLET ORAL ONCE
Status: DISCONTINUED | OUTPATIENT
Start: 2020-01-01 | End: 2020-01-01

## 2020-01-01 RX ORDER — CHOLECALCIFEROL (VITAMIN D3) 10 MCG
TABLET ORAL DAILY
Status: DISCONTINUED | OUTPATIENT
Start: 2020-01-01 | End: 2020-01-01 | Stop reason: HOSPADM

## 2020-01-01 RX ORDER — POLYETHYLENE GLYCOL 3350 17 G/17G
17 POWDER, FOR SOLUTION ORAL DAILY PRN
Status: DISCONTINUED | OUTPATIENT
Start: 2020-01-01 | End: 2020-01-01 | Stop reason: HOSPADM

## 2020-01-01 RX ORDER — HYDROXYCHLOROQUINE SULFATE 200 MG/1
200 TABLET, FILM COATED ORAL EVERY 12 HOURS
Status: DISCONTINUED | OUTPATIENT
Start: 2020-01-01 | End: 2020-01-01 | Stop reason: SDUPTHER

## 2020-01-01 RX ORDER — CITALOPRAM 20 MG/1
20 TABLET ORAL DAILY
Status: DISCONTINUED | OUTPATIENT
Start: 2020-01-01 | End: 2020-01-01 | Stop reason: HOSPADM

## 2020-01-01 RX ORDER — FUROSEMIDE 20 MG/1
40 TABLET ORAL DAILY
Qty: 180 TABLET | Refills: 3 | Status: SHIPPED | OUTPATIENT
Start: 2020-01-01 | End: 2020-01-01 | Stop reason: SDUPTHER

## 2020-01-01 RX ORDER — MIDODRINE HYDROCHLORIDE 5 MG/1
5 TABLET ORAL 3 TIMES DAILY
Qty: 90 TABLET | Refills: 1 | Status: CANCELLED | OUTPATIENT
Start: 2020-01-01

## 2020-01-01 RX ORDER — OMEGA-3/DHA/EPA/FISH OIL 60 MG-90MG
1000 CAPSULE ORAL DAILY
Status: DISCONTINUED | OUTPATIENT
Start: 2020-01-01 | End: 2020-01-01 | Stop reason: RX

## 2020-01-01 RX ORDER — HYDROXYCHLOROQUINE SULFATE 200 MG/1
400 TABLET, FILM COATED ORAL 2 TIMES DAILY
Status: COMPLETED | OUTPATIENT
Start: 2020-01-01 | End: 2020-01-01

## 2020-01-01 RX ORDER — LEVOFLOXACIN 5 MG/ML
500 INJECTION, SOLUTION INTRAVENOUS ONCE
Status: COMPLETED | OUTPATIENT
Start: 2020-01-01 | End: 2020-01-01

## 2020-01-01 RX ORDER — FUROSEMIDE 20 MG/1
40 TABLET ORAL DAILY
Qty: 90 TABLET | Refills: 3 | Status: SHIPPED | OUTPATIENT
Start: 2020-01-01 | End: 2020-01-01 | Stop reason: SDUPTHER

## 2020-01-01 RX ORDER — SULFAMETHOXAZOLE AND TRIMETHOPRIM 800; 160 MG/1; MG/1
1 TABLET ORAL 2 TIMES DAILY
Qty: 14 TABLET | Refills: 0 | Status: SHIPPED | OUTPATIENT
Start: 2020-01-01 | End: 2020-01-01

## 2020-01-01 RX ORDER — FUROSEMIDE 40 MG/1
40 TABLET ORAL 2 TIMES DAILY
Status: DISCONTINUED | OUTPATIENT
Start: 2020-01-01 | End: 2020-01-01 | Stop reason: HOSPADM

## 2020-01-01 RX ORDER — OCTREOTIDE ACETATE 100 UG/ML
50 INJECTION, SOLUTION INTRAVENOUS; SUBCUTANEOUS EVERY 8 HOURS
Status: DISCONTINUED | OUTPATIENT
Start: 2020-01-01 | End: 2020-01-01 | Stop reason: HOSPADM

## 2020-01-01 RX ORDER — INSULIN LISPRO 100 [IU]/ML
0-12 INJECTION, SOLUTION INTRAVENOUS; SUBCUTANEOUS EVERY 4 HOURS
Status: DISCONTINUED | OUTPATIENT
Start: 2020-01-01 | End: 2020-01-01 | Stop reason: DRUGHIGH

## 2020-01-01 RX ORDER — IVERMECTIN 3 MG/1
3 TABLET ORAL ONCE
Status: COMPLETED | OUTPATIENT
Start: 2020-01-01 | End: 2020-01-01

## 2020-01-01 RX ORDER — NADOLOL 40 MG/1
40 TABLET ORAL DAILY
Status: DISCONTINUED | OUTPATIENT
Start: 2020-01-01 | End: 2020-01-01 | Stop reason: HOSPADM

## 2020-01-01 RX ORDER — KETOCONAZOLE 20 MG/G
CREAM TOPICAL 2 TIMES DAILY
Status: DISCONTINUED | OUTPATIENT
Start: 2020-01-01 | End: 2020-01-01 | Stop reason: HOSPADM

## 2020-01-01 RX ORDER — LEVOTHYROXINE SODIUM 137 UG/1
137 TABLET ORAL DAILY
Qty: 30 TABLET | Refills: 0 | COMMUNITY
Start: 2020-01-01

## 2020-01-01 RX ORDER — ALBUMIN (HUMAN) 12.5 G/50ML
25 SOLUTION INTRAVENOUS ONCE
Status: COMPLETED | OUTPATIENT
Start: 2020-01-01 | End: 2020-01-01

## 2020-01-01 RX ORDER — FUROSEMIDE 10 MG/ML
40 INJECTION INTRAMUSCULAR; INTRAVENOUS ONCE
Status: COMPLETED | OUTPATIENT
Start: 2020-01-01 | End: 2020-01-01

## 2020-01-01 RX ORDER — POTASSIUM CHLORIDE 7.45 MG/ML
10 INJECTION INTRAVENOUS PRN
Status: DISCONTINUED | OUTPATIENT
Start: 2020-01-01 | End: 2020-01-01 | Stop reason: HOSPADM

## 2020-01-01 RX ORDER — FUROSEMIDE 40 MG/1
40 TABLET ORAL DAILY
Qty: 30 TABLET | Refills: 0
Start: 2020-01-01

## 2020-01-01 RX ORDER — MORPHINE SULFATE 100 MG/5ML
10 SOLUTION ORAL
Qty: 30 ML | Refills: 0 | Status: SHIPPED | OUTPATIENT
Start: 2020-01-01 | End: 2020-01-01

## 2020-01-01 RX ADMIN — CITALOPRAM HYDROBROMIDE 20 MG: 20 TABLET ORAL at 08:22

## 2020-01-01 RX ADMIN — INSULIN LISPRO 1 UNITS: 100 INJECTION, SOLUTION INTRAVENOUS; SUBCUTANEOUS at 12:11

## 2020-01-01 RX ADMIN — ALBUMIN (HUMAN) 25 G: 0.25 INJECTION, SOLUTION INTRAVENOUS at 20:08

## 2020-01-01 RX ADMIN — FUROSEMIDE 80 MG: 10 INJECTION, SOLUTION INTRAMUSCULAR; INTRAVENOUS at 21:03

## 2020-01-01 RX ADMIN — IVERMECTIN 3 MG: 3 TABLET ORAL at 20:37

## 2020-01-01 RX ADMIN — Medication 10 ML: at 08:47

## 2020-01-01 RX ADMIN — IPRATROPIUM BROMIDE AND ALBUTEROL SULFATE 1 AMPULE: .5; 3 SOLUTION RESPIRATORY (INHALATION) at 20:24

## 2020-01-01 RX ADMIN — CITALOPRAM HYDROBROMIDE 20 MG: 20 TABLET ORAL at 08:20

## 2020-01-01 RX ADMIN — ASPIRIN 81 MG: 81 TABLET, COATED ORAL at 09:29

## 2020-01-01 RX ADMIN — INSULIN LISPRO 12 UNITS: 100 INJECTION, SOLUTION INTRAVENOUS; SUBCUTANEOUS at 12:11

## 2020-01-01 RX ADMIN — PANTOPRAZOLE SODIUM 40 MG: 40 TABLET, DELAYED RELEASE ORAL at 06:16

## 2020-01-01 RX ADMIN — MIDODRINE HYDROCHLORIDE 2.5 MG: 5 TABLET ORAL at 08:20

## 2020-01-01 RX ADMIN — MIDODRINE HYDROCHLORIDE 15 MG: 5 TABLET ORAL at 17:39

## 2020-01-01 RX ADMIN — OCTREOTIDE ACETATE 50 MCG: 100 INJECTION, SOLUTION INTRAVENOUS; SUBCUTANEOUS at 11:46

## 2020-01-01 RX ADMIN — Medication 10 ML: at 08:42

## 2020-01-01 RX ADMIN — CITALOPRAM HYDROBROMIDE 20 MG: 20 TABLET ORAL at 10:25

## 2020-01-01 RX ADMIN — SPIRONOLACTONE 150 MG: 25 TABLET ORAL at 09:01

## 2020-01-01 RX ADMIN — RIFAXIMIN 550 MG: 550 TABLET ORAL at 21:28

## 2020-01-01 RX ADMIN — INSULIN LISPRO 2 UNITS: 100 INJECTION, SOLUTION INTRAVENOUS; SUBCUTANEOUS at 22:27

## 2020-01-01 RX ADMIN — LACTULOSE 20 G: 20 SOLUTION ORAL at 20:07

## 2020-01-01 RX ADMIN — INSULIN LISPRO 1 UNITS: 100 INJECTION, SOLUTION INTRAVENOUS; SUBCUTANEOUS at 14:12

## 2020-01-01 RX ADMIN — CITALOPRAM HYDROBROMIDE 20 MG: 20 TABLET ORAL at 10:02

## 2020-01-01 RX ADMIN — INSULIN LISPRO 3 UNITS: 100 INJECTION, SOLUTION INTRAVENOUS; SUBCUTANEOUS at 08:14

## 2020-01-01 RX ADMIN — INSULIN LISPRO 8 UNITS: 100 INJECTION, SOLUTION INTRAVENOUS; SUBCUTANEOUS at 13:13

## 2020-01-01 RX ADMIN — CITALOPRAM HYDROBROMIDE 20 MG: 20 TABLET ORAL at 08:40

## 2020-01-01 RX ADMIN — ASPIRIN 81 MG: 81 TABLET, COATED ORAL at 08:23

## 2020-01-01 RX ADMIN — LACTULOSE 20 G: 20 SOLUTION ORAL at 12:29

## 2020-01-01 RX ADMIN — LEVOTHYROXINE SODIUM 137 MCG: 0.03 TABLET ORAL at 07:08

## 2020-01-01 RX ADMIN — INSULIN LISPRO 1 UNITS: 100 INJECTION, SOLUTION INTRAVENOUS; SUBCUTANEOUS at 17:30

## 2020-01-01 RX ADMIN — FUROSEMIDE 80 MG: 10 INJECTION, SOLUTION INTRAMUSCULAR; INTRAVENOUS at 10:25

## 2020-01-01 RX ADMIN — KETOCONAZOLE: 20 CREAM TOPICAL at 07:51

## 2020-01-01 RX ADMIN — FUROSEMIDE 40 MG: 10 INJECTION, SOLUTION INTRAMUSCULAR; INTRAVENOUS at 18:26

## 2020-01-01 RX ADMIN — SPIRONOLACTONE 25 MG: 25 TABLET ORAL at 10:37

## 2020-01-01 RX ADMIN — FUROSEMIDE 80 MG: 10 INJECTION, SOLUTION INTRAMUSCULAR; INTRAVENOUS at 09:09

## 2020-01-01 RX ADMIN — IPRATROPIUM BROMIDE AND ALBUTEROL SULFATE 1 AMPULE: .5; 3 SOLUTION RESPIRATORY (INHALATION) at 07:08

## 2020-01-01 RX ADMIN — NEPHROCAP 1 MG: 1 CAP ORAL at 08:53

## 2020-01-01 RX ADMIN — Medication 10 ML: at 08:24

## 2020-01-01 RX ADMIN — METHYLPREDNISOLONE SODIUM SUCCINATE 40 MG: 40 INJECTION, POWDER, FOR SOLUTION INTRAMUSCULAR; INTRAVENOUS at 23:51

## 2020-01-01 RX ADMIN — MIDODRINE HYDROCHLORIDE 15 MG: 5 TABLET ORAL at 08:42

## 2020-01-01 RX ADMIN — INSULIN LISPRO 2 UNITS: 100 INJECTION, SOLUTION INTRAVENOUS; SUBCUTANEOUS at 17:32

## 2020-01-01 RX ADMIN — FUROSEMIDE 60 MG: 10 INJECTION, SOLUTION INTRAMUSCULAR; INTRAVENOUS at 17:34

## 2020-01-01 RX ADMIN — IPRATROPIUM BROMIDE AND ALBUTEROL SULFATE 1 AMPULE: .5; 3 SOLUTION RESPIRATORY (INHALATION) at 19:20

## 2020-01-01 RX ADMIN — CITALOPRAM HYDROBROMIDE 20 MG: 20 TABLET ORAL at 08:32

## 2020-01-01 RX ADMIN — RIFAXIMIN 550 MG: 550 TABLET ORAL at 21:06

## 2020-01-01 RX ADMIN — Medication 10 ML: at 22:14

## 2020-01-01 RX ADMIN — INSULIN LISPRO 3 UNITS: 100 INJECTION, SOLUTION INTRAVENOUS; SUBCUTANEOUS at 13:15

## 2020-01-01 RX ADMIN — Medication 10 ML: at 20:38

## 2020-01-01 RX ADMIN — Medication 10 ML: at 08:12

## 2020-01-01 RX ADMIN — INSULIN LISPRO 1 UNITS: 100 INJECTION, SOLUTION INTRAVENOUS; SUBCUTANEOUS at 21:02

## 2020-01-01 RX ADMIN — MIDODRINE HYDROCHLORIDE 10 MG: 5 TABLET ORAL at 08:12

## 2020-01-01 RX ADMIN — INSULIN GLARGINE 14 UNITS: 100 INJECTION, SOLUTION SUBCUTANEOUS at 21:43

## 2020-01-01 RX ADMIN — INSULIN LISPRO 1 UNITS: 100 INJECTION, SOLUTION INTRAVENOUS; SUBCUTANEOUS at 03:20

## 2020-01-01 RX ADMIN — IPRATROPIUM BROMIDE AND ALBUTEROL SULFATE 1 AMPULE: .5; 3 SOLUTION RESPIRATORY (INHALATION) at 20:36

## 2020-01-01 RX ADMIN — RIFAXIMIN 550 MG: 550 TABLET ORAL at 08:13

## 2020-01-01 RX ADMIN — RIFAXIMIN 550 MG: 550 TABLET ORAL at 09:08

## 2020-01-01 RX ADMIN — CITALOPRAM HYDROBROMIDE 20 MG: 20 TABLET ORAL at 08:43

## 2020-01-01 RX ADMIN — ALBUMIN (HUMAN) 25 G: 0.25 INJECTION, SOLUTION INTRAVENOUS at 04:05

## 2020-01-01 RX ADMIN — RIFAXIMIN 550 MG: 550 TABLET ORAL at 10:04

## 2020-01-01 RX ADMIN — INSULIN LISPRO 12 UNITS: 100 INJECTION, SOLUTION INTRAVENOUS; SUBCUTANEOUS at 11:46

## 2020-01-01 RX ADMIN — NEPHROCAP 1 MG: 1 CAP ORAL at 10:03

## 2020-01-01 RX ADMIN — IPRATROPIUM BROMIDE AND ALBUTEROL SULFATE 1 AMPULE: .5; 3 SOLUTION RESPIRATORY (INHALATION) at 20:27

## 2020-01-01 RX ADMIN — INSULIN LISPRO 1 UNITS: 100 INJECTION, SOLUTION INTRAVENOUS; SUBCUTANEOUS at 22:37

## 2020-01-01 RX ADMIN — CITALOPRAM HYDROBROMIDE 20 MG: 20 TABLET ORAL at 09:01

## 2020-01-01 RX ADMIN — CITALOPRAM HYDROBROMIDE 20 MG: 20 TABLET ORAL at 09:49

## 2020-01-01 RX ADMIN — MIDODRINE HYDROCHLORIDE 2.5 MG: 5 TABLET ORAL at 12:15

## 2020-01-01 RX ADMIN — LACTULOSE 20 G: 20 SOLUTION ORAL at 08:32

## 2020-01-01 RX ADMIN — INSULIN LISPRO 2 UNITS: 100 INJECTION, SOLUTION INTRAVENOUS; SUBCUTANEOUS at 20:43

## 2020-01-01 RX ADMIN — PANTOPRAZOLE SODIUM 40 MG: 40 TABLET, DELAYED RELEASE ORAL at 02:42

## 2020-01-01 RX ADMIN — GLIPIZIDE 5 MG: 5 TABLET ORAL at 06:35

## 2020-01-01 RX ADMIN — MIDODRINE HYDROCHLORIDE 10 MG: 5 TABLET ORAL at 16:43

## 2020-01-01 RX ADMIN — LACTULOSE 20 G: 20 SOLUTION ORAL at 15:16

## 2020-01-01 RX ADMIN — INSULIN LISPRO 3 UNITS: 100 INJECTION, SOLUTION INTRAVENOUS; SUBCUTANEOUS at 12:24

## 2020-01-01 RX ADMIN — LEVOTHYROXINE SODIUM 125 MCG: 125 TABLET ORAL at 06:37

## 2020-01-01 RX ADMIN — LACTULOSE 20 G: 20 SOLUTION ORAL at 13:15

## 2020-01-01 RX ADMIN — RIFAXIMIN 550 MG: 550 TABLET ORAL at 09:49

## 2020-01-01 RX ADMIN — INSULIN LISPRO 6 UNITS: 100 INJECTION, SOLUTION INTRAVENOUS; SUBCUTANEOUS at 16:42

## 2020-01-01 RX ADMIN — CITALOPRAM HYDROBROMIDE 20 MG: 20 TABLET ORAL at 09:09

## 2020-01-01 RX ADMIN — METHYLPREDNISOLONE SODIUM SUCCINATE 40 MG: 40 INJECTION, POWDER, FOR SOLUTION INTRAMUSCULAR; INTRAVENOUS at 09:35

## 2020-01-01 RX ADMIN — CITALOPRAM HYDROBROMIDE 20 MG: 20 TABLET ORAL at 09:29

## 2020-01-01 RX ADMIN — ASPIRIN 81 MG: 81 TABLET, CHEWABLE ORAL at 08:06

## 2020-01-01 RX ADMIN — KETOCONAZOLE: 20 CREAM TOPICAL at 09:00

## 2020-01-01 RX ADMIN — FUROSEMIDE 20 MG: 10 INJECTION, SOLUTION INTRAMUSCULAR; INTRAVENOUS at 14:35

## 2020-01-01 RX ADMIN — AZITHROMYCIN 250 MG: 250 TABLET, FILM COATED ORAL at 08:53

## 2020-01-01 RX ADMIN — LACTULOSE 20 G: 20 SOLUTION ORAL at 21:03

## 2020-01-01 RX ADMIN — Medication 10 ML: at 17:07

## 2020-01-01 RX ADMIN — RIFAXIMIN 550 MG: 550 TABLET ORAL at 20:56

## 2020-01-01 RX ADMIN — INSULIN LISPRO 6 UNITS: 100 INJECTION, SOLUTION INTRAVENOUS; SUBCUTANEOUS at 08:19

## 2020-01-01 RX ADMIN — OCTREOTIDE ACETATE 50 MCG: 100 INJECTION, SOLUTION INTRAVENOUS; SUBCUTANEOUS at 05:17

## 2020-01-01 RX ADMIN — MIDODRINE HYDROCHLORIDE 15 MG: 5 TABLET ORAL at 08:40

## 2020-01-01 RX ADMIN — CITALOPRAM HYDROBROMIDE 20 MG: 20 TABLET ORAL at 08:54

## 2020-01-01 RX ADMIN — SPIRONOLACTONE 100 MG: 25 TABLET ORAL at 08:22

## 2020-01-01 RX ADMIN — FUROSEMIDE 60 MG: 10 INJECTION, SOLUTION INTRAMUSCULAR; INTRAVENOUS at 08:54

## 2020-01-01 RX ADMIN — RIFAXIMIN 550 MG: 550 TABLET ORAL at 08:06

## 2020-01-01 RX ADMIN — INSULIN GLARGINE 14 UNITS: 100 INJECTION, SOLUTION SUBCUTANEOUS at 21:04

## 2020-01-01 RX ADMIN — Medication 10 ML: at 09:01

## 2020-01-01 RX ADMIN — RIFAXIMIN 550 MG: 550 TABLET ORAL at 22:34

## 2020-01-01 RX ADMIN — KETOCONAZOLE: 20 CREAM TOPICAL at 08:46

## 2020-01-01 RX ADMIN — Medication 10 ML: at 21:06

## 2020-01-01 RX ADMIN — LACTULOSE 20 G: 20 SOLUTION ORAL at 20:14

## 2020-01-01 RX ADMIN — ASPIRIN 81 MG: 81 TABLET, COATED ORAL at 08:33

## 2020-01-01 RX ADMIN — INSULIN LISPRO 2 UNITS: 100 INJECTION, SOLUTION INTRAVENOUS; SUBCUTANEOUS at 13:00

## 2020-01-01 RX ADMIN — INSULIN LISPRO 2 UNITS: 100 INJECTION, SOLUTION INTRAVENOUS; SUBCUTANEOUS at 13:19

## 2020-01-01 RX ADMIN — IPRATROPIUM BROMIDE AND ALBUTEROL SULFATE 1 AMPULE: .5; 3 SOLUTION RESPIRATORY (INHALATION) at 11:49

## 2020-01-01 RX ADMIN — INSULIN LISPRO 1 UNITS: 100 INJECTION, SOLUTION INTRAVENOUS; SUBCUTANEOUS at 09:02

## 2020-01-01 RX ADMIN — KETOCONAZOLE: 20 CREAM TOPICAL at 20:53

## 2020-01-01 RX ADMIN — INSULIN LISPRO 2 UNITS: 100 INJECTION, SOLUTION INTRAVENOUS; SUBCUTANEOUS at 16:41

## 2020-01-01 RX ADMIN — Medication 10 ML: at 21:04

## 2020-01-01 RX ADMIN — RIFAXIMIN 550 MG: 550 TABLET ORAL at 02:42

## 2020-01-01 RX ADMIN — Medication 10 ML: at 21:29

## 2020-01-01 RX ADMIN — IPRATROPIUM BROMIDE AND ALBUTEROL SULFATE 1 AMPULE: .5; 3 SOLUTION RESPIRATORY (INHALATION) at 08:03

## 2020-01-01 RX ADMIN — INSULIN LISPRO 4 UNITS: 100 INJECTION, SOLUTION INTRAVENOUS; SUBCUTANEOUS at 09:28

## 2020-01-01 RX ADMIN — RIFAXIMIN 550 MG: 550 TABLET ORAL at 21:03

## 2020-01-01 RX ADMIN — INSULIN LISPRO 1 UNITS: 100 INJECTION, SOLUTION INTRAVENOUS; SUBCUTANEOUS at 20:56

## 2020-01-01 RX ADMIN — ASPIRIN 81 MG: 81 TABLET, COATED ORAL at 08:38

## 2020-01-01 RX ADMIN — RIFAXIMIN 550 MG: 550 TABLET ORAL at 08:32

## 2020-01-01 RX ADMIN — Medication 10 ML: at 08:43

## 2020-01-01 RX ADMIN — INSULIN LISPRO 8 UNITS: 100 INJECTION, SOLUTION INTRAVENOUS; SUBCUTANEOUS at 18:15

## 2020-01-01 RX ADMIN — LACTULOSE 20 G: 20 SOLUTION ORAL at 16:54

## 2020-01-01 RX ADMIN — NEPHROCAP 1 MG: 1 CAP ORAL at 08:23

## 2020-01-01 RX ADMIN — KETOCONAZOLE: 20 CREAM TOPICAL at 09:09

## 2020-01-01 RX ADMIN — ASPIRIN 81 MG: 81 TABLET, CHEWABLE ORAL at 08:42

## 2020-01-01 RX ADMIN — HYDROXYCHLOROQUINE SULFATE 200 MG: 200 TABLET ORAL at 19:48

## 2020-01-01 RX ADMIN — LEVOTHYROXINE SODIUM 137 MCG: 0.03 TABLET ORAL at 06:12

## 2020-01-01 RX ADMIN — LACTULOSE 20 G: 20 SOLUTION ORAL at 13:19

## 2020-01-01 RX ADMIN — Medication 10 ML: at 08:23

## 2020-01-01 RX ADMIN — LACTULOSE 20 G: 20 SOLUTION ORAL at 08:54

## 2020-01-01 RX ADMIN — LACTULOSE 20 G: 20 SOLUTION ORAL at 14:25

## 2020-01-01 RX ADMIN — INSULIN LISPRO 6 UNITS: 100 INJECTION, SOLUTION INTRAVENOUS; SUBCUTANEOUS at 17:02

## 2020-01-01 RX ADMIN — LACTULOSE 20 G: 20 SOLUTION ORAL at 20:52

## 2020-01-01 RX ADMIN — KETOCONAZOLE: 20 CREAM TOPICAL at 22:35

## 2020-01-01 RX ADMIN — RIFAXIMIN 550 MG: 550 TABLET ORAL at 09:28

## 2020-01-01 RX ADMIN — KETOCONAZOLE: 20 CREAM TOPICAL at 22:24

## 2020-01-01 RX ADMIN — INSULIN LISPRO 15 UNITS: 100 INJECTION, SOLUTION INTRAVENOUS; SUBCUTANEOUS at 16:59

## 2020-01-01 RX ADMIN — FUROSEMIDE 80 MG: 10 INJECTION, SOLUTION INTRAMUSCULAR; INTRAVENOUS at 16:40

## 2020-01-01 RX ADMIN — CITALOPRAM HYDROBROMIDE 20 MG: 20 TABLET ORAL at 08:13

## 2020-01-01 RX ADMIN — LACTULOSE 20 G: 20 SOLUTION ORAL at 16:55

## 2020-01-01 RX ADMIN — METHYLPREDNISOLONE SODIUM SUCCINATE 40 MG: 40 INJECTION, POWDER, FOR SOLUTION INTRAMUSCULAR; INTRAVENOUS at 10:55

## 2020-01-01 RX ADMIN — KETOCONAZOLE: 20 CREAM TOPICAL at 14:11

## 2020-01-01 RX ADMIN — NEPHROCAP 1 MG: 1 CAP ORAL at 09:29

## 2020-01-01 RX ADMIN — MIDODRINE HYDROCHLORIDE 15 MG: 5 TABLET ORAL at 10:12

## 2020-01-01 RX ADMIN — Medication 10 ML: at 20:53

## 2020-01-01 RX ADMIN — RIFAXIMIN 550 MG: 550 TABLET ORAL at 22:10

## 2020-01-01 RX ADMIN — LACTULOSE 20 G: 20 SOLUTION ORAL at 22:11

## 2020-01-01 RX ADMIN — INSULIN LISPRO 2 UNITS: 100 INJECTION, SOLUTION INTRAVENOUS; SUBCUTANEOUS at 11:57

## 2020-01-01 RX ADMIN — INSULIN LISPRO 1 UNITS: 100 INJECTION, SOLUTION INTRAVENOUS; SUBCUTANEOUS at 08:43

## 2020-01-01 RX ADMIN — Medication 10 ML: at 20:08

## 2020-01-01 RX ADMIN — CITALOPRAM HYDROBROMIDE 20 MG: 20 TABLET ORAL at 10:12

## 2020-01-01 RX ADMIN — INSULIN LISPRO 2 UNITS: 100 INJECTION, SOLUTION INTRAVENOUS; SUBCUTANEOUS at 08:08

## 2020-01-01 RX ADMIN — GLIPIZIDE 5 MG: 5 TABLET ORAL at 10:03

## 2020-01-01 RX ADMIN — LINAGLIPTIN 5 MG: 5 TABLET, FILM COATED ORAL at 10:02

## 2020-01-01 RX ADMIN — INSULIN LISPRO 6 UNITS: 100 INJECTION, SOLUTION INTRAVENOUS; SUBCUTANEOUS at 21:44

## 2020-01-01 RX ADMIN — INSULIN LISPRO 2 UNITS: 100 INJECTION, SOLUTION INTRAVENOUS; SUBCUTANEOUS at 20:56

## 2020-01-01 RX ADMIN — CITALOPRAM HYDROBROMIDE 20 MG: 20 TABLET ORAL at 08:39

## 2020-01-01 RX ADMIN — MIDODRINE HYDROCHLORIDE 15 MG: 5 TABLET ORAL at 17:31

## 2020-01-01 RX ADMIN — ALBUMIN (HUMAN) 25 G: 0.25 INJECTION, SOLUTION INTRAVENOUS at 20:14

## 2020-01-01 RX ADMIN — LEVOTHYROXINE SODIUM 125 MCG: 125 TABLET ORAL at 06:03

## 2020-01-01 RX ADMIN — RIFAXIMIN 550 MG: 550 TABLET ORAL at 08:53

## 2020-01-01 RX ADMIN — MIDODRINE HYDROCHLORIDE 10 MG: 5 TABLET ORAL at 08:38

## 2020-01-01 RX ADMIN — LEVOTHYROXINE SODIUM 125 MCG: 125 TABLET ORAL at 06:35

## 2020-01-01 RX ADMIN — ASPIRIN 81 MG: 81 TABLET, COATED ORAL at 09:28

## 2020-01-01 RX ADMIN — NEPHROCAP 1 MG: 1 CAP ORAL at 09:09

## 2020-01-01 RX ADMIN — SPIRONOLACTONE 150 MG: 25 TABLET ORAL at 09:28

## 2020-01-01 RX ADMIN — NADOLOL 20 MG: 20 TABLET ORAL at 08:48

## 2020-01-01 RX ADMIN — LACTULOSE 20 G: 20 SOLUTION ORAL at 14:50

## 2020-01-01 RX ADMIN — LEVOTHYROXINE SODIUM 125 MCG: 125 TABLET ORAL at 05:29

## 2020-01-01 RX ADMIN — SPIRONOLACTONE 100 MG: 25 TABLET ORAL at 08:49

## 2020-01-01 RX ADMIN — FUROSEMIDE 40 MG: 40 TABLET ORAL at 09:01

## 2020-01-01 RX ADMIN — ASPIRIN 81 MG: 81 TABLET, COATED ORAL at 08:20

## 2020-01-01 RX ADMIN — ASPIRIN 81 MG: 81 TABLET, COATED ORAL at 08:13

## 2020-01-01 RX ADMIN — SPIRONOLACTONE 100 MG: 25 TABLET ORAL at 08:54

## 2020-01-01 RX ADMIN — LACTULOSE 20 G: 20 SOLUTION ORAL at 12:51

## 2020-01-01 RX ADMIN — FUROSEMIDE 40 MG: 10 INJECTION, SOLUTION INTRAMUSCULAR; INTRAVENOUS at 16:55

## 2020-01-01 RX ADMIN — LACTULOSE 20 G: 20 SOLUTION ORAL at 14:37

## 2020-01-01 RX ADMIN — RIFAXIMIN 550 MG: 550 TABLET ORAL at 08:45

## 2020-01-01 RX ADMIN — RIFAXIMIN 550 MG: 550 TABLET ORAL at 22:04

## 2020-01-01 RX ADMIN — INSULIN LISPRO 6 UNITS: 100 INJECTION, SOLUTION INTRAVENOUS; SUBCUTANEOUS at 04:26

## 2020-01-01 RX ADMIN — INSULIN LISPRO 8 UNITS: 100 INJECTION, SOLUTION INTRAVENOUS; SUBCUTANEOUS at 00:55

## 2020-01-01 RX ADMIN — LEVOTHYROXINE SODIUM 137 MCG: 0.03 TABLET ORAL at 08:06

## 2020-01-01 RX ADMIN — INSULIN GLARGINE 14 UNITS: 100 INJECTION, SOLUTION SUBCUTANEOUS at 20:42

## 2020-01-01 RX ADMIN — SPIRONOLACTONE 150 MG: 25 TABLET ORAL at 10:24

## 2020-01-01 RX ADMIN — LACTULOSE 20 G: 20 SOLUTION ORAL at 08:41

## 2020-01-01 RX ADMIN — INSULIN LISPRO 10 UNITS: 100 INJECTION, SOLUTION INTRAVENOUS; SUBCUTANEOUS at 17:58

## 2020-01-01 RX ADMIN — HYDROXYCHLOROQUINE SULFATE 200 MG: 200 TABLET ORAL at 08:23

## 2020-01-01 RX ADMIN — MIDODRINE HYDROCHLORIDE 15 MG: 5 TABLET ORAL at 12:29

## 2020-01-01 RX ADMIN — NADOLOL 40 MG: 40 TABLET ORAL at 12:15

## 2020-01-01 RX ADMIN — RIFAXIMIN 550 MG: 550 TABLET ORAL at 19:47

## 2020-01-01 RX ADMIN — RIFAXIMIN 550 MG: 550 TABLET ORAL at 22:28

## 2020-01-01 RX ADMIN — MIDODRINE HYDROCHLORIDE 2.5 MG: 5 TABLET ORAL at 11:47

## 2020-01-01 RX ADMIN — Medication 10 ML: at 20:37

## 2020-01-01 RX ADMIN — LACTULOSE 20 G: 20 SOLUTION ORAL at 09:09

## 2020-01-01 RX ADMIN — CHOLECALCIFEROL (VITAMIN D3) 10 MCG (400 UNIT) TABLET 400 UNITS: at 12:15

## 2020-01-01 RX ADMIN — PANTOPRAZOLE SODIUM 40 MG: 40 TABLET, DELAYED RELEASE ORAL at 07:08

## 2020-01-01 RX ADMIN — IPRATROPIUM BROMIDE AND ALBUTEROL SULFATE 1 AMPULE: .5; 3 SOLUTION RESPIRATORY (INHALATION) at 15:34

## 2020-01-01 RX ADMIN — LACTULOSE 20 G: 20 SOLUTION ORAL at 08:38

## 2020-01-01 RX ADMIN — LACTULOSE 20 G: 20 SOLUTION ORAL at 09:01

## 2020-01-01 RX ADMIN — LACTULOSE 20 G: 20 SOLUTION ORAL at 09:28

## 2020-01-01 RX ADMIN — ACETAMINOPHEN 650 MG: 325 TABLET ORAL at 04:47

## 2020-01-01 RX ADMIN — RIFAXIMIN 550 MG: 550 TABLET ORAL at 08:41

## 2020-01-01 RX ADMIN — RIFAXIMIN 550 MG: 550 TABLET ORAL at 10:12

## 2020-01-01 RX ADMIN — ASPIRIN 81 MG: 81 TABLET, COATED ORAL at 09:09

## 2020-01-01 RX ADMIN — SPIRONOLACTONE 100 MG: 25 TABLET ORAL at 10:04

## 2020-01-01 RX ADMIN — FUROSEMIDE 40 MG: 10 INJECTION, SOLUTION INTRAMUSCULAR; INTRAVENOUS at 16:56

## 2020-01-01 RX ADMIN — RIFAXIMIN 550 MG: 550 TABLET ORAL at 20:36

## 2020-01-01 RX ADMIN — LEVOTHYROXINE SODIUM 125 MCG: 125 TABLET ORAL at 06:33

## 2020-01-01 RX ADMIN — RIFAXIMIN 550 MG: 550 TABLET ORAL at 10:24

## 2020-01-01 RX ADMIN — IPRATROPIUM BROMIDE AND ALBUTEROL SULFATE 1 AMPULE: .5; 3 SOLUTION RESPIRATORY (INHALATION) at 07:19

## 2020-01-01 RX ADMIN — METHYLPREDNISOLONE SODIUM SUCCINATE 125 MG: 125 INJECTION, POWDER, FOR SOLUTION INTRAMUSCULAR; INTRAVENOUS at 18:26

## 2020-01-01 RX ADMIN — Medication 10 ML: at 09:29

## 2020-01-01 RX ADMIN — RIFAXIMIN 550 MG: 550 TABLET ORAL at 08:54

## 2020-01-01 RX ADMIN — INSULIN LISPRO 1 UNITS: 100 INJECTION, SOLUTION INTRAVENOUS; SUBCUTANEOUS at 22:28

## 2020-01-01 RX ADMIN — INSULIN LISPRO 1 UNITS: 100 INJECTION, SOLUTION INTRAVENOUS; SUBCUTANEOUS at 22:03

## 2020-01-01 RX ADMIN — INSULIN GLARGINE 14 UNITS: 100 INJECTION, SOLUTION SUBCUTANEOUS at 00:55

## 2020-01-01 RX ADMIN — Medication 10 ML: at 09:09

## 2020-01-01 RX ADMIN — IPRATROPIUM BROMIDE AND ALBUTEROL SULFATE 1 AMPULE: .5; 3 SOLUTION RESPIRATORY (INHALATION) at 16:59

## 2020-01-01 RX ADMIN — RIFAXIMIN 550 MG: 550 TABLET ORAL at 09:26

## 2020-01-01 RX ADMIN — LACTULOSE 20 G: 20 SOLUTION ORAL at 22:34

## 2020-01-01 RX ADMIN — NADOLOL 40 MG: 40 TABLET ORAL at 08:39

## 2020-01-01 RX ADMIN — GLIPIZIDE 5 MG: 5 TABLET ORAL at 06:37

## 2020-01-01 RX ADMIN — LEVOTHYROXINE SODIUM 137 MCG: 0.03 TABLET ORAL at 05:15

## 2020-01-01 RX ADMIN — Medication 10 ML: at 22:36

## 2020-01-01 RX ADMIN — IPRATROPIUM BROMIDE AND ALBUTEROL SULFATE 1 AMPULE: .5; 3 SOLUTION RESPIRATORY (INHALATION) at 07:26

## 2020-01-01 RX ADMIN — INSULIN LISPRO 1 UNITS: 100 INJECTION, SOLUTION INTRAVENOUS; SUBCUTANEOUS at 19:12

## 2020-01-01 RX ADMIN — LINAGLIPTIN 5 MG: 5 TABLET, FILM COATED ORAL at 08:53

## 2020-01-01 RX ADMIN — SPIRONOLACTONE 100 MG: 25 TABLET ORAL at 08:23

## 2020-01-01 RX ADMIN — Medication 10 ML: at 22:34

## 2020-01-01 RX ADMIN — NEPHROCAP 1 MG: 1 CAP ORAL at 08:21

## 2020-01-01 RX ADMIN — CHOLECALCIFEROL (VITAMIN D3) 10 MCG (400 UNIT) TABLET 400 UNITS: at 08:32

## 2020-01-01 RX ADMIN — INSULIN LISPRO 1 UNITS: 100 INJECTION, SOLUTION INTRAVENOUS; SUBCUTANEOUS at 12:30

## 2020-01-01 RX ADMIN — KETOCONAZOLE: 20 CREAM TOPICAL at 08:54

## 2020-01-01 RX ADMIN — NADOLOL 40 MG: 40 TABLET ORAL at 08:33

## 2020-01-01 RX ADMIN — ALBUMIN (HUMAN) 25 G: 0.25 INJECTION, SOLUTION INTRAVENOUS at 13:14

## 2020-01-01 RX ADMIN — OCTREOTIDE ACETATE 50 MCG: 100 INJECTION, SOLUTION INTRAVENOUS; SUBCUTANEOUS at 21:04

## 2020-01-01 RX ADMIN — HYDROXYCHLOROQUINE SULFATE 400 MG: 200 TABLET ORAL at 17:48

## 2020-01-01 RX ADMIN — CHOLECALCIFEROL (VITAMIN D3) 10 MCG (400 UNIT) TABLET 400 UNITS: at 12:32

## 2020-01-01 RX ADMIN — RIFAXIMIN 550 MG: 550 TABLET ORAL at 20:07

## 2020-01-01 RX ADMIN — IPRATROPIUM BROMIDE AND ALBUTEROL SULFATE 1 AMPULE: .5; 3 SOLUTION RESPIRATORY (INHALATION) at 16:36

## 2020-01-01 RX ADMIN — INSULIN LISPRO 2 UNITS: 100 INJECTION, SOLUTION INTRAVENOUS; SUBCUTANEOUS at 05:10

## 2020-01-01 RX ADMIN — MIDODRINE HYDROCHLORIDE 10 MG: 5 TABLET ORAL at 10:37

## 2020-01-01 RX ADMIN — ASPIRIN 81 MG: 81 TABLET, COATED ORAL at 09:01

## 2020-01-01 RX ADMIN — FUROSEMIDE 80 MG: 10 INJECTION, SOLUTION INTRAMUSCULAR; INTRAVENOUS at 18:42

## 2020-01-01 RX ADMIN — INSULIN LISPRO 6 UNITS: 100 INJECTION, SOLUTION INTRAVENOUS; SUBCUTANEOUS at 21:04

## 2020-01-01 RX ADMIN — HYDROXYCHLOROQUINE SULFATE 200 MG: 200 TABLET ORAL at 08:54

## 2020-01-01 RX ADMIN — LEVOTHYROXINE SODIUM 125 MCG: 125 TABLET ORAL at 05:45

## 2020-01-01 RX ADMIN — LACTULOSE 20 G: 20 SOLUTION ORAL at 19:47

## 2020-01-01 RX ADMIN — LIDOCAINE HYDROCHLORIDE 5 ML: 10 INJECTION, SOLUTION EPIDURAL; INFILTRATION; INTRACAUDAL; PERINEURAL at 13:30

## 2020-01-01 RX ADMIN — CITALOPRAM HYDROBROMIDE 20 MG: 20 TABLET ORAL at 08:06

## 2020-01-01 RX ADMIN — ASPIRIN 81 MG: 81 TABLET, COATED ORAL at 08:41

## 2020-01-01 RX ADMIN — MIDODRINE HYDROCHLORIDE 15 MG: 5 TABLET ORAL at 13:14

## 2020-01-01 RX ADMIN — RIFAXIMIN 550 MG: 550 TABLET ORAL at 22:11

## 2020-01-01 RX ADMIN — GLIPIZIDE 5 MG: 5 TABLET ORAL at 17:13

## 2020-01-01 RX ADMIN — HYDROXYCHLOROQUINE SULFATE 200 MG: 200 TABLET ORAL at 08:53

## 2020-01-01 RX ADMIN — MIDODRINE HYDROCHLORIDE 10 MG: 5 TABLET ORAL at 17:02

## 2020-01-01 RX ADMIN — CITALOPRAM HYDROBROMIDE 20 MG: 20 TABLET ORAL at 09:28

## 2020-01-01 RX ADMIN — INSULIN LISPRO 1 UNITS: 100 INJECTION, SOLUTION INTRAVENOUS; SUBCUTANEOUS at 18:42

## 2020-01-01 RX ADMIN — INSULIN LISPRO 1 UNITS: 100 INJECTION, SOLUTION INTRAVENOUS; SUBCUTANEOUS at 22:35

## 2020-01-01 RX ADMIN — FUROSEMIDE 80 MG: 10 INJECTION, SOLUTION INTRAMUSCULAR; INTRAVENOUS at 17:06

## 2020-01-01 RX ADMIN — FUROSEMIDE 40 MG: 10 INJECTION, SOLUTION INTRAMUSCULAR; INTRAVENOUS at 10:02

## 2020-01-01 RX ADMIN — KETOCONAZOLE: 20 CREAM TOPICAL at 22:11

## 2020-01-01 RX ADMIN — LEVOTHYROXINE SODIUM 137 MCG: 0.03 TABLET ORAL at 06:29

## 2020-01-01 RX ADMIN — FUROSEMIDE 60 MG: 10 INJECTION, SOLUTION INTRAMUSCULAR; INTRAVENOUS at 18:00

## 2020-01-01 RX ADMIN — IPRATROPIUM BROMIDE AND ALBUTEROL SULFATE 1 AMPULE: .5; 3 SOLUTION RESPIRATORY (INHALATION) at 08:17

## 2020-01-01 RX ADMIN — LEVOTHYROXINE SODIUM 125 MCG: 125 TABLET ORAL at 10:02

## 2020-01-01 RX ADMIN — PANTOPRAZOLE SODIUM 40 MG: 40 TABLET, DELAYED RELEASE ORAL at 06:29

## 2020-01-01 RX ADMIN — MIDODRINE HYDROCHLORIDE 2.5 MG: 5 TABLET ORAL at 16:58

## 2020-01-01 RX ADMIN — KETOCONAZOLE: 20 CREAM TOPICAL at 20:51

## 2020-01-01 RX ADMIN — LACTULOSE 20 G: 20 SOLUTION ORAL at 14:55

## 2020-01-01 RX ADMIN — OCTREOTIDE ACETATE 50 MCG: 100 INJECTION, SOLUTION INTRAVENOUS; SUBCUTANEOUS at 11:51

## 2020-01-01 RX ADMIN — NEPHROCAP 1 MG: 1 CAP ORAL at 08:45

## 2020-01-01 RX ADMIN — MIDODRINE HYDROCHLORIDE 15 MG: 5 TABLET ORAL at 12:52

## 2020-01-01 RX ADMIN — RIFAXIMIN 550 MG: 550 TABLET ORAL at 20:48

## 2020-01-01 RX ADMIN — Medication 10 ML: at 08:33

## 2020-01-01 RX ADMIN — LEVOTHYROXINE SODIUM 137 MCG: 0.03 TABLET ORAL at 05:46

## 2020-01-01 RX ADMIN — MIDODRINE HYDROCHLORIDE 15 MG: 5 TABLET ORAL at 08:07

## 2020-01-01 RX ADMIN — Medication 10 ML: at 20:51

## 2020-01-01 RX ADMIN — Medication 10 ML: at 22:57

## 2020-01-01 RX ADMIN — OCTREOTIDE ACETATE 50 MCG: 100 INJECTION, SOLUTION INTRAVENOUS; SUBCUTANEOUS at 14:35

## 2020-01-01 RX ADMIN — FUROSEMIDE 60 MG: 10 INJECTION, SOLUTION INTRAMUSCULAR; INTRAVENOUS at 16:53

## 2020-01-01 RX ADMIN — VITAMIN C 1 TABLET: TAB at 08:06

## 2020-01-01 RX ADMIN — KETOCONAZOLE: 20 CREAM TOPICAL at 20:52

## 2020-01-01 RX ADMIN — RIFAXIMIN 550 MG: 550 TABLET ORAL at 20:52

## 2020-01-01 RX ADMIN — INSULIN GLARGINE 14 UNITS: 100 INJECTION, SOLUTION SUBCUTANEOUS at 22:12

## 2020-01-01 RX ADMIN — Medication 10 ML: at 22:11

## 2020-01-01 RX ADMIN — RIFAXIMIN 550 MG: 550 TABLET ORAL at 20:14

## 2020-01-01 RX ADMIN — Medication 10 ML: at 22:22

## 2020-01-01 RX ADMIN — LACTULOSE 20 G: 20 SOLUTION ORAL at 21:43

## 2020-01-01 RX ADMIN — SPIRONOLACTONE 150 MG: 25 TABLET ORAL at 08:45

## 2020-01-01 RX ADMIN — INSULIN LISPRO 2 UNITS: 100 INJECTION, SOLUTION INTRAVENOUS; SUBCUTANEOUS at 12:47

## 2020-01-01 RX ADMIN — Medication 10 ML: at 08:57

## 2020-01-01 RX ADMIN — INSULIN LISPRO 10 UNITS: 100 INJECTION, SOLUTION INTRAVENOUS; SUBCUTANEOUS at 04:41

## 2020-01-01 RX ADMIN — Medication 10 ML: at 08:20

## 2020-01-01 RX ADMIN — ALBUMIN (HUMAN) 25 G: 0.25 INJECTION, SOLUTION INTRAVENOUS at 17:31

## 2020-01-01 RX ADMIN — ASPIRIN 81 MG: 81 TABLET, COATED ORAL at 10:25

## 2020-01-01 RX ADMIN — ASPIRIN 81 MG: 81 TABLET, COATED ORAL at 10:04

## 2020-01-01 RX ADMIN — INSULIN LISPRO 6 UNITS: 100 INJECTION, SOLUTION INTRAVENOUS; SUBCUTANEOUS at 11:48

## 2020-01-01 RX ADMIN — NADOLOL 20 MG: 20 TABLET ORAL at 08:24

## 2020-01-01 RX ADMIN — LEVOTHYROXINE SODIUM 137 MCG: 0.03 TABLET ORAL at 06:33

## 2020-01-01 RX ADMIN — LEVOTHYROXINE SODIUM 137 MCG: 0.03 TABLET ORAL at 06:16

## 2020-01-01 RX ADMIN — IPRATROPIUM BROMIDE AND ALBUTEROL SULFATE 1 AMPULE: .5; 3 SOLUTION RESPIRATORY (INHALATION) at 12:37

## 2020-01-01 RX ADMIN — INSULIN LISPRO 2 UNITS: 100 INJECTION, SOLUTION INTRAVENOUS; SUBCUTANEOUS at 11:49

## 2020-01-01 RX ADMIN — LACTULOSE 20 G: 20 SOLUTION ORAL at 09:20

## 2020-01-01 RX ADMIN — KETOCONAZOLE: 20 CREAM TOPICAL at 00:08

## 2020-01-01 RX ADMIN — INSULIN LISPRO 9 UNITS: 100 INJECTION, SOLUTION INTRAVENOUS; SUBCUTANEOUS at 22:10

## 2020-01-01 RX ADMIN — INSULIN LISPRO 1 UNITS: 100 INJECTION, SOLUTION INTRAVENOUS; SUBCUTANEOUS at 08:42

## 2020-01-01 RX ADMIN — ALBUMIN (HUMAN) 25 G: 0.25 INJECTION, SOLUTION INTRAVENOUS at 22:06

## 2020-01-01 RX ADMIN — FUROSEMIDE 80 MG: 10 INJECTION, SOLUTION INTRAMUSCULAR; INTRAVENOUS at 08:32

## 2020-01-01 RX ADMIN — BACITRACIN ZINC, NEOMYCIN SULFATE, POLYMYXIN B SULFATE: 3.5; 5000; 4 OINTMENT TOPICAL at 14:45

## 2020-01-01 RX ADMIN — FUROSEMIDE 80 MG: 10 INJECTION, SOLUTION INTRAMUSCULAR; INTRAVENOUS at 09:28

## 2020-01-01 RX ADMIN — ASPIRIN 81 MG: 81 TABLET, CHEWABLE ORAL at 10:12

## 2020-01-01 RX ADMIN — Medication 10 ML: at 20:14

## 2020-01-01 RX ADMIN — OCTREOTIDE ACETATE 50 MCG: 100 INJECTION, SOLUTION INTRAVENOUS; SUBCUTANEOUS at 04:31

## 2020-01-01 RX ADMIN — IPRATROPIUM BROMIDE AND ALBUTEROL SULFATE 1 AMPULE: .5; 3 SOLUTION RESPIRATORY (INHALATION) at 12:50

## 2020-01-01 RX ADMIN — INSULIN LISPRO 1 UNITS: 100 INJECTION, SOLUTION INTRAVENOUS; SUBCUTANEOUS at 08:45

## 2020-01-01 RX ADMIN — IPRATROPIUM BROMIDE AND ALBUTEROL SULFATE 1 AMPULE: .5; 3 SOLUTION RESPIRATORY (INHALATION) at 12:35

## 2020-01-01 RX ADMIN — VITAMIN C 1 TABLET: TAB at 08:40

## 2020-01-01 RX ADMIN — FUROSEMIDE 40 MG: 10 INJECTION, SOLUTION INTRAMUSCULAR; INTRAVENOUS at 18:14

## 2020-01-01 RX ADMIN — KETOCONAZOLE: 20 CREAM TOPICAL at 22:36

## 2020-01-01 RX ADMIN — METHYLPREDNISOLONE SODIUM SUCCINATE 40 MG: 40 INJECTION, POWDER, FOR SOLUTION INTRAMUSCULAR; INTRAVENOUS at 17:56

## 2020-01-01 RX ADMIN — LEVOTHYROXINE SODIUM 125 MCG: 125 TABLET ORAL at 05:41

## 2020-01-01 RX ADMIN — LACTULOSE 20 G: 20 SOLUTION ORAL at 11:57

## 2020-01-01 RX ADMIN — NEPHROCAP 1 MG: 1 CAP ORAL at 08:54

## 2020-01-01 RX ADMIN — INSULIN LISPRO 4 UNITS: 100 INJECTION, SOLUTION INTRAVENOUS; SUBCUTANEOUS at 01:06

## 2020-01-01 RX ADMIN — OCTREOTIDE ACETATE 50 MCG: 100 INJECTION, SOLUTION INTRAVENOUS; SUBCUTANEOUS at 22:09

## 2020-01-01 RX ADMIN — Medication 10 ML: at 08:39

## 2020-01-01 RX ADMIN — ALBUMIN (HUMAN) 25 G: 0.25 INJECTION, SOLUTION INTRAVENOUS at 13:01

## 2020-01-01 RX ADMIN — IPRATROPIUM BROMIDE AND ALBUTEROL SULFATE 1 AMPULE: .5; 3 SOLUTION RESPIRATORY (INHALATION) at 19:24

## 2020-01-01 RX ADMIN — RIFAXIMIN 550 MG: 550 TABLET ORAL at 21:44

## 2020-01-01 RX ADMIN — RIFAXIMIN 550 MG: 550 TABLET ORAL at 08:20

## 2020-01-01 RX ADMIN — ALBUMIN (HUMAN) 25 G: 0.25 INJECTION, SOLUTION INTRAVENOUS at 04:30

## 2020-01-01 RX ADMIN — CITALOPRAM HYDROBROMIDE 20 MG: 20 TABLET ORAL at 08:53

## 2020-01-01 RX ADMIN — NADOLOL 20 MG: 20 TABLET ORAL at 08:54

## 2020-01-01 RX ADMIN — MIDODRINE HYDROCHLORIDE 7.5 MG: 5 TABLET ORAL at 14:50

## 2020-01-01 RX ADMIN — SODIUM CHLORIDE 1000 ML: 9 INJECTION, SOLUTION INTRAVENOUS at 21:41

## 2020-01-01 RX ADMIN — HYDROXYCHLOROQUINE SULFATE 400 MG: 200 TABLET ORAL at 10:03

## 2020-01-01 RX ADMIN — INSULIN LISPRO 6 UNITS: 100 INJECTION, SOLUTION INTRAVENOUS; SUBCUTANEOUS at 00:24

## 2020-01-01 RX ADMIN — INSULIN LISPRO 2 UNITS: 100 INJECTION, SOLUTION INTRAVENOUS; SUBCUTANEOUS at 08:49

## 2020-01-01 RX ADMIN — Medication 10 ML: at 10:13

## 2020-01-01 RX ADMIN — Medication 10 ML: at 22:28

## 2020-01-01 RX ADMIN — INSULIN LISPRO 1 UNITS: 100 INJECTION, SOLUTION INTRAVENOUS; SUBCUTANEOUS at 16:57

## 2020-01-01 RX ADMIN — LACTULOSE 20 G: 20 SOLUTION ORAL at 21:06

## 2020-01-01 RX ADMIN — INSULIN LISPRO 8 UNITS: 100 INJECTION, SOLUTION INTRAVENOUS; SUBCUTANEOUS at 17:07

## 2020-01-01 RX ADMIN — FUROSEMIDE 80 MG: 10 INJECTION, SOLUTION INTRAMUSCULAR; INTRAVENOUS at 08:45

## 2020-01-01 RX ADMIN — LACTULOSE 20 G: 20 SOLUTION ORAL at 08:48

## 2020-01-01 RX ADMIN — METHYLPREDNISOLONE SODIUM SUCCINATE 40 MG: 40 INJECTION, POWDER, FOR SOLUTION INTRAMUSCULAR; INTRAVENOUS at 09:49

## 2020-01-01 RX ADMIN — LINAGLIPTIN 5 MG: 5 TABLET, FILM COATED ORAL at 08:23

## 2020-01-01 RX ADMIN — INSULIN LISPRO 9 UNITS: 100 INJECTION, SOLUTION INTRAVENOUS; SUBCUTANEOUS at 11:44

## 2020-01-01 RX ADMIN — HYDROXYCHLOROQUINE SULFATE 200 MG: 200 TABLET ORAL at 20:52

## 2020-01-01 RX ADMIN — NEPHROCAP 1 MG: 1 CAP ORAL at 08:32

## 2020-01-01 RX ADMIN — LEVOTHYROXINE SODIUM 137 MCG: 0.03 TABLET ORAL at 05:00

## 2020-01-01 RX ADMIN — CITALOPRAM HYDROBROMIDE 20 MG: 20 TABLET ORAL at 08:23

## 2020-01-01 RX ADMIN — VITAMIN C 1 TABLET: TAB at 10:12

## 2020-01-01 RX ADMIN — SPIRONOLACTONE 150 MG: 25 TABLET ORAL at 08:37

## 2020-01-01 RX ADMIN — ASPIRIN 81 MG: 81 TABLET, COATED ORAL at 20:37

## 2020-01-01 RX ADMIN — HYDROXYCHLOROQUINE SULFATE 200 MG: 200 TABLET ORAL at 20:48

## 2020-01-01 RX ADMIN — LACTULOSE 20 G: 20 SOLUTION ORAL at 22:09

## 2020-01-01 RX ADMIN — CITALOPRAM HYDROBROMIDE 20 MG: 20 TABLET ORAL at 08:41

## 2020-01-01 RX ADMIN — IPRATROPIUM BROMIDE AND ALBUTEROL SULFATE 1 AMPULE: .5; 3 SOLUTION RESPIRATORY (INHALATION) at 07:46

## 2020-01-01 RX ADMIN — INSULIN LISPRO 2 UNITS: 100 INJECTION, SOLUTION INTRAVENOUS; SUBCUTANEOUS at 17:35

## 2020-01-01 RX ADMIN — FUROSEMIDE 20 MG: 20 TABLET ORAL at 20:37

## 2020-01-01 RX ADMIN — LACTULOSE 20 G: 20 SOLUTION ORAL at 14:21

## 2020-01-01 RX ADMIN — IPRATROPIUM BROMIDE AND ALBUTEROL SULFATE 1 AMPULE: .5; 3 SOLUTION RESPIRATORY (INHALATION) at 09:22

## 2020-01-01 RX ADMIN — NADOLOL 20 MG: 20 TABLET ORAL at 10:05

## 2020-01-01 RX ADMIN — LACTULOSE 20 G: 20 SOLUTION ORAL at 08:23

## 2020-01-01 RX ADMIN — LACTULOSE 20 G: 20 SOLUTION ORAL at 10:25

## 2020-01-01 RX ADMIN — RIFAXIMIN 550 MG: 550 TABLET ORAL at 20:37

## 2020-01-01 RX ADMIN — Medication 10 ML: at 21:00

## 2020-01-01 RX ADMIN — IPRATROPIUM BROMIDE AND ALBUTEROL SULFATE 1 AMPULE: .5; 3 SOLUTION RESPIRATORY (INHALATION) at 15:55

## 2020-01-01 RX ADMIN — ASPIRIN 81 MG: 81 TABLET, COATED ORAL at 08:54

## 2020-01-01 RX ADMIN — OCTREOTIDE ACETATE 50 MCG: 100 INJECTION, SOLUTION INTRAVENOUS; SUBCUTANEOUS at 20:07

## 2020-01-01 RX ADMIN — ASPIRIN 81 MG: 81 TABLET, COATED ORAL at 08:53

## 2020-01-01 RX ADMIN — ALBUMIN (HUMAN) 25 G: 0.25 INJECTION, SOLUTION INTRAVENOUS at 00:35

## 2020-01-01 RX ADMIN — LACTULOSE 20 G: 20 SOLUTION ORAL at 20:36

## 2020-01-01 RX ADMIN — METHYLPREDNISOLONE SODIUM SUCCINATE 40 MG: 40 INJECTION, POWDER, FOR SOLUTION INTRAMUSCULAR; INTRAVENOUS at 08:41

## 2020-01-01 RX ADMIN — LACTULOSE 20 G: 20 SOLUTION ORAL at 08:12

## 2020-01-01 RX ADMIN — METHYLPREDNISOLONE SODIUM SUCCINATE 40 MG: 40 INJECTION, POWDER, FOR SOLUTION INTRAMUSCULAR; INTRAVENOUS at 00:25

## 2020-01-01 RX ADMIN — ALBUMIN (HUMAN) 25 G: 0.25 INJECTION, SOLUTION INTRAVENOUS at 12:52

## 2020-01-01 RX ADMIN — FUROSEMIDE 40 MG: 10 INJECTION, SOLUTION INTRAMUSCULAR; INTRAVENOUS at 08:54

## 2020-01-01 RX ADMIN — FUROSEMIDE 40 MG: 10 INJECTION, SOLUTION INTRAMUSCULAR; INTRAVENOUS at 10:55

## 2020-01-01 RX ADMIN — INSULIN GLARGINE 14 UNITS: 100 INJECTION, SOLUTION SUBCUTANEOUS at 20:54

## 2020-01-01 RX ADMIN — KETOCONAZOLE: 20 CREAM TOPICAL at 08:35

## 2020-01-01 RX ADMIN — INSULIN LISPRO 3 UNITS: 100 INJECTION, SOLUTION INTRAVENOUS; SUBCUTANEOUS at 08:40

## 2020-01-01 RX ADMIN — FUROSEMIDE 60 MG: 10 INJECTION, SOLUTION INTRAMUSCULAR; INTRAVENOUS at 08:17

## 2020-01-01 RX ADMIN — RIFAXIMIN 550 MG: 550 TABLET ORAL at 08:38

## 2020-01-01 RX ADMIN — LACTULOSE 20 G: 20 SOLUTION ORAL at 14:45

## 2020-01-01 RX ADMIN — FUROSEMIDE 80 MG: 10 INJECTION, SOLUTION INTRAMUSCULAR; INTRAVENOUS at 17:07

## 2020-01-01 RX ADMIN — INSULIN LISPRO 4 UNITS: 100 INJECTION, SOLUTION INTRAVENOUS; SUBCUTANEOUS at 23:51

## 2020-01-01 RX ADMIN — LACTULOSE 20 G: 20 SOLUTION ORAL at 16:40

## 2020-01-01 RX ADMIN — MIDODRINE HYDROCHLORIDE 15 MG: 5 TABLET ORAL at 18:39

## 2020-01-01 RX ADMIN — LEVOTHYROXINE SODIUM 125 MCG: 125 TABLET ORAL at 06:21

## 2020-01-01 RX ADMIN — METHYLPREDNISOLONE SODIUM SUCCINATE 40 MG: 40 INJECTION, POWDER, FOR SOLUTION INTRAMUSCULAR; INTRAVENOUS at 01:05

## 2020-01-01 RX ADMIN — LACTULOSE 20 G: 20 SOLUTION ORAL at 22:28

## 2020-01-01 RX ADMIN — INSULIN LISPRO 10 UNITS: 100 INJECTION, SOLUTION INTRAVENOUS; SUBCUTANEOUS at 04:28

## 2020-01-01 RX ADMIN — INSULIN LISPRO 1 UNITS: 100 INJECTION, SOLUTION INTRAVENOUS; SUBCUTANEOUS at 17:07

## 2020-01-01 RX ADMIN — INSULIN LISPRO 2 UNITS: 100 INJECTION, SOLUTION INTRAVENOUS; SUBCUTANEOUS at 12:50

## 2020-01-01 RX ADMIN — FUROSEMIDE 40 MG: 40 TABLET ORAL at 18:40

## 2020-01-01 RX ADMIN — RIFAXIMIN 550 MG: 550 TABLET ORAL at 09:01

## 2020-01-01 RX ADMIN — INSULIN LISPRO 1 UNITS: 100 INJECTION, SOLUTION INTRAVENOUS; SUBCUTANEOUS at 12:56

## 2020-01-01 RX ADMIN — LEVOTHYROXINE SODIUM 137 MCG: 0.03 TABLET ORAL at 10:05

## 2020-01-01 RX ADMIN — Medication 10 ML: at 10:04

## 2020-01-01 RX ADMIN — INSULIN LISPRO 1 UNITS: 100 INJECTION, SOLUTION INTRAVENOUS; SUBCUTANEOUS at 18:56

## 2020-01-01 RX ADMIN — IPRATROPIUM BROMIDE AND ALBUTEROL SULFATE 1 AMPULE: .5; 3 SOLUTION RESPIRATORY (INHALATION) at 19:43

## 2020-01-01 RX ADMIN — SPIRONOLACTONE 150 MG: 25 TABLET ORAL at 09:09

## 2020-01-01 RX ADMIN — Medication 10 ML: at 09:49

## 2020-01-01 RX ADMIN — INSULIN LISPRO 8 UNITS: 100 INJECTION, SOLUTION INTRAVENOUS; SUBCUTANEOUS at 20:54

## 2020-01-01 RX ADMIN — INSULIN LISPRO 1 UNITS: 100 INJECTION, SOLUTION INTRAVENOUS; SUBCUTANEOUS at 09:10

## 2020-01-01 RX ADMIN — LACTULOSE 20 G: 20 SOLUTION ORAL at 08:05

## 2020-01-01 RX ADMIN — IPRATROPIUM BROMIDE AND ALBUTEROL SULFATE 1 AMPULE: .5; 3 SOLUTION RESPIRATORY (INHALATION) at 18:15

## 2020-01-01 RX ADMIN — INSULIN LISPRO 2 UNITS: 100 INJECTION, SOLUTION INTRAVENOUS; SUBCUTANEOUS at 12:33

## 2020-01-01 RX ADMIN — CHOLECALCIFEROL (VITAMIN D3) 10 MCG (400 UNIT) TABLET 400 UNITS: at 08:48

## 2020-01-01 RX ADMIN — MIDODRINE HYDROCHLORIDE 10 MG: 5 TABLET ORAL at 11:56

## 2020-01-01 RX ADMIN — ASPIRIN 81 MG: 81 TABLET, CHEWABLE ORAL at 08:40

## 2020-01-01 RX ADMIN — INSULIN LISPRO 1 UNITS: 100 INJECTION, SOLUTION INTRAVENOUS; SUBCUTANEOUS at 20:52

## 2020-01-01 RX ADMIN — ASPIRIN 81 MG: 81 TABLET, COATED ORAL at 08:45

## 2020-01-01 RX ADMIN — FUROSEMIDE 60 MG: 10 INJECTION, SOLUTION INTRAMUSCULAR; INTRAVENOUS at 08:22

## 2020-01-01 RX ADMIN — CITALOPRAM HYDROBROMIDE 20 MG: 20 TABLET ORAL at 08:49

## 2020-01-01 RX ADMIN — ASPIRIN 81 MG: 81 TABLET, COATED ORAL at 09:49

## 2020-01-01 RX ADMIN — RIFAXIMIN 550 MG: 550 TABLET ORAL at 08:23

## 2020-01-01 RX ADMIN — INSULIN LISPRO 1 UNITS: 100 INJECTION, SOLUTION INTRAVENOUS; SUBCUTANEOUS at 17:06

## 2020-01-01 RX ADMIN — LACTULOSE 20 G: 20 SOLUTION ORAL at 08:53

## 2020-01-01 RX ADMIN — GLIPIZIDE 5 MG: 5 TABLET ORAL at 16:55

## 2020-01-01 RX ADMIN — NEPHROCAP 1 MG: 1 CAP ORAL at 09:01

## 2020-01-01 RX ADMIN — RIFAXIMIN 550 MG: 550 TABLET ORAL at 20:51

## 2020-01-01 RX ADMIN — INSULIN LISPRO 1 UNITS: 100 INJECTION, SOLUTION INTRAVENOUS; SUBCUTANEOUS at 20:21

## 2020-01-01 RX ADMIN — AZITHROMYCIN 250 MG: 250 TABLET, FILM COATED ORAL at 08:23

## 2020-01-01 RX ADMIN — ALBUMIN (HUMAN) 25 G: 0.25 INJECTION, SOLUTION INTRAVENOUS at 06:29

## 2020-01-01 RX ADMIN — ONDANSETRON 4 MG: 2 INJECTION INTRAMUSCULAR; INTRAVENOUS at 03:08

## 2020-01-01 RX ADMIN — LACTULOSE 20 G: 20 SOLUTION ORAL at 08:19

## 2020-01-01 RX ADMIN — LACTULOSE 20 G: 20 SOLUTION ORAL at 10:04

## 2020-01-01 RX ADMIN — INSULIN LISPRO 1 UNITS: 100 INJECTION, SOLUTION INTRAVENOUS; SUBCUTANEOUS at 10:15

## 2020-01-01 RX ADMIN — Medication 10 ML: at 08:54

## 2020-01-01 RX ADMIN — NADOLOL 40 MG: 40 TABLET ORAL at 10:05

## 2020-01-01 RX ADMIN — AZITHROMYCIN 500 MG: 250 TABLET, FILM COATED ORAL at 17:49

## 2020-01-01 RX ADMIN — RIFAXIMIN 550 MG: 550 TABLET ORAL at 08:40

## 2020-01-01 RX ADMIN — RIFAXIMIN 550 MG: 550 TABLET ORAL at 22:22

## 2020-01-01 RX ADMIN — AZITHROMYCIN 250 MG: 250 TABLET, FILM COATED ORAL at 10:04

## 2020-01-01 RX ADMIN — ALBUMIN (HUMAN) 25 G: 0.25 INJECTION, SOLUTION INTRAVENOUS at 16:53

## 2020-01-01 RX ADMIN — RIFAXIMIN 550 MG: 550 TABLET ORAL at 08:21

## 2020-01-01 RX ADMIN — ASPIRIN 81 MG: 81 TABLET, COATED ORAL at 08:49

## 2020-01-01 RX ADMIN — NEPHROCAP 1 MG: 1 CAP ORAL at 10:24

## 2020-01-01 RX ADMIN — INSULIN LISPRO 4 UNITS: 100 INJECTION, SOLUTION INTRAVENOUS; SUBCUTANEOUS at 08:43

## 2020-01-01 RX ADMIN — OCTREOTIDE ACETATE 50 MCG: 100 INJECTION, SOLUTION INTRAVENOUS; SUBCUTANEOUS at 12:07

## 2020-01-01 RX ADMIN — LACTULOSE 20 G: 20 SOLUTION ORAL at 08:45

## 2020-01-01 RX ADMIN — VITAMIN C 1 TABLET: TAB at 08:42

## 2020-01-01 RX ADMIN — AZITHROMYCIN 250 MG: 250 TABLET, FILM COATED ORAL at 08:54

## 2020-01-01 RX ADMIN — CITALOPRAM HYDROBROMIDE 20 MG: 20 TABLET ORAL at 08:45

## 2020-01-01 RX ADMIN — KETOCONAZOLE: 20 CREAM TOPICAL at 08:24

## 2020-01-01 RX ADMIN — LEVOTHYROXINE SODIUM 125 MCG: 125 TABLET ORAL at 09:08

## 2020-01-01 RX ADMIN — METHYLPREDNISOLONE SODIUM SUCCINATE 40 MG: 40 INJECTION, POWDER, FOR SOLUTION INTRAMUSCULAR; INTRAVENOUS at 16:54

## 2020-01-01 RX ADMIN — Medication 10 ML: at 08:08

## 2020-01-01 RX ADMIN — NADOLOL 20 MG: 20 TABLET ORAL at 09:06

## 2020-01-01 RX ADMIN — LEVOFLOXACIN 500 MG: 5 INJECTION, SOLUTION INTRAVENOUS at 21:41

## 2020-01-01 RX ADMIN — RIFAXIMIN 550 MG: 550 TABLET ORAL at 08:49

## 2020-01-01 RX ADMIN — LEVOTHYROXINE SODIUM 137 MCG: 0.03 TABLET ORAL at 05:17

## 2020-01-01 RX ADMIN — LIDOCAINE HYDROCHLORIDE 10 ML: 10 INJECTION, SOLUTION EPIDURAL; INFILTRATION; INTRACAUDAL; PERINEURAL at 15:25

## 2020-01-01 RX ADMIN — PANTOPRAZOLE SODIUM 40 MG: 40 TABLET, DELAYED RELEASE ORAL at 08:06

## 2020-01-01 RX ADMIN — Medication 10 ML: at 20:56

## 2020-01-01 RX ADMIN — INSULIN LISPRO 1 UNITS: 100 INJECTION, SOLUTION INTRAVENOUS; SUBCUTANEOUS at 17:19

## 2020-01-01 RX ADMIN — METHYLPREDNISOLONE SODIUM SUCCINATE 40 MG: 40 INJECTION, POWDER, FOR SOLUTION INTRAMUSCULAR; INTRAVENOUS at 18:14

## 2020-01-01 RX ADMIN — OCTREOTIDE ACETATE 50 MCG: 100 INJECTION, SOLUTION INTRAVENOUS; SUBCUTANEOUS at 05:15

## 2020-01-01 RX ADMIN — INSULIN LISPRO 1 UNITS: 100 INJECTION, SOLUTION INTRAVENOUS; SUBCUTANEOUS at 22:15

## 2020-01-01 RX ADMIN — IPRATROPIUM BROMIDE AND ALBUTEROL SULFATE 1 AMPULE: .5; 3 SOLUTION RESPIRATORY (INHALATION) at 12:32

## 2020-01-01 RX ADMIN — LIDOCAINE HYDROCHLORIDE 5 ML: 10 INJECTION, SOLUTION EPIDURAL; INFILTRATION; INTRACAUDAL; PERINEURAL at 14:30

## 2020-01-01 RX ADMIN — KETOCONAZOLE: 20 CREAM TOPICAL at 21:07

## 2020-01-01 RX ADMIN — ALBUMIN (HUMAN) 25 G: 0.25 INJECTION, SOLUTION INTRAVENOUS at 12:02

## 2020-01-01 RX ADMIN — INSULIN LISPRO 1 UNITS: 100 INJECTION, SOLUTION INTRAVENOUS; SUBCUTANEOUS at 20:54

## 2020-01-01 RX ADMIN — INSULIN LISPRO 2 UNITS: 100 INJECTION, SOLUTION INTRAVENOUS; SUBCUTANEOUS at 21:04

## 2020-01-01 RX ADMIN — SPIRONOLACTONE 100 MG: 25 TABLET ORAL at 08:53

## 2020-01-01 ASSESSMENT — PAIN DESCRIPTION - LOCATION
LOCATION: CHEST
LOCATION: NECK
LOCATION: CHEST

## 2020-01-01 ASSESSMENT — PAIN SCALES - GENERAL
PAINLEVEL_OUTOF10: 0
PAINLEVEL_OUTOF10: 10
PAINLEVEL_OUTOF10: 0
PAINLEVEL_OUTOF10: 6
PAINLEVEL_OUTOF10: 0
PAINLEVEL_OUTOF10: 10
PAINLEVEL_OUTOF10: 0
PAINLEVEL_OUTOF10: 4
PAINLEVEL_OUTOF10: 0
PAINLEVEL_OUTOF10: 4
PAINLEVEL_OUTOF10: 0
PAINLEVEL_OUTOF10: 6
PAINLEVEL_OUTOF10: 0
PAINLEVEL_OUTOF10: 8
PAINLEVEL_OUTOF10: 0
PAINLEVEL_OUTOF10: 4
PAINLEVEL_OUTOF10: 0
PAINLEVEL_OUTOF10: 4
PAINLEVEL_OUTOF10: 0
PAINLEVEL_OUTOF10: 10
PAINLEVEL_OUTOF10: 0
PAINLEVEL_OUTOF10: 0

## 2020-01-01 ASSESSMENT — PAIN DESCRIPTION - PROGRESSION
CLINICAL_PROGRESSION: NOT CHANGED
CLINICAL_PROGRESSION: GRADUALLY WORSENING
CLINICAL_PROGRESSION: NOT CHANGED

## 2020-01-01 ASSESSMENT — PAIN SCALES - WONG BAKER

## 2020-01-01 ASSESSMENT — ENCOUNTER SYMPTOMS
COUGH: 1
VOMITING: 0
ABDOMINAL PAIN: 0
COUGH: 0
NAUSEA: 0
RHINORRHEA: 0
COUGH: 0
GASTROINTESTINAL NEGATIVE: 1
CONSTIPATION: 0
ABDOMINAL DISTENTION: 1
ABDOMINAL PAIN: 0
DIARRHEA: 0
SHORTNESS OF BREATH: 1
NAUSEA: 0
CHEST TIGHTNESS: 1
NAUSEA: 0
CHEST TIGHTNESS: 1
WHEEZING: 0
VOMITING: 0
VOMITING: 0
COLOR CHANGE: 0
WHEEZING: 1
SHORTNESS OF BREATH: 1
ABDOMINAL DISTENTION: 1
DIARRHEA: 0
WHEEZING: 0
DIARRHEA: 0
SHORTNESS OF BREATH: 1
ABDOMINAL PAIN: 0
SHORTNESS OF BREATH: 1
ABDOMINAL PAIN: 0
COUGH: 1

## 2020-01-01 ASSESSMENT — PAIN DESCRIPTION - ONSET
ONSET: GRADUAL
ONSET: GRADUAL
ONSET: PROGRESSIVE
ONSET: GRADUAL
ONSET: GRADUAL

## 2020-01-01 ASSESSMENT — PAIN DESCRIPTION - DESCRIPTORS
DESCRIPTORS: ACHING;CONSTANT
DESCRIPTORS: TIGHTNESS

## 2020-01-01 ASSESSMENT — PAIN - FUNCTIONAL ASSESSMENT
PAIN_FUNCTIONAL_ASSESSMENT: PREVENTS OR INTERFERES SOME ACTIVE ACTIVITIES AND ADLS

## 2020-01-01 ASSESSMENT — PAIN DESCRIPTION - ORIENTATION
ORIENTATION: RIGHT;LEFT;MID
ORIENTATION: RIGHT;LEFT;MID
ORIENTATION: RIGHT;LEFT
ORIENTATION: RIGHT;LEFT;MID
ORIENTATION: POSTERIOR
ORIENTATION: RIGHT;LEFT;MID

## 2020-01-01 ASSESSMENT — PATIENT HEALTH QUESTIONNAIRE - PHQ9
2. FEELING DOWN, DEPRESSED OR HOPELESS: 0
1. LITTLE INTEREST OR PLEASURE IN DOING THINGS: 1
SUM OF ALL RESPONSES TO PHQ9 QUESTIONS 1 & 2: 1
SUM OF ALL RESPONSES TO PHQ QUESTIONS 1-9: 1
SUM OF ALL RESPONSES TO PHQ QUESTIONS 1-9: 1

## 2020-01-01 ASSESSMENT — PAIN DESCRIPTION - PAIN TYPE
TYPE: ACUTE PAIN
TYPE: CHRONIC PAIN
TYPE: ACUTE PAIN
TYPE: ACUTE PAIN

## 2020-01-01 ASSESSMENT — PAIN DESCRIPTION - FREQUENCY
FREQUENCY: INTERMITTENT

## 2020-01-14 NOTE — TELEPHONE ENCOUNTER
Patient still needs prior authorization for saxagliptin (ONGLYZA) 5 MG TABS tablet . Patient currently only has a few left and will be without if matters with the insurance company do not get resolved. Please Advise patient or pharmacy.

## 2020-04-08 PROBLEM — J96.00 ACUTE RESPIRATORY FAILURE (HCC): Status: ACTIVE | Noted: 2020-01-01

## 2020-04-08 NOTE — PATIENT INSTRUCTIONS
Advance Care Planning  People with COVID-19 may have no symptoms, mild symptoms, such as fever, cough, and shortness of breath or they may have more severe illness, developing severe and fatal pneumonia. As a result, Advance Care Planning with attention to naming a health care decision maker (someone you trust to make healthcare decisions for you if you could not speak for yourself) and sharing other health care preferences is important BEFORE a possible health crisis. Please contact your Primary Care Provider to discuss Advance Care Planning. Preventing the Spread of Coronavirus Disease 2019 in Homes and Residential Communities  For the most recent information go to [email or web address]    Prevention steps for People with confirmed or suspected COVID-19 (including persons under investigation) who do not need to be hospitalized  and   People with confirmed COVID-19 who were hospitalized and determined to be medically stable to go home    Your healthcare provider and public health staff will evaluate whether you can be cared for at home. If it is determined that you do not need to be hospitalized and can be isolated at home, you will be monitored by staff from your local or state health department. You should follow the prevention steps below until a healthcare provider or local or state health department says you can return to your normal activities. Stay home except to get medical care  People who are mildly ill with COVID-19 are able to isolate at home during their illness. You should restrict activities outside your home, except for getting medical care. Do not go to work, school, or public areas. Avoid using public transportation, ride-sharing, or taxis. Separate yourself from other people and animals in your home  People: As much as possible, you should stay in a specific room and away from other people in your home.  Also, you should use a separate medical record. Additional Information  If you have questions, please contact your physician practice where you receive care. Remember, MyChart is NOT to be used for urgent needs. For medical emergencies, dial 911.

## 2020-04-08 NOTE — PROGRESS NOTES
Pt admitted to room 3318 from ED. VSS on 5L O2 NC. O x 2. Pt lives at home with wife and was experiencing SOB at rest. Admission Dx: Acute respiratory failure, COVID r/o. Pt is assist/with walker. Pt oriented to room and updated on POC. Pt understands and has no further questions. Call light and bedside table within reach. Safety socks on and  bed in lowest position with 2/4 siderails up. bed alarm on. Camera in room. MT verified telemetry. Report form Nurse Nona Peguero.

## 2020-04-08 NOTE — ED PROVIDER NOTES
[facility]        Pt Name: Tommie Cota  MRN: [id number]  [place] 1945  Date of evaluation: 4/8/2020  Provider: NABIL Archuleta - CNP  PCP: Luis Manuel Dunlap MD     I have seen and evaluated this patient with my supervising physician bindu, [address] [place] [address]       Chief Complaint   Patient presents with    Shortness of Breath     pt sob x3 weeks, worse with exertion, 79% on RA with squad, cough as well off and on, no fevers. HISTORY OF PRESENT ILLNESS   (Location, Timing/Onset, Context/Setting, Quality, Duration, Modifying Factors, Severity, Associated Signs and Symptoms)  Note limiting factors. Tommie Cota is a 69 y.o. male presents to the emergency department with complaint of cough, hypoxia. He has been ill over the past 2 or 3 weeks. He denies any mitigating exacerbating factors. He reports fever. He does have an increased work of breathing. He does appear ill. Denies any lightheadedness, dizziness, visual disturbances. No neck or back pain. No abdominal pain, nausea, vomiting, diarrhea, constipation, or dysuria. No rash. Nursing Notes were all reviewed and agreed with or any disagreements were addressed in the HPI. REVIEW OF SYSTEMS    (2-9 systems for level 4, 10 or more for level 5)     Review of Systems   Constitutional: Positive for chills, fatigue and fever. Negative for activity change. HENT: Positive for congestion. Respiratory: Positive for cough, chest tightness and shortness of breath. Cardiovascular: Positive for chest pain. Gastrointestinal: Negative for abdominal pain, diarrhea, nausea and vomiting. Genitourinary: Negative for dysuria. Musculoskeletal: Positive for arthralgias and myalgias. All other systems reviewed and are negative. Positives and Pertinent negatives as per HPI. Except as noted above in the ROS, all other systems were reviewed and negative.        PAST nadolol (CORGARD) 20 MG tablet Take 1 tablet by mouth daily  Qty: 90 tablet, Refills: 3      citalopram (CELEXA) 20 MG tablet Take 1 tablet by mouth daily  Qty: 90 tablet, Refills: 3      spironolactone (ALDACTONE) 50 MG tablet Take 2 tablets by mouth daily  Qty: 60 tablet, Refills: 5      Turmeric (CURCUMIN 95) 500 MG CAPS Take by mouth daily      Levothyroxine Sodium 125 MCG CAPS Take by mouth Daily      CINNAMON PO Take 1,000 mg by mouth daily      MAGNESIUM PO Take 250 mg by mouth daily       b complex vitamins capsule Take 1 capsule by mouth daily      glipiZIDE (GLUCOTROL) 5 MG tablet Take 1 tablet by mouth 2 times daily (before meals)  Qty: 180 tablet, Refills: 3      MILK THISTLE PO Take 1,000 mg by mouth daily       B-D 3CC LUER-ALEXIS SYR 25GX1\" 25G X 1\" 3 ML MISC USE WITH VITAMIN B12 INJECTIONS  Qty: 12 each, Refills: 3      XIFAXAN 550 MG tablet 2 times daily       vitamin D (ERGOCALCIFEROL) 400 UNITS CAPS Take 400 Units by mouth daily. Omega-3 Fatty Acids (FISH OIL) 500 MG CAPS Take 1,000 mg by mouth daily       aspirin 81 MG tablet Take 81 mg by mouth daily. ALLERGIES     Cephalexin; Ciprocinonide [fluocinolone]; Quinolones; and Statins    FAMILYHISTORY       Family History   Problem Relation Age of Onset    Heart Disease Mother         arrythmia    Diabetes Father     Cancer Father     Heart Disease Father     Anesth Problems Neg Hx     Malig Hyperten Neg Hx     Hypotension Neg Hx     Malig Hypertherm Neg Hx     Pseudochol.  Deficiency Neg Hx           SOCIAL HISTORY       Social History     Tobacco Use    Smoking status: Current Some Day Smoker     Packs/day: 0.45     Years: 43.00     Pack years: 19.35     Types: Cigarettes     Start date: 1/1/1965    Smokeless tobacco: Never Used   Substance Use Topics    Alcohol use: No     Comment: RARELY;  approx once every 6 months    Drug use: No       SCREENINGS             PHYSICAL EXAM    (up to 7 for level 4, 8 or more for Laboratory  [address] [employer], [place] [employer]   Phone [phone]   LACTATE DEHYDROGENASE - Abnormal; Notable for the following components:     (*)     All other components within normal limits    Narrative:     Performed at:  [facility]  [address] [employer] [employer]   Phone [phone]   D-DIMER, QUANTITATIVE - Abnormal; Notable for the following components:    D-Dimer, Quant 2678 (*)     All other components within normal limits    Narrative:     Performed at:  [facility] [address] [facility] [employer] [employer]   Phone [phone]   CULTURE, BLOOD 1   CULTURE, BLOOD 2   LEGIONELLA ANTIGEN, URINE   STREP PNEUMONIAE ANTIGEN   TROPONIN    Narrative:     Performed at:  [facility] [address] [place] [employer] [employer]   Phone [phone]   LACTATE, SEPSIS    Narrative:     Performed at:  [facility] [address] [facility] [place] [employer], [facility] [employer]   Phone [phone]   PROCALCITONIN    Narrative:     Performed at:  [facility] [address] [place] [employer] [employer]   Phone [phone]   CK    Narrative:     Performed at:  [facility] [address], [employer]   Phone [phone]   FERRITIN   EMERGENT DISEASE PANEL   BASIC METABOLIC PANEL W/ REFLEX TO MG FOR LOW K   CBC       All other labs were within normal range or not returned as of this dictation. EKG: All EKG's are interpreted by the Emergency Department Physician in the absence of a cardiologist.  Please see their note for interpretation of EKG.       RADIOLOGY:   Non-plain film images such as CT, Ultrasound and MRI are read by the radiologist. Plain radiographic images are visualized and preliminarily interpreted by the ED Provider with the below findings:        Interpretation per the Radiologist below, if available at the time of this note:    XR CHEST PORTABLE   Final Result   Cardiomegaly with vascular congestion with right lower lobe airspace disease   and bilateral pleural effusions suggestive of CHF. Superimposed pneumonia   cannot be excluded. XR CHEST STANDARD (2 VW)    (Results Pending)     Xr Chest Portable    Result Date: 4/8/2020  EXAMINATION: ONE XRAY VIEW OF THE CHEST 4/8/2020 4:40 pm COMPARISON: Chest November 18, 2019. HISTORY: ORDERING SYSTEM PROVIDED HISTORY: sob TECHNOLOGIST PROVIDED HISTORY: Reason for exam:->sob FINDINGS: Sternotomy wires are noted. Cardiomegaly with vascular congestion with right lower lobe airspace disease and bilateral pleural effusions are noted. No pneumothorax or free air. Cardiomegaly with vascular congestion with right lower lobe airspace disease and bilateral pleural effusions suggestive of CHF. Superimposed pneumonia cannot be excluded. PROCEDURES   Unless otherwise noted below, none     Procedures    CRITICAL CARE TIME   The total critical care time spent while evaluating and treating this patient was at least 52 minutes. This excludes time spent doing separately billable procedures. This includes time at the bedside, data interpretation, medication management, obtaining critical history from collateral sources if the patient is unable to provide it directly, and physician consultation. Specifics of interventions taken and potentially life-threatening diagnostic considerations are listed above in the medical decision making.       CONSULTS:  None      EMERGENCY DEPARTMENT COURSE and DIFFERENTIAL DIAGNOSIS/MDM:   Vitals:    Vitals:    04/08/20 1540 04/08/20 1600 04/08/20 1700 04/08/20 1900   BP:  115/74 (!) 117/92 124/86   Pulse:  94 96    Resp:  23 20 22   Temp: 99.1 °F (37.3 °C)   97.3 °F (36.3 °C)   TempSrc: Rectal   Temporal   SpO2:  97% 99% 92%   Weight:    208 lb 12.8 oz (94.7 kg)   Height:    5' 11\" (1.803 m)       Patient was given the following solution (has no administration in time range)   glucagon (rDNA) injection 1 mg (has no administration in time range)   dextrose 5 % solution (has no administration in time range)   hydroxychloroquine (PLAQUENIL) tablet 400 mg (400 mg Oral Given 4/8/20 9636)       Briefly, this is a 69year old male that presents to the emergency department with complaint of cough, hypoxia. He has been ill over the past 2 or 3 weeks. He denies any mitigating exacerbating factors. He reports fever. He does have an increased work of breathing. He does appear ill. He is concerning for COVID-19 infection. Labs are as described above. Right shoulderXR CHEST PORTABLE (Final result)   Result time 04/08/20 17:13:19   Final result by Laura Walton DO (04/08/20 17:13:19)                Impression:    Cardiomegaly with vascular congestion with right lower lobe airspace disease  and bilateral pleural effusions suggestive of CHF.  Superimposed pneumonia  cannot be excluded. He will be admitted. Started on Plaquenil and azithromycin. hospitalist to admit    FINAL IMPRESSION       1. Suspected COVID-19 virus infection    2.  Hypoxia          DISPOSITION/PLAN   DISPOSITION Admitted 04/08/2020 05:31:19 PM      PATIENT REFERREDTO:  Graciela Banks MD  [facility] [address]  [phone]            DISCHARGE MEDICATIONS:  Current Discharge Medication List          DISCONTINUED MEDICATIONS:  Current Discharge Medication List                 (Please note that portions of this note were completed with a voice recognition program.  Efforts were made to edit the dictations but occasionally words are mis-transcribed.)    NABIL Brady CNP (electronically signed)           NABIL Brady CNP  04/08/20 1912

## 2020-04-08 NOTE — PROGRESS NOTES
2020  Earnest De Paz (:  1945)    FLU/COVID-19 CLINIC EVALUATION  Chief Complaint   Patient presents with    Shortness of Breath       HPI:   SYMPTOMS: Patient has been at home for the last month denies contact with ill persons. No travel.      Symptom duration, days:  [] 1   [] 2   [] 3   [] 4 - 7   [] 8 - 10   [] 11 - 13   [x] >14-   3 weeks ago  [] Fevers- none, states has been checking at home and not had any  [] Symptom (not measured)  [] Measured (Result:  degrees)  [] Chills  [x] Cough- occasionally, sometimes productive (phelgm), cough sounds moist    [] Coughing up blood  }  [] Chest Congestion  [] Nasal Congestion- denies  [] Sneezing  [x] Feeling short of breath with wheezing  [] Sometimes  [x] Frequently - worse when lying down  [] All the time     [x] Chest pain - constant right now, more central and right chest  [] Headaches  []Tolerable  [] Severe     [] Sore throat- denies  [] Muscle aches  [] Nausea  [] Vomiting  []Unable to keep fluids down     [] Diarrhea  []Severe       [] OTHER SYMPTOMS:    loss of appetite, feeling light headed     Symptom course:   [x] Worsening     [] Stable     [] Improving    RISK FACTORS:1}  [] Pregnant or possibly pregnant  [x] Age over 61  [x] Diabetes  [x] Heart disease- stent   [] Asthma  [] COPD/Other chronic lung diseases  [] Active Cancer  [] On Chemotherapy  [] Taking oral steroids  [] History Lymphoma/Leukemia  [] Close contact with a lab confirmed COVID-19 patient within 14 days of symptom onset  [] History of travel from affected geographical areas within 14 days of symptom onset  Former smoker  Cirrhosis of Liver     SOCIAL HISTORY:  Number of people living in patient's home (counting the patient as 1):  [] 1   [] 2   [] 3   [] 4   [] 5   [] >6      PHYSICAL EXAMINATION:    Vitals:    20 1413 20 1422   BP: 106/60    Site: Left Upper Arm    Position: Sitting    Cuff Size: Medium Adult    Pulse: 90 83   Temp:  98.3 °F (36.8 °C)   SpO2: (!)

## 2020-04-08 NOTE — ED NOTES
Pt wifes name is Natty. Home phone: [phone]. Cell Phone: [phone] to call for an update.       [place] RN  04/08/20 0161

## 2020-04-08 NOTE — ED NOTES
Pt co SOB for several weeks. Pt hypoxic upon exertion. Pt currently on 5L nasal cannula due to low O2 sat. Pt has wheezing and accessory muscle use. Pt hooked up to the monitor. Labs collected and sent. Will continue to monitor.       Sunil Goyal RN  04/08/20 1986

## 2020-04-08 NOTE — TELEPHONE ENCOUNTER
I have reviewed the triage nurse note. Also talked with patient's wife. Patient is having gradually worsening leg swelling for the last 7 days, shortness of breath abdominal distention and unable to walk more than couple of feet without worsening shortness of breath. I agree patient should be evaluated in the emergency room.

## 2020-04-09 NOTE — PLAN OF CARE
Problem: Falls - Risk of:  Goal: Will remain free from falls  Description: Will remain free from falls  Outcome: Met This Shift  Note: Pt is wearing the fall bracelet, bed in lowest position and locked, bed alarm and video monitoring in place. Pt informed of fall risks, verbalizes understanding, and agrees to ask for help to ambulate.       Goal: Absence of physical injury  Description: Absence of physical injury  Outcome: Met This Shift     Problem: Breathing Pattern - Ineffective:  Goal: Ability to achieve and maintain a regular respiratory rate will improve  Description: Ability to achieve and maintain a regular respiratory rate will improve  Outcome: Ongoing

## 2020-04-09 NOTE — PROGRESS NOTES
Discharge Planning Assessment  Discharge Planning Assessment  RN/SW discharge planner met with patient/ (and family member) to discuss reason for admission, current living situation, and potential needs at the time of discharge    Demographics/Insurance verified Yes/No: Yes, BCBS    Current type of dwelling:cape code style home , patient is able to get around home ok with cane if needed, bedroom and bathroom accessible, no issues getting to and from per patient wife    Patient from ECF/SW confirmed with:Patient is from home, private residence    Living arrangements:patient lives in private residence with wife, wife stated she is currently off work and will be home with patient 24 hrs    Level of function/Support: patient does not drive, wife does the driving  Bathing, eating, dressing are all done independently per patient wife     PCP: Dr. Doyle Lazo, patient had his last quarterly visit about 3 months ago    Last Visit to PCP:approximately 3 months ago per patient wife    DME: [facility] (patient wife stated he only uses can occasionally), grab bars in place in the breeze way of the home. No other assistive devices are in the home. Active with any community resources/agencies/skilled home care:  No, patient has no community resources, no skilled or unskilled home care and that they do not have a need for any services    Medication compliance issues: No. Patient wife stated he has a 2 week pill dispenser that she sets up and patient takes pills daily independently   Patient uses the [employer] in  [address] issues that could impact healthcare:  No health care cost concerns or financial concerns per patient wife      Tentative discharge plan: Patient wife stated plan is for him to return home. Patient wife stated that she will be picking him up at time of discharge.   I Discussed and provided facilities of choice if transition to a skilled nursing facility is required at the time of discharge    Patient wife stated she will look into it if need be, but that there is no preference at this time     I Discussed with patient and/or family that on the day of discharge home tentative time of discharge will be between 10 AM and noon. Patient wife stated she will be picking patient up     Transportation at the time of discharge:Patient Wife, Fredisdelbert Sebastian will be picking up the patient.

## 2020-04-09 NOTE — H&P
TONSILLECTOMY      UPPER GASTROINTESTINAL ENDOSCOPY  11/23/2012    multiple thin linear esophageal mucosal breaks    UPPER GASTROINTESTINAL ENDOSCOPY  3/6/2014    UPPER GASTROINTESTINAL ENDOSCOPY N/A 6/7/2019    EGD BAND LIGATION performed by Samantha Campbell MD at [address] 7/15/2019    EGD BIOPSY performed by Samantha Campbell MD at [address] N/A 7/15/2019    EGD BAND LIGATION performed by Samantha Campbell MD at [address]       Medications Prior to Admission:    Prior to Admission medications    Medication Sig Start Date End Date Taking?  Authorizing Provider   SITagliptin (JANUVIA) 100 MG tablet Take 1 tablet by mouth daily 1/14/20   Parker Martinez MD   lactulose [facility]) 10 GM/15ML solution Take 30 mLs by mouth 3 times daily 10/3/19   Karyle Grumbles, MD   cyanocobalamin 1000 MCG/ML injection INJECT 1 ML INTRAMUSCULARLY EVERY 30 DAYS 9/30/19   Parker Martinez MD   furosemide (LASIX) 20 MG tablet Take 1 tablet by mouth daily 9/30/19 6/22/25  Parker Martinez MD   nadolol (CORGARD) 20 MG tablet Take 1 tablet by mouth daily 9/20/19   Parker Martinez MD   citalopram (CELEXA) 20 MG tablet Take 1 tablet by mouth daily 9/20/19   Parker Martinez MD   spironolactone (ALDACTONE) 50 MG tablet Take 2 tablets by mouth daily 7/15/19   Samantha Campbell MD   Turmeric (CURCUMIN 95) 500 MG CAPS Take by mouth daily    Historical Provider, MD   Levothyroxine Sodium 125 MCG CAPS Take by mouth Daily    Historical Provider, MD   CINNAMON PO Take 1,000 mg by mouth daily    Historical Provider, MD   MAGNESIUM PO Take 250 mg by mouth daily     Historical Provider, MD   b complex vitamins capsule Take 1 capsule by mouth daily    Historical Provider, MD   glipiZIDE (GLUCOTROL) 5 MG tablet Take 1 tablet by mouth 2 times daily (before meals) 6/4/19   Parker Martinez MD   MILK THISTLE PO Take 1,000 mg by mouth daily Historical Provider, MD MARRERO 3CC LUER-ALEXIS SYR 25GX1\" 25G X 1\" 3 ML MISC USE WITH VITAMIN B12 INJECTIONS 10/12/18   Luis Manuel Dunlap MD   XIFAXAN 550 MG tablet 2 times daily  5/5/17   Historical Provider, MD   vitamin D (ERGOCALCIFEROL) 400 UNITS CAPS Take 400 Units by mouth daily. Historical Provider, MD   Omega-3 Fatty Acids (FISH OIL) 500 MG CAPS Take 1,000 mg by mouth daily     Historical Provider, MD   aspirin 81 MG tablet Take 81 mg by mouth daily. Historical Provider, MD       Allergies:  Cephalexin; Ciprocinonide [fluocinolone]; Quinolones; and Statins    Social History:  The patient currently lives at home    TOBACCO:   reports that he has been smoking cigarettes. He started smoking about 55 years ago. He has a 19.35 pack-year smoking history. He has never used smokeless tobacco.  ETOH:   reports no history of alcohol use. Family History:  Reviewed in detail and negative for DM, Early CAD, Cancer, CVA. Positive as follows:        Problem Relation Age of Onset    Heart Disease Mother         arrythmia    Diabetes Father     Cancer Father     Heart Disease Father     Anesth Problems Neg Hx     Malig Hyperten Neg Hx     Hypotension Neg Hx     Malig Hypertherm Neg Hx     Pseudochol. Deficiency Neg Hx        REVIEW OF SYSTEMS:   Positive for chills fatigue congestion cough shortness of breath with exertion and arthralgias, as noted in the HPI. All other systems reviewed and negative. PHYSICAL EXAM:    /86   Pulse 96   Temp 97.3 °F (36.3 °C) (Temporal)   Resp 22   Ht 5' 11\" (1.803 m)   Wt 208 lb 12.8 oz (94.7 kg)   SpO2 92%   BMI 29.12 kg/m²     General appearance: No apparent distress appears stated age and cooperative. He does appear chronically ill  HEENT Normal cephalic, atraumatic without obvious deformity. Pupils equal, round, and reactive to light. Extra ocular muscles intact. Conjunctivae/corneas clear. Neck: Supple, No jugular venous distention/bruits.   Trachea midline without thyromegaly or adenopathy with full range of motion. Lungs: Rales at the bilateral bases but otherwise no rhonchi and no wheezes are noted. Heart: Irregular rate and rhythm with Normal S1/S2 without murmurs, rubs or gallops, point of maximum impulse non-displaced  Abdomen: Soft, non-tender or non-distended without rigidity or guarding and positive bowel sounds all four quadrants. Extremities: No clubbing, cyanosis, or edema bilaterally. Full range of motion without deformity and normal gait intact. Skin: Skin color, texture, turgor normal.  No rashes or lesions. Neurologic: Alert and oriented X 3, neurovascularly intact with sensory/motor intact upper extremities/lower extremities, bilaterally. Cranial nerves: II-XII intact, grossly non-focal.  Mental status: Alert, oriented, thought content appropriate. Capillary Refill: Acceptable  < 3 seconds  Peripheral Pulses: +3 Easily felt, not easily obliterated with pressure      CXR:  I have reviewed the CXR with the following interpretation:   Cardiomegaly with vascular congestion with right lower lobe airspace disease   and bilateral pleural effusions suggestive of CHF.  Superimposed pneumonia   cannot be excluded.           EKG:  I have reviewed the EKG with the following interpretation: Atrial fibrillationLow voltage QRSPossible Inferior infarct , age undeterminedAbnormal ECG    CBC   Recent Labs     04/08/20  1530   WBC 4.8   HGB 14.2   HCT 43.3   PLT 80*      RENAL  Recent Labs     04/08/20  1530      K 4.1      CO2 31   BUN 14   CREATININE 1.0     LFT'S  No results for input(s): AST, ALT, ALB, BILIDIR, BILITOT, ALKPHOS in the last 72 hours. COAG  No results for input(s): INR in the last 72 hours.   CARDIAC ENZYMES  Recent Labs     04/08/20  1530   CKTOTAL 78   TROPONINI 0.01       U/A:    Lab Results   Component Value Date    NITRITE pos 03/14/2017    COLORU DK YELLOW 10/02/2019    WBCUA 6 10/02/2019    RBCUA 3 10/02/2019

## 2020-04-10 NOTE — PROGRESS NOTES
Shift assessment completed and documented. Fall precautions in place, hourly rounding, call light and belongings in reach, bed in lowest position, wheels locked in place, side rails up x 2, walkways free of clutter and bed alarm activated. Reminded to use call light for assistance. Denies pain or discomfort. Able to make needs known. Will continue to monitor.

## 2020-04-10 NOTE — PROGRESS NOTES
Hospitalist Progress Note      PCP: Davida See MD    Date of Admission: 4/8/2020        Subjective:     Patient again asking about going home today. . Still complains of orthopnea and exertional dyspnea. . Still requiring up to 5 L oxygen. . No fevers or chills      Medications:  Reviewed    Infusion Medications    dextrose       Scheduled Medications    furosemide  40 mg Intravenous BID    insulin lispro  0-6 Units Subcutaneous TID WC    insulin lispro  0-3 Units Subcutaneous Nightly    ketoconazole   Topical BID    azithromycin  250 mg Oral Daily    aspirin  81 mg Oral Daily    b complex-C-folic acid   Oral Daily    citalopram  20 mg Oral Daily    glipiZIDE  5 mg Oral BID AC    lactulose  20 g Oral TID    levothyroxine  125 mcg Oral Daily    nadolol  20 mg Oral Daily    linagliptin  5 mg Oral Daily    spironolactone  100 mg Oral Daily    rifaximin  550 mg Oral BID    sodium chloride flush  10 mL Intravenous 2 times per day    hydroxychloroquine  200 mg Oral BID     PRN Meds: sodium chloride flush, acetaminophen **OR** acetaminophen, polyethylene glycol, promethazine **OR** ondansetron, glucose, dextrose, glucagon (rDNA), dextrose      Intake/Output Summary (Last 24 hours) at 4/10/2020 0831  Last data filed at 4/10/2020 0636  Gross per 24 hour   Intake 480 ml   Output 1150 ml   Net -670 ml       Exam:    BP 97/64   Pulse 72   Temp 97.9 °F (36.6 °C) (Oral)   Resp 18   Ht 5' 11\" (1.803 m)   Wt 205 lb 6.4 oz (93.2 kg)   SpO2 94%   BMI 28.65 kg/m²     General appearance: No apparent distress, appears stated age and cooperative. HEENT: Pupils equal, round, and reactive to light. Conjunctivae/corneas clear. Neck: Supple, with full range of motion. No jugular venous distention. Trachea midline. Respiratory:  Normal respiratory effort. Clear to auscultation, bilaterally without Rales/Wheezes/Rhonchi.   Cardiovascular: Regular rate and rhythm with normal S1/S2 without murmurs, rubs or gallops. Abdomen: Soft, non-tender, non-distended with normal bowel sounds. Musculoskeletal: No clubbing, cyanosis ,edema bilaterally 2+. Full range of motion without deformity. Skin: Skin color, texture, turgor normal.  No rashes or lesions. Neurologic:  Neurovascularly intact without any focal sensory/motor deficits. Cranial nerves: II-XII intact, grossly non-focal.  Psychiatric: Alert and oriented, thought content appropriate, normal insight  Capillary Refill: Brisk,< 3 seconds   Peripheral Pulses: +2 palpable, equal bilaterally       Labs:   Recent Labs     04/08/20  1530 04/09/20  0601 04/10/20  0502   WBC 4.8 4.6 4.6   HGB 14.2 12.7* 12.4*   HCT 43.3 38.9* 38.3*   PLT 80* 69* 76*     Recent Labs     04/08/20  1530 04/09/20  0601 04/10/20  0502    139 143   K 4.1 4.0 4.2    99 100   CO2 31 33* 38*   BUN 14 14 15   CREATININE 1.0 0.8 1.0   CALCIUM 8.6 8.5 8.8     No results for input(s): AST, ALT, BILIDIR, BILITOT, ALKPHOS in the last 72 hours. No results for input(s): INR in the last 72 hours. Recent Labs     04/08/20  1530   CKTOTAL 66   TROPONINI 0.01       Assessment/Plan:      -Acute decompensated heart failure. . Patient has elevated BNP, cardiomegaly with mild pulmonary edema and small-moderate bilateral pleural effusions on chest x-ray--continue IV Lasix-increase dose to 60 twice daily,Continue strict I's and O's, fluid restriction daily weights. [facility] [facility] echocardiogram pending, likely will not be done until COVID ruled out  -bibasilar infiltrate, more likely compressive atelectasis, associated with pleural effusions-less likely pneumonia but is being ruled out for COVID 19 and  Is on hydroxychloroquine and azithromycin empirically  -Acute hypoxic respiratory failure due to above-on 5 L nasal cannula-continue supplemental oxygen and wean as tolerated  -Decompensated liver cirrhosis with portal hypertension and history of ascites. . Clinically does not seem to have ascites now. . Continue Lasix,

## 2020-04-11 NOTE — CONSULTS
15 g, Oral, PRN  dextrose 50 % IV solution, 12.5 g, Intravenous, PRN  glucagon (rDNA) injection 1 mg, 1 mg, Intramuscular, PRN  dextrose 5 % solution, 100 mL/hr, Intravenous, PRN    Allergies   Allergen Reactions    Cephalexin Hives    Ciprocinonide [Fluocinolone]     Quinolones Other (See Comments)     Confusion      Statins Other (See Comments)     Liver toxicity       Social History:    TOBACCO:   reports that he has been smoking cigarettes. He started smoking about 55 years ago. He has a 19.35 pack-year smoking history. He has never used smokeless tobacco.  ETOH:   reports no history of alcohol use. Patient currently lives independently  Environmental/chemical exposure: None known    Family History:       Problem Relation Age of Onset    Heart Disease Mother         arrythmia    Diabetes Father     Cancer Father     Heart Disease Father     Anesth Problems Neg Hx     Malig Hyperten Neg Hx     Hypotension Neg Hx     Malig Hypertherm Neg Hx     Pseudochol. Deficiency Neg Hx      REVIEW OF SYSTEMS:    CONSTITUTIONAL:  negative for fevers, chills, diaphoresis, activity change, appetite change, fatigue, night sweats and unexpected weight change.    EYES:  negative for blurred vision, eye discharge, visual disturbance and icterus  HEENT:  negative for hearing loss, tinnitus, ear drainage, sinus pressure, nasal congestion, epistaxis and snoring  RESPIRATORY:  See HPI  CARDIOVASCULAR:  negative for chest pain, palpitations, exertional chest pressure/discomfort, edema, syncope  GASTROINTESTINAL:  negative for nausea, vomiting, diarrhea, constipation, blood in stool and abdominal pain  GENITOURINARY:  negative for frequency, dysuria, urinary incontinence, decreased urine volume, and hematuria  HEMATOLOGIC/LYMPHATIC:  negative for easy bruising, bleeding and lymphadenopathy  ALLERGIC/IMMUNOLOGIC:  negative for recurrent infections, angioedema, anaphylaxis and drug reactions  ENDOCRINE:  negative for weight 04/09/20  0601 04/10/20  0502    139 143   K 4.1 4.0 4.2    99 100   CO2 31 33* 38*   BUN 14 14 15   CREATININE 1.0 0.8 1.0   CALCIUM 8.6 8.5 8.8   GLUCOSE 151* 120* 81      ABG:  No results for input(s): PHART, TUB1AYI, PO2ART, NHI5DGR, Q7LYIAVL, BEART in the last 72 hours. Lab Results   Component Value Date     (H) 11/08/2011     Lab Results   Component Value Date    CKTOTAL 78 04/08/2020    TROPONINI 0.01 04/08/2020       Cultures:     Abx:    Radiology Review:  All pertinent images / reports were reviewed as a part of this visit. Assessment:     1. Acute hypoxemic respiratory failure  · I have reviewed laboratories, medical records and images for this visit  · Normally does not require supplemental oxygen at home  · Now requiring up to 5 L/min oxygen supplement to maintain saturations in the mid 90s    2. Pleural effusion  · Bilateral moderate to large pleural effusions  · Likely on the basis of heart failure  · I think he would benefit from thoracentesis  · We will try to organize for Monday  · Hopefully by then, coronavirus status will be known    3. COVID Status Unkown  · Was 99.6 degrees on admission but has been afebrile ever since  · Procalcitonin is normal  · LDH is mildly elevated  · Ferritin is normal  · Troponin is normal  · Suspicion for coronavirus is low, testing is pending  · He is azithromycin and hydroxychloroquine  · Recommend closely monitoring QTC in this patient.

## 2020-04-12 NOTE — PROGRESS NOTES
Assessment completed. Please see flowsheets. Pt resting quietly in bed. Saturating 93% on 5L nasal cannula. Video monitoring in room.  Will continue to monitor

## 2020-04-12 NOTE — PROGRESS NOTES
Hospitalist Progress Note      PCP: Dino Shanks MD    Date of Admission: 4/8/2020        Subjective:     Complains of shortness of breath. . No fevers or chest pain      Medications:  Reviewed    Infusion Medications    dextrose       Scheduled Medications    furosemide  60 mg Intravenous BID    insulin lispro  0-6 Units Subcutaneous TID WC    insulin lispro  0-3 Units Subcutaneous Nightly    ketoconazole   Topical BID    aspirin  81 mg Oral Daily    b complex-C-folic acid   Oral Daily    citalopram  20 mg Oral Daily    lactulose  20 g Oral TID    levothyroxine  125 mcg Oral Daily    nadolol  20 mg Oral Daily    spironolactone  100 mg Oral Daily    rifaximin  550 mg Oral BID    sodium chloride flush  10 mL Intravenous 2 times per day    hydroxychloroquine  200 mg Oral BID     PRN Meds: sodium chloride flush, acetaminophen **OR** acetaminophen, polyethylene glycol, promethazine **OR** ondansetron, glucose, dextrose, glucagon (rDNA), dextrose      Intake/Output Summary (Last 24 hours) at 4/12/2020 1446  Last data filed at 4/12/2020 0519  Gross per 24 hour   Intake --   Output 900 ml   Net -900 ml       Exam:    /74   Pulse 68   Temp 98.6 °F (37 °C) (Oral)   Resp 16   Ht 5' 11\" (1.803 m)   Wt 202 lb 14.4 oz (92 kg)   SpO2 91%   BMI 28.30 kg/m²     General appearance: No apparent distress, appears stated age and cooperative. HEENT: Pupils equal, round, and reactive to light. Conjunctivae/corneas clear. Neck: Supple, with full range of motion. No jugular venous distention. Trachea midline. Respiratory:  Normal respiratory effort. Clear to auscultation, bilaterally without Rales/Wheezes/Rhonchi. Cardiovascular: Regular rate and rhythm with normal S1/S2 without murmurs, rubs or gallops. Abdomen: Soft, non-tender, non-distended with normal bowel sounds. Musculoskeletal: No clubbing, cyanosis ,edema bilaterally 2+. Full range of motion without deformity.   Skin: Skin color, texture, turgor normal.  No rashes or lesions. Neurologic:  Neurovascularly intact without any focal sensory/motor deficits. Cranial nerves: II-XII intact, grossly non-focal.  Psychiatric: Alert and oriented, thought content appropriate, normal insight  Capillary Refill: Brisk,< 3 seconds   Peripheral Pulses: +2 palpable, equal bilaterally       Labs:   Recent Labs     04/10/20  0502   WBC 4.6   HGB 12.4*   HCT 38.3*   PLT 76*     Recent Labs     04/10/20  0502      K 4.2      CO2 38*   BUN 15   CREATININE 1.0   CALCIUM 8.8     No results for input(s): AST, ALT, BILIDIR, BILITOT, ALKPHOS in the last 72 hours. No results for input(s): INR in the last 72 hours. No results for input(s): [place] in the last 72 hours. Assessment/Plan:      -Acute decompensated heart failure with preserved ejection fraction. . Patient has elevated BNP, cardiomegaly with mild pulmonary edema and moderate bilateral pleural effusions on chest x-ray--continue IV Lasix-increased dose to 60 twice daily 3/10/20,Continue strict I's and O's, fluid restriction daily weights. .    -Moderate mitral regurgitation and LVH with EF greater than 55% noted on limited echocardiogram 4/10/20  -Bilateral pleural effusions with compressive atelectasis-moderate/large and right, small on left. .. More likely due to decompensated heart failure . . c/w  IV Lasix 60mg bid. ..  ultrasound-guided thoracentesis-diagnostic and therapeutic by IR --follow-up with pulmonologist... Patient is also being ruled out for COVID-19 although the clinical presentation is less likely  -Acute hypoxic respiratory failure due to above-on 5 L nasal cannula-continue supplemental oxygen and wean as tolerated  -Decompensated liver cirrhosis with portal hypertension and history of ascites. . Clinically does not seem to have significant ascites now.   Patient has had ultrasound-guided paracentesis in the past. Continue Lasix, Aldactone, rifaximin, lactulose  -Chronic

## 2020-04-13 NOTE — PROGRESS NOTES
Pt sitting on side of bed eating. A&o x4. Assessment complete. o2 sats 90% on 5 L NC, sob with exertion. Pt awaiting thoracentesis today. Tele reading nsr. Pt refusing bed alarm. Instructed pt to call for assist. Pt using urinal at bedside.  Bed low, call bell in reach, instructed to call for assist.

## 2020-04-13 NOTE — PROGRESS NOTES
Pt desating 87% on 5 LNC after thoracentesis. Placed pt on 10 L high flow NC at 10Liters. O2 sats up to 93%. CXR to be done .

## 2020-04-13 NOTE — PROGRESS NOTES
Pt's o2 sats decreased to 84% on 6 L NC. RT notified. 10 L high flow applied. o2 sats increased to 89%. No distress noted. Stat chest xray ordered.

## 2020-04-13 NOTE — ADT AUTH CERT
(O) RETIRED   Utilization Reviews         Pneumonia - Care Day 5 (4/12/2020) by Av Galidno         Review Status Review Entered   Completed 4/13/2020 12:40       Criteria Review      Care Day: 5 Care Date: 4/12/2020 Level of Care: Telemetry    Guideline Day 3    Clinical Status    (X) * Hemodynamic stability    (X) * Afebrile, or temperature acceptable for next level of care    (X) * Tachypnea absent    ( ) * Hypoxemia absent    4/13/2020 12:40 PM EDT by [facility]      93% on 5L O2    (X) * Mental status at baseline    (X) * Antibiotic regimen acceptable for next level of care    ( ) * Discharge plans and education understood    Activity    (X) * Ambulatory    Routes    ( ) * Oral hydration, medications, and diet    4/13/2020 12:40 PM EDT by [employer] [facility]      iv lasix bid    Interventions    ( ) * Oxygen absent or at baseline need    (X) WBC    Medications    (X) Oral antibiotics    4/13/2020 12:40 PM EDT by [employer]      po zithro    * Milestone   Additional Notes   Continued Stay Review Note       4/12/20       Patient Visit Status: inpt   Level of Care: tele       Last set of vitals: /69   Pulse 75   Temp 98.6 °F (37 °C) (Oral)   Resp 16   Ht 5' 11\" (1.803 m)   Wt 204 lb 1.6 oz (92.6 kg)   SpO2 93%   BMI 28.47 kg/m²        Current Treatments: Still with orthopnea and dyspnea with exertion; gets sob with just a couple of steps. Plan for thoracentesis tomorrow. Remains on iv lasix bid, po zithro, po plaquenil, 5L O2, daily weights, strict I&o, telemetry. Pulmonology consulted. Pt/ot following but is unable to assess until covid results are known. Covid 19 still pending.        Review/Comments:   -Acute decompensated heart failure with preserved ejection fraction. . Patient has elevated BNP, cardiomegaly with mild pulmonary edema and moderate bilateral pleural effusions on chest x-ray--continue IV Lasix-increased dose to 60 twice daily 3/10/20,Continue strict I's and O's, effusions   · Likely on the basis of heart failure   · I think he would benefit from thoracentesis   · We will try to organize for Monday   · Hopefully by then, coronavirus status will be known       3. COVID Status Unkown   · Was 99.6 degrees on admission but has been afebrile ever since   · Procalcitonin is normal   · LDH is mildly elevated   · Ferritin is normal   · Troponin is normal   · Suspicion for coronavirus is low, testing is pending   · He is azithromycin and hydroxychloroquine   · Recommend closely monitoring QTC in this patient.       Anticipated Discharge Plan: tbd           Pneumonia - Care Day 3 (4/10/2020) by Amaury Israel         Review Status Review Entered   Completed 4/13/2020 12:27       Criteria Review      Care Day: 3 Care Date: 4/10/2020 Level of Care: Telemetry    Guideline Day 3    Clinical Status    (X) * Hemodynamic stability    (X) * Afebrile, or temperature acceptable for next level of care    (X) * Tachypnea absent    ( ) * Hypoxemia absent    4/13/2020 12:27 PM EDT by Danni Montana continuing to require 5L O2; O2 sat-91% on 5L    (X) * Mental status at baseline    (X) * Antibiotic regimen acceptable for next level of care    4/13/2020 12:27 PM EDT by Dylan sutherland    ( ) * Discharge plans and education understood    Activity    (X) * Ambulatory    Routes    ( ) * Oral hydration, medications, and diet    4/13/2020 12:27 PM EDT by Dylan Mosher      iv lasix bid; dosage increased to 60mg bid    Interventions    ( ) * Oxygen absent or at baseline need    4/13/2020 12:27 PM EDT by Dylan Mosher      O2 5L    (X) WBC    Medications    (X) Oral antibiotics    4/13/2020 12:27 PM EDT by Dylan yoo zithro    * Milestone   Additional Notes   Continued Stay Review Note       4/10/20       Patient Visit Status: inpt   Level of Care: tele       Last set of vitals: BP 97/64   Pulse 72   Temp 97.9 °F (36.6 °C) (Oral)   Resp 18   Ht 5' 11\"

## 2020-04-13 NOTE — CONSULTS
acetaminophen (TYLENOL) tablet 650 mg  650 mg Oral Q6H PRN Joan Tomlinson MD        Or    acetaminophen (TYLENOL) suppository 650 mg  650 mg Rectal Q6H PRN Joan Tomlinson MD        polyethylene glycol [facility]) packet 17 g  17 g Oral Daily PRN Joan Tomlinson MD        promethazine (PHENERGAN) tablet 12.5 mg  12.5 mg Oral Q6H PRN Joan Tomlinson MD        Or    ondansetron [facility]) injection 4 mg  4 mg Intravenous Q6H PRN Joan Tomlinson MD        glucose (GLUTOSE) 40 % oral gel 15 g  15 g Oral PRN Joan Tomlinson MD        dextrose 50 % IV solution  12.5 g Intravenous PRN Joan Tomlinson MD        glucagon (rDNA) injection 1 mg  1 mg Intramuscular PRN Joan Tomlinson MD        dextrose 5 % solution  100 mL/hr Intravenous PRN Joan Tomlinson MD           Past Medical History:   Diagnosis Date    ANEMIA     CAD (coronary artery disease) 10/12/2011    [facility]     Depression     Diabetes mellitus [facility]     Esophageal bleed, non-variceal 11/23/2012    Hypertension     HYPOTHYROIDISM     Liver disease 1/6/2012    Mixed hyperlipidemia     NEUROPATHY     Non-ST elevation MI (NSTEMI) ([address] 11/21/2012    Pancytopenia ([address]     Paroxysmal atrial fibrillation ([facility] 1/6/2012    Pneumonia     Sleep apnea     no cpap    Thrombocytopenia ([facility]     Thyroid disease      Past Surgical History:   Procedure Laterality Date    ACHILLES TENDON SURGERY      CARDIAC SURGERY      3 vessel cabg    CHOLECYSTECTOMY      COLONOSCOPY      CORONARY ARTERY BYPASS GRAFT  10/2011    cabg x3    ENDOSCOPY, COLON, DIAGNOSTIC      PTCA  11/23/2012    PTCA RCA & RPL    TONSILLECTOMY      UPPER GASTROINTESTINAL ENDOSCOPY  11/23/2012    multiple thin linear esophageal mucosal breaks    UPPER GASTROINTESTINAL ENDOSCOPY  3/6/2014    UPPER GASTROINTESTINAL ENDOSCOPY N/A 6/7/2019    EGD BAND LIGATION performed by Ronnie Longoria MD at [place] [address] activity: Not on file   Other Topics Concern    Not on file   Social History Narrative    Has 2 step children and 1 biologic child       Review of Systems:   · Constitutional: there has been no unanticipated weight loss. There's been no change in energy level, sleep pattern, or activity level. · Eyes: No visual changes or diplopia. No scleral icterus. · ENT: No Headaches, hearing loss or vertigo. No mouth sores or sore throat. · Cardiovascular: Reviewed in HPI  · Respiratory: No cough or wheezing, no sputum production. No hematemesis. · Gastrointestinal: No abdominal pain, appetite loss, blood in stools. No change in bowel or bladder habits. · Genitourinary: No dysuria, trouble voiding, or hematuria. · Musculoskeletal:  No gait disturbance, weakness or joint complaints. · Integumentary: No rash or pruritis. · Neurological: No headache, diplopia, change in muscle strength, numbness or tingling. No change in gait, balance, coordination, mood, affect, memory, mentation, behavior. · Psychiatric: No anxiety, no depression. · Endocrine: No malaise, fatigue or temperature intolerance. No excessive thirst, fluid intake, or urination. No tremor. · Hematologic/Lymphatic: No abnormal bruising or bleeding, blood clots or swollen lymph nodes. · Allergic/Immunologic: No nasal congestion or hives. Physical Examination:    Vitals:    04/13/20 0642 04/13/20 0809 04/13/20 0835 04/13/20 1215   BP:  98/66  112/70   Pulse: 91 102  88   Resp:  18  16   Temp:  98.3 °F (36.8 °C)  98.3 °F (36.8 °C)   TempSrc:  Oral  Oral   SpO2:  90% 90% 91%   Weight:       Height:         Body mass index is 27.7 kg/m².      Wt Readings from Last 3 Encounters:   04/13/20 198 lb 10.2 oz (90.1 kg)   11/18/19 194 lb (88 kg)   11/06/19 193 lb (87.5 kg)     BP Readings from Last 3 Encounters:   04/13/20 112/70   04/08/20 106/60   11/18/19 102/78     Saul Zelaya is a 69 y.o. male being evaluated by a Virtual Visit (video visit) encounter to

## 2020-04-13 NOTE — PROGRESS NOTES
39*   BUN 16   CREATININE 1.1   CALCIUM 8.7     Recent Labs     04/13/20  0526   AST 26   ALT 18   BILITOT 2.1*   ALKPHOS 107     Recent Labs     04/13/20  0828   INR 1.34*     No results for input(s): [place]an [place] in the last 72 hours. Assessment/Plan:      -Acute decompensated heart failure with preserved ejection fraction. . Patient has elevated BNP, cardiomegaly with mild pulmonary edema and moderate bilateral pleural effusions on chest x-ray--continue IV Lasix-increased dose to 60 twice daily 3/10/20,Continue strict I's and O's, fluid restriction daily weights. .    -Moderate mitral regurgitation and LVH with EF greater than 55% noted on limited echocardiogram 4/10/20  -Bilateral pleural effusions with compressive atelectasis-moderate/large and right, small on left. .. More likely due to decompensated heart failure . . c/w  IV Lasix 60mg bid. ..  ultrasound-guided thoracentesis-diagnostic and therapeutic by IR ordered--follow-up with pulmonologist... Patient is also being ruled out for COVID-19 although the clinical presentation is less likely  -Acute hypoxic respiratory failure due to above-on 5 L nasal cannula-continue supplemental oxygen and wean as tolerated  -Decompensated liver cirrhosis with portal hypertension and history of ascites. . Clinically does not seem to have significant ascites now. Patient has had ultrasound-guided paracentesis in the past. Continue Lasix, Aldactone, rifaximin, lactulose  -Chronic thrombocytopenia associated with liver cirrhosis--continue to monitor platelet counts  -Coronary artery disease status post CABG-continue aspirin, beta-blocker  -Diabetes mellitus type 2-- glipizide, Tradjenta held,c/w  sliding scale--continue to monitor blood sugars--  -Sulfonylurea induced hypoglycemia 4/11/20-resolved--discontinue glipizide and Tradjenta-continue hypoglycemic protocol. . c/w  insulin only while hospitalized  -hypothyroidism-continue Synthroid  -Obstructive sleep apnea-not on

## 2020-04-14 NOTE — PROGRESS NOTES
[address]  HEART FAILURE  Progress Note      Admit Date 4/8/2020     Reason for Consult:      Reason for Consultation/Chief Complaint: SOB    HPI:    Paola Lopez is a 69 y.o. male with PMH CAD, MI, DM, HTN, anemia, cirrhosis, and PAF admitted with fevers and hypoxia. He has diuresed 6L, COVID pending    Due to the current efforts to prevent transmission of COVID-19 and also the need to preserve PPE for other caregivers, a face-to-face encounter with the patient was not performed. All relevant records and diagnostic tests were reviewed, including laboratory results and imaging. Please reference any relevant documentation elsewhere. Care will be coordinated with the primary service    Subjective:  Patient is being seen for CHF. There were no acute overnight cardiac events.    O2 req now at 12-15L, Thoracentesis done yesterday with 1.7L out    Wt Readings from Last 3 Encounters:   04/13/20 198 lb 10.2 oz (90.1 kg)   11/18/19 194 lb (88 kg)   11/06/19 193 lb (87.5 kg)            Objective:   BP 99/62   Pulse 102   Temp 97.7 °F (36.5 °C) (Axillary)   Resp 18   Ht 5' 11\" (1.803 m)   Wt 198 lb 10.2 oz (90.1 kg)   SpO2 90%   BMI 27.70 kg/m²       Intake/Output Summary (Last 24 hours) at 4/14/2020 0936  Last data filed at 4/14/2020 0021  Gross per 24 hour   Intake --   Output 1450 ml   Net -1450 ml      Wt Readings from Last 3 Encounters:   04/13/20 198 lb 10.2 oz (90.1 kg)   11/18/19 194 lb (88 kg)   11/06/19 193 lb (87.5 kg)      In: -   Out: 650         Physical Exam: deferred      MEDICATIONS:   Scheduled Meds:   Scheduled Meds:   furosemide  80 mg Intravenous BID    spironolactone  150 mg Oral Daily    insulin lispro  0-6 Units Subcutaneous TID WC    insulin lispro  0-3 Units Subcutaneous Nightly    ketoconazole   Topical BID    aspirin  81 mg Oral Daily    b complex-C-folic acid   Oral Daily    citalopram  20 mg Oral Daily    lactulose  20 g Oral TID    levothyroxine  125 mcg Oral Daily (Last 24 hours) at 4/14/2020 0936  Last data filed at 4/14/2020 0021  Gross per 24 hour   Intake --   Output 1450 ml   Net -1450 ml       Cardiac Testing:     ECHO: 4/10/20:   Limited study - covid   Irregular rhythm   Left ventricular systolic function is normal with ejection fraction   estimated at 60-65 %.   There is moderate concentric left ventricular hypertrophy.   Left ventricle size is normal.   Moderately severe mitral regurgitation. Reported as mild MR in 8297   Diastolic dysfunction grade and filing pressure are indeterminate.  Sallyanne Sabal is a trivial circumferential pericardial effusion noted.  Sallyanne Sabal is a moderate left pleural effusion.   Previous echo done 2012 - EF 55%       Assessment/Plan:     1. CHF- 6L out, IV lasix and brina increased yesterday, amyloid w/u in progress, HR increased w/o nadalol, labs tomorrow  2. Plural effusions- re accumulation post thoracentesis, Chest CT ordered, continue diuresis  3.  Anemia - iron studies ok      I appreciate the opportunity of cooperating in the care of this individual.    Padilla Chongr, [facility], [address] 4/14/2020, 9:36 AM  Heart Failure  The [address], [address]  Ph: [phone]      Core Measures:   · Discharge instructions:   · LVEF documented:   · ACEI for LV dysfunction:   · Smoking Cessation:

## 2020-04-14 NOTE — PROGRESS NOTES
Assessment completed and documented. Denies pain or SOB at this time. Have spoken to wife twice with updates. Pt's O2 sats are 90-93% on 12 L high flow at this time. Monitor turned so it can be seen through window. Will continue to monitor.

## 2020-04-14 NOTE — PROGRESS NOTES
Decreased O2 flow from 12L high flow to 10L high flow. Pt talking to wife on the phone. Will continue to monitor.

## 2020-04-14 NOTE — PROGRESS NOTES
Hospitalist Progress Note      PCP: Lilian Blanchard MD    Date of Admission: 4/8/2020        Subjective:     Patient denies any shortness of breath and asking about going home. Meme Acuna However his oxygen requirements have increased overnight from 5 L to 12-15 liters on high flow cannula. . No chest pain, fevers or chills    Medications:  Reviewed    Infusion Medications    dextrose       Scheduled Medications    furosemide  80 mg Intravenous BID    spironolactone  150 mg Oral Daily    insulin lispro  0-6 Units Subcutaneous TID WC    insulin lispro  0-3 Units Subcutaneous Nightly    ketoconazole   Topical BID    aspirin  81 mg Oral Daily    b complex-C-folic acid   Oral Daily    citalopram  20 mg Oral Daily    lactulose  20 g Oral TID    levothyroxine  125 mcg Oral Daily    rifaximin  550 mg Oral BID    sodium chloride flush  10 mL Intravenous 2 times per day     PRN Meds: sodium chloride flush, acetaminophen **OR** acetaminophen, polyethylene glycol, promethazine **OR** ondansetron, glucose, dextrose, glucagon (rDNA), dextrose      Intake/Output Summary (Last 24 hours) at 4/14/2020 0834  Last data filed at 4/14/2020 0021  Gross per 24 hour   Intake 360 ml   Output 1690 ml   Net -1330 ml       Exam:    BP 97/66   Pulse 99   Temp 98.1 °F (36.7 °C) (Oral)   Resp 18   Ht 5' 11\" (1.803 m)   Wt 198 lb 10.2 oz (90.1 kg)   SpO2 93%   BMI 27.70 kg/m²     General appearance: No apparent distress,  cooperative. .  Neck: Supple, with full range of motion. No jugular venous distention. Respiratory:  Normal respiratory effort. Clear to auscultation, bilaterally without Rales/Wheezes/Rhonchi. Cardiovascular: Regular rate and rhythm with normal S1/S2  Abdomen: Soft, non-tender, non-distended with normal bowel sounds.    Neurologic: No obvious focal deficits  Capillary Refill: Brisk,< 3 seconds   Peripheral Pulses: +2 palpable, equal bilaterally       Labs:   Recent Labs     04/13/20  0526   WBC 4.1   HGB 12.2* HCT 37.0*   PLT 64*     Recent Labs     04/13/20  0526      K 3.8   CL 94*   CO2 39*   BUN 16   CREATININE 1.1   CALCIUM 8.7     Recent Labs     04/13/20  0526   AST 26   ALT 18   BILITOT 2.1*   ALKPHOS 107     Recent Labs     04/13/20  0828   INR 1.34*     No results for input(s): CKTOTAL, TROPONINI in the last 72 hours. Assessment/Plan:      -Acute decompensated heart failure with preserved ejection fraction. . Patient has elevated BNP, cardiomegaly with  pulmonary edema and moderate bilateral pleural effusions on chest x-ray--continue IV Lasix,aldactone,strict I's and O's, fluid restriction daily weights. . Follow-up with cardiologist  -Moderate mitral regurgitation and LVH with EF greater than 55% noted on limited echocardiogram 4/10/51-[id number] with cardiologist  -Bilateral pleural effusions with compressive atelectasis-moderate/large and right, small on left. ..due to decompensated heart failure . .   ultrasound-guided thoracentesis done 4/13 by pulmonologist with 1.7 L removed  -Reexpansion acute pulmonary edema on the right status post thoracentesis with worsening hypoxia and recurrent pleural effusion 4/14/20 -continue diuretics--follow-up with pulmonologist  -Acute hypoxic respiratory failure due to above- has been on 5 L but increased to high flow 12-15 L. . Worsening hypoxia due to reexpansion pulmonary edema with recurrent pleural effusion  -Decompensated liver cirrhosis with portal hypertension and history of ascites. . Clinically does not seem to have significant ascites now.   Patient has had ultrasound-guided paracentesis in the past. Continue Lasix, Aldactone, rifaximin, lactulose  -Chronic thrombocytopenia associated with liver cirrhosis--continue to monitor platelet counts  -Coronary artery disease status post CABG-continue aspirin, beta-blocker  -Diabetes mellitus type 2-- glipizide, Tradjenta held,c/w  sliding scale--continue to monitor blood sugars--  -Sulfonylurea induced hypoglycemia

## 2020-04-15 NOTE — PLAN OF CARE
Problem: Falls - Risk of:  Goal: Will remain free from falls  Description: Will remain free from falls  Outcome: Ongoing  Goal: Absence of physical injury  Description: Absence of physical injury  Outcome: Ongoing     Problem: Breathing Pattern - Ineffective:  Goal: Ability to achieve and maintain a regular respiratory rate will improve  Description: Ability to achieve and maintain a regular respiratory rate will improve  4/15/2020 0838 by Praveen Sánchez RN  Outcome: Ongoing  4/15/2020 0503 by Ruth Maldonado RN  Outcome: Ongoing     Problem: Metabolic:  Goal: Ability to maintain appropriate glucose levels will improve  Description: Ability to maintain appropriate glucose levels will improve  4/15/2020 0838 by Praveen Sánchez RN  Outcome: Ongoing  4/15/2020 0503 by Ruth Maldonado RN  Outcome: Ongoing     Problem: Cardiovascular  Goal: No DVT, peripheral vascular complications  Outcome: Ongoing  Goal: Hemodynamic stability  Outcome: Ongoing     Problem: Respiratory  Goal: No pulmonary complications  Outcome: Ongoing  Goal: O2 Sat > 90%  Outcome: Ongoing  Goal: Supplemental O2 requirements decreased  Outcome: Ongoing  Goal: Agreement to quit smoking  Outcome: Ongoing  Goal: Pneumonia vaccine at discharge as needed  Outcome: Ongoing

## 2020-04-15 NOTE — PROGRESS NOTES
status post CABG-continue aspirin, beta-blocker  -Diabetes mellitus type 2-- glipizide, Tradjenta held,c/w  sliding scale--continue to monitor blood sugars--  -Sulfonylurea induced hypoglycemia 4/11/20-resolved--discontinue glipizide and Tradjenta-continue hypoglycemic protocol. . c/w  insulin only while hospitalized  -hypothyroidism-continue Synthroid  -Obstructive sleep apnea-not on CPAP  -Essential hypertension--controlled-on beta-blocker and diuretics    -COVID-19 status--negative--    Pneumonia ruled out      DVT Prophylaxis: SCD  Diet: DIET CARB CONTROL; No Added Salt (3-4 GM);  Daily Fluid Restriction: 1500 ml  Code Status: Full Code        Leny Albright MD

## 2020-04-15 NOTE — PROGRESS NOTES
2. I/O     Intake/Output Summary (Last 24 hours) at 4/15/2020 0931  Last data filed at 4/15/2020 0840  Gross per 24 hour   Intake 240 ml   Output 250 ml   Net -10 ml       Cardiac Testing:     ECHO: 4/10/20:   Limited study - covid   Irregular rhythm   Left ventricular systolic function is normal with ejection fraction   estimated at 60-65 %.   There is moderate concentric left ventricular hypertrophy.   Left ventricle size is normal.   Moderately severe mitral regurgitation. Reported as mild MR in 9033   Diastolic dysfunction grade and filing pressure are indeterminate.  Kian Hurry is a trivial circumferential pericardial effusion noted.  Kian Hurry is a moderate left pleural effusion.   Previous echo done 2012 - EF 55%       Assessment/Plan:     1. CHF- 6L out, continue IV lasix and brina,  Amyloid w/u in progress  2. Plural effusions- thoracentesis done, Chest CT showed re-accumulation of effusion ordered, continue diuresis  3.  Anemia - iron studies ok      I appreciate the opportunity of cooperating in the care of this individual.    Kay Weinstein [facility], [address] 4/15/2020, 9:31 AM  Heart Failure  The [address], [address]  Ph: [phone]      Core Measures:   · Discharge instructions:   · LVEF documented:   · ACEI for LV dysfunction:   · Smoking Cessation:

## 2020-04-15 NOTE — PROGRESS NOTES
Occupational Therapy   Occupational Therapy Initial Assessment  Date: 4/15/2020   Patient Name: Vivienne Heredia  MRN: [id number]     : 1945    Date of Service: 4/15/2020    Discharge Recommendations:Mookie Wood scored a 16/24 on the AM-PAC ADL Inpatient form. Current research shows that an AM-PAC score of 17 or less is typically not associated with a discharge to the patient's home setting. Based on the patients AM-PAC score and their current ADL deficits, it is recommended that the patient have 3-5 sessions per week of Occupational Therapy at d/c to increase the patients independence. If patient discharges prior to next session this note will serve as a discharge summary. Please see below for the latest assessment towards goals. OT Equipment Recommendations  Equipment Needed: No  Other: Patient owns RW, shower chair-both recommended for use if patient discharged home    Assessment   Performance deficits / Impairments: Decreased functional mobility ; Decreased ADL status; Decreased safe awareness;Decreased cognition;Decreased endurance;Decreased balance;Decreased high-level IADLs  Treatment Diagnosis: Decreased ADLs, IADLs, transfers, mobility associated with Acute Respiratory Failure  Prognosis: Good;Fair  Decision Making: Medium Complexity  History: I PTA  Exam: as above  OT Education: OT Role;Plan of Care;ADL Adaptive Strategies;Transfer Training;IADL Safety;Equipment  Patient Education: Eval, discharge recommendations-patient verbalized understanding  REQUIRES OT FOLLOW UP: Yes  Activity Tolerance  Activity Tolerance: Patient Tolerated treatment well;Treatment limited secondary to decreased cognition  Safety Devices  Safety Devices in place: Yes  Type of devices: All fall risk precautions in place;Call light within reach; Bed alarm in place;Gait belt;Nurse notified; Left in bed;Patient at risk for falls           Patient Diagnosis(es): The primary encounter diagnosis was Suspected COVID-19 upper arm  BP Upper/Lower: Upper  Patient Position: Semi fowlers  Level of Consciousness: Alert  MEWS Score: 2  Patient Currently in Pain: Denies  Oxygen Therapy  SpO2: 92 %  Pulse Oximeter Device Mode: Intermittent  Pulse Oximeter Device Location: Finger  O2 Device: High flow nasal cannula  O2 Flow Rate (L/min): 10 L/min  Social/Functional History  Social/Functional History  Lives With: Spouse  Type of Home: House  Home Layout: One level  Home Access: Level entry  Bathroom Shower/Tub: Walk-in shower, Shower chair with back  Bathroom Toilet: Standard  Bathroom Accessibility: Walker accessible  Home Equipment: Cane, Rolling walker, Reacher(Sleeps in recliner)  ADL Assistance: Independent  Homemaking Responsibilities: No  Ambulation Assistance: Independent(with cane, RW or no device dependent on need)  Transfer Assistance: Independent  Active : No  Patient's  Info: Hasn't driven \"in awhile\"  Occupation: Retired  Type of occupation: ,   Leisure & Hobbies: sketching  Additional Comments: Falls: Endorses no falls in the last 6 months       Objective   Vision: Impaired  Vision Exceptions: Wears glasses at all times  Hearing: Within functional limits    Orientation  Overall Orientation Status: Within Functional Limits     Balance  Sitting Balance: Stand by assistance  Standing Balance: Minimal assistance(close SBA with SPC, with Joanne to correct 1 LOB)  Standing Balance  Time: ~45 feet with SPC close SBA with 1 LOB during a turn requiring Joanne to correct  ADL  LE Dressing: Minimal assistance  Tone RUE  RUE Tone: Normotonic  Tone LUE  LUE Tone: Normotonic  Coordination  Movements Are Fluid And Coordinated: Yes     Bed mobility  Supine to Sit: Supervision  Sit to Supine: Supervision  Scooting: Supervision  Transfers  Sit to stand: Stand by assistance  Stand to sit: Stand by assistance  Vision - Basic Assessment  Patient Visual Report: No visual complaint reported.   Cognition  Overall

## 2020-04-15 NOTE — PROGRESS NOTES
Following up with CT because pt has CT result on his chart, but pt was never taken down to CT. When CT time stamp noted as 2040, this nurse took pt VS at the same time. Spoke with Haider Torres in [facility] [employer] who spoke with CT tech and believes CT was not taken of this patient. Haider Torres will follow-up to make sure this gets straightened out in the morning. According to Haider Torres, the radiologist is the only one who can do the comparison to apply CT to the correct pt. This nurse will also make sure to pass along this information to make sure pt can get CT. Chong Baumgarten Electronically signed by Aiden Grullon RN on 4/15/2020 at 12:56 AM

## 2020-04-15 NOTE — PROGRESS NOTES
Physical Therapy    Facility/Department: [address] ONCOLOGY  Initial Assessment    NAME: Luis Morales  : 1945  MRN: [id number]    Date of Service: 4/15/2020    Discharge Recommendations:  Luis Morales scored a 19/24 on the AM-PAC short mobility form. Current research shows that an AM-PAC score of 18 or greater is typically associated with a discharge to the patient's home setting. Based on the patients AM-PAC score and their current functional mobility deficits, it is recommended that the patient have 2-3 sessions per week of Physical Therapy at d/c to increase the patients independence. If patient discharges prior to next session this note will serve as a discharge summary. Please see below for the latest assessment towards goals. HOME HEALTH CARE: LEVEL 1 STANDARD    - Initial home health evaluation to occur within 24-48 hours, in patient home   - Therapy to evaluate with goal of regaining prior level of functioning   - Therapy to evaluate if patient has [address] needs for personal care    S Level 1   PT Equipment Recommendations  Equipment Needed: No    Assessment   Body structures, Functions, Activity limitations: Decreased functional mobility ; Decreased strength;Decreased endurance;Decreased safe awareness;Decreased balance  Assessment: Patient not at baseline functional mobility and would benefit from skilled PT to address above deficits and facilitate return to baseline function  Treatment Diagnosis: decreased functional mobility, impaired gait, decreased balance  Prognosis: Fair  Decision Making: Medium Complexity  Clinical Presentation: evolving  PT Education: Goals; Energy Conservation;Home Exercise Program;PT Role;General Safety;Plan of Care  Patient Education: d/c recommendations, use of RW for safety and energy conservation upon discharge - verbalized understanding  Barriers to Learning: none  REQUIRES PT FOLLOW UP: Yes  Activity Tolerance  Activity Tolerance: Patient Tolerated treatment well       Patient Diagnosis(es): The primary encounter diagnosis was Suspected COVID-19 virus infection. A diagnosis of Hypoxia was also pertinent to this visit. has a past medical history of Acute on chronic diastolic congestive heart failure ([address] ANEMIA, CAD (coronary artery disease), Cirrhosis ([address] Depression, Diabetes mellitus ([address] Esophageal bleed, non-variceal, Hypertension, HYPOTHYROIDISM, Liver disease, Mixed hyperlipidemia, NEUROPATHY, Non-ST elevation MI (NSTEMI) ([address] Pancytopenia ([address] Paroxysmal atrial fibrillation ([address] Pneumonia, Sleep apnea, Thrombocytopenia ([address] and Thyroid disease. has a past surgical history that includes Achilles tendon surgery; Tonsillectomy; Colonoscopy; Endoscopy, colon, diagnostic; Coronary artery bypass graft (10/2011); Percutaneous Transluminal Coronary Angio (11/23/2012); Upper gastrointestinal endoscopy (11/23/2012); Cardiac surgery; Upper gastrointestinal endoscopy (3/6/2014); Cholecystectomy; Upper gastrointestinal endoscopy (N/A, 6/7/2019); Upper gastrointestinal endoscopy (N/A, 7/15/2019); and Upper gastrointestinal endoscopy (N/A, 7/15/2019). Restrictions  Restrictions/Precautions  Restrictions/Precautions: Fall Risk(High fall risk)  Required Braces or Orthoses?: No  Position Activity Restriction  Other position/activity restrictions: CT chest: Left basilar atelectasis Acute decompensated heart failure with preserved ejection fraction. . Patient has elevated BNP, cardiomegaly with  pulmonary edema and moderate bilateral pleural effusions on chest x-ray  Vision/Hearing  Vision: Impaired  Vision Exceptions: Wears glasses at all times  Hearing: Within functional limits     Subjective  General  Chart Reviewed: Yes  Family / Caregiver Present: No  Diagnosis: acute respiratory failure  Follows Commands: Within Functional Limits  General Comment  Comments: supine in bed upon arrival   Subjective  Subjective: Patient denied pain. devices: All fall risk precautions in place, Bed alarm in place, Call light within reach, Gait belt, Left in bed, Nurse notified, Patient at risk for falls  Restraints  Initially in place: No      AM-PAC Score  AM-PAC Inpatient Mobility Raw Score : 19 (04/15/20 1405)  AM-PAC Inpatient T-Scale Score : 45.44 (04/15/20 1405)  Mobility Inpatient CMS 0-100% Score: 41.77 (04/15/20 1405)  Mobility Inpatient CMS G-Code Modifier : CK (04/15/20 1405)          Goals  Short term goals  Time Frame for Short term goals:  To be met prior to discharge  Short term goal 1: Bed mobility with mod I  Short term goal 2: Sit to/from stand with mod I  Short term goal 3: Ambulate 150 feet with AAD and mod I  Patient Goals   Patient goals : did not state       Therapy Time   Individual Concurrent Group Co-treatment   Time In 0926         Time Out 0950         Minutes 24         Timed Code Treatment Minutes: 9 Minutes       Jewel Echols PT    Thanks, Jewel Echols PT, DPT [id number]

## 2020-04-15 NOTE — PLAN OF CARE
Problem: Breathing Pattern - Ineffective:  Goal: Ability to achieve and maintain a regular respiratory rate will improve  Description: Ability to achieve and maintain a regular respiratory rate will improve  4/15/2020 0503 by Tessie Galicia RN  Outcome: Ongoing  4/14/2020 1751 by Dami Morfin RN  Outcome: Ongoing     Problem: Metabolic:  Goal: Ability to maintain appropriate glucose levels will improve  Description: Ability to maintain appropriate glucose levels will improve  4/15/2020 0503 by Tessie Galicia RN  Outcome: Ongoing  4/14/2020 1751 by Dami Morfin RN  Outcome: Ongoing

## 2020-04-16 NOTE — PROGRESS NOTES
184 lb 12.8 oz (83.8 kg)   2. I/O     Intake/Output Summary (Last 24 hours) at 4/16/2020 0929  Last data filed at 4/16/2020 0606  Gross per 24 hour   Intake 840 ml   Output 825 ml   Net 15 ml       Cardiac Testing:     ECHO: 4/10/20:   Limited study - covid   Irregular rhythm   Left ventricular systolic function is normal with ejection fraction   estimated at 60-65 %.   There is moderate concentric left ventricular hypertrophy.   Left ventricle size is normal.   Moderately severe mitral regurgitation. Reported as mild MR in 5897   Diastolic dysfunction grade and filing pressure are indeterminate.  Glorietta Kim is a trivial circumferential pericardial effusion noted.  Glorietta Kim is a moderate left pleural effusion.   Previous echo done 2012 - EF 55%       Assessment/Plan:     1. CHF- 6L out, continue diuresis with IV lasix and brina, check labs in am  2. Plural effusions- repeat thoracentesis per pulmonary  3.  Anemia - iron studies ok      I appreciate the opportunity of cooperating in the care of this individual.    Holly Villalpando, [facility], [address] 4/16/2020, 9:29 AM  Heart Failure  The [address], [address]  Ph: [phone]      Core Measures:   · Discharge instructions:   · LVEF documented:   · ACEI for LV dysfunction:   · Smoking Cessation:

## 2020-04-16 NOTE — PROGRESS NOTES
disease)    HTN (hypertension)    HLD (hyperlipidemia)    DMITRIY (obstructive sleep apnea)    Depression    Liver disease    Cholelithiasis    Thrombocytopenia (HCC)    Esophageal bleeding not due to varices    Pancytopenia (HCC)    Warfarin-induced coagulopathy (HCC)    Hepatic encephalopathy (HCC)    Acute encephalopathy    Increased ammonia level    Cirrhosis of liver with ascites (HCC)    Generalized weakness    Hypotension, postural    Hyperammonemia (HCC)    Malaise and fatigue    Acute respiratory failure (HCC)    Acute on chronic diastolic congestive heart failure (HCC)    Nonrheumatic mitral valve regurgitation    Pleural effusion on right        Allergies   Allergen Reactions    Cephalexin Hives    Ciprocinonide [Fluocinolone]     Quinolones Other (See Comments)     Confusion      Statins Other (See Comments)     Liver toxicity        Current Inpatient Medications:    Current Facility-Administered Medications   Medication Dose Route Frequency Provider Last Rate Last Dose    furosemide (LASIX) injection 80 mg  80 mg Intravenous BID Noemí Chester MD   80 mg at 04/16/20 3621    spironolactone (ALDACTONE) tablet 150 mg  150 mg Oral Daily Noemí Chester MD   150 mg at 04/16/20 0837    insulin lispro (1 Unit Dial) 0-6 Units  0-6 Units Subcutaneous TID WC Twyla Marino MD   2 Units at 04/16/20 1157    insulin lispro (1 Unit Dial) 0-3 Units  0-3 Units Subcutaneous Nightly Twyla Marino MD   2 Units at 04/15/20 2056    ketoconazole (NIZORAL) 2 % cream   Topical BID Twyla Marino MD        aspirin EC tablet 81 mg  81 mg Oral Daily Joan Tomlinson MD   81 mg at 04/16/20 1538    b complex-C-folic acid (NEPHROCAPS) capsule   Oral Daily Joan Tomlinson MD   1 mg at 04/16/20 4160    citalopram (CELEXA) tablet 20 mg  20 mg Oral Daily Joan Tomlinson MD   20 mg at 04/16/20 0832    lactulose (CHRONULAC) 10 GM/15ML solution 20 g  20 g Oral TID Joan Tomlinson MD   20 g at BUN 19 04/15/2020    CREATININE 1.0 04/15/2020    GFRAA >60 04/15/2020    GFRAA >60 02/26/2013    AGRATIO 0.9 04/14/2020    LABGLOM >60 04/15/2020    LABGLOM 79 03/06/2012    GLUCOSE 144 04/15/2020    GLUCOSE 123 03/06/2012    PROT 6.2 04/14/2020    PROT 6.9 02/26/2013    LABALBU 3.0 04/14/2020    CALCIUM 8.7 04/15/2020    BILITOT 2.7 04/14/2020    ALKPHOS 108 04/14/2020    AST 40 04/14/2020    ALT 21 04/14/2020     Hepatic Function Panel:    Lab Results   Component Value Date    ALKPHOS 108 04/14/2020    ALT 21 04/14/2020    AST 40 04/14/2020    PROT 6.2 04/14/2020    PROT 6.9 02/26/2013    BILITOT 2.7 04/14/2020    BILIDIR 0.8 06/03/2018    IBILI 1.4 06/03/2018    LABALBU 3.0 04/14/2020     Magnesium:    Lab Results   Component Value Date    MG 1.90 04/15/2020     PT/INR:    Lab Results   Component Value Date    PROTIME 15.6 04/13/2020    PROTIME 23 01/08/2014    INR 1.34 04/13/2020     Last 3 Troponin:    Lab Results   Component Value Date    TROPONINI 0.01 04/08/2020    TROPONINI <0.01 10/02/2019    TROPONINI <0.01 03/02/2019     U/A:    Lab Results   Component Value Date    NITRITE pos 03/14/2017    COLORU DK YELLOW 10/02/2019    PHUR 5.0 10/02/2019    WBCUA 6 10/02/2019    RBCUA 3 10/02/2019    BACTERIA 4+ 01/26/2014    CLARITYU Clear 10/02/2019    SPECGRAV 1.024 10/02/2019    LEUKOCYTESUR SMALL 10/02/2019    UROBILINOGEN 1.0 10/02/2019    BILIRUBINUR SMALL 10/02/2019    BILIRUBINUR large 03/14/2017    BILIRUBINUR NEGATIVE 10/13/2011    BLOODU Negative 10/02/2019    GLUCOSEU 250 10/02/2019    GLUCOSEU 500 10/13/2011     ABG:    Lab Results   Component Value Date    PHART 7.37 10/18/2011    DRB2WGD 45 10/18/2011    PO2ART 74 10/18/2011    HXM8AIZ 26.0 10/18/2011    BEART 1 10/18/2011    THGBART 16.0 10/12/2011    MRP4SFZ 27 10/18/2011    E6JOGLPY 94 10/18/2011     FLP:    Lab Results   Component Value Date    TRIG 137 08/08/2017    HDL 41 08/08/2017    HDL 25 10/12/2011    LDLCALC 128 08/08/2017    LABVLDL 27 08/08/2017     TSH:    Lab Results   Component Value Date    TSH 1.74 10/14/2015      DATA:   ECG: Sinus Rhythm       ASSESSMENT:   1 acute on chronic diastolic heart failure clinically improved  Cardiology      2 large pleural effusion status post 1.5 L thoracentesis fluid removed  Has undergone thoracentesis     3 moderate mitral regurgitation with preserved left ventricular function  Cardiology following    4 acute hypoxic respiratory failure requiring high oxygen flow  Pulmonology following    5 diabetes continue with sliding scale    6 history of a CABG denies chest pain    7 chronic thrombocytopenia  Platelets 41,104    8 decompensated liver cirrhosis with portal hypertension and ascites  We will continue current treatment  PLAN   Continue current treatment

## 2020-04-16 NOTE — ADT AUTH CERT
Problem List:   Acute hypoxic respiratory failure   Large right pleural effusion   Probable CHF decompensation/severe MR   Assessment/Plan:       Patient underwent thoracentesis with 1.75 L of transudate pleural effusion removal.  However O2 requirements has worsened since thoracentesis, with reaccumulation of large right pleural effusion, confirmed on CT scan.  Suspect reexpansion pulmonary edema/pleural effusion.  No pneumothorax noted on serial imaging.         Will plan for thoracentesis by IR, should drain 1 to 1.5 L only.  Continue with aggressive diuresis.       Pulmonary will follow.         -hj

## 2020-04-16 NOTE — PROGRESS NOTES
Face to face report received from Susy Galvan, [facility]. Pt sitting up in bed, eyes closed with no s/s pain or distress. Respirations are regular and easy. No needs at this time. Call light within reach.   .Electronically signed by Efra Moy RN on 4/15/2020 at 8:50 PM

## 2020-04-16 NOTE — PLAN OF CARE
Problem: Breathing Pattern - Ineffective:  Goal: Ability to achieve and maintain a regular respiratory rate will improve  Description: Ability to achieve and maintain a regular respiratory rate will improve  Outcome: Ongoing     Problem: Respiratory  Goal: O2 Sat > 90%  Outcome: Ongoing

## 2020-04-16 NOTE — PROGRESS NOTES
[facility] Pulmonary/CCM Progress note      Admit Date: 4/8/2020    Chief Complaint: Shortness of breath    Subjective: Interval History: Patient's O2 requirements are now down to 8 L. Status post thoracentesis yesterday with 1.5 L removed. Patient denies any shortness of breath, cough, chest pain.     Scheduled Meds:   furosemide  80 mg Intravenous BID    spironolactone  150 mg Oral Daily    insulin lispro  0-6 Units Subcutaneous TID WC    insulin lispro  0-3 Units Subcutaneous Nightly    ketoconazole   Topical BID    aspirin  81 mg Oral Daily    b complex-C-folic acid   Oral Daily    citalopram  20 mg Oral Daily    lactulose  20 g Oral TID    levothyroxine  125 mcg Oral Daily    rifaximin  550 mg Oral BID    sodium chloride flush  10 mL Intravenous 2 times per day     Continuous Infusions:   dextrose       PRN Meds:sodium chloride flush, acetaminophen **OR** acetaminophen, polyethylene glycol, promethazine **OR** ondansetron, glucose, dextrose, glucagon (rDNA), dextrose    Review of Systems  Constitutional: negative for fatigue, fevers, malaise and weight loss  Ears, nose, mouth, throat: negative for ear drainage, epistaxis, hoarseness, nasal congestion, sore throat and voice change  Respiratory: negative except for cough and shortness of breath  Cardiovascular: negative for chest pain, chest pressure/discomfort, irregular heart beat, lower extremity edema and palpitations  Gastrointestinal: negative for abdominal pain, constipation, diarrhea, jaundice, melena, odynophagia, reflux symptoms and vomiting  Hematologic/lymphatic: negative for bleeding, easy bruising, lymphadenopathy and petechiae  Musculoskeletal:negative for arthralgias, bone pain, muscle weakness, neck pain and stiff joints  Neurological: negative for dizziness, gait problems, headaches, seizures, speech problems, tremors and weakness  Behavioral/Psych: negative for anxiety, behavior problems, depression, fatigue and sleep disturbance  Endocrine: negative for diabetic symptoms including none, neuropathy, polyphagia, polyuria, polydipsia, vomiting and diarrhea and temperature intolerance  Allergic/Immunologic: negative for anaphylaxis, angioedema, hay fever and urticaria    Objective:     Patient Vitals for the past 8 hrs:   BP Temp Temp src Pulse Resp SpO2 Weight   04/16/20 0831 112/70 98.5 °F (36.9 °C) Oral 114 18 90 % --   04/16/20 0830 -- -- -- 114 -- -- --   04/16/20 0600 -- -- -- -- -- -- 184 lb 12.8 oz (83.8 kg)   04/16/20 0513 113/73 98.4 °F (36.9 °C) Oral 106 18 91 % --     I/O last 3 completed shifts: In: 1080 [P.O.:1080]  Out: 1075 [Urine:1075]  No intake/output data recorded. General Appearance: alert and oriented to person, place and time, well developed and well- nourished, in no acute distress  Skin: warm and dry, no rash or erythema  Head: normocephalic and atraumatic  Eyes: pupils equal, round, and reactive to light, extraocular eye movements intact, conjunctivae normal  ENT: external ear and ear canal normal bilaterally, nose without deformity, nasal mucosa and turbinates normal  Neck: supple and non-tender without mass, no cervical lymphadenopathy  Pulmonary/Chest: Decreased air entry at the right base.   Cardiovascular: normal rate, regular rhythm,  no murmurs, rubs, distal pulses intact, no carotid bruits  Abdomen: soft, non-tender, non-distended, normal bowel sounds, no masses or organomegaly  Lymph Nodes: Cervical, supraclavicular normal  Extremities: no cyanosis, clubbing or edema  Musculoskeletal: normal range of motion, no joint swelling, deformity or tenderness  Neurologic: alert, no focal neurologic deficits    Data Review:  CBC:   Lab Results   Component Value Date    WBC 4.9 04/15/2020    RBC 3.90 04/15/2020     BMP:   Lab Results   Component Value Date    GLUCOSE 144 04/15/2020    GLUCOSE 123 03/06/2012    CO2 37 04/15/2020    BUN 19 04/15/2020    CREATININE 1.0 04/15/2020    CALCIUM 8.7 04/15/2020

## 2020-04-17 NOTE — PROGRESS NOTES
Nutrition Assessment (Low Risk)    Type and Reason for Visit: Reassess    Nutrition Recommendations:   No nutrition rec's @ this time. Nutrition Assessment:   Pt is stable from nutrition standpoint AEB po intake greater than 75% of meals & appetite reported as good. Pt has had -7 L output during admission, resulting in 26 lb wt loss. Remains on CCC, Low Na+ diet with 1500 ml fluid restriction, to promote CHF dietary compliance.     Malnutrition Assessment:  · Malnutrition Status: No malnutrition    Nutrition Risk Level   Risk Level: Low    Nutrition Diagnosis:   · Problem: No nutrition diagnosis at this time    Nutrition Intervention:  Food and/or Delivery: Continue current diet  Nutrition Education/Counseling/Coordination of Care:  Continued Inpatient Monitoring, Education Completed      Electronically signed by Urmila Solo RD, LD on 4/17/20 at 11:54 AM EDT    Contact Number: [phone]

## 2020-04-17 NOTE — PROGRESS NOTES
Date    SCD3BUZ 26.0 10/18/2011    BEART 1 10/18/2011    U4USODMV 94 10/18/2011    PHART 7.37 10/18/2011    THGBART 16.0 10/12/2011    VUA1ZQM 45 10/18/2011    PO2ART 74 10/18/2011    UKJ9IMI 27 10/18/2011       Radiology: All pertinent images / reports were reviewed as a part of this visit. Narrative   EXAMINATION:   ONE XRAY VIEW OF THE CHEST       4/11/2020 10:15 am       COMPARISON:   April 8, 2020       HISTORY:   ORDERING SYSTEM PROVIDED HISTORY: pleural effusions   TECHNOLOGIST PROVIDED HISTORY:   Reason for exam:->pleural effusions   Acuity: Unknown       FINDINGS:   Moderate to large right pleural effusion.  Small left pleural effusion. Bandlike opacity in left base.  Patient has had a median sternotomy.  The   cardiac silhouette is mildly enlarged.           Impression   1. No significant interval change.  Moderate to large right pleural effusion   and small left pleural effusion.  The right pleural effusion is large enough   that it may be obscuring an underlying consolidation. 2. Small left pleural effusion with left base opacity, likely atelectasis. 3. Stable mild enlargement of the cardiac silhouette. Problem List:   Acute hypoxic respiratory failure  Large right pleural effusion  Probable CHF decompensation/severe MR  Assessment/Plan:     Patient underwent thoracentesis x2 with a total of 3.25 L drained. Improved oxygenation noted-currently on 4 L. Continue with aggressive diuresis. Continue with Lasix 80 mg twice daily and spironolactone. Patient also has liver cirrhosis which could be contributing to fluid accumulation-hepatic hydrothorax. Patient will perhaps require home oxygen upon discharge. Possibly could go home in the next 1 to 2 days with home oxygen. Will organize for follow-up with pulmonary as an outpatient-will sign off.     Tessa Subramanian MD

## 2020-04-17 NOTE — PROGRESS NOTES
Hospitalist  Progress Note       Fiona Flores is a 69 y.o. male   1945     SUBJECTIVE:   Patient seen and examined said he feels much better says he wants to go home no chest pain. Cardiology follow-up evaluation noted patient still high flow of oxygen at 1.7 L of transudate effusion removed is now negative total of about 6 L  4/16  Seen and examined discussed with nurse and pulmonologist patient feels much better wife was on the phone and   Does  Not  Want  the patient to   go to [facility]      Wants Patient to come home upon discharge  OBJECTIVE:    Review of Systems:  General appearance: alert, appears stated age and cooperative  Skin: Skin color, texture, normal. No rashes or lesions  HEENT: No nose bleed, headache, vision problems  CV: C/O chest pain, tightness, pressure,   Respiratory: C/o no SOB, KEE, Orthopnea, PND  GI: No abdominal pain, black stool, bloating  Limbs: No c/o edema, pain, swelling, intermittent claudication, joint pains  Neuro: No dizziness, lightheadedness, syncope, gait problems, memory problems  Psych: grossly normal. No SI/depression. Vitals:   Blood pressure 99/63, pulse 85, temperature 98.1 °F (36.7 °C), temperature source Oral, resp. rate 16, height 5' 11\" (1.803 m), weight 182 lb 1.6 oz (82.6 kg), SpO2 93 %.     HEENT: AT, NC, PERRLA  Neck: No JVD  Heart: S1 S2 audible, no murmur   Lungs: Bilateral decreased breath sounds and rales   Abdomen: Nontender   Limbs: No edema   CNS: no focal deficit      Past Medical History:   Diagnosis Date    Acute on chronic diastolic congestive heart failure (HCC)     ANEMIA     CAD (coronary artery disease) 10/12/2011    [facility]     Depression     Diabetes mellitus ([facility]     Esophageal bleed, non-variceal 11/23/2012    Hypertension     HYPOTHYROIDISM     Liver disease 1/6/2012    Mixed hyperlipidemia     NEUROPATHY     Non-ST elevation MI (NSTEMI) ([facility] 11/21/2012    Pancytopenia (HCC)     Paroxysmal atrial fibrillation ([facility] 1/6/2012    Pneumonia     Sleep apnea     no cpap    Thrombocytopenia ([facility]     Thyroid disease         Patient Active Problem List   Diagnosis    Diabetes mellitus ([facility]    CAD (coronary artery disease)    HTN (hypertension)    HLD (hyperlipidemia)    DMITRIY (obstructive sleep apnea)    Depression    Liver disease    Cholelithiasis    Thrombocytopenia (HCC)    Esophageal bleeding not due to varices    Pancytopenia (HCC)    Warfarin-induced coagulopathy (HCC)    Hepatic encephalopathy (HCC)    Acute encephalopathy    Increased ammonia level    Cirrhosis of liver with ascites (HCC)    Generalized weakness    Hypotension, postural    Hyperammonemia (HCC)    Malaise and fatigue    Acute respiratory failure (HCC)    Acute on chronic diastolic congestive heart failure (HCC)    Nonrheumatic mitral valve regurgitation    Pleural effusion on right        Allergies   Allergen Reactions    Cephalexin Hives    Ciprocinonide [Fluocinolone]     Quinolones Other (See Comments)     Confusion      Statins Other (See Comments)     Liver toxicity        Current Inpatient Medications:    Current Facility-Administered Medications   Medication Dose Route Frequency Provider Last Rate Last Dose    furosemide (LASIX) injection 80 mg  80 mg Intravenous BID Danitza Melgoza MD   80 mg at 04/17/20 1025    spironolactone (ALDACTONE) tablet 150 mg  150 mg Oral Daily Danitza Melgoza MD   150 mg at 04/17/20 1024    insulin lispro (1 Unit Dial) 0-6 Units  0-6 Units Subcutaneous TID WC Jessica Khanna MD   1 Units at 04/16/20 1842    insulin lispro (1 Unit Dial) 0-3 Units  0-3 Units Subcutaneous Nightly Jessica Khanna MD   1 Units at 04/16/20 2215    ketoconazole (NIZORAL) 2 % cream   Topical BID Jessica Khanna MD        aspirin EC tablet 81 mg  81 mg Oral Daily Priyanka Burch MD   81 mg at 04/17/20 1025    b complex-C-folic acid (NEPHROCAPS) capsule   Oral Daily Priyanka Burch MD   1 mg at 04/15/2020    BASOSABS 0.0 04/15/2020    DIFFTYPE Auto-K 02/26/2013     CMP:    Lab Results   Component Value Date     04/17/2020    K 4.4 04/17/2020    K 4.0 04/09/2020    CL 93 04/17/2020    CO2 38 04/17/2020    BUN 20 04/17/2020    CREATININE 1.1 04/17/2020    GFRAA >60 04/17/2020    GFRAA >60 02/26/2013    AGRATIO 0.9 04/14/2020    LABGLOM >60 04/17/2020    LABGLOM 79 03/06/2012    GLUCOSE 189 04/17/2020    GLUCOSE 123 03/06/2012    PROT 6.2 04/14/2020    PROT 6.9 02/26/2013    LABALBU 3.0 04/14/2020    CALCIUM 8.8 04/17/2020    BILITOT 2.7 04/14/2020    ALKPHOS 108 04/14/2020    AST 40 04/14/2020    ALT 21 04/14/2020     Hepatic Function Panel:    Lab Results   Component Value Date    ALKPHOS 108 04/14/2020    ALT 21 04/14/2020    AST 40 04/14/2020    PROT 6.2 04/14/2020    PROT 6.9 02/26/2013    BILITOT 2.7 04/14/2020    BILIDIR 0.8 06/03/2018    IBILI 1.4 06/03/2018    LABALBU 3.0 04/14/2020     Magnesium:    Lab Results   Component Value Date    MG 1.90 04/15/2020     PT/INR:    Lab Results   Component Value Date    PROTIME 15.6 04/13/2020    PROTIME 23 01/08/2014    INR 1.34 04/13/2020     Last 3 Troponin:    Lab Results   Component Value Date    TROPONINI 0.01 04/08/2020    TROPONINI <0.01 10/02/2019    TROPONINI <0.01 03/02/2019     U/A:    Lab Results   Component Value Date    NITRITE pos 03/14/2017    COLORU DK YELLOW 10/02/2019    PHUR 5.0 10/02/2019    WBCUA 6 10/02/2019    RBCUA 3 10/02/2019    BACTERIA 4+ 01/26/2014    CLARITYU Clear 10/02/2019    SPECGRAV 1.024 10/02/2019    LEUKOCYTESUR SMALL 10/02/2019    UROBILINOGEN 1.0 10/02/2019    BILIRUBINUR SMALL 10/02/2019    BILIRUBINUR large 03/14/2017    BILIRUBINUR NEGATIVE 10/13/2011    BLOODU Negative 10/02/2019    GLUCOSEU 250 10/02/2019    GLUCOSEU 500 10/13/2011     ABG:    Lab Results   Component Value Date    PHART 7.37 10/18/2011    NZH1RYG 45 10/18/2011    PO2ART 74 10/18/2011    DNW6ZXV 26.0 10/18/2011    BEART 1 10/18/2011    THGBART

## 2020-04-17 NOTE — PROGRESS NOTES
Shift assessment completed. Patient alert, oriented x4. Spoke with patients wife who is requesting for the MD to call her, message passed on to hospitalist.  Patient denies SOB at this time. Patient is on 5L of oxygen. Lung sounds diminished. +1 edema noted to BLE. Patient reporting that he is feeling better. Patient resting in bed. Patient is getting up independently. POC discussed with patient. The care plan and education has been reviewed and mutually agreed upon with the patient. Call light in reach. Will monitor. 1332: Spoke with wife a little while ago. All questions answered.

## 2020-04-17 NOTE — PROGRESS NOTES
04/14/2020    FERRITIN 44.0 04/08/2020            1. WEIGHT: Admit Weight: 198 lb (89.8 kg)      Today  Weight: 182 lb 1.6 oz (82.6 kg)   2. I/O     Intake/Output Summary (Last 24 hours) at 4/17/2020 1453  Last data filed at 4/17/2020 1300  Gross per 24 hour   Intake 360 ml   Output 1330 ml   Net -970 ml       Cardiac Testing:     ECHO: 4/10/20:   Limited study - covid   Irregular rhythm   Left ventricular systolic function is normal with ejection fraction   estimated at 60-65 %.   There is moderate concentric left ventricular hypertrophy.   Left ventricle size is normal.   Moderately severe mitral regurgitation. Reported as mild MR in 9846   Diastolic dysfunction grade and filing pressure are indeterminate.  Oval Cools is a trivial circumferential pericardial effusion noted.  Oval Cools is a moderate left pleural effusion.   Previous echo done 2012 - EF 55%     Labs were reviewed including labs from other hospital systems through [facility]. Cardiac testing was reviewed including echos, nuclear scans, cardiac catheterization, including from other hospital systems through [facility]. Assessment/Plan:     1. CHF- 8L out, continue diuresis with IV lasix and brina, check labs in am  2. Plural effusions- managed by pulmonary  3. Anemia - iron studies ok  4. Switch to po lasix when BUN starts to climb.       I appreciate the opportunity of cooperating in the care of this individual.    Liana Napier MD, [facility] 4/17/2020, 2:53 PM  Heart Failure  The [address], [address]  Ph: [phone]      Core Measures:   · Discharge instructions:   · LVEF documented:   · ACEI for LV dysfunction:   · Smoking Cessation:

## 2020-04-17 NOTE — CARE COORDINATION
CM spoke with patient's wife regarding discharge planning, patient is more appropriate to home health. She stated that he had home care in 2011 following his CABG, on review of records patient had [facility]) she stated they were pleased with the care. Referred to liaison from [facility], patient updated on this.     LEONA BrayN, CCM, RN  [facility]  [phone]

## 2020-04-17 NOTE — DISCHARGE INSTR - COC
Continuity of Care Form      CHF PATHWAY  Systolic/Diastolic    RU:82%    _x_ Daily Visits x 3     _x_Cardiovascular Assessment. Titrate O2 to keep SaO2 greater than 90%    _x_ Daily Weights- Baseline Wt:182lbs  Call MD if:   3 pound weight gain or loss in one day OR 5 pound weight gain in one week       _x_ Labs:   BMP,BNP     Please start on 20   Frequency: Weekly x 4              Fax results to: CHF Clinic: [phone]    _x_ Med List attached:   Hold Coreg/Metoprolol if HR less than 45 or patient symptomatic*   Hold ACE/ARB if SBP less than 85 or patient symptomatic*   Do not hold Spironolactone (aldactone) for hypotension/bradycardia   Call MD for questions: CHF Clinic: [phone]    _x_Follow up appointment with cardiology: date/time: 20 at 8:30 am with Artur Son NP      x_ [address] for home health  And telehealth monitoring. For CHF/COPD. Patient Name: Юлия Knet   :    MRN:  [id number]    Admit date:  2020  Discharge date:  20    Code Status Order: Full Code   Advance Directives:   Advance Care Flowsheet Documentation     Date/Time Healthcare Directive Type of Healthcare Directive Copy in [facility] [address] Agent's Name Healthcare Agent's Phone Number    [phone]  No, patient does not have an advance directive for healthcare treatment -- -- -- -- --          Admitting Physician:  Tutu Resendiz MD  PCP: Renata Nicole MD    Discharging Nurse: [address] [place] [address] Unit/Room#: 9QS-4467/6158-31  Discharging Unit Phone Number: [phone]    Emergency Contact:   Extended Emergency Contact Information  Primary Emergency Contact: [facility]  Address: ANDI Leon [address] [place] [address] Phone: [phone]  Mobile Phone: [phone]  Relation: Spouse  Secondary Emergency Contact:  Danni Fatima   [address] Phone: [phone]  Mobile Phone: [phone]    Hepatic encephalopathy (HCC) K72.90    Acute encephalopathy G93.40    Increased ammonia level R79.89    Cirrhosis of liver with ascites (HCC) K74.60, R18.8    Generalized weakness R53.1    Hypotension, postural I95.1    Hyperammonemia (HCC) E72.20    Malaise and fatigue R53.81, R53.83    Acute respiratory failure (HCC) J96.00    Acute on chronic diastolic congestive heart failure (HCC) I50.33    Nonrheumatic mitral valve regurgitation I34.0    Pleural effusion on right J90       Isolation/Infection:   Isolation          No Isolation        Patient Infection Status     Infection Onset Added Last Indicated Last Indicated By Review Planned Expiration Resolved Resolved By    None active    Resolved    COVID-19 Rule Out 04/08/20 04/08/20 04/08/20 Emergent Disease Panel (Ordered)   04/14/20 Rule-Out Test Resulted          Nurse Assessment:  Last Vital Signs: BP 99/63   Pulse 85   Temp 98.1 °F (36.7 °C) (Oral)   Resp 16   Ht 5' 11\" (1.803 m)   Wt 182 lb 1.6 oz (82.6 kg)   SpO2 93%   BMI 25.40 kg/m²     Last documented pain score (0-10 scale): Pain Level: 0  Last Weight:   Wt Readings from Last 1 Encounters:   04/17/20 182 lb 1.6 oz (82.6 kg)     Mental Status:  oriented, alert and coherent    IV Access:  - None    Nursing Mobility/ADLs:  Walking   Independent  Transfer  Independent  Bathing  Independent  Dressing  Independent  Toileting  Independent  Feeding  Independent  Med Admin  Assisted  Med Delivery   none    Wound Care Documentation and Therapy:        Elimination:  Continence:   · Bowel: Yes  · Bladder: Yes  Urinary Catheter: None   Colostomy/Ileostomy/Ileal Conduit: No       Date of Last BM: 4/19/20    Intake/Output Summary (Last 24 hours) at 4/17/2020 1143  Last data filed at 4/17/2020 0548  Gross per 24 hour   Intake --   Output 1505 ml   Net -1505 ml     I/O last 3 completed shifts:  In: -   Out: [facility] [Urine:1505]    Safety Concerns:      At Risk for Falls    Impairments/Disabilities:      None    Nutrition Therapy:  Current Nutrition Therapy:   - Oral Diet:  Carb Control 4 carbs/meal (1800kcals/day), low sodium    Routes of Feeding: Oral  Liquids: No Restrictions  Daily Fluid Restriction: yes - amount 1500 ml  Last Modified Barium Swallow with Video (Video Swallowing Test): not done    Treatments at the Time of Hospital Discharge:   Respiratory Treatments:   Oxygen Therapy:  is on oxygen at 2-3 L/min per nasal cannula. Ventilator:    - No ventilator support    Rehab Therapies: SN,PT,OT  Weight Bearing Status/Restrictions: No weight bearing restirctions  Other Medical Equipment (for information only, NOT a DME order):     Other Treatments: HOME HEALTH CARE: LEVEL 3 SAFETY        -Initial home health evaluation to occur within 24-48 hours, in patient home    -Home health agency to establish plan of care for patient over 60 day period    -Medication Reconciliation    -PT/OT/Speech evaluations in home within 24-48 hours of discharge; including  -DME and home safety    -Frontload therapy 5 days, then 3x a week    -OT to evaluate if patient has [address] needs for personal care    - evaluation within 24-48 hours, includes evaluation of resources   and insurance to determine [place], [place], LTC, and Medicaid options    -PCP Visit scheduled within three to seven days of discharge    -Telehealth-Homecare Vitals(If patient is agreeable and meets guidelines)        Patient's personal belongings (please select all that are sent with patient):  None    RN SIGNATURE:  Electronically signed by Tatiana Chavez RN on 4/19/20 at 11:20 AM EDT    CASE MANAGEMENT/SOCIAL WORK SECTION    Inpatient Status Date: 4/8/2020    Readmission Risk Assessment Score:  Readmission Risk              Risk of Unplanned Readmission:        15           Discharging to Facility/ Agency   Name: Alicja Marshall:     [address] [facility]:   [address] [facility] [address] Jennifer Caal   PHONE:        [phone]  · FAX:              [phone]      Pt home with home oxygen, referral sent to [facility]  / signature: Louie Jaramillo RN, BSN  [phone]       PHYSICIAN SECTION    Prognosis: Good    Condition at Discharge: Stable    Rehab Potential (if transferring to Rehab): Good    Recommended Labs or Other Treatments After Discharge: [facility] on 9/07/5701    Physician Certification: I certify the above information and transfer of Conner Jayers  is necessary for the continuing treatment of the diagnosis listed and that he requires [address] for greater 30 days.      Update Admission H&P: No change in H&P    PHYSICIAN SIGNATURE:  Electronically signed by Eric Holly MD on 4/19/20 at 1:11 PM EDT

## 2020-04-17 NOTE — PROGRESS NOTES
Spoke with pt wife Nargis Rivera) last evening and she was asking about the process for discharge. Pt wife under the impression that pt will be discharging today (Friday) or Saturday to home. Noted that  has note saying pt talked about going to [facility] or something \"close to home\". When speaking with the pt, pt is adamant that he is not going to a facility. He does not recall speaking with the social work or . Left message on [employer] for Marla Ruff regarding the situation above.   .Electronically signed by Ann Gilliam RN on 4/17/2020 at 5:59 AM

## 2020-04-18 NOTE — PROGRESS NOTES
Physical Therapy  Facility/Department: [address] NURSING  Daily Treatment Note  NAME: Maryuri Mayes  : [birth year]  MRN: [id number]    Date of Service: 2020    Discharge Recommendations: Maryuri Mayes scored a 19/24 on the AM-PAC short mobility form. Current research shows that an AM-PAC score of 18 or greater is typically associated with a discharge to the patient's home setting. Based on the patients AM-PAC score and their current functional mobility deficits, it is recommended that the patient have 2-3 sessions per week of Physical Therapy at d/c to increase the patients independence. If patient discharges prior to next session this note will serve as a discharge summary. Please see below for the latest assessment towards goals. HOME HEALTH CARE: LEVEL 1 STANDARD    - Initial home health evaluation to occur within 24-48 hours, in patient home   - Therapy to evaluate with goal of regaining prior level of functioning   - Therapy to evaluate if patient has [address] needs for personal care    S Level 1   PT Equipment Recommendations  Equipment Needed: No    Assessment   Body structures, Functions, Activity limitations: Decreased functional mobility ; Decreased strength;Decreased endurance;Decreased safe awareness;Decreased balance  Assessment: Patient not at baseline functional mobility and would benefit from skilled PT to address above deficits and facilitate return to baseline function  Treatment Diagnosis: decreased functional mobility, impaired gait, decreased balance  Prognosis: Fair  Decision Making: Medium Complexity  Clinical Presentation: evolving  PT Education: Goals; Energy Conservation;Home Exercise Program;PT Role;General Safety;Plan of Care  Patient Education: d/c recommendations, use of RW for safety and energy conservation upon discharge - verbalized understanding  Barriers to Learning: none  REQUIRES PT FOLLOW UP: Yes  Activity Tolerance  Activity Tolerance: Patient Tolerated treatment well     Patient Diagnosis(es): The primary encounter diagnosis was Suspected COVID-19 virus infection. A diagnosis of Hypoxia was also pertinent to this visit. has a past medical history of Acute on chronic diastolic congestive heart failure ([address] ANEMIA, CAD (coronary artery disease), Cirrhosis ([address] Depression, Diabetes mellitus ([address] Esophageal bleed, non-variceal, Hypertension, HYPOTHYROIDISM, Liver disease, Mixed hyperlipidemia, NEUROPATHY, Non-ST elevation MI (NSTEMI) ([address] Pancytopenia ([address] Paroxysmal atrial fibrillation ([address] Pneumonia, Sleep apnea, Thrombocytopenia ([address] and Thyroid disease. has a past surgical history that includes Achilles tendon surgery; Tonsillectomy; Colonoscopy; Endoscopy, colon, diagnostic; Coronary artery bypass graft (10/2011); Percutaneous Transluminal Coronary Angio (11/23/2012); Upper gastrointestinal endoscopy (11/23/2012); Cardiac surgery; Upper gastrointestinal endoscopy (3/6/2014); Cholecystectomy; Upper gastrointestinal endoscopy (N/A, 6/7/2019); Upper gastrointestinal endoscopy (N/A, 7/15/2019); and Upper gastrointestinal endoscopy (N/A, 7/15/2019). Restrictions  Restrictions/Precautions  Restrictions/Precautions: Fall Risk(High fall risk)  Required Braces or Orthoses?: No  Position Activity Restriction  Other position/activity restrictions: CT chest: Left basilar atelectasis Acute decompensated heart failure with preserved ejection fraction. . Patient has elevated BNP, cardiomegaly with  pulmonary edema and moderate bilateral pleural effusions on chest x-ray  Subjective   General  Chart Reviewed: Yes  Response To Previous Treatment: Patient with no complaints from previous session. Family / Caregiver Present: No  Subjective  Subjective: Patient denied pain. Agreeable to therapy.   General Comment  Comments: supine in bed upon arrival   Pain Screening  Patient Currently in Pain: Denies  Vital Signs  Pulse: (122 bpm after ambulation, 107 bpm after

## 2020-04-18 NOTE — PROGRESS NOTES
Discussed POC with Teetee GRIFFIN who called with updates and to inform that patients wife is frustrated that patient is not being discharged today. Reviewed labs that were ordered this am and BUN 22 from 20 today. Will change to Lasix to PO 40 mg bid and stop IV lasix. Continue to wean oxygen as tolerated and continue aldactone 150 mg PO daily. Discussed with Dr. Cheikh Sharif over the phone, who agrees. Do not feel he is ready for discharge from c ardiovascular standpoint today. Will reassess in the am and call wife to discuss concerns over the phone.      Nicko Tamayo, CNP

## 2020-04-18 NOTE — PROGRESS NOTES
CINNAMON PO Take 1,000 mg by mouth daily Yes Historical Provider, MD   b complex vitamins capsule Take 1 capsule by mouth daily Yes Historical Provider, MD   glipiZIDE (GLUCOTROL) 5 MG tablet Take 1 tablet by mouth 2 times daily (before meals) Yes Alverto Viera MD   MILK THISTLE PO Take 1,000 mg by mouth daily  Yes Historical Provider, MD   B-D 3CC LUER-ALEXIS SYR 25GX1\" 25G X 1\" 3 ML MISC USE WITH VITAMIN B12 INJECTIONS Yes Alverto Viera MD   XIFAXAN 550 MG tablet 2 times daily  Yes Historical Provider, MD   vitamin D (ERGOCALCIFEROL) 400 UNITS CAPS Take 400 Units by mouth daily. Yes Historical Provider, MD   Omega-3 Fatty Acids (FISH OIL) 500 MG CAPS Take 1,000 mg by mouth daily  Yes Historical Provider, MD   aspirin 81 MG tablet Take 81 mg by mouth daily.  Yes Historical Provider, MD      Scheduled Meds:   furosemide  80 mg Intravenous BID    spironolactone  150 mg Oral Daily    insulin lispro  0-6 Units Subcutaneous TID WC    insulin lispro  0-3 Units Subcutaneous Nightly    ketoconazole   Topical BID    aspirin  81 mg Oral Daily    b complex-C-folic acid   Oral Daily    citalopram  20 mg Oral Daily    lactulose  20 g Oral TID    levothyroxine  125 mcg Oral Daily    rifaximin  550 mg Oral BID    sodium chloride flush  10 mL Intravenous 2 times per day     Continuous Infusions:   dextrose       PRN Meds:sodium chloride flush, acetaminophen **OR** acetaminophen, polyethylene glycol, promethazine **OR** ondansetron, glucose, dextrose, glucagon (rDNA), dextrose     Past Medical History:  Past Medical History:   Diagnosis Date    Acute on chronic diastolic congestive heart failure (HCC)     ANEMIA     CAD (coronary artery disease) 10/12/2011    [facility]     Depression     [facility]     Esophageal bleed, non-variceal 11/23/2012    Hypertension     HYPOTHYROIDISM     Liver disease 1/6/2012    Mixed hyperlipidemia     NEUROPATHY     Non-ST elevation [facility] (NSTEMI) [facility] 11/21/2012    Pancytopenia (HCC)     Paroxysmal atrial fibrillation ([facility] 1/6/2012    Pneumonia     Sleep apnea     no cpap    Thrombocytopenia ([facility]     Thyroid disease         Past Surgical History:    has a past surgical history that includes Achilles tendon surgery; Tonsillectomy; Colonoscopy; Endoscopy, colon, diagnostic; Coronary artery bypass graft (10/2011); Percutaneous Transluminal Coronary Angio (11/23/2012); Upper gastrointestinal endoscopy (11/23/2012); Cardiac surgery; Upper gastrointestinal endoscopy (3/6/2014); Cholecystectomy; Upper gastrointestinal endoscopy (N/A, 6/7/2019); Upper gastrointestinal endoscopy (N/A, 7/15/2019); and Upper gastrointestinal endoscopy (N/A, 7/15/2019). Social History:  Reviewed. reports that he has been smoking cigarettes. He started smoking about 55 years ago. He has a 19.35 pack-year smoking history. He has never used smokeless tobacco. He reports that he does not drink alcohol or use drugs. Family History:  Reviewed. family history includes Cancer in his father; Diabetes in his father; Heart Disease in his father and mother. Denies family history of sudden cardiac death, arrhythmia, premature CAD    Review of Systems:  · Constitutional: Negative for fever, night sweats, chills, weight changes, or weakness  · Skin: Negative for rash, dry skin, pruritus, bruising, bleeding, blood clots, or changes in skin pigment  · HEENT: Negative for vision changes, ringing in the ears, sore throat, dysphagia, or swollen lymph nodes  · Respiratory: Reviewed in HPI  · Cardiovascular: Reviewed in HPI  · Gastrointestinal: Negative for abdominal pain, N/V/D, constipation, or black/tarry stools  · Genito-Urinary: Negative for dysuria, incontinence, urgency, or hematuria  · Musculoskeletal: Negative for joint swelling, muscle pain, or injuries  · Neurological/Psych: Negative for confusion, seizures, headaches, balance issues or TIA-like symptoms.  No anxiety, depression, or insomnia    Physical Examination:  Vitals:    04/18/20 0600   BP:    Pulse: 77   Resp:    Temp:    SpO2:       In: -   Out: 725    Wt Readings from Last 3 Encounters:   04/17/20 182 lb 1.6 oz (82.6 kg)   11/18/19 194 lb (88 kg)   11/06/19 193 lb (87.5 kg)       Intake/Output Summary (Last 24 hours) at 4/18/2020 6660  Last data filed at 4/18/2020 0300  Gross per 24 hour   Intake 360 ml   Output 1125 ml   Net -765 ml     Telemetry: Personally Reviewed Atrial fibrillation   · Constitutional: Cooperative and in no apparent distress, and appears well nourished  · Skin: Warm and pink; no pallor, cyanosis, bruising, or clubbing  · HEENT: Symmetric and normocephalic. PERRL, EOM intact. Conjunctiva pink with clear sclera. Mucus membranes pink and moist. Teeth intact. Thyroid smooth without nodules or goiter. · Cardiovascular: irregular and rhythm. S1 & S2, no murmurs, rubs, or gallops. Peripheral pulses 2+, capillary refill < 3 seconds. Negative elevation of JVP. + peripheral edema 1+  · Respiratory: Respirations symmetric and unlabored. Lungsno wheezing or rhonchi. + crackles BLL, O2   · Gastrointestinal: Abdomen soft and round. Bowel sounds normoactive in all quadrants without tenderness or masses. · Musculoskeletal: Bilateral upper and lower extremity strength 5/5 with full ROM  · Neurologic/Psych: Awake and orientated to person, place and time. Calm affect, appropriate mood    Pertinent labs, diagnostic, device, and imaging results reviewed as a part of this visit    Labs:    BMP:   Recent Labs     04/17/20  0629      K 4.4   CL 93*   CO2 38*   BUN 20   CREATININE 1.1     Estimated Creatinine Clearance: 65 mL/min (based on SCr of 1.1 mg/dL).    CBC:   Recent Labs     04/18/20  0450   WBC 4.7   HGB 13.1*   HCT 39.2*   MCV 98.7   PLT 81*     Thyroid:   Lab Results   Component Value Date    TSH 1.74 10/14/2015     Lipids:   Lab Results   Component Value Date    CHOL 196 08/08/2017    HDL 41 08/08/2017    HDL 25 left ventricle.       2. Normal left ventricular ejection fraction of 52%.       NOTE- These findings were discussed by phone with Dr. Afshin Lazo,   11/21/2012, 1530 hours. Cath: 11/23/12   cath> BMS to mid-RCA, PTCA prox. RDL, patent grafts  Problem List:   Patient Active Problem List    Diagnosis Date Noted    Cholelithiasis 11/28/2012     Priority: High    HLD (hyperlipidemia) 10/12/2011     Priority: High    Pancytopenia ([address] 02/04/2013     Priority: Medium    Esophageal bleeding not due to varices 11/28/2012     Priority: Medium    CAD (coronary artery disease) 10/12/2011     Priority: Medium    Thrombocytopenia ([address] 11/28/2012     Priority: Low    Liver disease 01/06/2012     Priority: Low    HTN (hypertension) 10/12/2011     Priority: Low    Diabetes mellitus ([address]      Priority: Low    Acute on chronic diastolic congestive heart failure (HCC)     Nonrheumatic mitral valve regurgitation     Pleural effusion on right     Acute respiratory failure ([address] 04/08/2020    Malaise and fatigue 10/03/2019    Hyperammonemia (HCC) 10/02/2019    Hypotension, postural 06/04/2018    Hepatic encephalopathy ([address] 06/02/2018    Acute encephalopathy 06/02/2018    Increased ammonia level 06/02/2018    Cirrhosis of liver with ascites ([address] 06/02/2018    Generalized weakness 06/02/2018    Warfarin-induced coagulopathy ([address] 01/27/2014    DMITRIY (obstructive sleep apnea) 10/12/2011    Depression 10/12/2011      Assessment and Plan:  Acute on chronic HFpEF   - Appears overloaded, +2 BLE edema, Lungs fine crackles   - Remains on O2   - Down >8.5 L this admission    - Weight down 16 lbs   - On IV lasix 80 mg bid and aldactone 150 mg daily   Pleural effusions   - Pulm following  Anemia-stable    - Add BMP and monitor renal fx  - Continue IV lasix 80 mg bid  - Wean O2 as tolerated    All pertinent information and plan of care discussed with the EP physician.     Multiple medical conditions with risk of

## 2020-04-18 NOTE — PROGRESS NOTES
LABALBU 3.0 04/14/2020     Magnesium:    Lab Results   Component Value Date    MG 1.90 04/15/2020     PT/INR:    Lab Results   Component Value Date    PROTIME 15.6 04/13/2020    PROTIME 23 01/08/2014    INR 1.34 04/13/2020     Last 3 Troponin:    Lab Results   Component Value Date    TROPONINI 0.01 04/08/2020    TROPONINI <0.01 10/02/2019    TROPONINI <0.01 03/02/2019     U/A:    Lab Results   Component Value Date    NITRITE pos 03/14/2017    COLORU DK YELLOW 10/02/2019    PHUR 5.0 10/02/2019    WBCUA 6 10/02/2019    RBCUA 3 10/02/2019    BACTERIA 4+ 01/26/2014    CLARITYU Clear 10/02/2019    SPECGRAV 1.024 10/02/2019    LEUKOCYTESUR SMALL 10/02/2019    UROBILINOGEN 1.0 10/02/2019    BILIRUBINUR SMALL 10/02/2019    BILIRUBINUR large 03/14/2017    BILIRUBINUR NEGATIVE 10/13/2011    BLOODU Negative 10/02/2019    GLUCOSEU 250 10/02/2019    GLUCOSEU 500 10/13/2011     ABG:    Lab Results   Component Value Date    PHART 7.37 10/18/2011    TWU5BVB 45 10/18/2011    PO2ART 74 10/18/2011    LRY5RJS 26.0 10/18/2011    BEART 1 10/18/2011    THGBART 16.0 10/12/2011    SBB4LSC 27 10/18/2011    H3YKTTGK 94 10/18/2011     FLP:    Lab Results   Component Value Date    TRIG 137 08/08/2017    HDL 41 08/08/2017    HDL 25 10/12/2011    LDLCALC 128 08/08/2017    LABVLDL 27 08/08/2017     TSH:    Lab Results   Component Value Date    TSH 1.74 10/14/2015      DATA:   ECG: Sinus Rhythm       ASSESSMENT:   Acute hypoxic respiratory failure secondary to trasnsudate pleural effusion and acute on chronic diatolic heart failure exacerbation and moderate MR: s/p L thoracenteiss with 1/5L removed  - continuve IV lasix BID, check bmp and monitor electrolytes closely      diabetes continue with sliding scale     chronic thrombocytopenia  - monitor     decompensated liver cirrhosis with portal hypertension and ascites  - home meds

## 2020-04-18 NOTE — PLAN OF CARE
PT REFUSES BED ALARM, PT EDUCATED ON RISKS OF REFUSING BED ALARM BUT PT CONTINUES TO REFUSE. WILL MONITOR.

## 2020-04-18 NOTE — PLAN OF CARE
Problem: Metabolic:  Goal: Ability to maintain appropriate glucose levels will improve  Description: Ability to maintain appropriate glucose levels will improve  Note: Pt's blood glucose level monitored ACHS. Insulin administered per MAR parameters. Will monitor      Problem: Cardiovascular  Goal: Hemodynamic stability  Note: Pt on telemetry; rate and rhythm monitored. Pt denies dizziness, CP, and palpitations. Pt instructed to notify nurse if symptoms occur. Medications administered. Electrolytes monitored. Will continue to monitor. Problem: Respiratory  Goal: O2 Sat > 90%  Note: Pt lung sounds: diminished . O2sat>90 2L humidified NC. Pt reminded to C&DB. Medications and breathing treatment administered. I&O recorded. Pt ambulated. Up to chair. Pt weaned to 2 L oxygen with O2 sat >90. Will continue to monitor.

## 2020-04-19 NOTE — CARE COORDINATION
LARUEN spoke to Tiraa Mccall from [facility] and was given pt information.      Macarena Lema RN, BSN  [phone]

## 2020-04-19 NOTE — PROGRESS NOTES
Home Oxygen Evaluation     Patients room air resting oxygen saturation SpO2 88%   Patient's on oxygen at rest SpO2 92% at    1 lpm = SpO2  90%     2 lpm = SpO2  92%

## 2020-04-19 NOTE — PROGRESS NOTES
[facility]   Electrophysiology Progress Note     Date: 4/19/2020  Admit Date: 4/8/2020     Reason for follow up: CHF    Chief Complaint:   Chief Complaint   Patient presents with    Shortness of Breath     pt sob x3 weeks, worse with exertion, 79% on RA with squad, cough as well off and on, no fevers. History of Present Illness: History obtained from patient and medical record. Dre Mercado is a 69 y.o. male with a past medical history of CAD, MI, DM, HTN, anemia, cirrhosis, and PAF admitted with fevers and hypoxia. He has diuresed 6L, COVID negative    Interval Hx: Today, he is wanting to go home. He reports improved SOB and has been ambulating in the room. He remains on O2 2 lpm which is new for him. No new complaints today. No major events overnight. Denies having chest pain, palpitations, shortness of breath, orthopnea/PND, cough, or dizziness. Patient seen and examined. Clinical notes reviewed. Telemetry reviewed. Allergies: Allergies   Allergen Reactions    Cephalexin Hives    Ciprocinonide [Fluocinolone]     Quinolones Other (See Comments)     Confusion      Statins Other (See Comments)     Liver toxicity       Home Meds:  Prior to Visit Medications    Medication Sig Taking?  Authorizing Provider   SITagliptin (JANUVIA) 100 MG tablet Take 1 tablet by mouth daily Yes Georges Mahoney MD   lactulose (CHRONULAC) 10 GM/15ML solution Take 30 mLs by mouth 3 times daily Yes Michael Marie MD   cyanocobalamin 1000 MCG/ML injection INJECT 1 ML INTRAMUSCULARLY EVERY 30 DAYS Yes Georges Mahoney MD   furosemide (LASIX) 20 MG tablet Take 1 tablet by mouth daily Yes Georges Mahoney MD   nadolol (CORGARD) 20 MG tablet Take 1 tablet by mouth daily Yes Georges Mahoney MD   citalopram (CELEXA) 20 MG tablet Take 1 tablet by mouth daily Yes Georges Mahoney MD   spironolactone (ALDACTONE) 50 MG tablet Take 2 tablets by mouth daily Yes Radha Bailey MD   Levothyroxine Sodium [address] [facility] 1400 Take by mouth Daily Yes Historical Provider, MD   CINNAMON PO Take 1,000 mg by mouth daily Yes Historical Provider, MD   b complex vitamins capsule Take 1 capsule by mouth daily Yes Historical Provider, MD   glipiZIDE (GLUCOTROL) 5 MG tablet Take 1 tablet by mouth 2 times daily (before meals) Yes Renata Nicloe MD   MILK THISTLE PO Take 1,000 mg by mouth daily  Yes Historical Provider, MD   B-D 3CC LUER-ALEXIS SYR 25GX1\" 25G X 1\" 3 ML MISC USE WITH VITAMIN B12 INJECTIONS Yes Renata Nicole MD   XIFAXAN 550 MG tablet 2 times daily  Yes Historical Provider, MD   vitamin D (ERGOCALCIFEROL) 400 UNITS CAPS Take 400 Units by mouth daily. Yes Historical Provider, MD   Omega-3 Fatty Acids (FISH OIL) 500 MG CAPS Take 1,000 mg by mouth daily  Yes Historical Provider, MD   aspirin 81 MG tablet Take 81 mg by mouth daily.  Yes Historical Provider, MD      Scheduled Meds:   furosemide  40 mg Oral BID    spironolactone  150 mg Oral Daily    insulin lispro  0-6 Units Subcutaneous TID WC    insulin lispro  0-3 Units Subcutaneous Nightly    ketoconazole   Topical BID    aspirin  81 mg Oral Daily    b complex-C-folic acid   Oral Daily    citalopram  20 mg Oral Daily    lactulose  20 g Oral TID    levothyroxine  125 mcg Oral Daily    rifaximin  550 mg Oral BID    sodium chloride flush  10 mL Intravenous 2 times per day     Continuous Infusions:   dextrose       PRN Meds:sodium chloride flush, acetaminophen **OR** acetaminophen, polyethylene glycol, promethazine **OR** ondansetron, glucose, dextrose, glucagon (rDNA), dextrose     Past Medical History:  Past Medical History:   Diagnosis Date    [facility] on chronic diastolic [facility]     ANEMIA     CAD (coronary artery disease) 10/12/2011    Cirrhosis [facility]     Depression     Diabetes mellitus ([facility]     Esophageal bleed, non-variceal 11/23/2012    Hypertension     HYPOTHYROIDISM     Liver disease 1/6/2012    Mixed hyperlipidemia with close HF follow up from CV standpoint   - Will need BMP on Tues through Homecare  - HF Follow up scheduled with NP on 4/23  - Discharge on aldactone 100 mg daily (K+ trending up today) and 40 mg lasix daily   - Called and discussed POC with wife, who states homecare is set up, she can monitor oxygen level with oximetry at home and will call office with any issues     All pertinent information and plan of care discussed with cardiologist.    Multiple medical conditions with risk of decompensation. All questions and concerns were addressed to the patient. Alternatives to my treatment were discussed. I have discussed the above stated plan with patient and the nurse. The patient verbalized understanding and agreed with the plan. Thank you for allowing to us to participate in the care of Alyse Whipple.     NABIL Caldwell-CNP  [address]   Office: [phone]

## 2020-04-19 NOTE — DISCHARGE SUMMARY
Relevant Medical/Surgical History: na TECHNIQUE: Informed consent was obtained after a detailed explanation of the procedure including risks, benefits, and alternatives. Universal protocol was performed. The right chest was prepped and draped in sterile fashion and local anesthesia was achieved with lidocaine. A 5 [place] needle sheath was advanced under ultrasound guidance into pleural effusion and thoracentesis was performed. The patient tolerated the procedure well. FINDINGS: A total of 1.5 L blood tinged fluid was removed. Successful ultrasound guided right-sided thoracentesis.          Signed:    Bam Zafar MD   4/19/2020

## 2020-04-19 NOTE — PROGRESS NOTES
Data- discharge order received, pt verbalized agreement to discharge, [facility] [address] with juan miguel White  and/or new Durable Medical Equipment through [facility]  for oxygen, YURI reviewed and signed by MD, to be completed by RN. Action- AVS prepared, discharge instructions prepared and given to wife, medication information packet given r/t NEW or CHANGED prescriptions, pt verbalized understanding further self-review. D/C instruction summary: Diet- carb caontrol, Activity- as jp, follow up with Primary Care Physician Filipe Small -[phone] appointment , immunizations reviewed, medications prescriptions to be filled pts pharmacy. Response- Case Management/ reported faxing completed YURI and AVS to needed HHC/DME services stated above. Pt belongings gathered, IV removed, pt dressed . Disposition is home with HHC/DME as stated above, transported with wife, taken to lobby via w/c with this nurse, no complications.

## 2020-04-20 NOTE — TELEPHONE ENCOUNTER
[facility] FAILURE PROGRAM  TELEPHONE ENCOUNTER FORM    Cruz Molina 1945    ASSESSMENT:   1. Baseline weight:182  lbs  2. Current weight:  lbs  3. Patient weighing daily: Yes  4. Symptoms: dyspnea, edema  5. Patient knows who to call with symptoms: Yes  6. Diet history:      Patient states sodium limitation is : 3,000 mg      Patient states fluid limitation is 64 ounces  Patient following diet as instructed: Yes  7. Medication history: taking medications as instructed Yes; medication side effects noted No  8. Patient is involved in exercise program:   9. Patient:No  8. t knows date and time of follow up appointment: Yes  11. Patient has transportation to appointments: Yes    RECOMMENDATIONS:   1. Medication: none  2. Diet: low sodium  3. MD/ Clinic appointment: 4/23/20 at 8:30  4. Other: Patient sleeping. Spoke with his wife. States doesn't weigh daily and when he does it is after he eats. Proper weighing discussed. He is doing well today, good appetite. No shortness of breath or swelling. O2 saturation =94%. Continues to remain smoke free for a month. Wife monitors labels, but was unaware of fluid restriction. Wife hid salty snacks from patient.

## 2020-04-21 NOTE — ADT AUTH CERT
Pneumonia - Care Day 11 (4/18/2020) by Vic Shaw RN         Review Status Review Entered   Completed 4/21/2020 09:08       Criteria Review      Care Day: 11 Care Date: 4/18/2020 Level of Care: Telemetry    Guideline Day 3    Clinical Status    ( ) * Hemodynamic stability    4/21/2020 9:08 AM EDT by Amy Leung          (X) * Afebrile, or temperature acceptable for next level of care    (X) * Tachypnea absent    (X) * Hypoxemia absent    (X) * Mental status at baseline    (X) * Antibiotic regimen acceptable for next level of care    ( ) * Discharge plans and education understood    Activity    (X) * Ambulatory    Routes    (X) * Oral hydration, medications, and diet    Interventions    ( ) * Oxygen absent or at baseline need    4/21/2020 9:08 AM EDT by Amy Leung      O2 required NC 2-4l/min    (X) WBC    Medications    (X) Oral antibiotics    * Milestone   Additional Notes   4/18/2020         Blood pressure 126/82, pulse 110, temperature 97.4 °F (36.3 °C), temperature source Temporal, resp.  rate 18, height 5' 11\" (1.803 m), weight 182 lb 1.6 oz (82.6 kg), SpO2 94 % NC 3l/min      Labs      Sodium: 135 (L)   Potassium: 4.2   Chloride: 91 (L)   CO2: 37 (H)   BUN: 22 (H)   Creatinine: 1.1   Anion Gap: 7   GFR Non-: >60   GFR African American: >60   Glucose: 171 (H)   Calcium: 8.9   WBC: 4.7   RBC: 3.98 (L)   Hemoglobin Quant: 13.1 (L)   Hematocrit: 39.2 (L)   MCV: 98.7   MCH: 32.9   MCHC: 33.3   MPV: 7.6   RDW: 17.7 (H)   Platelet Count: 81 (L)      Orders:   Consult Pulmonary, Cardiology   furosemide (LASIX) injection 80 mg IV twice daily-am dose given and changed to 40 mg po twice daily   lactulose (CHRONULAC) 10 GM/15ML solution 20 g 3x/d po   rifaximin (XIFAXAN) tablet 550 mg po twice daily   spironolactone (ALDACTONE) tablet 150 mg po daily      Per Cardiology      Interval Hx: Pietro Cola being seen form follow up.  Continue to be SOB with activity although is has  Denies any shortness of breath, cough or chest pain. Problem List:   Acute hypoxic respiratory failure   Large right pleural effusion   Probable CHF decompensation/severe MR   Assessment/Plan:       Patient underwent thoracentesis x2 with a total of 3.25 L drained.  Improved oxygenation noted-currently on 4 L.  Continue with aggressive diuresis.       Continue with Lasix 80 mg twice daily and spironolactone.  Patient also has liver cirrhosis which could be contributing to fluid accumulation-hepatic hydrothorax.       Patient will perhaps require home oxygen upon discharge.  Possibly could go home in the next 1 to 2 days with home oxygen.  Will organize for follow-up with pulmonary as an outpatient-will sign off.       Per attending      SUBJECTIVE:    Patient seen and examined said he feels much better says he wants to go home no chest pain.  Cardiology follow-up evaluation noted patient still high flow of oxygen at 1.7 L of transudate effusion removed is now negative total of about 6 L   4/16   Seen and examined discussed with nurse and pulmonologist patient feels much better wife was on the phone and   Does  Not  Want  the patient to   go to [facility]      Wants Patient to come home upon discharge      ASSESSMENT:    1 acute on chronic diastolic heart failure clinically improved   Cardiology patient clinically stable       2 large pleural effusion status post 1.5 L thoracentesis fluid removed   Has undergone thoracentesis .  Still requires high dose of oxygen and pulmonology not ready for discharge yet       3 moderate mitral regurgitation with preserved left ventricular function   Cardiology following       4 acute hypoxic respiratory failure requiring high oxygen flow   Pulmonology following, patient clinically much better       5 diabetes continue with sliding scale       6 history of a CABG denies chest pain       7 chronic thrombocytopenia   Platelets 53,918       8 decompensated liver cirrhosis with portal

## 2020-04-21 NOTE — TELEPHONE ENCOUNTER
Prescription refill    Last OV: patient seen in the hospital 04/08/2020    Last Refill: in the hospital same day [facility] asking for 90 day supply    Labs:  04/19/2020    Future Appt: 04/23/2020

## 2020-04-22 NOTE — PROGRESS NOTES
Behavior: Behavior normal.         Thought Content: Thought content normal.         Judgment: Judgment normal.         Lab Data:    CBC:   Lab Results   Component Value Date    WBC 4.7 04/18/2020    WBC 4.9 04/15/2020    WBC 6.0 04/14/2020    RBC 3.98 04/18/2020    RBC 3.90 04/15/2020    RBC 4.27 04/14/2020    HGB 13.1 04/18/2020    HGB 12.7 04/15/2020    HGB 13.8 04/14/2020    HCT 39.2 04/18/2020    HCT 38.1 04/15/2020    HCT 42.0 04/14/2020    MCV 98.7 04/18/2020    MCV 97.8 04/15/2020    MCV 98.3 04/14/2020    RDW 17.7 04/18/2020    RDW 17.5 04/15/2020    RDW 17.8 04/14/2020    PLT 81 04/18/2020    PLT 66 04/15/2020    PLT 83 04/14/2020     BMP:  Lab Results   Component Value Date     04/19/2020     04/18/2020     04/17/2020    K 5.1 04/19/2020    K 4.2 04/18/2020    K 4.4 04/17/2020    K 4.0 04/09/2020    K 4.6 10/02/2019    K 4.7 06/03/2018    CL 90 04/19/2020    CL 91 04/18/2020    CL 93 04/17/2020    CO2 39 04/19/2020    CO2 37 04/18/2020    CO2 38 04/17/2020    PHOS 3.7 05/30/2018    BUN 24 04/19/2020    BUN 22 04/18/2020    BUN 20 04/17/2020    CREATININE 1.2 04/19/2020    CREATININE 1.1 04/18/2020    CREATININE 1.1 04/17/2020     BNP:   Lab Results   Component Value Date    PROBNP 661 04/17/2020    PROBNP 927 04/13/2020    PROBNP 900 04/08/2020     Iron Studies:    Lab Results   Component Value Date    FERRITIN 44.0 04/08/2020    FERRITIN 24.1 02/04/2013    FERRITIN 126.3 10/14/2011     Lab Results   Component Value Date    IRON 89 04/14/2020    TIBC 240 (L) 04/14/2020    FERRITIN 44.0 04/08/2020      Iron Deficiency Anemia:  No IV Iron Therapy:  No  2017 ACC/AHA HF Guidelines:   intravenous iron replacement in patients with New York Heart Association (NYHA) class II and III HF and iron deficiency(ferritin <100 ng/ml or 100-300 ng/ml if transferrin saturation <20%), to improve functional status and QoL. Assessment/Plan:    1.  Acute on chronic diastolic congestive heart failure

## 2020-04-27 NOTE — CARE COORDINATION
Patient resolved from the Care Transitions episode on 4/27/20  Patient/family has been provided the following resources and education related to COVID-19:                         Signs, symptoms and red flags related to COVID-19            CDC exposure and quarantine guidelines            Conduit exposure contact - [phone]            Contact for their local Department of Health                 Patient currently reports that the following symptoms have improved:  fever, fatigue, pain or aching joints, cough, shortness of breath, confusion or unusual change in mental status, chills or shaking, sweating, fast heart rate, fast breathing, dizziness/lightheadedness, less urine output, cold, clammy, and pale skin, low body temperature and no new/worsening symptoms     No further outreach scheduled with this CTN. Episode of Care resolved. Patient has this CTN contact information if future needs arise.

## 2020-05-07 NOTE — PROGRESS NOTES
Pneumonia     Sleep apnea     no cpap    Thrombocytopenia (HCC)     Thyroid disease      Social History:    Social History     Tobacco Use   Smoking Status Former Smoker    Packs/day: 0.45    Years: 43.00    Pack years: 19.35    Types: Cigarettes    Start date: 1965    Last attempt to quit: 3/1/2020    Years since quittin.1   Smokeless Tobacco Never Used     Current Medications:     Current Outpatient Medications on File Prior to Visit   Medication Sig Dispense Refill    furosemide (LASIX) 20 MG tablet Take 2 tablets by mouth daily 180 tablet 3    B-D 3CC LUER-ALEXIS SYR 25GX1\" 25G X 1\" 3 ML MISC USE WITH VITAMIN B-12 INJECTIONS 12 each 2    SITagliptin (JANUVIA) 100 MG tablet Take 1 tablet by mouth daily 90 tablet 3    lactulose (CHRONULAC) 10 GM/15ML solution Take 30 mLs by mouth 3 times daily 5 mL 0    cyanocobalamin 1000 MCG/ML injection INJECT 1 ML INTRAMUSCULARLY EVERY 30 DAYS 3 vial 2    nadolol (CORGARD) 20 MG tablet Take 1 tablet by mouth daily 90 tablet 3    citalopram (CELEXA) 20 MG tablet Take 1 tablet by mouth daily 90 tablet 3    spironolactone (ALDACTONE) 50 MG tablet Take 2 tablets by mouth daily 60 tablet 5    Levothyroxine Sodium 125 MCG CAPS Take by mouth Daily      CINNAMON PO Take 1,000 mg by mouth daily      b complex vitamins capsule Take 1 capsule by mouth daily      glipiZIDE (GLUCOTROL) 5 MG tablet Take 1 tablet by mouth 2 times daily (before meals) 180 tablet 3    MILK THISTLE PO Take 1,000 mg by mouth daily       XIFAXAN 550 MG tablet 2 times daily       vitamin D (ERGOCALCIFEROL) 400 UNITS CAPS Take 400 Units by mouth daily.  Omega-3 Fatty Acids (FISH OIL) 500 MG CAPS Take 1,000 mg by mouth daily       aspirin 81 MG tablet Take 81 mg by mouth daily. No current facility-administered medications on file prior to visit. Review of Systems   Constitutional: Negative for chills and fever. HENT: Negative for congestion and postnasal drip. Respiratory: Positive for shortness of breath. Negative for cough. Cardiovascular: Negative for chest pain and leg swelling. Gastrointestinal: Negative for abdominal pain and constipation. Musculoskeletal: Negative for arthralgias and joint swelling. Skin: Negative for color change and pallor. Allergic/Immunologic: Negative for environmental allergies and food allergies. Psychiatric/Behavioral: Negative for agitation and confusion. Objective:       VITALS:  /62   Pulse 67   SpO2 93% Comment: RA at rest     Physical Exam  Nursing note reviewed. Constitutional:       General: He is not in acute distress. Appearance: He is well-developed. HENT:      Head: Normocephalic. Mouth/Throat:      Pharynx: No oropharyngeal exudate. Eyes:      General:         Right eye: No discharge. Left eye: No discharge. Conjunctiva/sclera: Conjunctivae normal.      Pupils: Pupils are equal, round, and reactive to light. Neck:      Musculoskeletal: Normal range of motion and neck supple. Trachea: No tracheal deviation. Cardiovascular:      Rate and Rhythm: Normal rate and regular rhythm. Heart sounds: No friction rub. Pulmonary:      Effort: Pulmonary effort is normal. No tachypnea, accessory muscle usage or respiratory distress. Breath sounds: No stridor. Examination of the right-lower field reveals decreased breath sounds. Examination of the left-lower field reveals decreased breath sounds. Decreased breath sounds present. No wheezing or rales. Chest:      Chest wall: No tenderness or crepitus. Abdominal:      General: Bowel sounds are normal. There is no distension. Palpations: Abdomen is soft. Tenderness: There is no abdominal tenderness. Musculoskeletal: Normal range of motion. Lymphadenopathy:      Cervical: No cervical adenopathy. Skin:     General: Skin is warm and dry. Findings: No erythema.    Neurological:      Mental Status: He is

## 2020-05-07 NOTE — RESULT ENCOUNTER NOTE
Effusion remains small but slightly increased in the interval. Continue to monitor symptoms, plan for repeat CXR prior to next appointment.  Also needs f/u with CHF team.    Rodolfo Foster MSN APRN-ACNP CCRN

## 2020-05-20 NOTE — TELEPHONE ENCOUNTER
Patient requesting a medication refill.     Medication: SITagliptin (JANUVIA) 100 MG tablet       Pharmacy:  [address], [place], [address] [place] [address] [phone]  Last office visit: 4/8/2020  Next office visit: 6/11/2020

## 2020-06-12 NOTE — TELEPHONE ENCOUNTER
----- Message from NABIL Suárez - CNP sent at 6/12/2020  8:28 AM EDT -----  Labs look good, no change in meds.  Rajeev Mckeon

## 2020-06-30 NOTE — PROGRESS NOTES
[facility]  Advanced CHF/Pulmonary Hypertension   Cardiac Evaluation      Lilliam Watson  YOB: 1945    Date of Visit:  6/30/20      No chief complaint on file. History of Present Illness:  Lilliam Watson is a 70 y.o. male who presents for a follow up from a recent hospitalization for heart failure exacerbation. He has a history of CAD, DM, HTN, anemia, cirrhosis, and parox AFIB. He was recently admitted to the hospital for acute hypoxic respiratory failure. He also had a pleural effusion while admitted which resulted in a thoracentesis with 1.5L removed. The patient was also treated with lasix and discharged home on 2L of O2. Amyloid was ruled out with patient.      Today,      Allergies   Allergen Reactions    Cephalexin Hives    Ciprocinonide [Fluocinolone]     Quinolones Other (See Comments)     Confusion      Statins Other (See Comments)     Liver toxicity     Current Outpatient Medications   Medication Sig Dispense Refill    citalopram (CELEXA) 20 MG tablet TAKE ONE TABLET BY MOUTH DAILY 90 tablet 2    glipiZIDE (GLUCOTROL) 5 MG tablet TAKE ONE TABLET BY MOUTH TWICE A DAY BEFORE MEALS 180 tablet 2    levothyroxine (SYNTHROID) 150 MCG tablet TAKE ONE TABLET BY MOUTH DAILY 90 tablet 2    cyanocobalamin 1000 MCG/ML injection INJECT 1 ML INTRAMUSCULARLY ONCE EVERY 30 DAYS 10 mL 1    furosemide (LASIX) 20 MG tablet Take 2 tablets by mouth daily 180 tablet 3    B-D 3CC LUER-ALEXIS SYR 25GX1\" 25G X 1\" 3 ML MISC USE WITH VITAMIN B-12 INJECTIONS 12 each 2    SITagliptin (JANUVIA) 100 MG tablet Take 1 tablet by mouth daily 90 tablet 3    lactulose (CHRONULAC) 10 GM/15ML solution Take 30 mLs by mouth 3 times daily 5 mL 0    nadolol (CORGARD) 20 MG tablet Take 1 tablet by mouth daily 90 tablet 3    spironolactone (ALDACTONE) 50 MG tablet Take 2 tablets by mouth daily 60 tablet 5    CINNAMON PO Take 1,000 mg by mouth daily      b complex vitamins capsule Take 1 capsule by mouth daily      MILK THISTLE PO Take 1,000 mg by mouth daily       XIFAXAN 550 MG tablet 2 times daily       vitamin D (ERGOCALCIFEROL) 400 UNITS CAPS Take 400 Units by mouth daily.  Omega-3 Fatty Acids (FISH OIL) 500 MG CAPS Take 1,000 mg by mouth daily       aspirin 81 MG tablet Take 81 mg by mouth daily. No current facility-administered medications for this visit.         Past Medical History:   Diagnosis Date    Acute on chronic diastolic congestive [facility] failure [facility]     ANEMIA     CAD (coronary artery disease) 10/12/2011    Cirrhosis ([facility]     Depression     Diabetes mellitus ([facility]     Esophageal bleed, non-variceal 11/23/2012    Hypertension     HYPOTHYROIDISM     Liver disease 1/6/2012    Mixed hyperlipidemia     NEUROPATHY     Non-ST elevation MI (NSTEMI) ([facility] 11/21/2012    Pancytopenia ([facility]     Paroxysmal atrial fibrillation ([facility] 1/6/2012    Pneumonia     Sleep apnea     no cpap    Thrombocytopenia ([facility]     Thyroid disease      Past Surgical History:   Procedure Laterality Date    ACHILLES TENDON SURGERY      CARDIAC SURGERY      3 vessel cabg    CHOLECYSTECTOMY      COLONOSCOPY      CORONARY ARTERY BYPASS GRAFT  10/2011    cabg x3    ENDOSCOPY, COLON, DIAGNOSTIC      PTCA  11/23/2012    PTCA RCA & RPL    TONSILLECTOMY      UPPER GASTROINTESTINAL ENDOSCOPY  11/23/2012    multiple thin linear esophageal mucosal breaks    UPPER GASTROINTESTINAL ENDOSCOPY  3/6/2014    UPPER GASTROINTESTINAL ENDOSCOPY N/A 6/7/2019    EGD BAND LIGATION performed by Sukhi Burk MD at [facility] 5 N/A 7/15/2019    EGD BIOPSY performed by Sukhi Burk MD at [facility] 5 N/A 7/15/2019    EGD BAND LIGATION performed by Sukhi Burk MD at [id number] [facility] ENDOSCOPY     Family History   Problem Relation Age of Onset    Heart Disease Mother         arrythmia    Diabetes Father     Cancer Father  Heart Disease Father     Anesth Problems Neg Hx     Malig Hyperten Neg Hx     Hypotension Neg Hx     Malig Hypertherm Neg Hx     Pseudochol. Deficiency Neg Hx      Social History     Socioeconomic History    Marital status:      Spouse name: Not on file    Number of children: 1    Years of education: Not on file    Highest education level: Not on file   Occupational History    Occupation: retired: /   Social Needs    Financial resource strain: Not on file    Food insecurity     Worry: Not on file     Inability: Not on file   [employer] needs     Medical: Not on file     Non-medical: Not on file   Tobacco Use    Smoking status: Former Smoker     Packs/day: 0.45     Years: 43.00     Pack years: 19.35     Types: Cigarettes     Start date: 1965     Last attempt to quit: 3/1/2020     Years since quittin.3    Smokeless tobacco: Never Used   Substance and Sexual Activity    Alcohol use: No     Comment: RARELY;  approx once every 6 months    Drug use: No    Sexual activity: Not Currently     Partners: Female   Lifestyle    Physical activity     Days per week: Not on file     Minutes per session: Not on file    Stress: Not on file   Relationships    Social connections     Talks on phone: Not on file     Gets together: Not on file     Attends Holiness service: Not on file     Active member of club or organization: Not on file     Attends meetings of clubs or organizations: Not on file     Relationship status: Not on file    Intimate partner violence     Fear of current or ex partner: Not on file     Emotionally abused: Not on file     Physically abused: Not on file     Forced sexual activity: Not on file   Other Topics Concern    Not on file   Social History Narrative    Has 2 step children and 1 biologic child       Review of Systems:   · Constitutional: there has been no unanticipated weight loss.  There's been no change in energy level, sleep pattern, or activity level. · Eyes: No visual changes or diplopia. No scleral icterus. · ENT: No Headaches, hearing loss or vertigo. No mouth sores or sore throat. · Cardiovascular: Reviewed in HPI  · Respiratory: No cough or wheezing, no sputum production. No hematemesis. · Gastrointestinal: No abdominal pain, appetite loss, blood in stools. No change in bowel or bladder habits. · Genitourinary: No dysuria, trouble voiding, or hematuria. · Musculoskeletal:  No gait disturbance, weakness or joint complaints. · Integumentary: No rash or pruritis. · Neurological: No headache, diplopia, change in muscle strength, numbness or tingling. No change in gait, balance, coordination, mood, affect, memory, mentation, behavior. · Psychiatric: No anxiety, no depression. · Endocrine: No malaise, fatigue or temperature intolerance. No excessive thirst, fluid intake, or urination. No tremor. · Hematologic/Lymphatic: No abnormal bruising or bleeding, blood clots or swollen lymph nodes. · Allergic/Immunologic: No nasal congestion or hives. Physical Examination:    There were no vitals filed for this visit. There is no height or weight on file to calculate BMI.      Wt Readings from Last 3 Encounters:   06/11/20 191 lb (86.6 kg)   04/23/20 185 lb (83.9 kg)   04/17/20 182 lb 1.6 oz (82.6 kg)     BP Readings from Last 3 Encounters:   06/11/20 120/66   05/07/20 118/62   04/23/20 98/64     Constitutional and General Appearance:   WD/WN in NAD  HEENT:  NC/AT  PAOLA  No problems with hearing  Skin:  Warm, dry  Respiratory:  · Normal excursion and expansion without use of accessory muscles  · Resp Auscultation: Normal breath sounds without dullness  Cardiovascular:  · The apical impulses not displaced  · Heart tones are crisp and normal  · Cervical veins are not engorged  · The carotid upstroke is normal in amplitude and contour without delay or bruit  · JVP less than 8 cm H2O  RRR with nl S1 and S2 without m,r,g  · Peripheral pulses are symmetrical and full  · There is no clubbing, cyanosis of the extremities. · No edema  · Femoral Arteries: 2+ and equal  · Pedal Pulses: 2+ and equal   Neck:  · No thyromegaly  Abdomen:  · No masses or tenderness  · Liver/Spleen: No Abnormalities Noted  Neurological/Psychiatric:  · Alert and oriented in all spheres  · Moves all extremities well  · Exhibits normal gait balance and coordination  · No abnormalities of mood, affect, memory, mentation, or behavior are noted     Echo 4/20   Irregular rhythm, Left ventricular systolic function is normal with ejection fraction   estimated at 60-65 %. There is moderate concentric left ventricular hypertrophy. Left ventricle size is normal. Moderately severe mitral regurgitation. Reported as mild MR in 9217 Diastolic dysfunction grade and filing pressure are indeterminate. There is a trivial circumferential pericardial effusion noted. There is a moderate left pleural effusion. Previous echo done 2012 - EF 55%    MPI 11/12  The wall motion of the left ventricle appeared within   normal limits as did the ejection fraction, estimated at 52%. The   stress and rest SPECT images however demonstrated a focal area of   stress ischemic change involving the inferolateral wall of the   left ventricle. This region demonstrated redistribution of isotope   on rest phase. Assessment:    No diagnosis found. 1. Acute on chronic diastolic congestive heart failure  ECHO 4/20-> EF=60-65%    2. CAD  Stable on BB, ASA, no statin due to liver disease    3. HTN  There were no vitals taken for this visit. Plan:  ***      QUALITY MEASURES  1. Tobacco Cessation Counseling: {YES/NO:19732}  2. Retake of BP if >140/90:   {YES/NO:19732}  3. Documentation to PCP/referring for new patient:  Sent to PCP at close of office visit  4. CAD patient on anti-platelet: {UUV/QU:12074}  5. CAD patient on STATIN therapy:  {YES/NO:19732}  6.  Patient with CHF and aFib on anticoagulation: {YES/NO:19732}         I appreciate the opportunity of cooperating in the care of this patient.     David Marie M.D., [facility]

## 2020-07-01 NOTE — PATIENT INSTRUCTIONS
1. Plan for bloodwork in August (CBC, CMP, BNP)  2. No medication changes  3.  Follow up in 3 months with Bakari Montes

## 2020-07-01 NOTE — PROGRESS NOTES
ONCE EVERY 30 DAYS 10 mL 1    furosemide (LASIX) 20 MG tablet Take 2 tablets by mouth daily 180 tablet 3    SITagliptin (JANUVIA) 100 MG tablet Take 1 tablet by mouth daily 90 tablet 3    lactulose (CHRONULAC) 10 GM/15ML solution Take 30 mLs by mouth 3 times daily 5 mL 0    nadolol (CORGARD) 20 MG tablet Take 1 tablet by mouth daily 90 tablet 3    spironolactone (ALDACTONE) 50 MG tablet Take 2 tablets by mouth daily 60 tablet 5    CINNAMON PO Take 1,000 mg by mouth daily      b complex vitamins capsule Take 1 capsule by mouth daily      MILK THISTLE PO Take 1,000 mg by mouth daily       XIFAXAN 550 MG tablet 2 times daily       vitamin D (ERGOCALCIFEROL) 400 UNITS CAPS Take 400 Units by mouth daily.  Omega-3 Fatty Acids (FISH OIL) 500 MG CAPS Take 1,000 mg by mouth daily       aspirin 81 MG tablet Take 81 mg by mouth daily.  B-D 3CC LUER-ALEXIS SYR 25GX1\" 25G X 1\" 3 ML MISC USE WITH VITAMIN B-12 INJECTIONS 12 each 2     No current facility-administered medications for this visit.         Past Medical History:   Diagnosis Date    Acute on chronic diastolic congestive heart failure (HCC)     ANEMIA     CAD (coronary artery disease) 10/12/2011    Cirrhosis ([address]     Depression     Diabetes mellitus ([address]     Esophageal bleed, non-variceal 11/23/2012    Hypertension     HYPOTHYROIDISM     Liver disease 1/6/2012    Mixed hyperlipidemia     NEUROPATHY     Non-ST elevation MI (NSTEMI) ([address] 11/21/2012    Pancytopenia ([address]     Paroxysmal atrial fibrillation ([address] 1/6/2012    Pneumonia     Sleep apnea     no cpap    Thrombocytopenia ([address]     Thyroid disease      Past Surgical History:   Procedure Laterality Date    ACHILLES TENDON SURGERY      CARDIAC SURGERY      3 vessel cabg    CHOLECYSTECTOMY      COLONOSCOPY      CORONARY ARTERY BYPASS GRAFT  10/2011    cabg x3    ENDOSCOPY, COLON, DIAGNOSTIC      PTCA  11/23/2012    PTCA RCA & RPL    TONSILLECTOMY      UPPER GASTROINTESTINAL ENDOSCOPY  2012    multiple thin linear esophageal mucosal breaks    UPPER GASTROINTESTINAL ENDOSCOPY  3/6/2014    UPPER GASTROINTESTINAL ENDOSCOPY N/A 2019    EGD BAND LIGATION performed by Yandel Manzano MD at [address] N/A 7/15/2019    EGD BIOPSY performed by Yandel Manzano MD at [address] N/[place] 7/15/2019    EGD BAND LIGATION performed by Yandel Manzano MD at [address] ENDOSCOPY     Family History   Problem Relation Age of Onset    Heart Disease Mother         arrythmia    Diabetes Father     Cancer Father     Heart Disease Father     Anesth Problems Neg Hx     Malig Hyperten Neg Hx     Hypotension Neg Hx     Malig Hypertherm Neg Hx     Pseudochol.  Deficiency Neg Hx      Social History     Socioeconomic History    Marital status:      Spouse name: Not on file    Number of children: 1    Years of education: Not on file    Highest education level: Not on file   Occupational History    Occupation: retired: /   Social Needs    Financial resource strain: Not on file    Food insecurity     Worry: Not on file     Inability: Not on file   [employer] needs     Medical: Not on file     Non-medical: Not on file   Tobacco Use    Smoking status: Former Smoker     Packs/day: 0.45     Years: 43.00     Pack years: 19.35     Types: Cigarettes     Start date: 1965     Last attempt to quit: 3/1/2020     Years since quittin.3    Smokeless tobacco: Never Used   Substance and Sexual Activity    Alcohol use: No     Comment: RARELY;  approx once every 6 months    Drug use: No    Sexual activity: Not Currently     Partners: Female   Lifestyle    Physical activity     Days per week: Not on file     Minutes per session: Not on file    Stress: Not on file   Relationships    Social connections     Talks on phone: Not on file     Gets together: Not on file     Attends [employer] service: Not on file     Active member of club or organization: Not on file     Attends meetings of clubs or organizations: Not on file     Relationship status: Not on file    Intimate partner violence     Fear of current or ex partner: Not on file     Emotionally abused: Not on file     Physically abused: Not on file     Forced sexual activity: Not on file   Other Topics Concern    Not on file   Social History Narrative    Has 2 step children and 1 biologic child       Review of Systems:   · Constitutional: there has been no unanticipated weight loss. There's been no change in energy level, sleep pattern, or activity level. · Eyes: No visual changes or diplopia. No scleral icterus. · ENT: No Headaches, hearing loss or vertigo. No mouth sores or sore throat. · Cardiovascular: Reviewed in HPI  · Respiratory: No cough or wheezing, no sputum production. No hematemesis. · Gastrointestinal: No abdominal pain, appetite loss, blood in stools. No change in bowel or bladder habits. · Genitourinary: No dysuria, trouble voiding, or hematuria. · Musculoskeletal:  No gait disturbance, weakness or joint complaints. · Integumentary: No rash or pruritis. · Neurological: No headache, diplopia, change in muscle strength, numbness or tingling. No change in gait, balance, coordination, mood, affect, memory, mentation, behavior. · Psychiatric: No anxiety, no depression. · Endocrine: No malaise, fatigue or temperature intolerance. No excessive thirst, fluid intake, or urination. No tremor. · Hematologic/Lymphatic: No abnormal bruising or bleeding, blood clots or swollen lymph nodes. · Allergic/Immunologic: No nasal congestion or hives. Physical Examination:    Vitals:    07/01/20 1423   BP: 94/68   Pulse: 80   Weight: 196 lb (88.9 kg)   Height: 5' 11\" (1.803 m)     Body mass index is 27.34 kg/m².      Wt Readings from Last 3 Encounters:   07/01/20 196 lb (88.9 kg)   06/11/20 191 lb (86.6 kg)   04/23/20 185 lb (83.9 kg)     BP Readings from Last 3 Encounters:   07/01/20 94/68   06/11/20 120/66   05/07/20 118/62     Constitutional and General Appearance:   WD/WN in NAD  HEENT:  NC/AT  PAOLA  No problems with hearing  Skin:  Warm, dry  Respiratory:  · Normal excursion and expansion without use of accessory muscles  · Resp Auscultation: Normal breath sounds without dullness  Cardiovascular:  · The apical impulses not displaced  · Heart tones are crisp and normal  · Cervical veins are not engorged  · The carotid upstroke is normal in amplitude and contour without delay or bruit  · JVP less than 8 cm H2O  RRR with nl S1 and S2 without m,r,g  · Peripheral pulses are symmetrical and full  · There is no clubbing, cyanosis of the extremities. · No edema  · Femoral Arteries: 2+ and equal  · Pedal Pulses: 2+ and equal   Neck:  · No thyromegaly  Abdomen:  · No masses or tenderness  · Liver/Spleen: No Abnormalities Noted  Neurological/Psychiatric:  · Alert and oriented in all spheres  · Moves all extremities well  · Exhibits normal gait balance and coordination  · No abnormalities of mood, affect, memory, mentation, or behavior are noted     Limited Echo 4/20   Irregular rhythm, Left ventricular systolic function is normal with ejection fraction   estimated at 60-65 %. There is moderate concentric left ventricular hypertrophy. Left ventricle size is normal. Moderately severe mitral regurgitation. Reported as mild MR in 0759 Diastolic dysfunction grade and filing pressure are indeterminate. There is a trivial circumferential pericardial effusion noted. There is a moderate left pleural effusion. Previous echo done 2012 - EF 55%    MPI 11/12  The wall motion of the left ventricle appeared within   normal limits as did the ejection fraction, estimated at 52%. The   stress and rest SPECT images however demonstrated a focal area of   stress ischemic change involving the inferolateral wall of the   left ventricle. This region demonstrated redistribution of isotope   on rest phase. Assessment:    1. Chronic diastolic heart failure ([address]    2. Coronary artery disease involving native coronary artery of native heart without angina pectoris    3. Essential hypertension         1. Acute on chronic diastolic congestive heart failure  ECHO 4/20-> EF=60-65%  Compensated today by exam.   Continue current medications- Lasix, Nadolol, Spironolactone    2. CAD  CABG x3 10/18/2011,  LIMA-LAD, SVG-PDA, SVG-OM1 (Dr. Mitchel Valerio)  11/23/12 LHC> BMS to mid-RCA, PTCA prox. RDL, patent grafts   Stable on BB, ASA, no statin due to liver disease    3. HTN  BP 94/68   Pulse 80   Ht 5' 11\" (1.803 m)   Wt 196 lb (88.9 kg)   BMI 27.34 kg/m²   Stable. No medication changes    Plan:  1. Plan for bloodwork in August (CBC, CMP, BNP)  2. No medication changes  3. Follow up in 3 months with [address]  1. Tobacco Cessation Counseling: Yes  2. Retake of BP if >140/90:   Yes  3. Documentation to PCP/referring for new patient:  Sent to PCP at close of office visit  4. CAD patient on anti-platelet: Yes  5. CAD patient on STATIN therapy:  Yes  6. Patient with CHF and aFib on anticoagulation:  NA     Scribe's Attestation: This note was scribed in the presence of Dr. Sierra Vegas MD by Deneen Chowdhury RN. The scribe's documentation has been prepared under my direction and personally reviewed by me in its entirety. I confirm that the note above accurately reflects all work, treatment, procedures, and medical decision making performed by me. I appreciate the opportunity of cooperating in the care of this patient.     Kathryn Persaud M.D., [facility]

## 2020-07-13 NOTE — TELEPHONE ENCOUNTER
Pt having symptoms of uti, wanting something called in for it if possible, will do urine sample if necessary     [phone]

## 2020-07-31 NOTE — TELEPHONE ENCOUNTER
Wife is asking if pt's lab orders could be faxed to Dr Michaela Conroy office. Wife only has phone  [phone].   Any questions please call Noni Buerger (wife.)

## 2020-08-18 NOTE — TELEPHONE ENCOUNTER
Spoke to wife. Discussed lab work with his wife. She denies any black or bloody stools for her . Claudy is stable. Denies SOB and has minimal feet puffiness for which he wears support stockings.

## 2020-08-21 NOTE — TELEPHONE ENCOUNTER
----- Message from Linda Wong MD sent at 8/20/2020 11:42 PM EDT -----  Please call patient. Labs look good. No changes.   LY

## 2020-08-28 NOTE — TELEPHONE ENCOUNTER
----- Message from Wardlupe Melissa sent at 8/28/2020  8:39 AM EDT -----  Subject: Message to Provider    QUESTIONS  Information for Provider? Pt. wife called in stated pt. had a cough and   chest tightness. she had to go into work she will call back later to be   transferred to nurse triage. ---------------------------------------------------------------------------  --------------  Ricky MASON  What is the best way for the office to contact you? OK to leave message on   voicemail  Do not leave any message   patient will call back for answer  Preferred Call Back Phone Number? [phone]  ---------------------------------------------------------------------------  --------------  SCRIPT ANSWERS  Relationship to Patient? Other  Representative Name? Natty  Is the Representative on the appropriate HIPAA document in Epic?  Yes

## 2020-08-28 NOTE — TELEPHONE ENCOUNTER
C/o  Cough - clear sputum --more like a coughing spasm that makes it hard to breath    no fever   Some sinus congestion   X 2 weeks   Not taking anything for symptoms      Wife made VV

## 2020-08-28 NOTE — PROGRESS NOTES
2020    TELEHEALTH EVALUATION -- Audio/Visual (During THYWH-56 public health emergency)    HPI:    Eunice Cullen (:  1945) has requested an audio/video evaluation for the following concern(s):    The patient was seen via video visit for the complaint of some sinus congestion, postnasal drip and cough thought to be secondary to the above symptoms    He denies high fever, shaking chills or night sweats    He is chronically ill, chronically maintained on oxygen    History is primarily obtained from his wife, she denies any recent bleeding episodes secondary to his esophageal varices and cirrhosis. Review of Systems    Prior to Visit Medications    Medication Sig Taking?  Authorizing Provider   azithromycin (ZITHROMAX) 250 MG tablet 2 day one then one daily till gone ( Maria Dolores Johnston) Yes Roxene Mortimer, MD   ONGLYZA 5 MG TABS tablet   Historical Provider, MD   citalopram (CELEXA) 20 MG tablet TAKE ONE TABLET BY MOUTH DAILY  Roxene Mortimer, MD   glipiZIDE (GLUCOTROL) 5 MG tablet TAKE ONE TABLET BY MOUTH TWICE A DAY BEFORE MEALS  Roxene Mortimer, MD   levothyroxine (SYNTHROID) 150 MCG tablet TAKE ONE TABLET BY MOUTH DAILY  Patient taking differently: Take 137 mcg by mouth Daily   Roxene Mortimer, MD   cyanocobalamin 1000 MCG/ML injection INJECT 1 ML INTRAMUSCULARLY ONCE EVERY 30 DAYS  Roxene Mortimer, MD   furosemide (LASIX) 20 MG tablet Take 2 tablets by mouth daily  NABIL Trejo - CNP   B-D 3CC LUER-ALEXIS SYR 25GX1\" 25G X 1\" 3 ML MISC USE WITH VITAMIN B-12 INJECTIONS  Roxene Mortimer, MD   SITagliptin (JANUVIA) 100 MG tablet Take 1 tablet by mouth daily  Roxene Mortimer, MD   lactulose (CHRONULAC) 10 GM/15ML solution Take 30 mLs by mouth 3 times daily  David Cuenca MD   nadolol (CORGARD) 20 MG tablet Take 1 tablet by mouth daily  Roxene Mortimer, MD   spironolactone (ALDACTONE) 50 MG tablet Take 2 tablets by mouth daily  Melony Mei MD   CINNAMON PO Take 1,000 mg by mouth daily  Historical Provider, MD   b complex vitamins capsule Take 1 capsule by mouth daily  Historical Provider, MD   MILK THISTLE PO Take 1,000 mg by mouth daily   Historical Provider, MD   XIFAXAN 550 MG tablet 2 times daily   Historical Provider, MD   vitamin D (ERGOCALCIFEROL) 400 UNITS CAPS Take 400 Units by mouth daily. Historical Provider, MD   Omega-3 Fatty Acids (FISH OIL) 500 MG CAPS Take 1,000 mg by mouth daily   Historical Provider, MD   aspirin 81 MG tablet Take 81 mg by mouth daily. Historical Provider, MD       Social History     Tobacco Use    Smoking status: Former Smoker     Packs/day: 0.45     Years: 43.00     Pack years: 19.35     Types: Cigarettes     Start date: 1965     Last attempt to quit: 3/1/2020     Years since quittin.4    Smokeless tobacco: Never Used   Substance Use Topics    Alcohol use: No     Comment: RARELY;  approx once every 6 months    Drug use: No            PHYSICAL EXAMINATION:  [ INSTRUCTIONS:  \"[x]\" Indicate s a positive item  \"[]\" Indicates a negative item  -- DELETE ALL ITEMS NOT EXAMINED]  Vital Signs: (As obtained by patient/caregiver or practitioner observation)    Blood pressure-  Heart rate-    Respiratory rate-    Temperature-  Pulse oximetry-     Constitutional: [] Appears well-developed and well-nourished [] No apparent distress      [x] Abnormal-appears chronically ill mental status  [] Alert and awake  [] Oriented to person/place/time []Able to follow commands      Eyes:  EOM    [x]  Normal  [] Abnormal-  Sclera  []  Normal  [] Abnormal -         Discharge []  None visible  [] Abnormal -    HENT:   [x] Normocephalic, atraumatic.   [] Abnormal   [] Mouth/Throat: Mucous membranes are moist.     External Ears [x] Normal  [] Abnormal-     Neck: [x] No visualized mass     Pulmonary/Chest: [x] Respiratory effort normal.  [] No visualized signs of difficulty breathing or respiratory distress        [] Abnormal-on oxygen no dyspnea at rest     Musculoskeletal:   [] Normal gait with no signs of ataxia         [] Normal range of motion of neck        [] Abnormal-       Neurological:        [x] No Facial Asymmetry (Cranial nerve 7 motor function) (limited exam to video visit)          [] No gaze palsy        [] Abnormal-         Skin:        [x] No significant exanthematous lesions or discoloration noted on facial skin         [] Abnormal-            Psychiatric:       [] Normal Affect [] No Hallucinations        [] Abnormal-     Other pertinent observable physical exam findings-     ASSESSMENT/PLAN:  1. Acute sinusitis, recurrence not specified, unspecified location  The patient has not been out of the home, I believe he is at low risk for COVID-19, will treatment for sinusitis, advised his wife that he should be tested should he develop increasing symptoms and provided with the number of the [facility].  - azithromycin (ZITHROMAX) 250 MG tablet; 2 day one then one daily till gone ( Z Daniel)  Dispense: 1 packet; Refill: 0      No follow-ups on file. Meir Saha is a 70 y.o. male being evaluated by a Virtual Visit (video visit) encounter to address concerns as mentioned above. A caregiver was present when appropriate. Due to this being a TeleHealth encounter (During EJMOX-49 public health emergency), evaluation of the following organ systems was limited: Vitals/Constitutional/EENT/Resp/CV/GI//MS/Neuro/Skin/Heme-Lymph-Imm. Pursuant to the emergency declaration under the [address] [place] [address], [address] [place] [address] authority and the [employer] Act, this Virtual Visit was conducted with patient's (and/or legal guardian's) consent, to reduce the patient's risk of exposure to COVID-19 and provide necessary medical care. The patient (and/or legal guardian) has also been advised to contact this office for worsening conditions or problems, and seek emergency medical treatment and/or call 911 if deemed necessary. Patient identification was verified at the start of the visit: Yes    Total time spent on this encounter: Not billed by time    Services were provided through a video synchronous discussion virtually to substitute for in-person clinic visit. Patient and provider were located at their individual homes. --Deandre Atkinson MD on 8/28/2020 at 12:31 PM    An electronic signature was used to authenticate this note.

## 2020-08-31 PROBLEM — J96.01 ACUTE RESPIRATORY FAILURE WITH HYPOXIA (HCC): Status: ACTIVE | Noted: 2020-01-01

## 2020-08-31 PROBLEM — J96.01 ACUTE HYPOXEMIC RESPIRATORY FAILURE (HCC): Status: ACTIVE | Noted: 2020-01-01

## 2020-08-31 NOTE — TELEPHONE ENCOUNTER
I placed him on antibiotic after our last encounter    If he is deteriorating would recommend ER evaluation

## 2020-08-31 NOTE — ED NOTES
Pt comes in today with SOB that has been going on for three weeks and it is progressively getting worse. Pt was told to come by PCP. C/o bilateral leg swelling as well and abd distention noted. Wife states that his recent admission he was diagnosed wit heart failure. Denies any other complaints. Wife at bedside. Pt hooked up to monitor and NSR noted. Will continue to monitor.       Ayesha Burgess RN  08/31/20 1538

## 2020-08-31 NOTE — LETTER
[facility]  [address]  Phone: [phone]            September 7, 2020     Patient: Maritza Galdamez   YOB: 1945   Date of Visit: 8/31/2020       To Whom It May Concern:    Kristie Smith was hospitalized Monday 8/31 through Monday 9/7. It is recommended wife remain home with him for next couple of days. If you have any questions or concerns, please don't hesitate to call.     Sincerely,        Franca Starr, APRN-CNP

## 2020-08-31 NOTE — TELEPHONE ENCOUNTER
Pt on ATB   C/o abd distended --  Feet swelling     Pt is SOB even on O2       Wheezing   No fever   On lasix 20 mg bid per wife   Wife concerned about pneumonia  And the SOB     Dr Birch Cons office will be calling them to set up appt to drain fluid /ascites    Wife not sure if he should go to ER for his SOB and concern for pneumonia

## 2020-08-31 NOTE — ED PROVIDER NOTES
[address] [facility]        Pt Name: Nisreen Beauchamp  MRN: [id number]  [place] 1945  Date of evaluation: 8/31/2020  Provider: Kash Hayes PA-C  PCP: Sandra Ferrari MD     I have seen and evaluated this patient with my supervising physician Emily Babcock MD.    [address] [place] [address]       Chief Complaint   Patient presents with    Shortness of Breath     Pt to er With c/o SOB for three weeks, progrssively getting worse. told to come by PCP. bilateral foot swelling. pcp wants pt tested for COVID       HISTORY OF PRESENT ILLNESS   (Location, Timing/Onset, Context/Setting, Quality, Duration, Modifying Factors, Severity, Associated Signs and Symptoms)  Note limiting factors. Nisreen Beauchamp is a 70 y.o. male with history of cirrhosis, CAD, diabetes, hypothyroidism, hyperlipidemia, hypertension, paroxysmal atrial fibrillation and sleep apnea presents for evaluation of increasing shortness of breath x3 weeks. Wife is at bedside and provides most of history. Patient just keeps stating that he cannot breathe. Wife states that they are both sick with cold-like symptoms a couple of weeks ago. They had virtual visit with . he was started on Z-Daniel. She states that from a respiratory standpoint his symptoms did start to improve but she has noticed recently that he is starting to swell. He has edema to bilateral lower extremities and his abdomen is distended. She is concerned for possible increasing ascites causing lung compression and subsequent shortness of breath. She states the patient has not had any fevers. No recent body aches. No nausea vomiting or diarrhea. She states that his most recent admission he was diagnosed with heart failure. She states that she has been giving him Lasix, 20 mg twice daily. He also take spironolactone. He is wearing compression stockings and she states there is no improvement with this.   He does have a nonproductive cough. He denies any chest pain. He was sent home with recent admission on 3 L nasal cannula oxygen. Wife is increased this to 4 L. He has no other complaints or concerns at this time. Nursing Notes were all reviewed and agreed with or any disagreements were addressed in the HPI. REVIEW OF SYSTEMS    (2-9 systems for level 4, 10 or more for level 5)     Review of Systems   Constitutional: Negative for appetite change, chills and fever. HENT: Negative for congestion and rhinorrhea. Eyes: Negative for visual disturbance. Respiratory: Positive for cough and shortness of breath. Negative for wheezing. Cardiovascular: Positive for leg swelling. Negative for chest pain. Gastrointestinal: Positive for abdominal distention. Negative for abdominal pain, diarrhea, nausea and vomiting. Genitourinary: Negative for difficulty urinating, dysuria and hematuria. Musculoskeletal: Negative for neck pain and neck stiffness. Skin: Negative for rash. Neurological: Negative for dizziness, syncope, weakness, light-headedness and headaches. Positives and Pertinent negatives as per HPI. Except as noted above in the ROS, all other systems were reviewed and negative.        PAST MEDICAL HISTORY     Past Medical History:   Diagnosis Date    Acute on chronic diastolic congestive heart failure ([address]     ANEMIA     CAD (coronary artery disease) 10/12/2011    Cirrhosis ([address]     Depression     Diabetes mellitus ([address]     Esophageal bleed, non-variceal 11/23/2012    Hypertension     HYPOTHYROIDISM     Liver disease 1/6/2012    Mixed hyperlipidemia     NEUROPATHY     Non-ST elevation MI (NSTEMI) ([address] 11/21/2012    Pancytopenia ([address]     Paroxysmal atrial fibrillation ([address] 1/6/2012    Pneumonia     Sleep apnea     no cpap    Thrombocytopenia ([address]     Thyroid disease          SURGICAL HISTORY     Past Surgical History:   Procedure Laterality Date    ACHILLES TENDON SURGERY  CARDIAC SURGERY      3 vessel cabg    CHOLECYSTECTOMY      COLONOSCOPY      CORONARY ARTERY BYPASS GRAFT  10/2011    cabg x3    ENDOSCOPY, COLON, DIAGNOSTIC      PTCA  11/23/2012    PTCA RCA & RPL    TONSILLECTOMY      UPPER GASTROINTESTINAL ENDOSCOPY  11/23/2012    multiple thin linear esophageal mucosal breaks    UPPER GASTROINTESTINAL ENDOSCOPY  3/6/2014    UPPER GASTROINTESTINAL ENDOSCOPY N/A 6/7/2019    EGD BAND LIGATION performed by Mar Metz MD at [address] 7/15/2019    EGD BIOPSY performed by Mar Metz MD at [address] 7/15/2019    EGD BAND LIGATION performed by Mar Metz MD at [address]       Previous Medications    ASPIRIN 81 MG TABLET    Take 81 mg by mouth daily.     AZITHROMYCIN (ZITHROMAX) 250 MG TABLET    2 day one then one daily till gone ( Z Daniel)    B COMPLEX VITAMINS CAPSULE    Take 1 capsule by mouth daily    B-D 3CC LUER-ALEXIS SYR 25GX1\" 25G X 1\" 3 ML MISC    USE WITH VITAMIN B-12 INJECTIONS    CINNAMON PO    Take 1,000 mg by mouth daily    CITALOPRAM (CELEXA) 20 MG TABLET    TAKE ONE TABLET BY MOUTH DAILY    CYANOCOBALAMIN 1000 MCG/ML INJECTION    INJECT 1 ML INTRAMUSCULARLY ONCE EVERY 30 DAYS    FUROSEMIDE (LASIX) 20 MG TABLET    Take 2 tablets by mouth daily    GLIPIZIDE (GLUCOTROL) 5 MG TABLET    TAKE ONE TABLET BY MOUTH TWICE A DAY BEFORE MEALS    LACTULOSE (CHRONULAC) 10 GM/15ML SOLUTION    Take 30 mLs by mouth 3 times daily    LEVOTHYROXINE (SYNTHROID) 150 MCG TABLET    TAKE ONE TABLET BY MOUTH DAILY    MILK THISTLE PO    Take 1,000 mg by mouth daily     NADOLOL (CORGARD) 20 MG TABLET    Take 1 tablet by mouth daily    OMEGA-3 FATTY ACIDS (FISH OIL) 500 MG CAPS    Take 1,000 mg by mouth daily     ONGLYZA 5 MG TABS TABLET        SITAGLIPTIN (JANUVIA) 100 MG TABLET    Take 1 tablet by mouth daily    SPIRONOLACTONE (ALDACTONE) 50 MG TABLET    Take 2 tablets by mouth daily    VITAMIN D (ERGOCALCIFEROL) 400 UNITS CAPS    Take 400 Units by mouth daily. XIFAXAN 550 MG TABLET    2 times daily          ALLERGIES     Cephalexin; Ciprocinonide [fluocinolone]; Quinolones; and Statins    FAMILYHISTORY       Family History   Problem Relation Age of Onset    Heart Disease Mother         arrythmia    Diabetes Father     Cancer Father     Heart Disease Father     Anesth Problems Neg Hx     Malig Hyperten Neg Hx     Hypotension Neg Hx     Malig Hypertherm Neg Hx     Pseudochol. Deficiency Neg Hx           SOCIAL HISTORY       Social History     Tobacco Use    Smoking status: Former Smoker     Packs/day: 0.45     Years: 43.00     Pack years: 19.35     Types: Cigarettes     Start date: 1965     Last attempt to quit: 3/1/2020     Years since quittin.5    Smokeless tobacco: Never Used   Substance Use Topics    Alcohol use: No     Comment: RARELY;  approx once every 6 months    Drug use: No       SCREENINGS             PHYSICAL EXAM    (up to 7 for level 4, 8 or more for level 5)     ED Triage Vitals [20 1455]   BP Temp Temp Source Pulse Resp SpO2 Height Weight   110/76 98 °F (36.7 °C) Oral 116 22 99 % 5' 11\" (1.803 m) 198 lb (89.8 kg)       Physical Exam  Vitals signs and nursing note reviewed. Constitutional:       General: He is in acute distress. Appearance: He is well-developed. He is ill-appearing. He is not toxic-appearing or diaphoretic. HENT:      Head: Normocephalic and atraumatic. Right Ear: External ear normal.      Left Ear: External ear normal.      Nose: Nose normal.   Eyes:      General:         Right eye: No discharge. Left eye: No discharge. Neck:      Musculoskeletal: Normal range of motion and neck supple. Cardiovascular:      Rate and Rhythm: Tachycardia present. Rhythm irregular. Heart sounds: Normal heart sounds.    Pulmonary:      Effort: Respiratory distress present. Breath sounds: Decreased breath sounds and wheezing present. Abdominal:      General: There is distension. Palpations: Abdomen is soft. There is fluid wave. Tenderness: There is no abdominal tenderness. There is no guarding or rebound. Hernia: No hernia is present. Musculoskeletal: Normal range of motion. Right lower leg: Edema present. Left lower leg: Edema present. Skin:     General: Skin is warm and dry. Neurological:      Mental Status: He is alert and oriented to person, place, and time. Psychiatric:         Behavior: Behavior normal.         DIAGNOSTIC RESULTS   LABS:    Labs Reviewed   BASIC METABOLIC PANEL - Abnormal; Notable for the following components:       Result Value    Chloride 97 (*)     CO2 34 (*)     Glucose 183 (*)     All other components within normal limits    Narrative:     Performed at:  [facility] [address], [place], [address]   Phone [phone]   BRAIN NATRIURETIC PEPTIDE - Abnormal; Notable for the following components:    Pro- (*)     All other components within normal limits    Narrative:     Performed at:  [facility] [address], [place], [address]   Phone [phone]   CBC WITH AUTO DIFFERENTIAL - Abnormal; Notable for the following components:    RBC 3.73 (*)     Hemoglobin 11.6 (*)     Hematocrit 35.7 (*)     RDW 17.1 (*)     Platelets 90 (*)     Lymphocytes Absolute 0.9 (*)     All other components within normal limits    Narrative:     Performed at:  [facility] [address], [place], [address]   Phone [phone]   HEPATIC FUNCTION PANEL - Abnormal; Notable for the following components:     Total Protein 6.1 (*)     Alb 2.9 (*)     Alkaline Phosphatase 148 (*)     Total Bilirubin 2.3 (*)     Bilirubin, Direct 0.8 (*)     Bilirubin, Indirect 1.5 (*)     All other components within normal limits no acute distress and nontoxic. He is also tachycardic, A. fib RVR. He has significant decreased aeration bilaterally, scant wheezing to the left. His belly is distended, nontender with fluid wave. He has pitting edema up past his knees bilaterally. No skin changes. He was given IV Solu-Medrol and DuoNeb breathing treatment for symptomatic relief and will be reevaluated. CBC and BMP are remarkable for hyperglycemia. He has thrombocytopenia, consistent with history. No evidence of diabetic ketoacidosis. . Troponin is negative. LFTs are elevated consistent with his history and known cirrhosis. Coags elevated and INR 1.3. Chest x-ray shows an increasing right-sided pleural effusion. I do believe patient warrants admission for further evaluation management of this likely requiring therapeutic versus diagnostic thoracentesis due to increasing oxygen requirement. He also did have some improvement with breathing treatment steroids, possible component of restrictive lung disease. Hospitals will resume care the patient at this time. Patient was informed and agreeable. Stable for admission. Critical Care  There was a high probability of life-threatening clinical deterioration in the patient's condition requiring my urgent intervention. Total critical care time with the patient was 34 minutes excluding separately reportable procedures. Critical care required due to patients presentation, comorbidities and concern for acute life-threatening disease, increasing oxygen requirement and decompensation. FINAL IMPRESSION      1. Pleural effusion    2. Increased oxygen demand          DISPOSITION/PLAN   DISPOSITION        PATIENT REFERREDTO:  No follow-up provider specified.     DISCHARGE MEDICATIONS:  New Prescriptions    No medications on file       DISCONTINUED MEDICATIONS:  Discontinued Medications    No medications on file              (Please note that portions of this note were completed with a voice recognition program.  Efforts were made to edit the dictations but occasionally words are mis-transcribed.)    Farzana Hu PA-C (electronically signed)           Yovany BarlowBanner Heart Hospitalvioleta Speaker  08/31/20 9750

## 2020-08-31 NOTE — ED NOTES
Report given to Qasim Ryan RN. All questions answered. Pt discharged to floor with all belongings and on tele.       Yariel Florence RN  08/31/20 1956

## 2020-08-31 NOTE — ED PROVIDER NOTES
As physician-in-triage, I performed a medical screening history and physical exam on Kriss Claros. I also cared for and evaluated the patient in conjunction with the ED Advanced Practice Provider. All diagnostic, treatment, and disposition decisions were made by myself in conjunction with the advanced practice provider. For all further details of the patient's emergency department visit, please see the advanced practice provider's documentation. Patient resents the ER for evaluation of a 3-week history of increasing dyspnea increased abdominal girth he has a history of nonalcoholic steatosis, hepatitis, with recurrent cirrhosis and prior paracentesis and pleurocentesis. He is afebrile. No hemoptysis positive dyspnea. Positive hepatomegaly with positive fluid wave with diminished breath sounds bilaterally. He will will be admitted for thoracentesis and possible paracentesis. He does not show signs of sepsis.       Impression: Acute on chronic cirrhosis, [place], pleural effusion, dyspnea, ascites      Gloria Alegre MD  76/19/08 1759       Gloria Alegre MD  69/18/91 0736

## 2020-09-01 NOTE — PROGRESS NOTES
Pt awake in bed and assisted to use the bathroom. VSS and pt denies any needs. Call light in reach and bed alarm engaged.

## 2020-09-01 NOTE — CONSULTS
[facility]   Consult Note      Reason for Consult: Shortness of breath, pleural effusion  Requesting Physician: Dr. Neeraj Aguirre  Subjective:   [address] [facility] / HPI:                The patient is a 70 y.o. male with significant past medical history of:      Diagnosis Date    Acute on chronic diastolic congestive heart failure ([facility]     ANEMIA     CAD (coronary artery disease) 10/12/2011    Cirrhosis ([facility]     Depression     Diabetes mellitus ([facility]     Esophageal bleed, non-variceal 11/23/2012    Hypertension     HYPOTHYROIDISM     Liver disease 1/6/2012    Mixed hyperlipidemia     NEUROPATHY     Non-ST elevation MI (NSTEMI) ([address] 11/21/2012    Pancytopenia ([facility]     Paroxysmal atrial fibrillation ([facility] 1/6/2012    Pneumonia     Sleep apnea     no cpap    Thrombocytopenia ([facility]     Thyroid disease      Patient presents to the emergency department with increasingly severe shortness of breath over the preceding 1 week. He has mild associated cough. Exertion is a modifying factor for him. The patient is known to have FLOYD with cirrhosis. In the past he has had thoracentesis. Patient also suspected to have COPD based on a significant tobacco exposure. He has not had pulmonary function testing. The patient also has a history of chronic diastolic heart failure and is followed by cardiology as an outpatient.   He has had atrial fibrillation in the past.      Past Surgical History:        Procedure Laterality Date    ACHILLES TENDON SURGERY      CARDIAC SURGERY      3 vessel cabg    CHOLECYSTECTOMY      COLONOSCOPY      CORONARY ARTERY BYPASS GRAFT  10/2011    cabg x3    ENDOSCOPY, COLON, DIAGNOSTIC      PTCA  11/23/2012    PTCA RCA & RPL    TONSILLECTOMY      UPPER GASTROINTESTINAL ENDOSCOPY  11/23/2012    multiple thin linear esophageal mucosal breaks    UPPER GASTROINTESTINAL ENDOSCOPY  3/6/2014    UPPER GASTROINTESTINAL ENDOSCOPY N/A 6/7/2019    EGD BAND LIGATION performed by Melvi Flores MD at [address] [place] [address] N/A 7/15/2019    EGD BIOPSY performed by Melvi Flores MD at [address] [place] [address] N/A 7/15/2019    EGD BAND LIGATION performed by Melvi Flores MD at [address] [place] [address]     Current Medications:    Current Facility-Administered Medications: sodium chloride flush 0.9 % injection 10 mL, 10 mL, Intravenous, PRN  potassium chloride (KLOR-CON M) extended release tablet 40 mEq, 40 mEq, Oral, PRN **OR** potassium bicarb-citric acid (EFFER-K) effervescent tablet 40 mEq, 40 mEq, Oral, PRN **OR** potassium chloride 10 mEq/100 mL IVPB (Peripheral Line), 10 mEq, Intravenous, PRN  magnesium sulfate 1 g in dextrose 5% 100 mL IVPB, 1 g, Intravenous, PRN  acetaminophen (TYLENOL) tablet 650 mg, 650 mg, Oral, Q6H PRN **OR** acetaminophen (TYLENOL) suppository 650 mg, 650 mg, Rectal, Q6H PRN  glucose (GLUTOSE) 40 % oral gel 15 g, 15 g, Oral, PRN  dextrose 50 % IV solution, 12.5 g, Intravenous, PRN  glucagon (rDNA) injection 1 mg, 1 mg, Intramuscular, PRN  dextrose 5 % solution, 100 mL/hr, Intravenous, PRN  ondansetron (ZOFRAN-ODT) disintegrating tablet 4 mg, 4 mg, Oral, Q8H PRN **OR** ondansetron (ZOFRAN) injection 4 mg, 4 mg, Intravenous, Q6H PRN  insulin glargine (LANTUS;BASAGLAR) injection pen 14 Units, 0.15 Units/kg, Subcutaneous, Nightly  insulin lispro (1 Unit Dial) 0-12 Units, 0-12 Units, Subcutaneous, Q4H  ipratropium-albuterol (DUONEB) nebulizer solution 1 ampule, 1 ampule, Inhalation, 4x daily  aspirin EC tablet 81 mg, 81 mg, Oral, Daily  citalopram (CELEXA) tablet 20 mg, 20 mg, Oral, Daily  lactulose (CHRONULAC) 10 GM/15ML solution 20 g, 20 g, Oral, TID  levothyroxine (SYNTHROID) tablet 137 mcg, 137 mcg, Oral, Daily  nadolol (CORGARD) tablet 20 mg, 20 mg, Oral, Daily  vitamin D3 (CHOLECALCIFEROL) tablet 400 Units, 400 Units, Oral, Daily  rifaximin (XIFAXAN) tablet 550 mg, 550 mg, Oral, BID  spironolactone (ALDACTONE) tablet 100 mg, 100 mg, Oral, Daily  methylPREDNISolone sodium (SOLU-MEDROL) injection 40 mg, 40 mg, Intravenous, Q8H  furosemide (LASIX) injection 40 mg, 40 mg, Intravenous, BID    Allergies   Allergen Reactions    Cephalexin Hives    Ciprocinonide [Fluocinolone]     Quinolones Other (See Comments)     Confusion      Statins Other (See Comments)     Liver toxicity       Social History:    TOBACCO:   reports that he quit smoking about 6 months ago. His smoking use included cigarettes. He started smoking about 55 years ago. He has a 19.35 pack-year smoking history. He has never used smokeless tobacco.  ETOH:   reports no history of alcohol use. Patient currently lives independently  Environmental/chemical exposure: None known    Family History:       Problem Relation Age of Onset    Heart Disease Mother         arrythmia    Diabetes Father     Cancer Father     Heart Disease Father     Anesth Problems Neg Hx     Malig Hyperten Neg Hx     Hypotension Neg Hx     Malig Hypertherm Neg Hx     Pseudochol. Deficiency Neg Hx      REVIEW OF SYSTEMS:    CONSTITUTIONAL:  negative for fevers, chills, diaphoresis, activity change, appetite change, fatigue, night sweats and unexpected weight change.    EYES:  negative for blurred vision, eye discharge, visual disturbance and icterus  HEENT:  negative for hearing loss, tinnitus, ear drainage, sinus pressure, nasal congestion, epistaxis and snoring  RESPIRATORY:  See HPI  CARDIOVASCULAR:  negative for chest pain, palpitations, exertional chest pressure/discomfort, edema, syncope  GASTROINTESTINAL:  negative for nausea, vomiting, diarrhea, constipation, blood in stool and abdominal pain  GENITOURINARY:  negative for frequency, dysuria, urinary incontinence, decreased urine volume, and hematuria  HEMATOLOGIC/LYMPHATIC:  negative for easy bruising, bleeding and lymphadenopathy  ALLERGIC/IMMUNOLOGIC:  negative for recurrent infections, angioedema, anaphylaxis and drug reactions  ENDOCRINE:  negative for weight changes and diabetic symptoms including polyuria, polydipsia and polyphagia  MUSCULOSKELETAL:  negative for  pain, joint swelling, decreased range of motion and muscle weakness  NEUROLOGICAL:  negative for headaches, slurred speech, unilateral weakness  PSYCHIATRIC/BEHAVIORAL: negative for hallucinations, behavioral problems, confusion and agitation. Objective:   PHYSICAL EXAM:      VITALS:  /69   Pulse 112   Temp 98.2 °F (36.8 °C) (Oral)   Resp 16   Ht 5' 11\" (1.803 m)   Wt 208 lb 6.4 oz (94.5 kg)   SpO2 95%   BMI 29.07 kg/m²      24HR INTAKE/OUTPUT:  No intake or output data in the 24 hours ending 09/01/20 0916  CONSTITUTIONAL:  awake, alert, cooperative, no apparent distress, and appears stated age  NECK:  Supple, symmetrical, trachea midline, no adenopathy, thyroid symmetric, not enlarged and no tenderness, skin normal  LUNGS: He is short of breath just sitting on the edge of the bed. He has crackles throughout the left with end expiratory wheezes. Breath sounds are diminished on the right likely due to large right pleural effusion  CARDIOVASCULAR: S1 and S2, no edema and no JVD  ABDOMEN: Slightly distended and somewhat firm. Probably has ascites. LYMPHADENOPATHY:  no axillary or supraclavicular adenopathy. No cervical adnenopathy  PSYCHIATRIC: Oriented to person place and time. No obvious depression or anxiety. MUSCULOSKELETAL: No obvious misalignment or effusion of the joints. No clubbing, cyanosis of the digits. SKIN:  normal skin color, texture, turgor and no redness, warmth, or swelling.  No palpable nodules    DATA:    Old records have been reviewed    CBC:  Recent Labs     08/31/20  1514 09/01/20 0516   WBC 4.7 2.5*   RBC 3.73* 3.61*   HGB 11.6* 11.0*   HCT 35.7* 34.0*   PLT 90* 85*   MCV 95.6 94.4   MCH 31.1 30.5   MCHC 32.5 32.3   RDW 17.1* 16.8*      BMP:  Recent Labs     08/31/20  1514 09/01/20  0516  134*   K 4.6 4.4   CL 97* 96*   CO2 34* 29   BUN 20 23*   CREATININE 1.0 1.2   CALCIUM 8.9 8.8   GLUCOSE 183* 266*      ABG:  No results for input(s): PHART, MCS5HGE, PO2ART, SJS4DMS, F0ZYKVVE, BEART in the last 72 hours. Lab Results   Component Value Date     (H) 11/08/2011     Lab Results   Component Value Date    CKTOTAL 78 04/08/2020    TROPONINI <0.01 08/31/2020       Cultures:     Abx:    Radiology Review:  All pertinent images / reports were reviewed as a part of this visit. Assessment:     1. Acute hypoxemic respiratory failure  2. Large right pleural effusion  3. Acute on chronic diastolic heart failure  4. FLOYD with cirrhosis    Plan:     1. I have reviewed laboratories, medical records and images for this visit  2. Chest x-ray reveals increased right pleural effusion now probably large in size  3. May also have ascites  4. Would benefit from thoracentesis as he is pretty dyspneic  5. Arrange for IR thoracentesis, hopefully today. 6. He is also wheezing some on exam  7. He is on some Solu-Medrol  8. Add scheduled nebulizer treatments  9. Has crackles on exam and peripheral edema due to decompensated diastolic heart failure  10. Receiving Lasix and Aldactone  11.  May eventually need paracentesis

## 2020-09-01 NOTE — PROGRESS NOTES
Pt back from thoracentesis. VSS and wife at bedside. Denies any needs, call light in reach and bed alarm engaged.

## 2020-09-01 NOTE — CONSULTS
[address]   Electrophysiology Consultation   Date: 9/1/2020  Reason for Consultation: Atrial fibrillation   Consult Requesting Physician: Kerri Lucia MD     Chief Complaint   Patient presents with    Shortness of Breath     Pt to er With c/o SOB for three weeks, progrssively getting worse. told to come by PCP. bilateral foot swelling. pcp wants pt tested for COVID     HPI: Kassie Burt is a 70 y.o. male with Hx of FLOYD cirrhosis, hepatic encephalopathy, COPD, paroxysmal atrial fibrillation , thoracentesis in 4/2020 due to pleural effusion , was admitted for progressive shortness of breath and increase in abdominal girth. He has been found in Atrial fibrillation since 4/2020. Rate has been controlled before on nadolol. ECG in 10/2019 was sinus.       Past Medical History:   Diagnosis Date    Acute on chronic diastolic congestive heart failure ([address]     ANEMIA     CAD (coronary artery disease) 10/12/2011    Cirrhosis ([address]     Depression     Diabetes mellitus ([address]     Esophageal bleed, non-variceal 11/23/2012    Hypertension     HYPOTHYROIDISM     Liver disease 1/6/2012    Mixed hyperlipidemia     NEUROPATHY     Non-ST elevation MI (NSTEMI) ([address] 11/21/2012    Pancytopenia ([address]     Paroxysmal atrial fibrillation ([address] 1/6/2012    Pneumonia     Sleep apnea     no cpap    Thrombocytopenia ([address]     Thyroid disease         Past Surgical History:   Procedure Laterality Date    ACHILLES TENDON SURGERY      CARDIAC SURGERY      3 vessel cabg    CHOLECYSTECTOMY      COLONOSCOPY      CORONARY ARTERY BYPASS GRAFT  10/2011    cabg x3    ENDOSCOPY, COLON, DIAGNOSTIC      PTCA  11/23/2012    PTCA RCA & RPL    TONSILLECTOMY      UPPER GASTROINTESTINAL ENDOSCOPY  11/23/2012    multiple thin linear esophageal mucosal breaks    UPPER GASTROINTESTINAL ENDOSCOPY  3/6/2014    UPPER GASTROINTESTINAL ENDOSCOPY N/A 6/7/2019    EGD BAND LIGATION performed by Mia Albert MD at [address] [facility] ENDOSCOPY    UPPER GASTROINTESTINAL ENDOSCOPY N/A 7/15/2019    EGD BIOPSY performed by Radhames Estrada MD at [address] N/A 7/15/2019    EGD BAND LIGATION performed by Radhames Estrada MD at [address] [facility] ENDOSCOPY       Allergies   Allergen Reactions    Cephalexin Hives    Ciprocinonide [Fluocinolone]     Quinolones Other (See Comments)     Confusion      Statins Other (See Comments)     Liver toxicity       Social History:  Reviewed. reports that he quit smoking about 6 months ago. His smoking use included cigarettes. He started smoking about 55 years ago. He has a 19.35 pack-year smoking history. He has never used smokeless tobacco. He reports that he does not drink alcohol or use drugs. Family History:  Reviewed. family history includes Cancer in his father; Diabetes in his father; Heart Disease in his father and mother. Review of System:  All other systems reviewed and are negative except for that noted above. Pertinent negatives are:     · General: negative for fever, chills   · Ophthalmic ROS: negative for - eye pain or loss of vision  · ENT ROS: negative for - headaches, sore throat   · Respiratory: negative for - cough, sputum  · Cardiovascular: Reviewed in HPI  · Gastrointestinal: negative for - abdominal pain, diarrhea, N/V  · Hematology: negative for - bleeding, blood clots, bruising or jaundice  · Genito-Urinary:  negative for - Dysuria or incontinence  · Musculoskeletal: negative for - Joint swelling, muscle pain  · Neurological: negative for - confusion, dizziness, headaches   · Psychiatric: No anxiety, no depression. · Dermatological: negative for - rash    Physical Examination:  Vitals:    20 0922   BP:    Pulse:    Resp: 18   Temp:    SpO2: 94%      No intake/output data recorded.    Wt Readings from Last 3 Encounters:   20 208 lb 6.4 oz (94.5 kg)   20 196 lb (88.9 kg)   20 191 lb (86.6 kg)     Temp  Av °F (36.7 °C)  Min: 97.3 °F (36.3 °C)  Max: 98.3 °F (36.8 °C)  Pulse  Av.4  Min: 110  Max: 120  BP  Min: 104/77  Max: 121/83  SpO2  Av.6 %  Min: 80 %  Max: 100 %  No intake or output data in the 24 hours ending 20 1104    · Telemetry: Atrial fibrillation   · Constitutional: Oriented. No distress. · Head: Normocephalic and atraumatic. · Mouth/Throat: Oropharynx is clear and moist.   · Eyes: Conjunctivae normal. EOM are normal.   · Neck: Neck supple. No rigidity. No JVD present. · Cardiovascular: Normal rate, irregular rhythm, S1&S2. · Pulmonary/Chest: decreased Rt  respiratory sounds. + wheezes, + rhonchi. · Abdominal: Soft. Bowel sounds present.+ distension, No tenderness. · Musculoskeletal: No tenderness. No edema    · Lymphadenopathy: Has no cervical adenopathy. · Neurological: Alert and oriented. Cranial nerve appears intact, No Gross deficit   · Skin: Skin is warm and dry. No rash noted. · Psychiatric: Has a normal behavior     Labs, diagnostic and imaging results reviewed. Reviewed.    Recent Labs     20  1514 20  0516    134*   K 4.6 4.4   CL 97* 96*   CO2 34* 29   BUN 20 23*   CREATININE 1.0 1.2     Recent Labs     20  1514 20  0516   WBC 4.7 2.5*   HGB 11.6* 11.0*   HCT 35.7* 34.0*   MCV 95.6 94.4   PLT 90* 85*     Lab Results   Component Value Date    CKTOTAL 78 2020    TROPONINI <0.01 2020     Lab Results   Component Value Date     2011     Lab Results   Component Value Date    PROTIME 15.4 2020    PROTIME 15.1 2020    PROTIME 15.6 2020    PROTIME 23 2014    PROTIME 40.9 2013    PROTIME 28.2 2013    INR 1.32 2020    INR 1.30 2020    INR 1.34 2020     Lab Results   Component Value Date    CHOL 196 2017    HDL 41 2017    HDL 25 10/12/2011    TRIG 137 2017       ECG: Atrial fibrillation with rapid ventricular response with premature ventricular or aberrantly conducted complexesLow voltage QRSSeptal infarct   Echo: 4.2020 Conclusions      Summary   Limited study - covid   Irregular rhythm   Left ventricular systolic function is normal with ejection fraction   estimated at 60-65 %. There is moderate concentric left ventricular hypertrophy. Left ventricle size is normal.   Moderately severe mitral regurgitation. Reported as mild MR in 3570   Diastolic dysfunction grade and filing pressure are indeterminate. There is a trivial circumferential pericardial effusion noted. There is a moderate left pleural effusion.    Previous echo done 2012 - EF 55%  Cath:     Scheduled Meds:   insulin glargine  0.15 Units/kg Subcutaneous Nightly    insulin lispro  0-12 Units Subcutaneous Q4H    ipratropium-albuterol  1 ampule Inhalation 4x daily    ipratropium-albuterol        aspirin  81 mg Oral Daily    citalopram  20 mg Oral Daily    lactulose  20 g Oral TID    levothyroxine  137 mcg Oral Daily    nadolol  20 mg Oral Daily    vitamin D3  400 Units Oral Daily    rifaximin  550 mg Oral BID    spironolactone  100 mg Oral Daily    methylPREDNISolone  40 mg Intravenous Q8H    furosemide  40 mg Intravenous BID     Continuous Infusions:   dextrose       PRN Meds:.sodium chloride flush, potassium chloride **OR** potassium alternative oral replacement **OR** potassium chloride, magnesium sulfate, acetaminophen **OR** acetaminophen, glucose, dextrose, glucagon (rDNA), dextrose, ondansetron **OR** ondansetron     Patient Active Problem List    Diagnosis Date Noted    Cholelithiasis 11/28/2012     Priority: High    HLD (hyperlipidemia) 10/12/2011     Priority: High    Pancytopenia ([address] 02/04/2013     Priority: Medium    Esophageal bleeding not due to varices 11/28/2012     Priority: Medium    CAD (coronary artery disease) 10/12/2011     Priority: Medium    Thrombocytopenia ([address] 11/28/2012     Priority: Low    Liver disease 01/06/2012     Priority: Low    HTN (hypertension) 10/12/2011     Priority: Low    Type 2 diabetes mellitus ([address]      Priority: Low    Acute respiratory failure with hypoxia ([address] 08/31/2020    Acute on chronic diastolic congestive heart failure (HCC)     Nonrheumatic mitral valve regurgitation     Pleural effusion on right     Malaise and fatigue 10/03/2019    Hyperammonemia (HCC) 10/02/2019    Hypotension, postural 06/04/2018    Hepatic encephalopathy ([address] 06/02/2018    Acute encephalopathy 06/02/2018    Increased ammonia level 06/02/2018    Cirrhosis of liver with ascites ([address] 06/02/2018    Generalized weakness 06/02/2018    Warfarin-induced coagulopathy ([address] 01/27/2014    Atrial fibrillation with RVR ([address] 10/12/2011    DMITRIY (obstructive sleep apnea) 10/12/2011    Depression 10/12/2011      Active Hospital Problems    Diagnosis Date Noted    Type 2 diabetes mellitus ([address] [E11.9]      Priority: Low    Acute respiratory failure with hypoxia (HCC) [J96.01] 08/31/2020    Pleural effusion on right [J90]     Acute on chronic diastolic congestive heart failure (HCC) [I50.33]     Cirrhosis of liver with ascites ([address] [K74.60, R18.8] 06/02/2018    Atrial fibrillation with RVR ([address] [I48.91] 10/12/2011       Assessment:       Plan:    - Atrial fibrillation      Rate is acceptable to slightly high due to underlying issues and stress/distress. Will increase nadolol to 40 mg and see if that is enough for rate control. I expect HR to improve after thoracentesis. He was not started on anticoagulation last time dur to advance liver disease.  I do not disagree with this decision.       - Pancytopenia   Due to liver disease    - COPD   On inhaler   Followed by Dr. Adriana Cuevas    - pleural effusion and ascites due to cirrhosis     Plan for tap    - Hyponatremia   Limit free wated    I independently reviewed  CXR    Thank you for allowing me to participate in the care of Damaris Jamil     NOTE: This report was transcribed using voice recognition software. Every effort was made to ensure accuracy, however, inadvertent computerized transcription errors may be present.

## 2020-09-01 NOTE — PLAN OF CARE
Problem: Falls - Risk of:  Goal: Will remain free from falls  Description: Will remain free from falls  9/1/2020 1213 by Rhonda Wynne RN  Outcome: Ongoing  Pt will remain free from falls. Fall precautions in place and alarm engaged. Bedside table and call light within reach. Pt aware to use call light for assistance ambulating. Problem: Pain:  Goal: Pain level will decrease  Description: Pain level will decrease  9/1/2020 1213 by Rhonda Wynne RN  Outcome: Ongoing  Pt can rate pain on a 0-10 scale. Pt pain will remain at a level that is tolerable to pt. PRN pain medication as needed.

## 2020-09-01 NOTE — PLAN OF CARE
Problem: Falls - Risk of:  Goal: Will remain free from falls  Description: Will remain free from falls  Outcome: Met This Shift  Note: Pt is wearing the fall bracelet and yellow nonskid socks. Bed is in lowest position, locked, side rails up 2/4, bed alarm is active, and call light is within reach. Pt informed of fall risks, verbalizes understanding, and agrees to ask for help to ambulate. Will monitor. Problem: Pain:  Goal: Pain level will decrease  Description: Pain level will decrease  Outcome: Not Met This Shift  Note:    Pt c/o pressure in chest. Pt assessed for breathing and BP. Pt educated on pain scale. Pt declined pain medication. Pt repositioned. Stimuli reduced. Rest promoted. Pain reassessed. Will continue to monitor.

## 2020-09-01 NOTE — PROGRESS NOTES
4 Eyes Skin Assessment     The patient is being assess for  Admission    I agree that 2 RN's have performed a thorough Head to Toe Skin Assessment on the patient. ALL assessment sites listed below have been assessed. Areas assessed by both nurses:   [x]   Head, Face, and Ears   [x]   Shoulders, Back, and Chest  [x]   Arms, Elbows, and Hands   [x]   Coccyx, Sacrum, and IschIum  [x]   Legs, Feet, and Heels        Does the Patient have Skin Breakdown?   No         Aguilar Prevention initiated:  No   Wound Care Orders initiated:  No      C nurse consulted for Pressure Injury (Stage 3,4, Unstageable, DTI, NWPT, and Complex wounds), New and Established Ostomies:  NA      Nurse 1 eSignature: Electronically signed by Rajendra Garcia RN on 8/31/20 at 10:10 PM EDT    SHARE this note so that the co-signing nurse is able to place an eSignature    Nurse 2 eSignature: Electronically signed by Alee Carolina RN on 8/31/20 at 10:11 PM EDT

## 2020-09-01 NOTE — H&P
HOSPITALISTS HISTORY AND PHYSICAL    9/1/2020 12:04 AM    Patient Information:  Frederick Trivedi is a 70 y.o. male [id number]  PCP:  Jennifer Gross MD (Tel: [phone] )    Chief complaint:    Chief Complaint   Patient presents with    Shortness of Breath     Pt to er With c/o SOB for three weeks, progrssively getting worse. told to come by PCP. bilateral foot swelling. pcp wants pt tested for COVID        History of Present Illness:  Karen Gilliam is a 70 y.o. male who presented to the ED to be evaluated for progressive increase of abdominal girth as well as dyspnea. History is limited due to chronic hepatic encephalopathy. Prior to leaving the bedside, his wife endorsed med compliance and stated that she was responsible for distribution of his prescriptions. The patient has an extensive past history including but not limited to FLOYD; decompensated cirrhosis, decompensated CHF, COPD, and PAF. Upon admission EKG was obtained and revealed that the patient was once again in A. fib RVR. Labs were obtained and notable for hepatic dysfunction including hypoalbuminemia, thrombocytopenia, coagulopathy. Chest x-ray revealed recurrent right-sided pleural effusion has reaccumulated status post 4/8/2020 admission at which time he underwent thoracentesis. Patient will be admitted to address his multiple comorbid conditions. History obtained from patient and epic chart        REVIEW OF SYSTEMS:   Constitutional: Negative for fever,chills or night sweats  ENT: Negative for rhinorrhea, epistaxis, hoarseness, sore throat.   Respiratory: Positive for shortness of breath,wheezing  Cardiovascular: Negative for chest pain, palpitations   Gastrointestinal: Negative for nausea, vomiting, diarrhea; positive anorexia and increased abdominal girth  Genitourinary: Negative for polyuria, dysuria  Hematologic/Lymphatic: Negative for bleeding tendency, easy bruising  Musculoskeletal: Negative for myalgias and arthralgias  Neurologic: Positive for confusion  Skin: Negative for itching,rash  Psychiatric: Negative for depression,anxiety, agitation. Endocrine: Negative for polydipsia,polyuria,heat /cold intolerance. Past Medical History:   has a past medical history of Acute on chronic diastolic congestive heart failure ([facility] ANEMIA, CAD (coronary artery disease), Cirrhosis ([facility] [address] [facility] Depression, Diabetes mellitus ([facility] [facility] Esophageal bleed, non-variceal, Hypertension, HYPOTHYROIDISM, Liver disease, Mixed hyperlipidemia, NEUROPATHY, Non-ST elevation MI (NSTEMI) ([facility] [address] Pancytopenia ([facility] [address] [facility] Paroxysmal atrial fibrillation ([facility] [address] Pneumonia, Sleep apnea, Thrombocytopenia ([facility] and Thyroid disease. Past Surgical History:   has a past surgical history that includes Achilles tendon surgery; Tonsillectomy; Colonoscopy; Endoscopy, colon, diagnostic; Coronary artery bypass graft (10/2011); Percutaneous Transluminal Coronary Angio (11/23/2012); Upper gastrointestinal endoscopy (11/23/2012); Cardiac surgery; Upper gastrointestinal endoscopy (3/6/2014); Cholecystectomy; Upper gastrointestinal endoscopy (N/A, 6/7/2019); Upper gastrointestinal endoscopy (N/A, 7/15/2019); and Upper gastrointestinal endoscopy (N/A, 7/15/2019). Medications:  No current facility-administered medications on file prior to encounter.       Current Outpatient Medications on File Prior to Encounter   Medication Sig Dispense Refill    ONGLYZA 5 MG TABS tablet       azithromycin (ZITHROMAX) 250 MG tablet 2 day one then one daily till gone ( Z Daniel) 1 packet 0    citalopram (CELEXA) 20 MG tablet TAKE ONE TABLET BY MOUTH DAILY 90 tablet 2    glipiZIDE (GLUCOTROL) 5 MG tablet TAKE ONE TABLET BY MOUTH TWICE A DAY BEFORE MEALS 180 tablet 2    levothyroxine (SYNTHROID) 150 MCG tablet TAKE ONE TABLET BY MOUTH DAILY (Patient taking differently: Take 137 mcg by mouth Daily ) 90 tablet 2    cyanocobalamin 1000 MCG/ML injection INJECT 1 ML INTRAMUSCULARLY ONCE EVERY 30 DAYS 10 mL 1    furosemide (LASIX) 20 MG tablet Take 2 tablets by mouth daily 180 tablet 3    B-D 3CC LUER-ALEXIS SYR 25GX1\" 25G X 1\" 3 ML MISC USE WITH VITAMIN B-12 INJECTIONS 12 each 2    SITagliptin (JANUVIA) 100 MG tablet Take 1 tablet by mouth daily 90 tablet 3    lactulose (CHRONULAC) 10 GM/15ML solution Take 30 mLs by mouth 3 times daily 5 mL 0    nadolol (CORGARD) 20 MG tablet Take 1 tablet by mouth daily 90 tablet 3    spironolactone (ALDACTONE) 50 MG tablet Take 2 tablets by mouth daily 60 tablet 5    CINNAMON PO Take 1,000 mg by mouth daily      b complex vitamins capsule Take 1 capsule by mouth daily      MILK THISTLE PO Take 1,000 mg by mouth daily       XIFAXAN 550 MG tablet 2 times daily       Omega-3 Fatty Acids (FISH OIL) 500 MG CAPS Take 1,000 mg by mouth daily       aspirin 81 MG tablet Take 81 mg by mouth daily. Allergies: Allergies   Allergen Reactions    Cephalexin Hives    Ciprocinonide [Fluocinolone]     Quinolones Other (See Comments)     Confusion      Statins Other (See Comments)     Liver toxicity        Social History:  Patient Lives  reports that he quit smoking about 6 months ago. His smoking use included cigarettes. He started smoking about 55 years ago. He has a 19.35 pack-year smoking history. He has never used smokeless tobacco. He reports that he does not drink alcohol or use drugs. Family History:  family history includes Cancer in his father; Diabetes in his father; Heart Disease in his father and mother. Physical Exam:  /82   Pulse 119   Temp 98.3 °F (36.8 °C) (Oral)   Resp 18   Ht 5' 11\" (1.803 m)   Wt 198 lb (89.8 kg)   SpO2 94%   BMI 27.62 kg/m²     General appearance:  Appears chronically ill  Eyes: Sclera clear, pupils equal; no icterus  ENT: Moist mucus membranes, no thrush. Trachea midline.   Cardiovascular tachycardic with irregularly irregular rhythm; 2+ pitting edema bilateral lower extremities  Respiratory: Poor aeration worsened on right; positive end expiratory wheeze   gastrointestinal: Abdomen distended with positive fluid wave; no caput medusa  Musculoskeletal: No cyanosis in digits, neck supple  Neurology: Cranial nerves grossly intact. Alert and oriented in time, place and person. No speech or motor deficits; no asterixis or hemiballismus  Psychiatry: Appropriate affect. Not agitated; poor historian but pleasant affect  Skin: Warm, dry, normal turgor, no rash  Brisk capillary refill, peripheral pulses palpable   Labs:  CBC:   Lab Results   Component Value Date    WBC 4.7 08/31/2020    RBC 3.73 08/31/2020    HGB 11.6 08/31/2020    HCT 35.7 08/31/2020    MCV 95.6 08/31/2020    MCH 31.1 08/31/2020    MCHC 32.5 08/31/2020    RDW 17.1 08/31/2020    PLT 90 08/31/2020    MPV 7.4 08/31/2020     BMP:    Lab Results   Component Value Date     08/31/2020    K 4.6 08/31/2020    K 4.0 04/09/2020    CL 97 08/31/2020    CO2 34 08/31/2020    BUN 20 08/31/2020    CREATININE 1.0 08/31/2020    CALCIUM 8.9 08/31/2020    GFRAA >60 08/31/2020    GFRAA >60 02/26/2013    LABGLOM >60 08/31/2020    LABGLOM 79 03/06/2012    GLUCOSE 183 08/31/2020    GLUCOSE 123 03/06/2012     XR CHEST (2 VW)   Final Result   Increasing right pleural effusion. Chest Xray:   EKG:    I visualized CXR images and EKG strips  Ventricular Rate  108 BPM  QTc Calculation (Bazett)  455 ms    Atrial Rate  113 BPM  R Axis  -7 degrees    QRS Duration  62 ms  T Axis  0 degrees    Q-T Interval  340 ms  Diagnosis  Atrial fibrillation with rapid ventricular response with premature ventricular or aberrantly cond. ..           Discussed case  with attending Policastro    Problem List  Principal Problem:    Acute respiratory failure with hypoxia ([address]  Active Problems:    Type 2 diabetes mellitus (HCC)    Atrial fibrillation with RVR (HCC)    Cirrhosis of liver with ascites ([address] Acute on chronic diastolic congestive heart failure (HCC)    Pleural effusion on right  Resolved Problems:    * No resolved hospital problems. *        Assessment/Plan:     1. Acute hypoxic respiratory failure (multifactorial)  - Consult placed to pulmonologist for ultrasound-guided thoracentesis  - IV Solu-Medrol as well as DuoNeb and aggressive pulmonary toilet  - IV Lasix and oral Aldactone therapy with strict I's and O's  -Continuous pulse oximetry with PRN supplemental O2    2.  A. fib RVR  -Nadolol continued for rate control  -No anticoagulation due to hepatic coagulopathy and thrombocytopenia    3. Cirrhosis of liver with ascites  - Continue diuretic therapy  -Continue Xifaxan and lactulose; ammonia level pending  -Steroids will likely benefit discriminant function  -May require paracentesis    4. Type 2 diabetes  -All oral hypoglycemic agents on hold   - A1c pending  - Lantus scheduled for bedtime; Humalog SSI every 4 hours as needed n.p.o.        DVT prophylaxis-BLE SCD due to hepatic coagulopathy  Code status-full code  Diet- n.p.o. after midnight for possible thoracentesis  IV access-PIV established in ED    Admit as inpatient I anticipate hospitalization spanning more than two midnights for investigation and treatment of the above medically necessary diagnoses. Please note that some part of this chart was generated using Dragon dictation software. Although every effort was made to ensure the accuracy of this automated transcription, some errors in transcription may have occurred inadvertently. If you may need any clarification, please do not hesitate to contact me through [facility].        Derek Spring MD    9/1/2020 12:04 AM

## 2020-09-01 NOTE — PROGRESS NOTES
Pt admitted to room 3303 from ED. VSS. Pt A&Ox4. POC updated with pt, all questions answered. Oriented pt to room and call light. Call light and bedside table within reach. Instructed to call out with any needs, v/u. Will monitor.

## 2020-09-02 NOTE — PROGRESS NOTES
Physical/Occupational Therapy  Lilliam Watson  PT/OT orders noted and appreciated. Pt currently RAMSES upon PT arrival to room. Pt's wife present and PT informing wife of PT/OT orders and purpose of evaluations. Pt's wife agreeable, expresses intent on pt coming home but appears open to [facility] if needed. Reports pt has been moving fairly well but would benefit from PT and OT for mobility while in hospital. PT informing pt's wife that therapy will attempt evaluations later today, but if unable due to time constraints that pt will be attempted tomorrow morning ASAP. Wife verbalized understanding.   Thank you,  Emperatriz Guerrero, PT, DPT, [id number]  Jovita Richey, OTR/L, [address]

## 2020-09-02 NOTE — PROGRESS NOTES
5300ml of fluid removed  Spoke to patients GABY Michelle and advised her the amount of fluid removed and that patient was returning to the floor.

## 2020-09-02 NOTE — PROGRESS NOTES
[pt returned to floor from IR s/p paracentesis. Rt abdominal site CDI, bandage on. VSS. Pt A&O, wife at bedside. Will monitor.

## 2020-09-02 NOTE — PROGRESS NOTES
P Pulmonary and Critical Care   Progress Note      Reason for Consult: Shortness of breath, pleural effusion  Requesting Physician: Dr. Luis Manuel Lambert  Subjective:   [facility] / HPI:                The patient is a 70 y.o. male with significant past medical history of:      Diagnosis Date    Acute on chronic diastolic congestive heart failure ([facility]     ANEMIA     CAD (coronary artery disease) 10/12/2011    Chronic obstructive pulmonary disease (HCC)     Cirrhosis ([facility]     Depression     Diabetes mellitus ([facility]     Esophageal bleed, non-variceal 11/23/2012    Hypertension     HYPOTHYROIDISM     Liver disease 1/6/2012    Mixed hyperlipidemia     NEUROPATHY     Non-ST elevation MI (NSTEMI) ([facility] 11/21/2012    Pancytopenia ([facility]     Paroxysmal atrial fibrillation ([facility] 1/6/2012    Pneumonia     Sleep apnea     no cpap    Thrombocytopenia (HCC)     Thyroid disease      Interval history: Patient had 2 L thoracentesis yesterday with improvement in symptoms. Still complains that his abdomen is tight and he feels like he has fluid there as well.       Past Surgical History:        Procedure Laterality Date    ACHILLES TENDON SURGERY      CARDIAC SURGERY      3 vessel cabg    CHOLECYSTECTOMY      COLONOSCOPY      CORONARY ARTERY BYPASS GRAFT  10/2011    cabg x3    ENDOSCOPY, COLON, DIAGNOSTIC      PTCA  11/23/2012    PTCA RCA & RPL    TONSILLECTOMY      UPPER GASTROINTESTINAL ENDOSCOPY  11/23/2012    multiple thin linear esophageal mucosal breaks    UPPER GASTROINTESTINAL ENDOSCOPY  3/6/2014    UPPER GASTROINTESTINAL ENDOSCOPY N/A 6/7/2019    EGD BAND LIGATION performed by Presley Cardoza MD at [facility] 7/15/2019    EGD BIOPSY performed by Presley Cardoza MD at [facility] N/A 7/15/2019    EGD BAND LIGATION performed by Presley Cardoza MD at [address] [facility] ENDOSCOPY     Current Medications:    Current Facility-Administered Medications: sodium chloride flush 0.9 % injection 10 mL, 10 mL, Intravenous, PRN  potassium chloride (KLOR-CON M) extended release tablet 40 mEq, 40 mEq, Oral, PRN **OR** potassium bicarb-citric acid (EFFER-K) effervescent tablet 40 mEq, 40 mEq, Oral, PRN **OR** potassium chloride 10 mEq/100 mL IVPB (Peripheral Line), 10 mEq, Intravenous, PRN  magnesium sulfate 1 g in dextrose 5% 100 mL IVPB, 1 g, Intravenous, PRN  acetaminophen (TYLENOL) tablet 650 mg, 650 mg, Oral, Q6H PRN **OR** acetaminophen (TYLENOL) suppository 650 mg, 650 mg, Rectal, Q6H PRN  glucose (GLUTOSE) 40 % oral gel 15 g, 15 g, Oral, PRN  dextrose 50 % IV solution, 12.5 g, Intravenous, PRN  glucagon (rDNA) injection 1 mg, 1 mg, Intramuscular, PRN  dextrose 5 % solution, 100 mL/hr, Intravenous, PRN  ondansetron (ZOFRAN-ODT) disintegrating tablet 4 mg, 4 mg, Oral, Q8H PRN **OR** ondansetron (ZOFRAN) injection 4 mg, 4 mg, Intravenous, Q6H PRN  insulin glargine (LANTUS;BASAGLAR) injection pen 14 Units, 0.15 Units/kg, Subcutaneous, Nightly  insulin lispro (1 Unit Dial) 0-12 Units, 0-12 Units, Subcutaneous, Q4H  ipratropium-albuterol (DUONEB) nebulizer solution 1 ampule, 1 ampule, Inhalation, 4x daily  nadolol (CORGARD) tablet 40 mg, 40 mg, Oral, Daily  aspirin EC tablet 81 mg, 81 mg, Oral, Daily  citalopram (CELEXA) tablet 20 mg, 20 mg, Oral, Daily  lactulose (CHRONULAC) 10 GM/15ML solution 20 g, 20 g, Oral, TID  levothyroxine (SYNTHROID) tablet 137 mcg, 137 mcg, Oral, Daily  vitamin D3 (CHOLECALCIFEROL) tablet 400 Units, 400 Units, Oral, Daily  rifaximin (XIFAXAN) tablet 550 mg, 550 mg, Oral, BID  spironolactone (ALDACTONE) tablet 100 mg, 100 mg, Oral, Daily  methylPREDNISolone sodium (SOLU-MEDROL) injection 40 mg, 40 mg, Intravenous, Q8H  furosemide (LASIX) injection 40 mg, 40 mg, Intravenous, BID    Allergies   Allergen Reactions    Cephalexin Hives    Ciprocinonide [Fluocinolone]     Quinolones Other (See Comments) Confusion      Statins Other (See Comments)     Liver toxicity       Social History:    TOBACCO:   reports that he quit smoking about 6 months ago. His smoking use included cigarettes. He started smoking about 55 years ago. He has a 19.35 pack-year smoking history. He has never used smokeless tobacco.  ETOH:   reports no history of alcohol use. Patient currently lives independently  Environmental/chemical exposure: None known    Family History:       Problem Relation Age of Onset    Heart Disease Mother         arrythmia    Diabetes Father     Cancer Father     Heart Disease Father     Anesth Problems Neg Hx     Malig Hyperten Neg Hx     Hypotension Neg Hx     Malig Hypertherm Neg Hx     Pseudochol. Deficiency Neg Hx      REVIEW OF SYSTEMS:    CONSTITUTIONAL:  negative for fevers, chills, diaphoresis, activity change, appetite change, fatigue, night sweats and unexpected weight change.    EYES:  negative for blurred vision, eye discharge, visual disturbance and icterus  HEENT:  negative for hearing loss, tinnitus, ear drainage, sinus pressure, nasal congestion, epistaxis and snoring  RESPIRATORY:  See HPI  CARDIOVASCULAR:  negative for chest pain, palpitations, exertional chest pressure/discomfort, edema, syncope  GASTROINTESTINAL:  negative for nausea, vomiting, diarrhea, constipation, blood in stool and abdominal pain  GENITOURINARY:  negative for frequency, dysuria, urinary incontinence, decreased urine volume, and hematuria  HEMATOLOGIC/LYMPHATIC:  negative for easy bruising, bleeding and lymphadenopathy  ALLERGIC/IMMUNOLOGIC:  negative for recurrent infections, angioedema, anaphylaxis and drug reactions  ENDOCRINE:  negative for weight changes and diabetic symptoms including polyuria, polydipsia and polyphagia  MUSCULOSKELETAL:  negative for  pain, joint swelling, decreased range of motion and muscle weakness  NEUROLOGICAL:  negative for headaches, slurred speech, unilateral weakness  PSYCHIATRIC/BEHAVIORAL: negative for hallucinations, behavioral problems, confusion and agitation. Objective:   PHYSICAL EXAM:      VITALS:  /71   Pulse 97   Temp 97.4 °F (36.3 °C) (Oral)   Resp 14   Ht 5' 11\" (1.803 m)   Wt 204 lb 6.4 oz (92.7 kg)   SpO2 91%   BMI 28.51 kg/m²      24HR INTAKE/OUTPUT:      Intake/Output Summary (Last 24 hours) at 9/2/2020 1214  Last data filed at 9/2/2020 0225  Gross per 24 hour   Intake 10 ml   Output 250 ml   Net -240 ml     CONSTITUTIONAL:  awake, alert, cooperative, no apparent distress, and appears stated age  NECK:  Supple, symmetrical, trachea midline, no adenopathy, thyroid symmetric, not enlarged and no tenderness, skin normal  LUNGS: He is short of breath just sitting on the edge of the bed. He has crackles throughout the left with end expiratory wheezes. Breath sounds are diminished on the right likely due to large right pleural effusion  CARDIOVASCULAR: S1 and S2, no edema and no JVD  ABDOMEN: Slightly distended and somewhat firm. Probably has ascites. LYMPHADENOPATHY:  no axillary or supraclavicular adenopathy. No cervical adnenopathy  PSYCHIATRIC: Oriented to person place and time. No obvious depression or anxiety. MUSCULOSKELETAL: No obvious misalignment or effusion of the joints. No clubbing, cyanosis of the digits. SKIN:  normal skin color, texture, turgor and no redness, warmth, or swelling.  No palpable nodules    DATA:    Old records have been reviewed    CBC:  Recent Labs     08/31/20  1514 09/01/20  0516   WBC 4.7 2.5*   RBC 3.73* 3.61*   HGB 11.6* 11.0*   HCT 35.7* 34.0*   PLT 90* 85*   MCV 95.6 94.4   MCH 31.1 30.5   MCHC 32.5 32.3   RDW 17.1* 16.8*      BMP:  Recent Labs     08/31/20  1514 09/01/20  0516    134*   K 4.6 4.4   CL 97* 96*   CO2 34* 29   BUN 20 23*   CREATININE 1.0 1.2   CALCIUM 8.9 8.8   GLUCOSE 183* 266*      ABG:  No results for input(s): PHART, LEK6SER, PO2ART, XFC4SWT, B4INTTIH, BEART in the last 72 hours. Lab Results   Component Value Date     (H) 11/08/2011     Lab Results   Component Value Date    CKTOTAL 78 04/08/2020    TROPONINI <0.01 08/31/2020       Cultures:     Abx:    Radiology Review:  All pertinent images / reports were reviewed as a part of this visit. Assessment:     1. Acute hypoxemic respiratory failure  2. Large right pleural effusion  3. Acute on chronic diastolic heart failure  4. FLOYD with cirrhosis    Plan:     1. I have reviewed laboratories, medical records and images for this visit  2. 2 L thoracentesis  3. Post procedure chest imaging does reveal residual effusion  4. If he does have ascites, this will leak into the pleural cavity  5. May benefit from a paracentesis.   6.

## 2020-09-02 NOTE — PROGRESS NOTES
follow-up. Take a copy of this screening test to your primary care physician on your next office visit. Interpretation:      0 -   7: It is unlikely that you are abnormally sleepy. 8 -   9: You have an average amount of daytime sleepiness. 10 - 15: You may be excessively sleepy depending on the situation. You may want to consider seeking medical attention. 16 - 24: You are excessively sleepy and should consider seeking medical attention.       Electronically signed by Uriah Ascencio RCP on 9/2/2020 at 4:37 PM

## 2020-09-02 NOTE — PROGRESS NOTES
Hourly rounding performed on pt:  Pain: controlled, pt in chair and awake  Position: appears in no distress  Restroom: Pt clean and no need to void at this time  Proximity: call light, phone, bedside table in reach    Pt refusing to get back to bed and wishes to remain in chair as he does at home.     Will continue to monitor

## 2020-09-02 NOTE — PROGRESS NOTES
[facility] PROGRESS NOTE    9/2/2020 2:18 PM        Name: Meir Saha . Admitted: 8/31/2020  Primary Care Provider: Linda Amaro MD (Tel: [phone])      Subjective:  .     On 4 L O2   Abdomen is distended and tight  S/ p thoracentesis on 09/01/20 with removal of 2 L of fluid  Wife at the bedside    Reviewed interval ancillary notes    Current Medications  sodium chloride flush 0.9 % injection 10 mL, PRN  potassium chloride (KLOR-CON M) extended release tablet 40 mEq, PRN    Or  potassium bicarb-citric acid (EFFER-K) effervescent tablet 40 mEq, PRN    Or  potassium chloride 10 mEq/100 mL IVPB (Peripheral Line), PRN  magnesium sulfate 1 g in dextrose 5% 100 mL IVPB, PRN  acetaminophen (TYLENOL) tablet 650 mg, Q6H PRN    Or  acetaminophen (TYLENOL) suppository 650 mg, Q6H PRN  glucose (GLUTOSE) 40 % oral gel 15 g, PRN  dextrose 50 % IV solution, PRN  glucagon (rDNA) injection 1 mg, PRN  dextrose 5 % solution, PRN  ondansetron (ZOFRAN-ODT) disintegrating tablet 4 mg, Q8H PRN    Or  ondansetron (ZOFRAN) injection 4 mg, Q6H PRN  insulin glargine (LANTUS;BASAGLAR) injection pen 14 Units, Nightly  insulin lispro (1 Unit Dial) 0-12 Units, Q4H  ipratropium-albuterol (DUONEB) nebulizer solution 1 ampule, 4x daily  nadolol (CORGARD) tablet 40 mg, Daily  aspirin EC tablet 81 mg, Daily  citalopram (CELEXA) tablet 20 mg, Daily  lactulose (CHRONULAC) 10 GM/15ML solution 20 g, TID  levothyroxine (SYNTHROID) tablet 137 mcg, Daily  vitamin D3 (CHOLECALCIFEROL) tablet 400 Units, Daily  rifaximin (XIFAXAN) tablet 550 mg, BID  spironolactone (ALDACTONE) tablet 100 mg, Daily  methylPREDNISolone sodium (SOLU-MEDROL) injection 40 mg, Q8H  furosemide (LASIX) injection 40 mg, BID        Objective:  BP 99/65   Pulse 105   Temp 97.7 °F (36.5 °C) (Oral)   Resp 14   Ht 5' 11\" (1.803 m)   Wt 204 lb 6.4 oz (92.7 kg)   SpO2 91%   BMI 28.51 kg/m²     Intake/Output Summary (Last 24 hours) at 9/2/2020 1418  Last data filed at 9/2/2020 1358  Gross per 24 hour   Intake 710 ml   Output 550 ml   Net 160 ml      Wt Readings from Last 3 Encounters:   09/02/20 204 lb 6.4 oz (92.7 kg)   07/01/20 196 lb (88.9 kg)   06/11/20 191 lb (86.6 kg)       General appearance:  Appears comfortable  Eyes: Sclera clear. Pupils equal.  ENT: Moist oral mucosa. Trachea midline, no adenopathy. Cardiovascular: Regular rhythm, normal S1, S2. No murmur. No edema in lower extremities  Respiratory: Not using accessory muscles. Good inspiratory effort. Clear to auscultation bilaterally, no wheeze or crackles. GI: Abdomen soft, no tenderness, not distended, normal bowel sounds  Musculoskeletal: No cyanosis in digits, neck supple  Neurology: CN 2-12 grossly intact. No speech or motor deficits  Psych: Normal affect. Alert and oriented in time, place and person  Skin: Warm, dry, normal turgor    Labs and Tests:  CBC:   Recent Labs     08/31/20  1514 09/01/20  0516   WBC 4.7 2.5*   HGB 11.6* 11.0*   PLT 90* 85*     BMP:    Recent Labs     08/31/20  1514 09/01/20  0516    134*   K 4.6 4.4   CL 97* 96*   CO2 34* 29   BUN 20 23*   CREATININE 1.0 1.2   GLUCOSE 183* 266*     Hepatic:   Recent Labs     08/31/20  1514 09/01/20  0516   AST 25 24   ALT 19 20   BILITOT 2.3* 2.3*   ALKPHOS 148* 144*           Problem List  Principal Problem:    Acute respiratory failure with hypoxia (HCC)  Active Problems:    Type 2 diabetes mellitus (HCC)    Atrial fibrillation with RVR (HCC)    Cirrhosis of liver with ascites (HCC)    Acute on chronic diastolic congestive heart failure (HCC)    Pleural effusion    Hyponatremia    Chronic obstructive pulmonary disease (HCC)  Resolved Problems:    * No resolved hospital problems. *       Assessment & Plan:   1.   Acute on chromic hypoxic respiratory failure (multifactorial)  On 4 L O2 at baseline    Left pleural effusion   - s/p thoracentesis ultrasound-guided thoracentesis with removal of 2 L fluid on 09/2/2020  - IV Solu-Medrol as well as DuoNeb and aggressive pulmonary toilet  - IV Lasix and oral Aldactone therapy with strict I's and O's  Pulmonology following    Cirrhosis with ascites  Abdomen distended  Paracentesis ordered  Follows up with Dr. Diamond William outpt  Diuretics on hold due to low bp     .  A. fib RVR  -Nadolol continued for rate control  -No anticoagulation due to hepatic coagulopathy and thrombocytopenia          4.  Type 2 diabetes  -All oral hypoglycemic agents on hold             - A1c pending  - Lantus scheduled for bedtime; Humalog SSI every 4 hours as needed n.p.o.         Diet: DIET CARB CONTROL;  Code:Full Code  DVT PPX      Mohan Matos MD   9/2/2020 2:18 PM

## 2020-09-02 NOTE — PROGRESS NOTES
Spoke with Dr. Larry Tirado regarding BP readings. MD stated that morning antihypertensives should be held. See eMAR.

## 2020-09-02 NOTE — PROGRESS NOTES
[address] TASH Iyer 1945    History:  Past Medical History:   Diagnosis Date    Acute on chronic diastolic [facility]ive [facility] failure ([facility]     ANEMIA     CAD (coronary artery disease) 10/12/2011    Chronic obstructive pulmonary disease (HCC)     Cirrhosis (HCC)     Depression     [facility] mellitus [facility]     Esophageal bleed, non-variceal 11/23/2012    Hypertension     HYPOTHYROIDISM     Liver disease 1/6/2012    Mixed hyperlipidemia     NEUROPATHY     Non-ST elevation MI (NSTEMI) ([facility] 11/21/2012    Pancytopenia (HCC)     Paroxysmal atrial fibrillation ([facility] 1/6/2012    Pneumonia     Sleep apnea     no cpap    Thrombocytopenia (HCC)     Thyroid disease        ECHO:     Conclusions      Summary   Limited study - covid   Irregular rhythm   Left ventricular systolic function is normal with ejection fraction   estimated at 60-65 %. There is moderate concentric left ventricular hypertrophy. Left ventricle size is normal.   Moderately severe mitral regurgitation. Reported as mild MR in 2277   Diastolic dysfunction grade and filing pressure are indeterminate. There is a trivial circumferential pericardial effusion noted. There is a moderate left pleural effusion. Previous echo done 2012 - EF 55%      Signature      ------------------------------------------------------------------   Electronically signed by Aretha Palma MD, [address] (Interpreting   physician) on 04/10/2020 at 05:35 PM   ------------------------------------------------------------------      ACE/ARB: . No ace/arb  BB: Nadolol 40 mg daily  Aldosterone Antagonist: Spironolactone 100 mg daily    History of sleep apnea: No  [place] Screen ordered: Yes    Last Hospital Admission: 4/8/20 for heart failure  Code Status: Full  Discharge plans: Patient to return home. Lives with wife. Would benefit from home health. Family Present: Wife    Mr. Deshawn Iyer was seen for heart failure.  He has a complicated history and follows with Dr. Anthony Noble of the heart failure team. He has a history of COPD, PAF, and cirrhosis of the liver and follows with Dr. Brittani Arnold. Quit smoking within the last 6 months. He has had progressive shortness of breath, and abdominal swelling. Wife stated she noticed his abdomen swelling at the end of July and his  legs swelling and shortness of breath over the last two weeks. She did not call or who to call,  stated she did not what to do as he got worse due to his co morbidities Resource line provided and reiterated to call early with any symptoms. Does not weigh daily, follows a low sodium diet, although he does eat soup for lunch. Wife says he follows a fluid restriction, but he's not sure. Wife works during the day. Compliant with medications and follow up! Thoracentesis and paracentesis performed this admission. Patient provided with both written and verbal education on CHF signs/ symptoms, causes, discharge medications, smoking cessation, daily weights, low sodium diet, activity, and follow-up. Pt to call if gains 3 pounds in one day or 5 pounds in one week. Mutually agreed upon goals were discussed such as calling the MD as soon as they recognize symptoms and weight gain, maintaining his proper diet, taking medications as prescribed, joining rehab when able. Patient provided with CHF Zone Management tool and CHF symptoms magnet. Discussed importance of lifestyle changes: daily weights, call early for symptoms    PATIENT/CAREGIVER TEACHING:    Level of patient/caregiver understanding able to:   x[ ] Verbalize understanding [ ] Demonstrate understanding [ ] Teach back   [ ] Needs reinforcement [ ] Other:       Time spent teachin minutes    1. WEIGHT: Admit Weight: 198 lb (89.8 kg)      Today  Weight: 204 lb 6.4 oz (92.7 kg)   2.  I/O     Intake/Output Summary (Last 24 hours) at 2020 1445  Last data filed at 2020 1358  Gross per 24 hour   Intake 710 ml   Output 550 ml   Net 160 ml       Recommendations:   1. Patient will need a follow up appointment  2. Educate further on fluid restriction 48 oz- 64 oz during inpatient stay so he can understand how to measure intake at home. 3. Continue to educate on S/S.   4. Emphasize daily weights, diet, and knowing when and who to call  5. Provided patient with CHF Resource Line for questions and concerns.        Alex Li 9/2/2020 2:45 PM

## 2020-09-02 NOTE — PROGRESS NOTES
[facility]   Electrophysiology Progress Note     Admit Date: 2020     Reason for follow up: Atrial fibrillation     HPI and Interval History:   Patient seen and examined. Clinical notes reviewed. Telemetry reviewed. No new complaint today. No major events overnight. Denies having chest pain, shortness of breath, dyspnea on exertion, Orthopnea, PND at the time of this visit. Feels beter but still abdminal discomfort  Review of System:  All other systems reviewed and are negative except for that noted above. Pertinent negatives are:     · General: negative for fever, chills   · Ophthalmic ROS: negative for - eye pain or loss of vision  · ENT ROS: negative for - headaches, sore throat   · Respiratory: negative for - cough, sputum  · Cardiovascular: Reviewed in HPI  · Gastrointestinal: negative for - abdominal pain, diarrhea, N/V  · Hematology: negative for - bleeding, blood clots, bruising or jaundice  · Genito-Urinary:  negative for - Dysuria or incontinence  · Musculoskeletal: negative for - Joint swelling, muscle pain  · Neurological: negative for - confusion, dizziness, headaches   · Psychiatric: No anxiety, no depression. · Dermatological: negative for - rash      Physical Examination:  Vitals:    20 0845   BP: 105/71   Pulse: 97   Resp: 14   Temp: 97.4 °F (36.3 °C)   SpO2: 91%      In: 10 [I.V.:10]  Out: 250    Wt Readings from Last 3 Encounters:   20 204 lb 6.4 oz (92.7 kg)   20 196 lb (88.9 kg)   20 191 lb (86.6 kg)     Temp  Av °F (36.7 °C)  Min: 97.4 °F (36.3 °C)  Max: 98.6 °F (37 °C)  Pulse  Av.9  Min: 91  Max: 110  BP  Min: 90/61  Max: 105/71  SpO2  Av %  Min: 90 %  Max: 97 %    Intake/Output Summary (Last 24 hours) at 2020 1103  Last data filed at 2020 0225  Gross per 24 hour   Intake 10 ml   Output 250 ml   Net -240 ml       · Telemetry: Atrial fibrillation   · Constitutional: Oriented. No distress.    · Head: Normocephalic and atraumatic. · Mouth/Throat: Oropharynx is clear and moist.   · Eyes: Conjunctivae normal. EOM are normal.   · Neck: Neck supple. No rigidity. No JVD present. · Cardiovascular: Normal rate, irregular rhythm, S1&S2. · Pulmonary/Chest: Bilateral respiratory sounds. No wheezes, No rhonchi. · abdominal: Soft. Bowel sounds present. + distension, No tenderness. · Musculoskeletal: No tenderness. + edema    · Lymphadenopathy: Has no cervical adenopathy. · Neurological: Alert and oriented. Cranial nerve appears intact, No Gross deficit   · Skin: Skin is warm and dry. No rash noted. · Psychiatric: Has a normal behavior     Labs, diagnostic and imaging results reviewed. Reviewed. Recent Labs     08/31/20  1514 09/01/20  0516    134*   K 4.6 4.4   CL 97* 96*   CO2 34* 29   BUN 20 23*   CREATININE 1.0 1.2     Recent Labs     08/31/20  1514 09/01/20  0516   WBC 4.7 2.5*   HGB 11.6* 11.0*   HCT 35.7* 34.0*   MCV 95.6 94.4   PLT 90* 85*     Lab Results   Component Value Date    CKTOTAL 78 04/08/2020    TROPONINI <0.01 08/31/2020     Estimated Creatinine Clearance: 64 mL/min (based on SCr of 1.2 mg/dL).    Lab Results   Component Value Date     11/08/2011     Lab Results   Component Value Date    PROTIME 15.4 09/01/2020    PROTIME 15.1 08/31/2020    PROTIME 15.6 04/13/2020    PROTIME 23 01/08/2014    PROTIME 40.9 12/11/2013    PROTIME 28.2 09/06/2013    INR 1.32 09/01/2020    INR 1.30 08/31/2020    INR 1.34 04/13/2020     Lab Results   Component Value Date    CHOL 196 08/08/2017    HDL 41 08/08/2017    HDL 25 10/12/2011    TRIG 137 08/08/2017       Scheduled Meds:   insulin glargine  0.15 Units/kg Subcutaneous Nightly    insulin lispro  0-12 Units Subcutaneous Q4H    ipratropium-albuterol  1 ampule Inhalation 4x daily    nadolol  40 mg Oral Daily    aspirin  81 mg Oral Daily    citalopram  20 mg Oral Daily    lactulose  20 g Oral TID    levothyroxine  137 mcg Oral Daily    vitamin D3  400 pulmonary disease ([facility] [J44.9]     Acute respiratory failure with hypoxia ([facility] [J96.01] 08/31/2020    Pleural effusion [J90]     Acute on chronic diastolic congestive heart failure (HCC) [I50.33]     Cirrhosis of liver with ascites ([facility] [K74.60, R18.8] 06/02/2018    Atrial fibrillation with RVR ([facility] [I48.91] 10/12/2011       Assessment:       Plan:      - Atrial fibrillation                  Rate is acceptable now. I increased nadolol to 40 mg and see if that is enough for rate control. I expect HR to improve after thoracentesis and paracentesis.     If BP allows and HR goes up, it can be increased to 60 mg qd. He was not started on anticoagulation last time dur to advance liver disease. I do not disagree with this decision.         - Pancytopenia              Due to liver disease.     - COPD              On inhaler              Followed by Dr. Summer Webster     - pleural effusion and ascites due to cirrhosis      2 L was removed by pleural tap. Plan for paracentesis.     - Hyponatremia              Limit free wated       NOTE: This report was transcribed using voice recognition software. Every effort was made to ensure accuracy, however, inadvertent computerized transcription errors may be present.

## 2020-09-02 NOTE — PROGRESS NOTES
This RN offered to assist pt in to bed for the night from the recliner. Pt refused and stated he wants to sleep in recliner and that is what he does at home. Repositioned pt and gave pillow supports. Posey/chair alarm in place. Will monitor.

## 2020-09-03 NOTE — PROGRESS NOTES
Pt reported that thompson was very uncomfortable and demanded that it be removed.   Thompson removed

## 2020-09-03 NOTE — PLAN OF CARE
Problem: Metabolic:  Goal: Ability to maintain appropriate glucose levels will improve  Description: Ability to maintain appropriate glucose levels will improve  Note: Pt's blood glucose level monitored ACHS. Insulin administered per MAR parameters. Will monitor      Problem: Cardiovascular  Goal: No DVT, peripheral vascular complications  Note: Pt on telemetry; rate and rhythm monitored. Pt denies dizziness, CP, and palpitations. Pt instructed to notify nurse if symptoms occur. Medications administered. Cr=1.6; diuretics held per cardiology. Electrolytes monitored. Will continue to monitor. Problem: Respiratory  Goal: O2 Sat > 90%  Note: Pt lung sounds: crackles BLL. O2sat>90 on 4L humidified NC. CXR and US liver today. Pt reminded to use acepella, IS, C&DB. Medications and breathing treatment administered. I&O recorded. Pt ambulated. Up to chair. Will continue to monitor.

## 2020-09-03 NOTE — PROGRESS NOTES
Urine sent for sodium/osmolality/urea/ creatinine. Bladder cfon=790 post void. Dinh placed but only 100 ml removed. Not sure if bladder scan picked up some ascites fluid. Will monitor.  UA sent

## 2020-09-03 NOTE — PROGRESS NOTES
Occupational Therapy   Occupational Therapy Initial Assessment  Date: 9/3/2020   Patient Name: Myrtie Oppenheim  MRN: [id number]     : 1945    Date of Service: 9/3/2020    Discharge Recommendations:  Myrtie Oppenheim scored a 21/24 on the [facility] ADL Inpatient form. At this time, no further OT is recommended upon discharge due to pt is expected to be at baseline level of occupational function by discharge. Recommend patient returns to prior setting with assist as needed. OT Equipment Recommendations  Equipment Needed: No    Assessment   Performance deficits / Impairments: Decreased functional mobility ; Decreased ADL status  Assessment: Pt is just below his baseline level of occupational function, based on the above deficits associated with acute respiratory failure with hypoxia. Pt would benefit from continued skilled acute OT services to address these deficits. Treatment Diagnosis: Decreased ADL status and functional mobility associated with acute respiratory failure with hypoxia  Prognosis: Good  Decision Making: Low Complexity  History: Pt 71 yo, lives w/wife, who provides assistance, stays on main level of home, gets assist as needed with shower, dressing, ambulates w/SPC, no recent falls. PMH: HTN, DM, COPD, CAD, CHF, A-fib, NSTEMI  Exam: ROM, MMT, 6 clicks, 2 performance deficits, stable presentation  Assistance / Modification: Supervision for functional transfers/mobility w/SPC, supervision don/doff socks  OT Education: OT Role;Plan of Care;Transfer Training  Patient Education: D/C recommendation. Pt verbalized and demonstrated understanding. Barriers to Learning: None  REQUIRES OT FOLLOW UP: Yes  Activity Tolerance  Activity Tolerance: Patient Tolerated treatment well  Safety Devices  Safety Devices in place: Yes  Type of devices: All fall risk precautions in place;Call light within reach; Chair alarm in place; Left in chair;Nurse notified;Gait belt           Patient Diagnosis(es): The primary encounter diagnosis was Pleural effusion. A diagnosis of Increased oxygen demand was also pertinent to this visit. has a past medical history of Acute on chronic diastolic congestive heart failure ([address] ANEMIA, CAD (coronary artery disease), Chronic obstructive pulmonary disease ([address] Cirrhosis ([address] Depression, Diabetes mellitus ([address] Esophageal bleed, non-variceal, Hypertension, HYPOTHYROIDISM, Liver disease, Mixed hyperlipidemia, NEUROPATHY, Non-ST elevation MI (NSTEMI) ([address] Pancytopenia ([address] Paroxysmal atrial fibrillation ([address] Pneumonia, Sleep apnea, Thrombocytopenia ([address] and Thyroid disease. has a past surgical history that includes Achilles tendon surgery; Tonsillectomy; Colonoscopy; Endoscopy, colon, diagnostic; Coronary artery bypass graft (10/2011); Percutaneous Transluminal Coronary Angio (11/23/2012); Upper gastrointestinal endoscopy (11/23/2012); Cardiac surgery; Upper gastrointestinal endoscopy (3/6/2014); Cholecystectomy; Upper gastrointestinal endoscopy (N/A, 6/7/2019); Upper gastrointestinal endoscopy (N/A, 7/15/2019); and Upper gastrointestinal endoscopy (N/A, 7/15/2019). Treatment Diagnosis: Decreased ADL status and functional mobility associated with acute respiratory failure with hypoxia      Restrictions  Restrictions/Precautions  Restrictions/Precautions: Fall Risk(high)  Required Braces or Orthoses?: No  Position Activity Restriction  Other position/activity restrictions: 70 y.o. male who presented to the ED to be evaluated for progressive increase of abdominal girth as well as dyspnea. History is limited due to chronic hepatic encephalopathy. Prior to leaving the bedside, his wife endorsed med compliance and stated that she was responsible for distribution of his prescriptions. The patient has an extensive past history including but not limited to FLOYD; decompensated cirrhosis, decompensated CHF, COPD, and PAF.   Upon admission EKG was obtained and revealed that the independently bathes; wife helps with socks; pt can don on shirt and pants; independent toileting)  Homemaking Assistance: Needs assistance  Homemaking Responsibilities: No  Ambulation Assistance: Independent(w/SPC)  Transfer Assistance: Independent  Active : No  Patient's  Info: Wife drives when needed  Occupation: Retired  Leisure & Hobbies: fish, watch TV  Additional Comments: no falls reported past 6 months       Objective   Vision: Impaired  Vision Exceptions: Wears glasses at all times  Hearing: Within functional limits    Orientation  Overall Orientation Status: Within Normal Limits     Balance  Standing Balance: Supervision(w/SPC)  Standing Balance  Time: ~5 min  Activity: functional mobility ~250 ft w/SPC  Functional Mobility  Functional - Mobility Device: Cane  Activity: Other  Assist Level: Supervision  ADL  LE Dressing: Supervision(don/doff socks)  Additional Comments: Pt declined further ADLs. Tone RUE  RUE Tone: Normotonic  Tone LUE  LUE Tone: Normotonic  Coordination  Movements Are Fluid And Coordinated: Yes     Bed mobility  Comment: Not assessed. Pt in recliner at beginning and end of session. Pt slept in recliner last night. Transfers  Stand Step Transfers: Supervision(w/SPC to recliner)  Sit to stand: Modified independent(from recliner)  Stand to sit: Modified independent(to recliner)  Vision - Basic Assessment  Prior Vision: Wears glasses all the time  Visual History: No significant visual history  Patient Visual Report: No visual complaint reported.   Cognition  Overall Cognitive Status: WNL  Perception  Overall Perceptual Status: WFL     Sensation  Overall Sensation Status: WNL        LUE AROM (degrees)  LUE AROM : WNL  Left Hand AROM (degrees)  Left Hand AROM: WNL  RUE AROM (degrees)  RUE AROM : WNL  LUE Strength  Gross LUE Strength: WNL(5/5 elbow, shoulder)  L Hand General: 5/5  RUE Strength  Gross RUE Strength: WNL(5/5 elbow, shoulder)  R Hand General: 5/5                   Plan Plan  Times per week: 1-2  Times per day: Daily  Current Treatment Recommendations: Self-Care / ADL, Functional Mobility Training, Safety Education & Training    AM-PAC Score        AM-PAC Inpatient Daily Activity Raw Score: 21 (09/03/20 1055)  AM-PAC Inpatient ADL T-Scale Score : 44.27 (09/03/20 1055)  ADL Inpatient CMS 0-100% Score: 32.79 (09/03/20 1055)  ADL Inpatient CMS G-Code Modifier : Warren Elzbieta (09/03/20 1055)    Goals  Short term goals  Time Frame for Short term goals: Discharge  Short term goal 1: Mod I for functional transfers to ADL surfaces w/SPC  Short term goal 2: Mod I for functional mobility w/SPC for ADL activity  Short term goal 3: S/U for UB ADLs  Short term goal 4: S/U for LB ADLs  Short term goal 5: Mod I for toileting       Therapy Time   Individual Concurrent Group Co-treatment   Time In 0835         Time Out 0907         Minutes 32               Timed Code Treatment Minutes:   17    Total Treatment Minutes:  28  ]  [address], [id number], OTR/L, [id number]

## 2020-09-03 NOTE — PROGRESS NOTES
Physical Therapy    Facility/Department: [address] NURSING  Initial Assessment    NAME: Lilliam Watson  :   MRN: [id number]    Date of Service: 9/3/2020    Discharge Recommendations:  Lilliam Watson scored a 21/24 on the AM-PAC short mobility form. Current research shows that an AM-PAC score of 18 or greater is typically associated with a discharge to the patient's home setting. Based on the patient's AM-PAC score and their current functional mobility deficits, it is recommended that the patient have 2-3 sessions per week of Physical Therapy at d/c to increase the patient's independence. At this time, this patient demonstrates the endurance and safety to discharge home with home services and a follow up treatment frequency of 2-3x/wk. Please see assessment section for further patient specific details. HOME HEALTH CARE: LEVEL 1 STANDARD    - Initial home health evaluation to occur within 24-48 hours, in patient home   - Therapy to evaluate with goal of regaining prior level of functioning   - Therapy to evaluate if patient has [address] needs for personal care    If patient discharges prior to next session this note will serve as a discharge summary. Please see below for the latest assessment towards goals. 2-3 sessions per week, S Level 1(Pt says that he would \"deny home therapy because his wife gives him the support he needs at home\")        Assessment   Body structures, Functions, Activity limitations: Decreased functional mobility ; Decreased balance;Decreased endurance  Assessment: Pt presents with good (-) balance during dynamic standing and requires supervision during 250' ambulation with SPC in RUE. Pt is slightly decreased from baseline and is recommended for continued skilled therapy to return to PLOF.   Treatment Diagnosis: decreased functional mobility and balance  Prognosis: Good  Decision Making: Low Complexity  PT Education: PT Role;Plan of Care  Patient Education: Pt educated on above and verbally understands. Barriers to Learning: none  REQUIRES PT FOLLOW UP: Yes  Activity Tolerance  Activity Tolerance: Patient Tolerated treatment well       Patient Diagnosis(es): The primary encounter diagnosis was Pleural effusion. A diagnosis of Increased oxygen demand was also pertinent to this visit. has a past medical history of Acute on chronic diastolic congestive heart failure ([address] ANEMIA, CAD (coronary artery disease), Chronic obstructive pulmonary disease ([address] Cirrhosis ([address] Depression, Diabetes mellitus ([address] Esophageal bleed, non-variceal, Hypertension, HYPOTHYROIDISM, Liver disease, Mixed hyperlipidemia, NEUROPATHY, Non-ST elevation MI (NSTEMI) ([address] Pancytopenia ([address] Paroxysmal atrial fibrillation ([address] Pneumonia, Sleep apnea, Thrombocytopenia ([address] and Thyroid disease. has a past surgical history that includes Achilles tendon surgery; Tonsillectomy; Colonoscopy; Endoscopy, colon, diagnostic; Coronary artery bypass graft (10/2011); Percutaneous Transluminal Coronary Angio (11/23/2012); Upper gastrointestinal endoscopy (11/23/2012); Cardiac surgery; Upper gastrointestinal endoscopy (3/6/2014); Cholecystectomy; Upper gastrointestinal endoscopy (N/A, 6/7/2019); Upper gastrointestinal endoscopy (N/A, 7/15/2019); and Upper gastrointestinal endoscopy (N/A, 7/15/2019). Restrictions  Restrictions/Precautions  Restrictions/Precautions: Fall Risk(high)  Required Braces or Orthoses?: No  Position Activity Restriction  Other position/activity restrictions: 70 y.o. male who presented to the ED to be evaluated for progressive increase of abdominal girth as well as dyspnea. History is limited due to chronic hepatic encephalopathy. Prior to leaving the bedside, his wife endorsed med compliance and stated that she was responsible for distribution of his prescriptions.   The patient has an extensive past history including but not limited to FLOYD; decompensated cirrhosis, Assistance: Independent(w/SPC)  Transfer Assistance: Independent  Active : No  Patient's  Info: Wife drives when needed  Occupation: Retired  Leisure & Hobbies: fish, watch TV  Additional Comments: no falls reported past 6 months  Cognition   Cognition  Overall Cognitive Status: [facility]    Objective     Observation/Palpation  Posture: Good  Observation: slight swelling in BLE    AROM RLE (degrees)  RLE AROM: WFL  AROM LLE (degrees)  LLE AROM : WFL  Strength RLE  Strength RLE: WFL  Comment: 4+/5 grossly MMT  Strength LLE  Strength LLE: WFL  Comment: 4+/5 grossly MMT     Sensation  Overall Sensation Status: [facility]  Bed mobility  Comment: Not assessed due to pt sitting in recliner upon arrival. Pt sleeps in recliner at home. Transfers  Sit to Stand: Modified independent  Stand to sit: Modified independent  Comment: uses handrails on recliner to stand  Ambulation  Ambulation?: Yes  Ambulation 1  Surface: level tile  Device: Single point cane(RUE support)  Assistance: Supervision  Quality of Gait: narrow BERNIE, steady, good posture, no LOB  Gait Deviations: Slow Maria De Jesus  Distance: 250'  Comments: Pt reports that ambulation \"feels pretty normal\"  Stairs/Curb  Stairs?: No(assess during following sessions)     Balance  Posture: Good  Sitting - Static: Good  Sitting - Dynamic: Good  Standing - Static: Good(with SPC for RUE support)  Standing - Dynamic: Good;-(can for RUE support, supervision during 250' ambulation)        Plan   Plan  Times per week: 1-2  Times per day: Daily  Current Treatment Recommendations: Strengthening, Balance Training, Functional Mobility Training, Transfer Training, Stair training, Gait Training, ADL/Self-care Training, Endurance Training  Safety Devices  Type of devices:  All fall risk precautions in place, Left in chair, Call light within reach, Chair alarm in place, Nurse notified, Gait belt, Patient at risk for falls  Restraints  Initially in place: No      AM-PAC Score  AM-PAC Inpatient

## 2020-09-03 NOTE — PROGRESS NOTES
[facility]ISTS PROGRESS NOTE    9/3/2020 7:52 AM        Name: Layla Abrams . Admitted: 8/31/2020  Primary Care Provider: Enma Frances MD (Tel: [phone])                        Hospital course:   70years old gentleman with a history of cirrhosis due to Constantin Shear presented to the emergency room with abdominal distention and shortness of breath found to have right pleural effusion. Underwent right-sided thoracentesis on 9/1/2021 2 L of fluid removed. He underwent paracentesis on 9/2/2000 5.3 L of fluid removed. Subjective: Patient feels much better, wants to go home. No acute events overnight. Resting well. Pain control. Diet ok. Labs reviewed  Denies any chest pain sob.      Reviewed interval ancillary notes    Current Medications  sodium chloride flush 0.9 % injection 10 mL, 2 times per day  sodium chloride flush 0.9 % injection 10 mL, PRN  sodium chloride flush 0.9 % injection 10 mL, PRN  potassium chloride (KLOR-CON M) extended release tablet 40 mEq, PRN    Or  potassium bicarb-citric acid (EFFER-K) effervescent tablet 40 mEq, PRN    Or  potassium chloride 10 mEq/100 mL IVPB (Peripheral Line), PRN  magnesium sulfate 1 g in dextrose 5% 100 mL IVPB, PRN  acetaminophen (TYLENOL) tablet 650 mg, Q6H PRN    Or  acetaminophen (TYLENOL) suppository 650 mg, Q6H PRN  glucose (GLUTOSE) 40 % oral gel 15 g, PRN  dextrose 50 % IV solution, PRN  glucagon (rDNA) injection 1 mg, PRN  dextrose 5 % solution, PRN  ondansetron (ZOFRAN-ODT) disintegrating tablet 4 mg, Q8H PRN    Or  ondansetron (ZOFRAN) injection 4 mg, Q6H PRN  insulin glargine (LANTUS;BASAGLAR) injection pen 14 Units, Nightly  insulin lispro (1 Unit Dial) 0-12 Units, Q4H  ipratropium-albuterol (DUONEB) nebulizer solution 1 ampule, 4x daily  nadolol (CORGARD) tablet 40 mg, Daily  aspirin EC tablet 81 mg, Daily  citalopram (CELEXA) tablet 20 mg, Daily  lactulose (CHRONULAC) 10 GM/15ML solution 20 g, TID  levothyroxine (SYNTHROID) tablet 137 mcg, Daily  vitamin D3 (CHOLECALCIFEROL) tablet 400 Units, Daily  rifaximin (XIFAXAN) tablet 550 mg, BID  spironolactone (ALDACTONE) tablet 100 mg, Daily  methylPREDNISolone sodium (SOLU-MEDROL) injection 40 mg, Q8H  furosemide (LASIX) injection 40 mg, BID        Objective:  /73   Pulse 109   Temp 97.6 °F (36.4 °C) (Oral)   Resp 16   Ht 5' 11\" (1.803 m)   Wt 198 lb 9.6 oz (90.1 kg)   SpO2 93%   BMI 27.70 kg/m²     Intake/Output Summary (Last 24 hours) at 9/3/2020 0752  Last data filed at 9/2/2020 1711  Gross per 24 hour   Intake 820 ml   Output 300 ml   Net 520 ml      Wt Readings from Last 3 Encounters:   09/03/20 198 lb 9.6 oz (90.1 kg)   07/01/20 196 lb (88.9 kg)   06/11/20 191 lb (86.6 kg)       General appearance:  Appears comfortable  Eyes: Sclera clear. Pupils equal.  ENT: Moist oral mucosa. Trachea midline, no adenopathy, neck supple. Cardiovascular: Regular rhythm, normal S1, S2. No murmur. No edema in lower extremities  Respiratory: Not using accessory muscles. Good inspiratory effort. Left posterior crackles noted, right thoracentesis dressing look intact . GI: Abdomen soft, no tenderness, not distended, normal bowel sounds  Musculoskeletal: No deformity, ROM WNL. Neurology: CN 2-12 grossly intact. No speech or motor deficits  Psych: Normal affect.  Alert and oriented in time, place and person  Skin: Warm, dry, normal turgor  Extremities:  1+ peripheral edema noted bilaterally  Labs and Tests:  CBC:   Recent Labs     08/31/20  1514 09/01/20  0516   WBC 4.7 2.5*   HGB 11.6* 11.0*   HCT 35.7* 34.0*   MCV 95.6 94.4   PLT 90* 85*     BMP:    Recent Labs     08/31/20  1514 09/01/20  0516    134*   K 4.6 4.4   CL 97* 96*   CO2 34* 29   BUN 20 23*   CREATININE 1.0 1.2   GLUCOSE 183* 266*     Hepatic:   Recent Labs     08/31/20  1514 09/01/20  0516   AST 25 24   ALT 19 20   BILITOT 2.3* 2.3*   ALKPHOS 148* 144*         Problem List  Principal Problem:    Acute respiratory failure with hypoxia (HCC)  Active Problems:    Type 2 diabetes mellitus (HCC)    Atrial fibrillation with RVR (HCC)    Cirrhosis of liver with ascites (HCC)    Acute on chronic diastolic congestive heart failure (HCC)    Pleural effusion    Hyponatremia    Chronic obstructive pulmonary disease (HCC)  Resolved Problems:    * No resolved hospital problems. *       Assessment & Plan:   1.  Acute on chromic hypoxic respiratory failure (multifactorial) On 4 L O2 at baseline     2. Right pleural effusion  - s/p thoracentesis ultrasound-guided thoracentesis with removal of 2 L fluid on 09/2/2020 , IV Solu-Medrol as well as DuoNeb and aggressive pulmonary toilet. IV Lasix and oral Aldactone therapy with strict I's and O's, Pulmonology following     3. Cirrhosis with ascites Abdomen distended Paracentesis ordered Follows up with Dr. Major Osorio outpt Diuretics on hold due to low bp, low-sodium diet     4. .  A. fib RVR: Nadolol continued for rate control , no anticoagulation due to hepatic coagulopathy and thrombocytopenia       5.   Type 2 DM: Home medication on hold,  last hemoglobin A1c from June was 6.3%, Lantus 14 units nightly, Humalog SSI every 4 hours as needed n.p.o.  6.  Disposition: Home, however pulmonology wants to repeat chest x-ray today. Continue with PT OT and their recommendations.       Diet: Diet NPO, After Midnight  Code:Full Code  DVT PPX lovenox       Alma Leger MD   9/3/2020 7:52 AM

## 2020-09-03 NOTE — PROGRESS NOTES
[facility]  Gastroenterology Progress Note    Lady Howe is a 70 y.o. male patient. 1. Pleural effusion    2. Increased oxygen demand        SUBJECTIVE:  No GI complaints. ROS:  Cardiovascular ROS: no chest pain or dyspnea on exertion  Gastrointestinal ROS: no abdominal pain, change in bowel habits, or black or bloody stools  Respiratory ROS: no cough, shortness of breath, or wheezing    Physical    VITALS:  /72   Pulse 95   Temp 97.8 °F (36.6 °C) (Oral)   Resp 18   Ht 5' 11\" (1.803 m)   Wt 198 lb 9.6 oz (90.1 kg)   SpO2 93%   BMI 27.70 kg/m²   TEMPERATURE:  Current - Temp: 97.8 °F (36.6 °C); Max - Temp  Av.6 °F (36.4 °C)  Min: 97.5 °F (36.4 °C)  Max: 97.8 °F (36.6 °C)    NAD  RRR, Nl s1s2  Lungs diminished in bases  Abdomen soft, ND, NT, no HSM, Bowel sounds normal  AAOx3, No asterixis     Data    Data Review:    Recent Labs     20  0516 20  0826   WBC 4.7 2.5* 6.8   HGB 11.6* 11.0* 10.1*   HCT 35.7* 34.0* 31.6*   MCV 95.6 94.4 94.9   PLT 90* 85* 73*     Recent Labs     20  0516 20  0826    134* 136   K 4.6 4.4 4.5   CL 97* 96* 97*   CO2 34* 29 33*   BUN 20 23* 38*   CREATININE 1.0 1.2 1.6*     Recent Labs     20  0516 20  0826   AST 25 24 20   ALT 19 20 18   BILIDIR 0.8*  --  0.7*   BILITOT 2.3* 2.3* 1.9*   ALKPHOS 148* 144* 103     No results for input(s): LIPASE, AMYLASE in the last 72 hours. Recent Labs     20  0516   PROTIME 15.1* 15.4*   INR 1.30* 1.32*     No results for input(s): PTT in the last 72 hours. Radiology Review:    US liver, doppler      ASSESSMENT     Ascites - s/p para 9/2. Cell count negative for neutrocytic ascites. SAAG elevated c/w portal hypertension. Protein low (0.3) suggestive of ascites from cirrhosis rather than CHF although CHF can be contributing. Was on lasix 40 mg daily and aldactone 100 mg daily at home.  Was not following 2 gram low sodium diet.   Cirrhosis -  d/t FLOYD. H/o esophageal varices s/p banding 7/2019. No GI bleeding. H/o ascites on diuretics. PLAN    - f/u ascitic fluid cx,  cytology  - f/u liver US with doppler  - nephrology consult given elevated cr  - hold aldactone as cr increased   - daily weights  - 2 gram low sodium diet   - continue home lactulose and xifaxan     Discussed with Dr. Nick Ramirez, PA-C  [address]    I have personally performed a face to face diagnostic evaluation on this patient. I have interviewed and examined the patient and I agree with the findings and recommended plan of care. In summary, my findings and plan are the following:     Patient feels well and wonders whether he can be discharged today. No new complaints. VSS abdomen is obese and non-tender. His creatinine has increased to 1.6. We will hold aldactone and consult nephrology. Continue salt restricted diet (2 grams sodium/day). Due to his CHF, he is not a candidate for TIPS. If we cannot titrate his diuretics up due to his worsening kidney function, he would need intermittent paracentesis.     Genia Olivia MD, MSc  [address] and [address] [facility] [address]

## 2020-09-03 NOTE — PROGRESS NOTES
[facility] Pulmonary and Critical Care   Progress Note      Reason for Consult: Shortness of breath, pleural effusion  Requesting Physician: Dr. Rosalia Boxer  Subjective:   [address] [facility]:                The patient is a 70 y.o. male with significant past medical history of:      Diagnosis Date    Acute on chronic diastolic congestive heart failure ([facility]     ANEMIA     CAD (coronary artery disease) 10/12/2011    Chronic obstructive pulmonary disease (HCC)     Cirrhosis ([facility]     Depression     Diabetes mellitus ([facility]     Esophageal bleed, non-variceal 11/23/2012    Hypertension     HYPOTHYROIDISM     Liver disease 1/6/2012    Mixed hyperlipidemia     NEUROPATHY     Non-ST elevation MI (NSTEMI) ([facility] 11/21/2012    Pancytopenia ([facility]     Paroxysmal [facility] fibrillation ([facility] 1/6/2012    Pneumonia     Sleep apnea     no cpap    Thrombocytopenia (HCC)     Thyroid disease      Interval history:Patient had 5 liter paracentesis yesterday and he does feel much better. Still on 4 LPM O2 supplement.         Past Surgical History:        Procedure Laterality Date    ACHILLES TENDON SURGERY      CARDIAC SURGERY      3 vessel cabg    CHOLECYSTECTOMY      COLONOSCOPY      CORONARY ARTERY BYPASS GRAFT  10/2011    cabg x3    ENDOSCOPY, COLON, DIAGNOSTIC      PTCA  11/23/2012    PTCA RCA & RPL    TONSILLECTOMY      UPPER GASTROINTESTINAL ENDOSCOPY  11/23/2012    multiple thin linear esophageal mucosal breaks    UPPER GASTROINTESTINAL ENDOSCOPY  3/6/2014    UPPER GASTROINTESTINAL ENDOSCOPY N/A 6/7/2019    EGD BAND LIGATION performed by Shyann Almanza MD at [address] 7/15/2019    EGD BIOPSY performed by Shyann Almanza MD at [address] N/A 7/15/2019    EGD BAND LIGATION performed by Shyann Almanza MD at [address] [facility] ENDOSCOPY     Current Medications:    Current Facility-Administered Medications: sodium chloride weakness  NEUROLOGICAL:  negative for headaches, slurred speech, unilateral weakness  PSYCHIATRIC/BEHAVIORAL: negative for hallucinations, behavioral problems, confusion and agitation. Objective:   PHYSICAL EXAM:      VITALS:  /69   Pulse 97   Temp 97.8 °F (36.6 °C) (Oral)   Resp 16   Ht 5' 11\" (1.803 m)   Wt 198 lb 9.6 oz (90.1 kg)   SpO2 94%   BMI 27.70 kg/m²      24HR INTAKE/OUTPUT:      Intake/Output Summary (Last 24 hours) at 9/3/2020 0933  Last data filed at 9/2/2020 1711  Gross per 24 hour   Intake 820 ml   Output 300 ml   Net 520 ml     CONSTITUTIONAL:  awake, alert, cooperative, no apparent distress, and appears stated age  NECK:  Supple, symmetrical, trachea midline, no adenopathy, thyroid symmetric, not enlarged and no tenderness, skin normal  LUNGS: He is short of breath just sitting on the edge of the bed. He has crackles throughout the left with end expiratory wheezes. Breath sounds are diminished on the right likely due to large right pleural effusion  CARDIOVASCULAR: S1 and S2, no edema and no JVD  ABDOMEN: Slightly distended and somewhat firm. Probably has ascites. LYMPHADENOPATHY:  no axillary or supraclavicular adenopathy. No cervical adnenopathy  PSYCHIATRIC: Oriented to person place and time. No obvious depression or anxiety. MUSCULOSKELETAL: No obvious misalignment or effusion of the joints. No clubbing, cyanosis of the digits. SKIN:  normal skin color, texture, turgor and no redness, warmth, or swelling.  No palpable nodules    DATA:    Old records have been reviewed    CBC:  Recent Labs     08/31/20  1514 09/01/20 0516 09/03/20  0826   WBC 4.7 2.5* 6.8   RBC 3.73* 3.61* 3.34*   HGB 11.6* 11.0* 10.1*   HCT 35.7* 34.0* 31.6*   PLT 90* 85* 73*   MCV 95.6 94.4 94.9   MCH 31.1 30.5 30.3   MCHC 32.5 32.3 31.9   RDW 17.1* 16.8* 17.2*      BMP:  Recent Labs     08/31/20  1514 09/01/20  0516 09/03/20  0826    134* 136   K 4.6 4.4 4.5   CL 97* 96* 97*   CO2 34* 29 33*   BUN 20 23* 38*   CREATININE 1.0 1.2 1.6*   CALCIUM 8.9 8.8 8.6   GLUCOSE 183* 266* 168*      ABG:  No results for input(s): PHART, JTC8FCB, PO2ART, PRN3SSP, Z1NXJWQO, BEART in the last 72 hours. Lab Results   Component Value Date     (H) 11/08/2011     Lab Results   Component Value Date    CKTOTAL 78 04/08/2020    TROPONINI <0.01 08/31/2020       Cultures:     Abx:    Radiology Review:  All pertinent images / reports were reviewed as a part of this visit. Assessment:     1. Acute hypoxemic respiratory failure  2. Large right pleural effusion  3. Acute on chronic diastolic heart failure  4. FLOYD with cirrhosis    Plan:     1. I have reviewed laboratories, medical records and images for this visit  2. 2 L thoracentesis  3. 5 L paracentesis  4. Repeat CXR today  5.  Wean O2

## 2020-09-04 NOTE — PROGRESS NOTES
[facility] GI  Gastroenterology Progress Note    Gopi Orellana is a 70 y.o. male patient. 1. Pleural effusion    2. Increased oxygen demand        SUBJECTIVE:  No GI complaints. ROS:  Cardiovascular ROS: no chest pain or dyspnea on exertion  Gastrointestinal ROS: no abdominal pain, change in bowel habits, or black or bloody stools  Respiratory ROS: no cough, shortness of breath, or wheezing    Physical    VITALS:  /73   Pulse 88   Temp 97.4 °F (36.3 °C) (Oral)   Resp 16   Ht 5' 11\" (1.803 m)   Wt 201 lb 3.2 oz (91.3 kg)   SpO2 94%   BMI 28.06 kg/m²   TEMPERATURE:  Current - Temp: 97.4 °F (36.3 °C); Max - Temp  Av.9 °F (36.6 °C)  Min: 97.4 °F (36.3 °C)  Max: 98.4 °F (36.9 °C)    NAD  RRR, Nl s1s2  Lungs diminished in bases  Abdomen protuberant but soft, NT, no HSM, Bowel sounds normal  AAOx3, No asterixis   BLE edema    Data    Data Review:    Recent Labs     20  0820  0551   WBC 6.8 7.4   HGB 10.1* 11.2*   HCT 31.6* 34.2*   MCV 94.9 95.3   PLT 73* 84*     Recent Labs     20  0826 20  0550    133*   K 4.5 4.4   CL 97* 95*   CO2 33* 31   PHOS  --  4.0   BUN 38* 43*   CREATININE 1.6* 1.6*     Recent Labs     20  0826 20  0550   AST 20 23   ALT 18 22   BILIDIR 0.7* 0.6*   BILITOT 1.9* 1.7*   ALKPHOS 103 114     No results for input(s): LIPASE, AMYLASE in the last 72 hours. No results for input(s): PROTIME, INR in the last 72 hours. No results for input(s): PTT in the last 72 hours. Radiology Review:    US liver, doppler  Impression    Normal hepatopetal flow in the main portal vein.  Abdominal ascites. Cirrhotic appearance to the liver               ASSESSMENT     Ascites - s/p para 9/2. Cell count negative for neutrocytic ascites. SAAG elevated c/w portal hypertension. Protein low (0.3) suggestive of ascites from cirrhosis rather than CHF although CHF can be contributing. Was on lasix 40 mg daily and aldactone 100 mg daily at home.  Was not following 2 gram low sodium diet. Ascites on repeat US 9/3. Weight up 3 pounds  Cirrhosis -  d/t FLOYD. H/o esophageal varices s/p banding 7/2019. No GI bleeding. H/o ascites on diuretics. POONAM - appreciate nephrology eval. Minerva to be HRS. PLAN    - f/u ascitic fluid cx, cytology   - daily weights  - 2 gram low sodium diet   - continue home lactulose and xifaxan     Discussed with Dr. Wing Samuel, PA-C  [address]    I have personally performed a face to face diagnostic evaluation on this patient. I have interviewed and examined the patient and I agree with the findings and recommended plan of care. In summary, my findings and plan are the following:     Patient feels well and wants to go home. Renal thinks he has HRS and started midodrine, albumin and octreotide. Follow his UO and kidney function. Hold diuretics for now. Continue 2 gram sodium diet a day and paracentesis PRN.     Ratna Jones MD, MSc  [address]

## 2020-09-04 NOTE — CONSULTS
Office : [phone]     Fax :[phone]       Nephrology Consult Note      Patient's Name: Kriss Claros  10:28 AM  9/4/2020    Reason for Consult: POONAM      Requesting Physician:  Nery Allison MD      Chief Complaint:    Chief Complaint   Patient presents with    Shortness of Breath     Pt to er With c/o SOB for three weeks, progrssively getting worse. told to come by PCP. bilateral foot swelling. pcp wants pt tested for COVID         History of Present iIlness:    Kriss Claros is a 70 y.o. male with h/o CAD, Cirrhosis - FLOYD, DM 2, Anemia , COPD who was admitted with c/o SOB, abdominal distention and increased edema. Since admission his renal function has worsened. His creatinine increased to 1. 6. his baseline is 0.8 . His BP was running low yesterday and also was tachycardic.    He had paracentesis with 5.3  L fluid removal on 9/2/2020  He did receive albumin along with paracentesis    His creat was 1.2 on 9/1/2020  Aldactone and lasix was held yesterday   BP slightly better today     Past Medical History:   Diagnosis Date    Acute on chronic diastolic congestive heart failure ([address]     ANEMIA     CAD (coronary artery disease) 10/12/2011    Chronic obstructive pulmonary disease (HCC)     Cirrhosis (HCC)     Depression     Diabetes mellitus ([address]     Esophageal bleed, non-variceal 11/23/2012    Hypertension     HYPOTHYROIDISM     Liver disease 1/6/2012    Mixed hyperlipidemia     NEUROPATHY     Non-ST elevation MI (NSTEMI) ([address] 11/21/2012    Pancytopenia ([address]     Paroxysmal atrial fibrillation ([address] 1/6/2012    Pneumonia     Sleep apnea     no cpap    Thrombocytopenia ([address]     Thyroid disease        Past Surgical History:   Procedure Laterality Date    ACHILLES TENDON SURGERY      CARDIAC SURGERY      3 vessel cabg    CHOLECYSTECTOMY      COLONOSCOPY      CORONARY ARTERY BYPASS GRAFT  10/2011    cabg x3    ENDOSCOPY, COLON, DIAGNOSTIC      PTCA  11/23/2012    PTCA RCA & RPL    TONSILLECTOMY      UPPER GASTROINTESTINAL ENDOSCOPY  11/23/2012    multiple thin linear esophageal mucosal breaks    UPPER GASTROINTESTINAL ENDOSCOPY  3/6/2014    UPPER GASTROINTESTINAL ENDOSCOPY N/A 6/7/2019    EGD BAND LIGATION performed by Rigo Hull MD at [address] N/A 7/15/2019    EGD BIOPSY performed by Rigo Hull MD at [address] N/A 7/15/2019    EGD BAND LIGATION performed by Rigo Hull MD at [address] [facility] ENDOSCOPY       Family History   Problem Relation Age of Onset    Heart Disease Mother         arrythmia    Diabetes Father     Cancer Father     Heart Disease Father     Anesth Problems Neg Hx     Malig Hyperten Neg Hx     Hypotension Neg Hx     Malig Hypertherm Neg Hx     Pseudochol. Deficiency Neg Hx         reports that he quit smoking about 6 months ago. His smoking use included cigarettes. He started smoking about 55 years ago. He has a 19.35 pack-year smoking history. He has never used smokeless tobacco. He reports that he does not drink alcohol or use drugs. Allergies:  Cephalexin; Ciprocinonide [fluocinolone];  Quinolones; and Statins    Current Medications:    sodium chloride flush 0.9 % injection 10 mL, 2 times per day  sodium chloride flush 0.9 % injection 10 mL, PRN  sodium chloride flush 0.9 % injection 10 mL, PRN  potassium chloride (KLOR-CON M) extended release tablet 40 mEq, PRN    Or  potassium bicarb-citric acid (EFFER-K) effervescent tablet 40 mEq, PRN    Or  potassium chloride 10 mEq/100 mL IVPB (Peripheral Line), PRN  magnesium sulfate 1 g in dextrose 5% 100 mL IVPB, PRN  acetaminophen (TYLENOL) tablet 650 mg, Q6H PRN Or  acetaminophen (TYLENOL) suppository 650 mg, Q6H PRN  glucose (GLUTOSE) 40 % oral gel 15 g, PRN  dextrose 50 % IV solution, PRN  glucagon (rDNA) injection 1 mg, PRN  dextrose 5 % solution, PRN  ondansetron (ZOFRAN-ODT) disintegrating tablet 4 mg, Q8H PRN    Or  ondansetron (ZOFRAN) injection 4 mg, Q6H PRN  insulin glargine (LANTUS;BASAGLAR) injection pen 14 Units, Nightly  insulin lispro (1 Unit Dial) 0-12 Units, Q4H  ipratropium-albuterol (DUONEB) nebulizer solution 1 ampule, 4x daily  nadolol (CORGARD) tablet 40 mg, Daily  aspirin EC tablet 81 mg, Daily  citalopram (CELEXA) tablet 20 mg, Daily  lactulose (CHRONULAC) 10 GM/15ML solution 20 g, TID  levothyroxine (SYNTHROID) tablet 137 mcg, Daily  vitamin D3 (CHOLECALCIFEROL) tablet 400 Units, Daily  rifaximin (XIFAXAN) tablet 550 mg, BID  methylPREDNISolone sodium (SOLU-MEDROL) injection 40 mg, Q8H  [Held by provider] furosemide (LASIX) injection 40 mg, BID        Review of Systems:   14 point ROS obtained but were negative except mentioned in HPI      Physical exam:     Vitals:  /68   Pulse 85   Temp 97.7 °F (36.5 °C) (Oral)   Resp 16   Ht 5' 11\" (1.803 m)   Wt 201 lb 3.2 oz (91.3 kg)   SpO2 95%   BMI 28.06 kg/m²   Constitutional:  OAA X3 NAD  Skin: no rash, turgor wnl  Heent:  eomi, mmm  Neck: no bruits or jvd noted  Cardiovascular:  S1, S2 without m/r/g  Respiratory: left lung pleural effusion   Abdomen:  +bs, soft, nt, nd  Ext: 1 + lower extremity edema  Psychiatric: mood and affect appropriate  Musculoskeletal:  Rom, muscular strength intact    Labs:  CBC:   Recent Labs     09/03/20  0826 09/04/20  0551   WBC 6.8 7.4   HGB 10.1* 11.2*   PLT 73* 84*     BMP:    Recent Labs     09/03/20  0826 09/04/20  0550    133*   K 4.5 4.4   CL 97* 95*   CO2 33* 31   BUN 38* 43*   CREATININE 1.6* 1.6*   GLUCOSE 168* 264*     Ca/Mg/Phos:   Recent Labs     09/03/20  0826 09/04/20  0550   CALCIUM 8.6 8.9   MG  --  2.50*   PHOS  --  4.0     Hepatic:   Recent Labs     09/03/20  0826 09/04/20  0550   AST 20 23   ALT 18 22   BILITOT 1.9* 1.7*   ALKPHOS 103 114     Troponin: No results for input(s): TROPONINI in the last 72 hours. BNP: No results for input(s): BNP in the last 72 hours. Lipids: No results for input(s): CHOL, TRIG, HDL, LDLCALC, LABVLDL in the last 72 hours. ABGs: No results for input(s): PHART, PO2ART, CIA8WOR in the last 72 hours. INR: No results for input(s): INR in the last 72 hours. UA:  Recent Labs     09/03/20  1715   URINETYPE NotGiven      Urine Microscopic:   Recent Labs     09/03/20  1715   HYALCAST 15*   WBCUA 5   RBCUA 12*   EPIU 2     Urine Culture: No results for input(s): LABURIN in the last 72 hours. Urine Chemistry:   Recent Labs     09/03/20  1600   LABCREA 240.5   NAUR <20                IMAGING:  XR CHEST PORTABLE   Final Result   Slight worsening of right pleural effusion and hazy opacity in the right lung   base. US LIVER   Final Result   Normal hepatopetal flow in the main portal vein. Abdominal ascites. Cirrhotic appearance to the liver         US DUP ABD PEL RETRO SCROT COMPLETE   Final Result   Normal hepatopetal flow in the main portal vein. Abdominal ascites. Cirrhotic appearance to the liver         IR US GUIDED PARACENTESIS   Final Result   Successful ultrasound guided paracentesis. US THORACENTESIS   Final Result   Successful ultrasound guided thoracentesis. XR CHEST 1 VIEW   Final Result   No pneumothorax following large volume right thoracentesis, 2 L removed. There is residual moderate capacity right lower lung from residual fluid and   or right lower and middle lobe atelectasis. XR CHEST (2 VW)   Final Result   Increasing right pleural effusion. I/O last 3 completed shifts: In: 5 [P.O.:720]  Out: 175 [Urine:175]          Assessment/Plan :      1.  POONAM .  likely has HRS  Urine studies s/o HRS  Still has significant edema   Will add midodrine, albumin and octreotide. Recommend to dose adjust all medications  based on renal functions  Maintain SBP> 90 mmHg   Daily weights   AVOID NSAIDs  Avoid Nephrotoxins  Monitor Intake/Output  Call if significant decrease in urine output     2. Hypotension. Midodrine     3. FLOYD related liver cirrhosis   Once renal function better resume aldactone    He wants to go home but d/w him and his wife at bedside that it will be unsafe to go home at this point and needs close monitoring of his renal function . High risk for re admission.                  Thank you for allowing us to participate in care of Andrea Crawford         Electronically signed by: Kathrine Alarcon MD, 9/4/2020, 10:28 AM      [facility] [address]  Office : [phone]  Fax :[phone]

## 2020-09-04 NOTE — ADT AUTH CERT
albumin and octreotide. Recommend to dose adjust all medications  based on renal functions    Maintain SBP> 90 mmHg    Daily weights    AVOID NSAIDs    Avoid Nephrotoxins    Monitor Intake/Output    Call if significant decrease in urine output         2. Hypotension. Midodrine         3. FLOYD related liver cirrhosis    Once renal function better resume aldactone         Pulmonary note    Assessment:         1. Acute hypoxemic respiratory failure    2. Large right pleural effusion    3. Acute on chronic diastolic heart failure    4. FLOYD with cirrhosis         Plan:         1. I have reviewed laboratories, medical records and images for this visit    2. 2 L thoracentesis    3. 5 L paracentesis    4. I reviewed imaging from yesterday's chest x-ray    5. He does have some reaccumulation of right pleural fluid    6. Tolerating his home dose of oxygen    7.  Does not need thoracentesis at this time       Sodium 133 chl 95 CO2 31 bun 43 creat 1.6 mag 2.50 glucose 264 hgb 11.2 hct 34.2

## 2020-09-04 NOTE — PROGRESS NOTES
Nutrition Education    · Verbally reviewed information with Patient and Family  · Educated on low Na+ diet  · Written educational materials provided. · Contact name and number provided. · Refer to Patient Education activity for more details.     Electronically signed by Laverne Ha RD, LD on 9/4/20 at 11:06 AM EDT    Contact: [phone]

## 2020-09-04 NOTE — PLAN OF CARE
Problem: Falls - Risk of:  Goal: Will remain free from falls  Description: Bed locked in lowest position and 2/4 rails up. Nonskid socks on. Call light and belongings within reach. Bed & chair alarm on. Yellow blanket in place. Instructed pt to call out for assistance to bathroom. Pt verbalized understanding. Will continue to monitor.      9/4/2020 1126 by Anne Sims RN  Outcome: Ongoing  9/4/2020 0304 by Vane Cates RN  Outcome: Ongoing  Goal: Absence of physical injury  Description: Absence of physical injury  9/4/2020 1126 by Anne Sims RN  Outcome: Ongoing  9/4/2020 0304 by Vane Cates RN  Outcome: Ongoing     Problem: Pain:  Goal: Pain level will decrease  Description: Pain level will decrease  9/4/2020 1126 by Anne Sims RN  Outcome: Ongoing  9/4/2020 0304 by Vane Cates RN  Outcome: Ongoing  Goal: Control of acute pain  Description: Control of acute pain  9/4/2020 1126 by Anne Sims RN  Outcome: Ongoing  9/4/2020 0304 by Vane Cates RN  Outcome: Ongoing  Goal: Control of chronic pain  Description: Control of chronic pain  Outcome: Ongoing     Problem: Skin Integrity:  Goal: Will show no infection signs and symptoms  Description: Will show no infection signs and symptoms  Outcome: Ongoing  Goal: Absence of new skin breakdown  Description: Absence of new skin breakdown  Outcome: Ongoing     Problem: Metabolic:  Goal: Ability to maintain appropriate glucose levels will improve  Description: Ability to maintain appropriate glucose levels will improve  Outcome: Ongoing     Problem: Cardiovascular  Goal: No DVT, peripheral vascular complications  2/7/4968 [id number] by Anne Sims RN  Outcome: Ongoing  9/4/2020 0304 by Vane Cates RN  Outcome: Ongoing  Goal: Hemodynamic stability  Outcome: Ongoing  Goal: Anticoagulate/Hct stable  Outcome: Ongoing  Goal: Agreement to quit smoking  Outcome: Ongoing  Goal: Weight maintained or lost  Outcome: Ongoing  Goal: Understanding of dietary

## 2020-09-04 NOTE — PLAN OF CARE
Problem: Falls - Risk of:  Goal: Will remain free from falls  Description: Bed locked in lowest position and 2/4 rails up. Nonskid socks on. Call light and belongings within reach. Bed & chair alarm on. Yellow blanket in place. Instructed pt to call out for assistance to bathroom. Pt verbalized understanding. Will continue to monitor.      Outcome: Ongoing     Problem: Falls - Risk of:  Goal: Absence of physical injury  Description: Absence of physical injury  Outcome: Ongoing     Problem: Pain:  Goal: Pain level will decrease  Description: Pain level will decrease  Outcome: Ongoing     Problem: Pain:  Goal: Control of acute pain  Description: Control of acute pain  Outcome: Ongoing     Problem: Cardiovascular  Goal: No DVT, peripheral vascular complications  Outcome: Ongoing

## 2020-09-04 NOTE — PROGRESS NOTES
[facility] Pulmonary and Critical Care   Progress Note      Reason for Consult: Shortness of breath, pleural effusion  Requesting Physician: Dr. Mitch Gaona  Subjective:   [facility]:                The patient is a 70 y.o. male with significant past medical history of:      Diagnosis Date    Acute on chronic diastolic congestive heart failure ([address]     ANEMIA     CAD (coronary artery disease) 10/12/2011    Chronic obstructive pulmonary disease (HCC)     [facility]     Depression     Diabetes mellitus ([facility]     Esophageal bleed, non-variceal 11/23/2012    Hypertension     HYPOTHYROIDISM     Liver disease 1/6/2012    Mixed hyperlipidemia     NEUROPATHY     Non-ST elevation MI (NSTEMI) ([facility] 11/21/2012    Pancytopenia ([facility]     Paroxysmal atrial fibrillation ([facility] 1/6/2012    Pneumonia     Sleep apnea     no cpap    Thrombocytopenia (HCC)     Thyroid disease      Interval history: He is actually feeling pretty good. Remains on 4 L nasal cannula oxygen supplement but this is his normal home dose.         Past Surgical History:        Procedure Laterality Date    ACHILLES TENDON SURGERY      CARDIAC SURGERY      3 vessel cabg    CHOLECYSTECTOMY      COLONOSCOPY      CORONARY ARTERY BYPASS GRAFT  10/2011    cabg x3    ENDOSCOPY, COLON, DIAGNOSTIC      PTCA  11/23/2012    PTCA RCA & RPL    TONSILLECTOMY      UPPER GASTROINTESTINAL ENDOSCOPY  11/23/2012    multiple thin linear esophageal mucosal breaks    UPPER GASTROINTESTINAL ENDOSCOPY  3/6/2014    UPPER GASTROINTESTINAL ENDOSCOPY N/A 6/7/2019    EGD BAND LIGATION performed by Crawford Runner, MD at [facility] 5 7/15/2019    EGD BIOPSY performed by Crawford Runner, MD at [facility] 5 N/A 7/15/2019    EGD BAND LIGATION performed by Crawford Runner, MD at [address] [facility] ENDOSCOPY     Current Medications:    Current Facility-Administered Medications: sodium chloride flush 0.9 % injection 10 mL, 10 mL, Intravenous, 2 times per day  sodium chloride flush 0.9 % injection 10 mL, 10 mL, Intravenous, PRN  sodium chloride flush 0.9 % injection 10 mL, 10 mL, Intravenous, PRN  potassium chloride (KLOR-CON M) extended release tablet 40 mEq, 40 mEq, Oral, PRN **OR** potassium bicarb-citric acid (EFFER-K) effervescent tablet 40 mEq, 40 mEq, Oral, PRN **OR** potassium chloride 10 mEq/100 mL IVPB (Peripheral Line), 10 mEq, Intravenous, PRN  magnesium sulfate 1 g in dextrose 5% 100 mL IVPB, 1 g, Intravenous, PRN  acetaminophen (TYLENOL) tablet 650 mg, 650 mg, Oral, Q6H PRN **OR** acetaminophen (TYLENOL) suppository 650 mg, 650 mg, Rectal, Q6H PRN  glucose (GLUTOSE) 40 % oral gel 15 g, 15 g, Oral, PRN  dextrose 50 % IV solution, 12.5 g, Intravenous, PRN  glucagon (rDNA) injection 1 mg, 1 mg, Intramuscular, PRN  dextrose 5 % solution, 100 mL/hr, Intravenous, PRN  ondansetron (ZOFRAN-ODT) disintegrating tablet 4 mg, 4 mg, Oral, Q8H PRN **OR** ondansetron (ZOFRAN) injection 4 mg, 4 mg, Intravenous, Q6H PRN  insulin glargine (LANTUS;BASAGLAR) injection pen 14 Units, 0.15 Units/kg, Subcutaneous, Nightly  insulin lispro (1 Unit Dial) 0-12 Units, 0-12 Units, Subcutaneous, Q4H  ipratropium-albuterol (DUONEB) nebulizer solution 1 ampule, 1 ampule, Inhalation, 4x daily  nadolol (CORGARD) tablet 40 mg, 40 mg, Oral, Daily  aspirin EC tablet 81 mg, 81 mg, Oral, Daily  citalopram (CELEXA) tablet 20 mg, 20 mg, Oral, Daily  lactulose (CHRONULAC) 10 GM/15ML solution 20 g, 20 g, Oral, TID  levothyroxine (SYNTHROID) tablet 137 mcg, 137 mcg, Oral, Daily  vitamin D3 (CHOLECALCIFEROL) tablet 400 Units, 400 Units, Oral, Daily  rifaximin (XIFAXAN) tablet 550 mg, 550 mg, Oral, BID  methylPREDNISolone sodium (SOLU-MEDROL) injection 40 mg, 40 mg, Intravenous, Q8H  [Held by provider] furosemide (LASIX) injection 40 mg, 40 mg, Intravenous, BID    Allergies   Allergen Reactions    Cephalexin Hives    Ciprocinonide [Fluocinolone]     Quinolones Other (See Comments)     Confusion      Statins Other (See Comments)     Liver toxicity       Social History:    TOBACCO:   reports that he quit smoking about 6 months ago. His smoking use included cigarettes. He started smoking about 55 years ago. He has a 19.35 pack-year smoking history. He has never used smokeless tobacco.  ETOH:   reports no history of alcohol use. Patient currently lives independently  Environmental/chemical exposure: None known    Family History:       Problem Relation Age of Onset    Heart Disease Mother         arrythmia    Diabetes Father     Cancer Father     Heart Disease Father     Anesth Problems Neg Hx     Malig Hyperten Neg Hx     Hypotension Neg Hx     Malig Hypertherm Neg Hx     Pseudochol. Deficiency Neg Hx      REVIEW OF SYSTEMS:    CONSTITUTIONAL:  negative for fevers, chills, diaphoresis, activity change, appetite change, fatigue, night sweats and unexpected weight change.    EYES:  negative for blurred vision, eye discharge, visual disturbance and icterus  HEENT:  negative for hearing loss, tinnitus, ear drainage, sinus pressure, nasal congestion, epistaxis and snoring  RESPIRATORY:  See HPI  CARDIOVASCULAR:  negative for chest pain, palpitations, exertional chest pressure/discomfort, edema, syncope  GASTROINTESTINAL:  negative for nausea, vomiting, diarrhea, constipation, blood in stool and abdominal pain  GENITOURINARY:  negative for frequency, dysuria, urinary incontinence, decreased urine volume, and hematuria  HEMATOLOGIC/LYMPHATIC:  negative for easy bruising, bleeding and lymphadenopathy  ALLERGIC/IMMUNOLOGIC:  negative for recurrent infections, angioedema, anaphylaxis and drug reactions  ENDOCRINE:  negative for weight changes and diabetic symptoms including polyuria, polydipsia and polyphagia  MUSCULOSKELETAL:  negative for  pain, joint swelling, decreased range of motion and muscle weakness  NEUROLOGICAL: negative for headaches, slurred speech, unilateral weakness  PSYCHIATRIC/BEHAVIORAL: negative for hallucinations, behavioral problems, confusion and agitation. Objective:   PHYSICAL EXAM:      VITALS:  /68   Pulse 85   Temp 97.7 °F (36.5 °C) (Oral)   Resp 16   Ht 5' 11\" (1.803 m)   Wt 201 lb 3.2 oz (91.3 kg)   SpO2 95%   BMI 28.06 kg/m²      24HR INTAKE/OUTPUT:      Intake/Output Summary (Last 24 hours) at 9/4/2020 1027  Last data filed at 9/3/2020 1846  Gross per 24 hour   Intake 720 ml   Output 175 ml   Net 545 ml     CONSTITUTIONAL:  awake, alert, cooperative, no apparent distress, and appears stated age  NECK:  Supple, symmetrical, trachea midline, no adenopathy, thyroid symmetric, not enlarged and no tenderness, skin normal  LUNGS: He is short of breath just sitting on the edge of the bed. He has crackles throughout the left with end expiratory wheezes. Breath sounds are diminished on the right likely due to large right pleural effusion  CARDIOVASCULAR: S1 and S2, no edema and no JVD  ABDOMEN: Slightly distended and somewhat firm. Probably has ascites. LYMPHADENOPATHY:  no axillary or supraclavicular adenopathy. No cervical adnenopathy  PSYCHIATRIC: Oriented to person place and time. No obvious depression or anxiety. MUSCULOSKELETAL: No obvious misalignment or effusion of the joints. No clubbing, cyanosis of the digits. SKIN:  normal skin color, texture, turgor and no redness, warmth, or swelling.  No palpable nodules    DATA:    Old records have been reviewed    CBC:  Recent Labs     09/03/20  0826 09/04/20  0551   WBC 6.8 7.4   RBC 3.34* 3.59*   HGB 10.1* 11.2*   HCT 31.6* 34.2*   PLT 73* 84*   MCV 94.9 95.3   MCH 30.3 31.1   MCHC 31.9 32.7   RDW 17.2* 17.1*      BMP:  Recent Labs     09/03/20  0826 09/04/20  0550    133*   K 4.5 4.4   CL 97* 95*   CO2 33* 31   BUN 38* 43*   CREATININE 1.6* 1.6*   CALCIUM 8.6 8.9   GLUCOSE 168* 264*      ABG:  No results for input(s): PHART, XAO6ILA, PO2ART, KTL3YMT, M2WRCVEY, BEART in the last 72 hours. Lab Results   Component Value Date     (H) 11/08/2011     Lab Results   Component Value Date    CKTOTAL 78 04/08/2020    TROPONINI <0.01 08/31/2020       Cultures:     Abx:    Radiology Review:  All pertinent images / reports were reviewed as a part of this visit. Assessment:     1. Acute hypoxemic respiratory failure  2. Large right pleural effusion  3. Acute on chronic diastolic heart failure  4. FLOYD with cirrhosis    Plan:     1. I have reviewed laboratories, medical records and images for this visit  2. 2 L thoracentesis  3. 5 L paracentesis  4. I reviewed imaging from yesterday's chest x-ray  5. He does have some reaccumulation of right pleural fluid  6. Tolerating his home dose of oxygen  7. Does not need thoracentesis at this time  8. Okay with me if he is discharged home when okay with rest of the treatment team  9. I will sign off. Please call with any questions.

## 2020-09-04 NOTE — DISCHARGE INSTR - COC
[phone]  Mobile Phone: [phone]  Relation: Spouse  Secondary Emergency Contact:  David Payan [facility] [address] Phone: [phone]  Mobile Phone: [phone]  Relation: Child    Past Surgical History:  Past Surgical History:   Procedure Laterality Date    ACHILLES TENDON SURGERY      CARDIAC SURGERY      3 vessel cabg    CHOLECYSTECTOMY      COLONOSCOPY      CORONARY ARTERY BYPASS GRAFT  10/2011    cabg x3    ENDOSCOPY, COLON, DIAGNOSTIC      PTCA  11/23/2012    PTCA RCA & RPL    TONSILLECTOMY      UPPER GASTROINTESTINAL ENDOSCOPY  11/23/2012    multiple thin linear esophageal mucosal breaks    UPPER GASTROINTESTINAL ENDOSCOPY  3/6/2014    UPPER GASTROINTESTINAL ENDOSCOPY N/A 6/7/2019    EGD BAND LIGATION performed by Mia Albert MD at [address] N/A 7/15/2019    EGD BIOPSY performed by Mia Albert MD at [address] N/A 7/15/2019    EGD BAND LIGATION performed by Mia Albert MD at [address] [facility] ENDOSCOPY       Immunization History:   Immunization History   Administered Date(s) Administered    Influenza 11/02/2012    Influenza Virus Vaccine 10/26/2011, 10/16/2013, 10/13/2018    Influenza, High Dose (Fluzone 65 yrs and older) 09/16/2014, 08/31/2016    Influenza, Quadv, IM, PF (6 mo and older Fluzone, Flulaval, Fluarix, and 3 yrs and older Afluria) 10/13/2018    Influenza, Triv, inactivated, subunit, adjuvanted, IM (Fluad 65 yrs and older) 11/06/2019    Pneumococcal Conjugate 13-valent ([id number]) 06/15/2016    Pneumococcal Polysaccharide ([id number]) 10/11/2006, 05/14/2013       Active Problems:  Patient Active Problem List   Diagnosis Code    Type 2 diabetes mellitus ([facility] E11.9    CAD (coronary artery disease) I25.10    HTN (hypertension) I10    HLD (hyperlipidemia) E78.5    Atrial fibrillation with RVR (HCC) I48.91    DMITRIY (obstructive sleep apnea) G47.33    Depression F32.9    Liver disease K76.9    Cholelithiasis K80.20    Thrombocytopenia (HCC) D69.6    Esophageal bleeding not due to varices K22.8    Pancytopenia (HCC) D61.818    Warfarin-induced coagulopathy (HCC) D68.32, T45.515A    Hepatic encephalopathy (HCC) K72.90    Acute encephalopathy G93.40    Increased ammonia level R79.89    Cirrhosis of liver with ascites (HCC) K74.60, R18.8    Generalized weakness R53.1    Hypotension, postural I95.1    Hyperammonemia (HCC) E72.20    Malaise and fatigue R53.81, R53.83    Acute on chronic diastolic congestive heart failure (HCC) I50.33    Nonrheumatic mitral valve regurgitation I34.0    Pleural effusion J90    Acute respiratory failure with hypoxia (HCC) J96.01    Hyponatremia E87.1    Chronic obstructive pulmonary disease (HCC) J44.9       Isolation/Infection:   Isolation          No Isolation        Patient Infection Status     Infection Onset Added Last Indicated Last Indicated By Review Planned Expiration Resolved Resolved By    None active    Resolved    COVID-19 Rule Out 04/08/20 04/08/20 04/08/20 Emergent Disease Panel (Ordered)   04/14/20 Rule-Out Test Resulted          Nurse Assessment:  Last Vital Signs: /73   Pulse 88   Temp 97.4 °F (36.3 °C) (Oral)   Resp 16   Ht 5' 11\" (1.803 m)   Wt 201 lb 3.2 oz (91.3 kg)   SpO2 94%   BMI 28.06 kg/m²     Last documented pain score (0-10 scale): Pain Level: 0  Last Weight:   Wt Readings from Last 1 Encounters:   09/04/20 201 lb 3.2 oz (91.3 kg)     Mental Status:  {IP PT MENTAL STATUS:20030}    IV Access:  {[facility] IV ACCESS:308562027}    Nursing Mobility/ADLs:  Walking   {CHP DME ONLV:512577532}  Transfer  {CHP DME JQMR:676138894}  Bathing  {CHP DME CSEV:490044917}  Dressing  {CHP DME YWHS:946666678}  Toileting  {CHP DME ESXM:340875139}  Feeding  {CHP DME YGDE:634685238}  Med Admin  {CHP DME EUFU:420542537}  Med Delivery   { YURI MED Delivery:262436426}    Wound Care Documentation and SECTION    Prognosis: {Prognosis:5968499509}    Condition at Discharge: [address] Saeed Patient Condition:400119176}    Rehab Potential (if transferring to Rehab): {Prognosis:9247279871}    Recommended Labs or Other Treatments After Discharge: ***    Physician Certification: I certify the above information and transfer of Chan Scherer  is necessary for the continuing treatment of the diagnosis listed and that he requires {Admit to Appropriate Level of Care:98118} for {GREATER/LESS:340675818} 30 days.      Update Admission H&P: {CHP DME Changes in GPPDJ:860214583}    PHYSICIAN SIGNATURE:  {Esignature:292980751}

## 2020-09-04 NOTE — PROGRESS NOTES
[facility]    9/4/2020 7:15 AM        Name: Brionna Gallego . Admitted: 8/31/2020  Primary Care Provider: Chlua Simmons MD (Tel: [phone])                        Hospital course:   70years old gentleman with a history of cirrhosis due to Joselin Kennel presented to the emergency room with abdominal distention and shortness of breath found to have right pleural effusion. Underwent right-sided thoracentesis on 9/1/2021 2 L of fluid removed. He underwent paracentesis on 9/2/2000 5.3 L of fluid removed. Subjective:  . No acute events overnight. Resting well. Pain control. Diet ok. Labs reviewed  Denies any chest pain sob.      Reviewed interval ancillary notes    Current Medications  sodium chloride flush 0.9 % injection 10 mL, 2 times per day  sodium chloride flush 0.9 % injection 10 mL, PRN  sodium chloride flush 0.9 % injection 10 mL, PRN  potassium chloride (KLOR-CON M) extended release tablet 40 mEq, PRN    Or  potassium bicarb-citric acid (EFFER-K) effervescent tablet 40 mEq, PRN    Or  potassium chloride 10 mEq/100 mL IVPB (Peripheral Line), PRN  magnesium sulfate 1 g in dextrose 5% 100 mL IVPB, PRN  acetaminophen (TYLENOL) tablet 650 mg, Q6H PRN    Or  acetaminophen (TYLENOL) suppository 650 mg, Q6H PRN  glucose (GLUTOSE) 40 % oral gel 15 g, PRN  dextrose 50 % IV solution, PRN  glucagon (rDNA) injection 1 mg, PRN  dextrose 5 % solution, PRN  ondansetron (ZOFRAN-ODT) disintegrating tablet 4 mg, Q8H PRN    Or  ondansetron (ZOFRAN) injection 4 mg, Q6H PRN  insulin glargine (LANTUS;BASAGLAR) injection pen 14 Units, Nightly  insulin lispro (1 Unit Dial) 0-12 Units, Q4H  ipratropium-albuterol (DUONEB) nebulizer solution 1 ampule, 4x daily  nadolol (CORGARD) tablet 40 mg, Daily  aspirin EC tablet 81 mg, Daily  citalopram (CELEXA) tablet 20 mg, Daily  lactulose (CHRONULAC) 10 GM/15ML solution 20 g, TID  levothyroxine (SYNTHROID) tablet 137 mcg, Daily  vitamin D3 (CHOLECALCIFEROL) tablet 400 Units, Daily  rifaximin (XIFAXAN) tablet 550 mg, BID  methylPREDNISolone sodium (SOLU-MEDROL) injection 40 mg, Q8H  [Held by provider] furosemide (LASIX) injection 40 mg, BID        Objective:  /81   Pulse 89   Temp 97.9 °F (36.6 °C) (Oral)   Resp 16   Ht 5' 11\" (1.803 m)   Wt 201 lb 3.2 oz (91.3 kg)   SpO2 95%   BMI 28.06 kg/m²     Intake/Output Summary (Last 24 hours) at 9/4/2020 0715  Last data filed at 9/3/2020 1846  Gross per 24 hour   Intake 720 ml   Output 175 ml   Net 545 ml      Wt Readings from Last 3 Encounters:   09/04/20 201 lb 3.2 oz (91.3 kg)   07/01/20 196 lb (88.9 kg)   06/11/20 191 lb (86.6 kg)       General appearance:  Appears comfortable  Eyes: Sclera clear. Pupils equal.  ENT: Moist oral mucosa. Trachea midline, no adenopathy, neck supple. Cardiovascular: Regular rhythm, normal S1, S2. No murmur. No edema in lower extremities  Respiratory: Not using accessory muscles. Good inspiratory effort. Clear to auscultation bilaterally, no wheeze or crackles. GI: Abdomen soft, no tenderness, not distended, normal bowel sounds  Musculoskeletal: No deformity, ROM WNL. Neurology: CN 2-12 grossly intact. No speech or motor deficits  Psych: Normal affect.  Alert and oriented in time, place and person  Skin: Warm, dry, normal turgor    Labs and Tests:  CBC:   Recent Labs     09/03/20  0826 09/04/20  0551   WBC 6.8 7.4   HGB 10.1* 11.2*   HCT 31.6* 34.2*   MCV 94.9 95.3   PLT 73* 84*     BMP:    Recent Labs     09/03/20  0826 09/04/20  0550    133*   K 4.5 4.4   CL 97* 95*   CO2 33* 31   BUN 38* 43*   CREATININE 1.6* 1.6*   GLUCOSE 168* 264*     Hepatic:   Recent Labs     09/03/20  0826 09/04/20  0550   AST 20 23   ALT 18 22   BILITOT 1.9* 1.7*   ALKPHOS 103 114         Problem List  Principal Problem:    Acute respiratory failure with hypoxia [facility])  Active Problems:    Type 2 diabetes mellitus (HCC)    Atrial fibrillation with RVR (HCC)    Cirrhosis of liver with ascites (HCC)    Acute on chronic diastolic congestive heart failure (HCC)    Pleural effusion    Hyponatremia    Chronic obstructive pulmonary disease ([address]  Resolved Problems:    * No resolved hospital problems. *       Assessment & Plan:   1.  Acute on chromic hypoxic respiratory failure (multifactorial) On 4 L O2 at baseline     2. Right pleural effusion  - s/p thoracentesis ultrasound-guided thoracentesis with removal of 2 L fluid on 09/2/2020 , IV Solu-Medrol as well as DuoNeb and aggressive pulmonary toilet. IV Lasix and oral Aldactone therapy with strict I's and O's, Pulmonology following     3. Cirrhosis with ascites Abdomen distended Paracentesis ordered Follows up with Dr. Brian Boateng outpt Diuretics on hold due to low bp, low-sodium diet     4. .  A. fib RVR: Nadolol continued for rate control , no anticoagulation due to hepatic coagulopathy and thrombocytopenia       5.   Type 2 DM: Home medication on hold,  last hemoglobin A1c from June was 6.3%, Lantus 14 units nightly, Humalog SSI every 4 hours as needed n.p.o.  6.  Disposition: Home today if  pulmonology and nephrology ok with that.     Diet: DIET CARB CONTROL;  Code:Full Code  DVT PPX bernardox       Anne Marie Bains MD   9/4/2020 7:15 AM

## 2020-09-04 NOTE — ADT AUTH CERT
Respiratory Failure [facility] - Care Day 3 (9/2/2020) by Tess Li RN         Review Status  Review Entered    Completed  9/4/2020 09:23        Criteria Review       Care Day: 3 Care Date: 9/2/2020 Level of Care: Intermediate Care    Guideline Day 1    Level Of Care    (X) ICU or intermediate care as appropriate [C]    Clinical Status    ( ) * Clinical Indications met [D]    Interventions    (X) Inpatient interventions as needed    * Milestone    Additional Notes    9/2       BP 99/65   Pulse 105   Temp 97.7 °F (36.5 °C) (Oral)   Resp 14   Ht 5' 11\" (1.803 m)   Wt 204 lb 6.4 oz (92.7 kg)   SpO2 91% 4L/min NC   BMI 28.51 kg/m²       Orders    Oxygen    Telemetry    POCT glucose q4h SSI    lactulose (CHRONULAC) 10 GM/15ML solution 20 g  po 3x/day    methylPREDNISolone sodium (SOLU-MEDROL) injection 40 mg  IV q8h    rifaximin (XIFAXAN) tablet 550 mg  po twice daily             Per IM    On 4 L O2    Abdomen is distended and tight    S/ p thoracentesis on 09/01/20 with removal of 2 L of fluid       Assessment & Plan:     1.  Acute on chromic hypoxic respiratory failure (multifactorial)    On 4 L O2 at baseline         Left pleural effusion     - s/p thoracentesis ultrasound-guided thoracentesis with removal of 2 L fluid on 09/2/2020    - IV Solu-Medrol as well as DuoNeb and aggressive pulmonary toilet    - IV Lasix and oral Aldactone therapy with strict I's and O's    Pulmonology following         Cirrhosis with ascites    Abdomen distended    Paracentesis ordered    Follows up with Dr. Brian Boateng outpt    Diuretics on hold due to low bp         .  A. fib RVR    -Nadolol continued for rate control    -No anticoagulation due to hepatic coagulopathy and thrombocytopenia                   4. Type 2 diabetes    -All oral hypoglycemic agents on hold               - A1c pending    - Lantus scheduled for bedtime; Humalog SSI every 4 hours as needed n.p.o.        Per GI    Recommendations:    - f/u ascitic fluid cell count, cx, albumin, protein, cytology    - Check liver US with doppler    - will need to resume lasix and aldactone at some point    - daily weights    - 2 gram low sodium diet    - continue home lactulose and xifaxan           PA rec's by Easton Myers RN         Review Status  Review Entered    In Primary  2020 16:09        Criteria Review    We recommend that the following pt's current hospitalization under INPATIENT   status is APPROPRIATE . If you agree, please place a new ADMIT order in [facility] as recommended.     Name: Damaris Jamil   : 1945   CSN: [id number]     Clinical summary SOB  Vitals Tachycardic  Labs and Imaging CXR rt pleural effusion, Ecg afib RVR, plt 90  MCG criteria applies yes, M 505  Comments       This chart was reviewed at 4:09 PM 2020    [address]

## 2020-09-05 NOTE — PROGRESS NOTES
[facility] GI  Gastroenterology Progress Note    Tiara Escalante is a 70 y.o. male patient. 1. Pleural effusion    2. Increased oxygen demand        SUBJECTIVE:  No GI complaints. ROS:  Cardiovascular ROS: no chest pain or dyspnea on exertion  Gastrointestinal ROS: no abdominal pain, change in bowel habits, or black or bloody stools  Respiratory ROS: no cough, shortness of breath, or wheezing    Physical    VITALS:  /71   Pulse 88   Temp 98 °F (36.7 °C) (Oral)   Resp 16   Ht 5' 11\" (1.803 m)   Wt 204 lb 9.6 oz (92.8 kg)   SpO2 94%   BMI 28.54 kg/m²   TEMPERATURE:  Current - Temp: 98 °F (36.7 °C); Max - Temp  Av.8 °F (36.6 °C)  Min: 97.3 °F (36.3 °C)  Max: 98.2 °F (36.8 °C)    NAD  RRR, Nl s1s2  Lungs diminished in bases  Abdomen protuberant but soft, NT, no HSM, Bowel sounds normal  AAOx3, No asterixis   BLE edema    Data    Data Review:    Recent Labs     20  0820  0551 20  0522   WBC 6.8 7.4 6.7   HGB 10.1* 11.2* 9.9*   HCT 31.6* 34.2* 31.0*   MCV 94.9 95.3 94.4   PLT 73* 84* 72*     Recent Labs     20  0820  0550 20  0522    133* 133*   K 4.5 4.4 4.7   CL 97* 95* 94*   CO2 33* 31 30   PHOS  --  4.0  --    BUN 38* 43* 40*   CREATININE 1.6* 1.6* 1.4*     Recent Labs     20  0820  0550 20  0522   AST 20 23 30   ALT 18 22 27   BILIDIR 0.7* 0.6* 0.7*   BILITOT 1.9* 1.7* 2.1*   ALKPHOS 103 114 109     No results for input(s): LIPASE, AMYLASE in the last 72 hours. No results for input(s): PROTIME, INR in the last 72 hours. No results for input(s): PTT in the last 72 hours. Radiology Review:    US liver, doppler  Impression    Normal hepatopetal flow in the main portal vein.  Abdominal ascites. Cirrhotic appearance to the liver               ASSESSMENT     Ascites - s/p para 9/2. Cell count negative for neutrocytic ascites. SAAG elevated c/w portal hypertension.  Protein low (0.3) suggestive of ascites from cirrhosis rather than CHF although CHF can be contributing. Was on lasix 40 mg daily and aldactone 100 mg daily at home. Was not following 2 gram low sodium diet. Ascites on repeat US 9/3. Weight up 3 pounds  Cirrhosis -  d/t FLOYD. H/o esophageal varices s/p banding 7/2019. No GI bleeding. H/o ascites on diuretics. POONAM - Felt to be HRS. started midodrine, albumin and octreotide. Follow his UO and kidney function. Hold diuretics for now.  Cr improved today      PLAN    - f/u ascitic fluid cx, cytology   - daily weights  - 2 gram low sodium diet   - continue home lactulose and xifaxan  - ok for d/c from gi standpoint once renal function improved     Maty Garnica MD  [address]

## 2020-09-05 NOTE — PROGRESS NOTES
Office: [phone]       Fax: [phone]      Nephrology Progress Note        Patient's Name: Kash Espinoza Date: 8/31/2020  Date of Visit: 9/5/2020    Reason for Consult:  POONAM      Subjective:      Gopi Orellana is a 70 y.o. male with PMHx of hypertension, FLOYD, DM, cirrhosis, COPD on home oxygen, PAF who was hospitalized on 8/31/2020 with complaints of shortness of breath   Patient has been managed for Afib RVR, ascites status post paracenteses on 9/2 (5.3 L), right pleural effusion status post thoracentesis 9/1 (2L)  Creat trending up.   Denies much shortness of breath   NSAID use: Denies   IV contrast: None recent   Home meds reviewed and noted to be on ald, lasix    Past Medical History:   Diagnosis Date    Acute on chronic diastolic congestive heart failure ([address]     ANEMIA     CAD (coronary artery disease) 10/12/2011    Chronic obstructive pulmonary disease (HCC)     Cirrhosis ([address]     Depression     Diabetes mellitus ([address]     Esophageal bleed, non-variceal 11/23/2012    Hypertension     HYPOTHYROIDISM     Liver disease 1/6/2012    Mixed hyperlipidemia     NEUROPATHY     Non-ST elevation MI (NSTEMI) ([address] 11/21/2012    Pancytopenia ([address]     Paroxysmal atrial fibrillation ([address] 1/6/2012    Pneumonia     Sleep apnea     no cpap    Thrombocytopenia ([address]     Thyroid disease        Past Surgical History:   Procedure Laterality Date    ACHILLES TENDON SURGERY      CARDIAC SURGERY      3 vessel cabg    CHOLECYSTECTOMY      COLONOSCOPY      CORONARY ARTERY BYPASS GRAFT  10/2011    cabg x3    ENDOSCOPY, COLON, DIAGNOSTIC      PTCA  11/23/2012    PTCA RCA & RPL    TONSILLECTOMY      UPPER GASTROINTESTINAL ENDOSCOPY  11/23/2012    multiple thin linear esophageal mucosal breaks    UPPER GASTROINTESTINAL ENDOSCOPY  3/6/2014  UPPER GASTROINTESTINAL ENDOSCOPY N/A 6/7/2019    EGD BAND LIGATION performed by Jana Jaime MD at [facility] 5 N/A 7/15/2019    EGD BIOPSY performed by Jana Jaime MD at [facility] 5 N/A 7/15/2019    EGD BAND LIGATION performed by Jana Jaime MD at [address] [facility] ENDOSCOPY       Family History   Problem Relation Age of Onset    Heart Disease Mother         arrythmia    Diabetes Father     Cancer Father     Heart Disease Father     Anesth Problems Neg Hx     Malig Hyperten Neg Hx     Hypotension Neg Hx     Malig Hypertherm Neg Hx     Pseudochol. Deficiency Neg Hx         reports that he quit smoking about 6 months ago. His smoking use included cigarettes. He started smoking about 55 years ago. He has a 19.35 pack-year smoking history. He has never used smokeless tobacco. He reports that he does not drink alcohol or use drugs. Medications: Allergies:  Cephalexin; Ciprocinonide [fluocinolone];  Quinolones; and Statins    Scheduled Meds:   midodrine  2.5 mg Oral TID WC    octreotide  50 mcg Subcutaneous Q8H    albumin human  25 g Intravenous Q8H    insulin lispro  0-18 Units Subcutaneous TID WC    insulin lispro  0-9 Units Subcutaneous Nightly    sodium chloride flush  10 mL Intravenous 2 times per day    insulin glargine  0.15 Units/kg Subcutaneous Nightly    ipratropium-albuterol  1 ampule Inhalation 4x daily    nadolol  40 mg Oral Daily    aspirin  81 mg Oral Daily    citalopram  20 mg Oral Daily    lactulose  20 g Oral TID    levothyroxine  137 mcg Oral Daily    vitamin D3  400 Units Oral Daily    rifaximin  550 mg Oral BID    [Held by provider] furosemide  40 mg Intravenous BID     Continuous Infusions:   dextrose         Labs:  CBC:   Recent Labs     09/03/20  0826 09/04/20  0551 09/05/20  0522   WBC 6.8 7.4 6.7   HGB 10.1* 11.2* 9.9*   PLT 73* 84* 72*     Ca/Mg/Phos:   Recent Labs residual moderate capacity right lower lung from residual fluid and   or right lower and middle lobe atelectasis. XR CHEST (2 VW)   Final Result   Increasing right pleural effusion. Assessment :     1. POONAM  -Non-Oliguric  -Baseline creat: 0.8-1.2 Now 1.6->1.4  -UA hyaline cast, pyuria, Ur Na <20  -Volume: Hypervolemic   -Electrolytes: Hyponatremia  -Acid-Base: Metabolic alkalosis   -POONAM suspected 2/2 renal hypoperfusion. May has HRS physiology. Recent Labs     09/03/20 0826 09/04/20 0550 09/05/20 0522   BUN 38* 43* 40*   CREATININE 1.6* 1.6* 1.4*     Recent Labs     09/03/20 0826 09/04/20 0550 09/05/20 0522    133* 133*   K 4.5 4.4 4.7   CO2 33* 31 30   MG  --  2.50*  --          2. HTN  -Blood pressure low    BP Readings from Last 1 Encounters:   09/05/20 99/66       3. FLOYD  -portal hypertension     4. COPD on home oxygen     5. DM  -not on metformin    Plan:     - Albumin infusion  - Octreotide  - increase Midodrine  - Hold diuretics today  - monitor BMP    -Monitor I/O, UOP  -Maintain MAP>65  -Avoid nephrotoxin, if able. -Dose meds to current eGFR    Thank you for allowing us to participate in care of Swapnil Bolivar . We will continue to follow. Feel free to contact me with any questions.       Reema Haywood  9/5/2020    [facility] [address]  Office : [phone]  Fax :[phone]

## 2020-09-05 NOTE — PROGRESS NOTES
[address] [place], [place] [address]ISTS PROGRESS NOTE    9/5/2020 2:24 PM        Name: Lilliam Watson . Admitted: 8/31/2020  Primary Care Provider: Nilson Nicholson MD (Tel: [phone])    Brief Course:  70years old gentleman with a history of cirrhosis due to Lafonda Schlossman presented to the emergency room with abdominal distention and shortness of breath found to have right pleural effusion.  Underwent right-sided thoracentesis on 9/1/2021 2 L of fluid removed. Indiana Cabrera underwent paracentesis on 9/2/2000 5.3 L of fluid removed.   67 years old gentleman with a history of cirrhosis due to Lafonda Schlossman presented to the emergency room with abdominal distention and shortness of breath found to have right pleural effusion.  Underwent right-sided thoracentesis on 9/1/2021 2 L of fluid removed. Indiana Cabrera underwent paracentesis on 9/2/2000 5.3 L of fluid removed. Worsening renal function concerns of hepatorenal syndrome. Nephrology on consult. On octreotide and albumin.       CC: Leg edema    Subjective:  .   No major overnight issues, patient feels better, sitting in the chair,    Reviewed interval ancillary notes    Current Medications  midodrine (PROAMATINE) tablet 10 mg, TID WC  midodrine (PROAMATINE) tablet 7.5 mg, Once  octreotide (SANDOSTATIN) injection 50 mcg, Q8H  albumin human 25 % IV solution 25 g, Q8H  insulin lispro (1 Unit Dial) 0-18 Units, TID WC  insulin lispro (1 Unit Dial) 0-9 Units, Nightly  sodium chloride flush 0.9 % injection 10 mL, 2 times per day  sodium chloride flush 0.9 % injection 10 mL, PRN  sodium chloride flush 0.9 % injection 10 mL, PRN  potassium chloride (KLOR-CON M) extended release tablet 40 mEq, PRN    Or  potassium bicarb-citric acid (EFFER-K) effervescent tablet 40 mEq, PRN    Or  potassium chloride 10 mEq/100 mL IVPB (Peripheral Line), PRN  magnesium sulfate 1 g in dextrose 5% 100 mL IVPB, PRN  acetaminophen (TYLENOL) tablet 650 mg, Q6H PRN    Or  acetaminophen (TYLENOL) suppository 650 mg, Q6H PRN  glucose (GLUTOSE) 40 % oral gel 15 g, PRN  dextrose 50 % IV solution, PRN  glucagon (rDNA) injection 1 mg, PRN  dextrose 5 % solution, PRN  ondansetron (ZOFRAN-ODT) disintegrating tablet 4 mg, Q8H PRN    Or  ondansetron (ZOFRAN) injection 4 mg, Q6H PRN  insulin glargine (LANTUS;BASAGLAR) injection pen 14 Units, Nightly  ipratropium-albuterol (DUONEB) nebulizer solution 1 ampule, 4x daily  nadolol (CORGARD) tablet 40 mg, Daily  aspirin EC tablet 81 mg, Daily  citalopram (CELEXA) tablet 20 mg, Daily  lactulose (CHRONULAC) 10 GM/15ML solution 20 g, TID  levothyroxine (SYNTHROID) tablet 137 mcg, Daily  vitamin D3 (CHOLECALCIFEROL) tablet 400 Units, Daily  rifaximin (XIFAXAN) tablet 550 mg, BID  [Held by provider] furosemide (LASIX) injection 40 mg, BID        Objective:  /70   Pulse 80   Temp 97.7 °F (36.5 °C) (Oral)   Resp 14   Ht 5' 11\" (1.803 m)   Wt 204 lb 9.6 oz (92.8 kg)   SpO2 95%   BMI 28.54 kg/m²     Intake/Output Summary (Last 24 hours) at 9/5/2020 1424  Last data filed at 9/4/2020 1821  Gross per 24 hour   Intake 100 ml   Output --   Net 100 ml      Wt Readings from Last 3 Encounters:   09/05/20 204 lb 9.6 oz (92.8 kg)   07/01/20 196 lb (88.9 kg)   06/11/20 191 lb (86.6 kg)   Pitting leg edema  Alert awake oriented x3  General appearance:  Appears comfortable  Eyes: Sclera clear. Pupils equal.  ENT: Moist oral mucosa. Trachea midline, no adenopathy. Cardiovascular: Regular rhythm, normal S1, S2. No murmur. edema in lower extremities  Respiratory: Not using accessory muscles. Good inspiratory effort. Clear to auscultation bilaterally, no wheeze or crackles. GI: Abdomen soft, no tenderness, not distended, normal bowel sounds  Musculoskeletal: No cyanosis in digits, neck supple  Neurology: CN 2-12 grossly intact. No speech or motor deficits  Psych: Normal affect.  Alert and oriented in time, place and person  Skin: Warm, dry, normal turgor  Extremity exam shows brisk capillary refill. Peripheral pulses are palpable in lower extremities     Labs and Tests:  CBC:   Recent Labs     09/03/20  0826 09/04/20  0551 09/05/20  0522   WBC 6.8 7.4 6.7   HGB 10.1* 11.2* 9.9*   PLT 73* 84* 72*     BMP:    Recent Labs     09/03/20  0826 09/04/20  0550 09/05/20  0522    133* 133*   K 4.5 4.4 4.7   CL 97* 95* 94*   CO2 33* 31 30   BUN 38* 43* 40*   CREATININE 1.6* 1.6* 1.4*   GLUCOSE 168* 264* 245*     Hepatic:   Recent Labs     09/03/20  0826 09/04/20  0550 09/05/20  0522   AST 20 23 30   ALT 18 22 27   BILITOT 1.9* 1.7* 2.1*   ALKPHOS 103 114 109     XR CHEST PORTABLE   Final Result   Slight worsening of right pleural effusion and hazy opacity in the right lung   base. US LIVER   Final Result   Normal hepatopetal flow in the main portal vein. Abdominal ascites. Cirrhotic appearance to the liver         US DUP ABD PEL RETRO SCROT COMPLETE   Final Result   Normal hepatopetal flow in the main portal vein. Abdominal ascites. Cirrhotic appearance to the liver         IR US GUIDED PARACENTESIS   Final Result   Successful ultrasound guided paracentesis. US THORACENTESIS   Final Result   Successful ultrasound guided thoracentesis. XR CHEST 1 VIEW   Final Result   No pneumothorax following large volume right thoracentesis, 2 L removed. There is residual moderate capacity right lower lung from residual fluid and   or right lower and middle lobe atelectasis. XR CHEST (2 VW)   Final Result   Increasing right pleural effusion.              Problem List  Principal Problem:    Acute respiratory failure with hypoxia (HCC)  Active Problems:    Type 2 diabetes mellitus (HCC)    Atrial fibrillation with RVR (HCC)    Cirrhosis of liver with ascites (HCC)    Acute on chronic diastolic congestive heart failure (HCC)    Pleural effusion    Hyponatremia    Chronic obstructive pulmonary disease ([address]  Resolved Problems:    * No

## 2020-09-05 NOTE — PLAN OF CARE
Problem: Falls - Risk of:  Goal: Will remain free from falls  Description: Bed locked in lowest position and 2/4 rails up. Nonskid socks on. Call light and belongings within reach. Bed & chair alarm on. Yellow blanket in place. Instructed pt to call out for assistance to bathroom. Pt verbalized understanding. Will continue to monitor. 9/5/2020 0837 by Va Ocampo RN  Outcome: Ongoing  Note: D:  Patient continues to be a fall risk d/t leg swelling/weakness  A:  Bed alarm on, reminded to call before ambulation  R:  Patient calls before ambulation, uses walker, walks well, Will continue to assess fall risk status and report changes.        Problem: Pain:  Goal: Control of acute pain  Description: Control of acute pain  Outcome: Ongoing  Note: No c/o pain today, will continue to assess     Problem: Metabolic:  Goal: Ability to maintain appropriate glucose levels will improve  Description: Ability to maintain appropriate glucose levels will improve  9/5/2020 0837 by Va Ocampo RN  Outcome: Ongoing  Note: Poc glucose 204 this am, sliding scale humalog given, carb control diet ordered     Problem: Respiratory  Goal: O2 Sat > 90%  9/5/2020 0837 by Va Ocampo RN  Outcome: Ongoing  Note: On 4 L NC per home dose, 94%, lungs cta and diminished, encouraged use of IS cough and deep breathe 10x/hour

## 2020-09-06 NOTE — PROGRESS NOTES
[address] [place], [place] [address]ISTS PROGRESS NOTE    9/6/2020 12:28 PM        Name: Ada Mendez . Admitted: 8/31/2020  Primary Care Provider: Katia Hudson MD (Tel: [phone])    Brief Course:  70years old gentleman with a history of cirrhosis due to Evelena  presented to the emergency room with abdominal distention and shortness of breath found to have right pleural effusion.  Underwent right-sided thoracentesis on 9/1/2021 2 L of fluid removed. Jessica Cesar underwent paracentesis on 9/2/2000 5.3 L of fluid removed.   67 years old gentleman with a history of cirrhosis due to Evelena  presented to the emergency room with abdominal distention and shortness of breath found to have right pleural effusion.  Underwent right-sided thoracentesis on 9/1/2021 2 L of fluid removed. Jessica Cesar underwent paracentesis on 9/2/2000 5.3 L of fluid removed. Worsening renal function concerns of hepatorenal syndrome. Nephrology on consult. On octreotide and albumin.       CC: Leg edema    Subjective:   .  Kidney functions improving, still symptomatic on edema, sitting in the chair, denies any new complaints    Reviewed interval ancillary notes    Current Medications  furosemide (LASIX) injection 20 mg, Once  midodrine (PROAMATINE) tablet 10 mg, TID WC  octreotide (SANDOSTATIN) injection 50 mcg, Q8H  insulin lispro (1 Unit Dial) 0-18 Units, TID WC  insulin lispro (1 Unit Dial) 0-9 Units, Nightly  sodium chloride flush 0.9 % injection 10 mL, 2 times per day  sodium chloride flush 0.9 % injection 10 mL, PRN  sodium chloride flush 0.9 % injection 10 mL, PRN  potassium chloride (KLOR-CON M) extended release tablet 40 mEq, PRN    Or  potassium bicarb-citric acid (EFFER-K) effervescent tablet 40 mEq, PRN    Or  potassium chloride 10 mEq/100 mL IVPB (Peripheral Line), PRN  magnesium sulfate 1 g in dextrose 5% 100 mL IVPB, PRN  acetaminophen (TYLENOL) tablet 650 mg, Q6H PRN Or  acetaminophen (TYLENOL) suppository 650 mg, Q6H PRN  glucose (GLUTOSE) 40 % oral gel 15 g, PRN  dextrose 50 % IV solution, PRN  glucagon (rDNA) injection 1 mg, PRN  dextrose 5 % solution, PRN  ondansetron (ZOFRAN-ODT) disintegrating tablet 4 mg, Q8H PRN    Or  ondansetron (ZOFRAN) injection 4 mg, Q6H PRN  insulin glargine (LANTUS;BASAGLAR) injection pen 14 Units, Nightly  ipratropium-albuterol (DUONEB) nebulizer solution 1 ampule, 4x daily  nadolol (CORGARD) tablet 40 mg, Daily  aspirin EC tablet 81 mg, Daily  citalopram (CELEXA) tablet 20 mg, Daily  lactulose (CHRONULAC) 10 GM/15ML solution 20 g, TID  levothyroxine (SYNTHROID) tablet 137 mcg, Daily  vitamin D3 (CHOLECALCIFEROL) tablet 400 Units, Daily  rifaximin (XIFAXAN) tablet 550 mg, BID        Objective:  /82   Pulse 87   Temp 97 °F (36.1 °C)   Resp 14   Ht 5' 11\" (1.803 m)   Wt 204 lb 6.4 oz (92.7 kg)   SpO2 95%   BMI 28.51 kg/m²     Intake/Output Summary (Last 24 hours) at 9/6/2020 1228  Last data filed at 9/6/2020 0040  Gross per 24 hour   Intake 120 ml   Output 350 ml   Net -230 ml      Wt Readings from Last 3 Encounters:   09/06/20 204 lb 6.4 oz (92.7 kg)   07/01/20 196 lb (88.9 kg)   06/11/20 191 lb (86.6 kg)   Pitting leg edema  Alert awake oriented x3  General appearance:  Appears comfortable  Eyes: Sclera clear. Pupils equal.  ENT: Moist oral mucosa. Trachea midline, no adenopathy. Cardiovascular: Regular rhythm, normal S1, S2. No murmur. edema in lower extremities  Respiratory: Not using accessory muscles. Good inspiratory effort. Clear to auscultation bilaterally, no wheeze or crackles. GI: Abdomen soft, no tenderness, not distended, normal bowel sounds  Musculoskeletal: No cyanosis in digits, neck supple  Neurology: CN 2-12 grossly intact. No speech or motor deficits  Psych: Normal affect. Alert and oriented in time, place and person  Skin: Warm, dry, normal turgor  Extremity exam shows brisk capillary refill.   Peripheral pulses are palpable in lower extremities     Labs and Tests:  CBC:   Recent Labs     09/04/20  0551 09/05/20  0522 09/06/20  0615   WBC 7.4 6.7 4.6   HGB 11.2* 9.9* 9.8*   PLT 84* 72* 58*     BMP:    Recent Labs     09/04/20  0550 09/05/20  0522 09/06/20  0615   * 133* 137   K 4.4 4.7 5.2*   CL 95* 94* 98*   CO2 31 30 31   BUN 43* 40* 35*   CREATININE 1.6* 1.4* 1.2   GLUCOSE 264* 245* 172*     Hepatic:   Recent Labs     09/04/20  0550 09/05/20  0522 09/06/20  0615   AST 23 30 35   ALT 22 27 33   BILITOT 1.7* 2.1* 3.2*   ALKPHOS 114 109 99     XR CHEST PORTABLE   Final Result   Slight worsening of right pleural effusion and hazy opacity in the right lung   base. US LIVER   Final Result   Normal hepatopetal flow in the main portal vein. Abdominal ascites. Cirrhotic appearance to the liver         US DUP ABD PEL RETRO SCROT COMPLETE   Final Result   Normal hepatopetal flow in the main portal vein. Abdominal ascites. Cirrhotic appearance to the liver         IR US GUIDED PARACENTESIS   Final Result   Successful ultrasound guided paracentesis. US THORACENTESIS   Final Result   Successful ultrasound guided thoracentesis. XR CHEST 1 VIEW   Final Result   No pneumothorax following large volume right thoracentesis, 2 L removed. There is residual moderate capacity right lower lung from residual fluid and   or right lower and middle lobe atelectasis. XR CHEST (2 VW)   Final Result   Increasing right pleural effusion. Problem List  Principal Problem:    Acute respiratory failure with hypoxia (HCC)  Active Problems:    Type 2 diabetes mellitus (HCC)    Atrial fibrillation with RVR (HCC)    Cirrhosis of liver with ascites (HCC)    Acute on chronic diastolic congestive heart failure (HCC)    Pleural effusion    Hyponatremia    Chronic obstructive pulmonary disease (HCC)  Resolved Problems:    * No resolved hospital problems. *       Assessment & Plan:   POONAM  Concerns of hepatorenal, nephrology on case, diuretics on hold, on octreotide, midodrine and albumin, discussed with nephrology, continue on octreotide, Lasix initiated today, hold on Aldactone given hyperkalemia      Acute on chromic hypoxic respiratory failure (multifactorial) On 4 L O2 at baseline     Right pleural effusion  - s/p thoracentesis ultrasound-guided thoracentesis with removal of 2 L fluid on 09/2/2020 , IV Solu-Medrol as well as DuoNeb and aggressive pulmonary toilet. IV Lasix and oral Aldactone therapy with strict I's and O's, Pulmonology following     Cirrhosis with ascites Abdomen distended Paracentesis ordered Follows up with Dr. Valladares Covert outpt Diuretics on hold due to low bp, low-sodium diet     A. fib RVR: Nadolol continued for rate control , no anticoagulation due to hepatic coagulopathy and thrombocytopenia       Type 2 DM: Home medication on hold,  last hemoglobin A1c from June was 6.3%, Lantus 14 units nightly, Humalog SSI every 4 hours as needed n.p.o.    IV Access: Peripheral  Dinh: No  Diet: DIET CARB CONTROL; Low Sodium (2 GM);  Low Potassium  Code:Full Code  DVT PPX Lovenox  Disposition awaiting renal function to improve, concerns of hepatorenal,      Raul Scales MD   9/6/2020 12:28 PM

## 2020-09-06 NOTE — PROGRESS NOTES
Office: [phone]       Fax: [phone]      Nephrology Progress Note        Patient's Name: Jeanine Roblero  Admit Date: 8/31/2020  Date of Visit: 9/6/2020    Reason for Consult:  POONAM      Subjective:      Jeanine Roblero is a 70 y.o. male with PMHx of hypertension, FLOYD, DM, cirrhosis, COPD on home oxygen, PAF who was hospitalized on 8/31/2020 with complaints of shortness of breath   Patient has been managed for Afib RVR, ascites status post paracenteses on 9/2 (5.3 L), right pleural effusion status post thoracentesis 9/1 (2L)  Creat trending up.   Denies much shortness of breath   NSAID use: Denies   IV contrast: None recent   Home meds reviewed and noted to be on ald, lasix    INTERVAL HISTORY    Feels better  Shortness of breath: No   UOP: Fair  Creat: trending down       Past Surgical History:   Procedure Laterality Date    ACHILLES TENDON SURGERY      CARDIAC SURGERY      3 vessel cabg    CHOLECYSTECTOMY      COLONOSCOPY      CORONARY ARTERY BYPASS GRAFT  10/2011    cabg x3    ENDOSCOPY, COLON, DIAGNOSTIC      PTCA  11/23/2012    PTCA RCA & RPL    TONSILLECTOMY      UPPER GASTROINTESTINAL ENDOSCOPY  11/23/2012    multiple thin linear esophageal mucosal breaks    UPPER GASTROINTESTINAL ENDOSCOPY  3/6/2014    UPPER GASTROINTESTINAL ENDOSCOPY N/A 6/7/2019    EGD BAND LIGATION performed by Naseem Andres MD at [facility] 5 N/A 7/15/2019    EGD BIOPSY performed by Naseem Andres MD at [facility] 5 N/A 7/15/2019    EGD BAND LIGATION performed by Naseem Andres MD at [address] [place] [address]       Family History   Problem Relation Age of Onset    Heart Disease Mother         arrythmia    Diabetes Father     Cancer Father     Heart Disease Father    Anna Varghese Problems Neg Hx     Malig Hyperten Neg Hx     Hypotension Neg Hx     Malig Hypertherm Neg Hx     Pseudochol. Deficiency Neg Hx         reports that he quit smoking about 6 months ago. His smoking use included cigarettes. He started smoking about 55 years ago. He has a 19.35 pack-year smoking history. He has never used smokeless tobacco. He reports that he does not drink alcohol or use drugs. Medications: Allergies:  Cephalexin; Ciprocinonide [fluocinolone];  Quinolones; and Statins    Scheduled Meds:   midodrine  10 mg Oral TID WC    octreotide  50 mcg Subcutaneous Q8H    insulin lispro  0-18 Units Subcutaneous TID WC    insulin lispro  0-9 Units Subcutaneous Nightly    sodium chloride flush  10 mL Intravenous 2 times per day    insulin glargine  0.15 Units/kg Subcutaneous Nightly    ipratropium-albuterol  1 ampule Inhalation 4x daily    nadolol  40 mg Oral Daily    aspirin  81 mg Oral Daily    citalopram  20 mg Oral Daily    lactulose  20 g Oral TID    levothyroxine  137 mcg Oral Daily    vitamin D3  400 Units Oral Daily    rifaximin  550 mg Oral BID    [Held by provider] furosemide  40 mg Intravenous BID     Continuous Infusions:   dextrose         Labs:  CBC:   Recent Labs     09/04/20  0551 09/05/20  0522 09/06/20  0615   WBC 7.4 6.7 4.6   HGB 11.2* 9.9* 9.8*   PLT 84* 72* 58*     Ca/Mg/Phos:   Recent Labs     09/04/20  0550 09/05/20  0522 09/06/20  0615   CALCIUM 8.9 9.6 9.4   MG 2.50*  --   --    PHOS 4.0  --   --      UA:  Recent Labs     09/03/20  1715   URINETYPE NotGiven      Urine Microscopic:   Recent Labs     09/03/20  1715   HYALCAST 15*   WBCUA 5   RBCUA 12*   EPIU 2     Urine Chemistry:   Recent Labs     09/03/20  1600   LABCREA 240.5   NAUR <20       Objective:     Vitals: /82   Pulse 87   Temp 97 °F (36.1 °C)   Resp 14   Ht 5' 11\" (1.803 m)   Wt 204 lb 6.4 oz (92.7 kg)   SpO2 95%   BMI 28.51 kg/m²    Wt Readings from Last 3 Encounters:   09/06/20 204 lb 6.4 oz (92.7 kg)   07/01/20 196 lb (88.9 kg)   06/11/20 191 lb (86.6 kg)      24HR INTAKE/OUTPUT:      Intake/Output Summary (Last 24 hours) at 9/6/2020 1219  Last data filed at 9/6/2020 0040  Gross per 24 hour   Intake 120 ml   Output 350 ml   Net -230 ml     Constitutional:  awake, NAD  HEENT:  MMM, No icterus  Neck: no bruits, No JVD  Cardiovascular:  S1, S2 reg  Respiratory: CTA, no crackles  Abdomen:  +BS, soft, NT, Distended  Ext: + lower extremity edema  CNS: alert, no agitation    IMAGING:  XR CHEST PORTABLE   Final Result   Slight worsening of right pleural effusion and hazy opacity in the right lung   base. US LIVER   Final Result   Normal hepatopetal flow in the main portal vein. Abdominal ascites. Cirrhotic appearance to the liver         US DUP ABD PEL RETRO SCROT COMPLETE   Final Result   Normal hepatopetal flow in the main portal vein. Abdominal ascites. Cirrhotic appearance to the liver         IR US GUIDED PARACENTESIS   Final Result   Successful ultrasound guided paracentesis. US THORACENTESIS   Final Result   Successful ultrasound guided thoracentesis. XR CHEST 1 VIEW   Final Result   No pneumothorax following large volume right thoracentesis, 2 L removed. There is residual moderate capacity right lower lung from residual fluid and   or right lower and middle lobe atelectasis. XR CHEST (2 VW)   Final Result   Increasing right pleural effusion. Assessment :     1. POONAM  -Non-Oliguric  -Baseline creat: 0.8-1.2 Now 1.6->1.4  -UA hyaline cast, pyuria, Ur Na <20  -Volume: Hypervolemic   -Electrolytes: Hyperkalemia  -Acid-Base: Metabolic alkalosis   -POONAM suspected 2/2 renal hypoperfusion. May has HRS physiology.     Recent Labs     09/04/20  0550 09/05/20  0522 09/06/20  0615   BUN 43* 40* 35*   CREATININE 1.6* 1.4* 1.2     Recent Labs     09/04/20  0550 09/05/20  0522 09/06/20  0615   * 133* 137   K 4.4 4.7 5.2*   CO2 31 30 31   MG 2.50*  --   -- 2. HTN  -Blood pressure improving    BP Readings from Last 1 Encounters:   09/06/20 124/82       3. FLOYD  -portal hypertension     4. COPD on home oxygen     5. DM  -not on metformin    Plan:     - Albumin infusion  - Octreotide  - Midodrine increased  - resume lasix today, Aldactone tomorrow  - Low K diet  - monitor BMP    -Monitor I/O, UOP  -Maintain MAP>65  -Avoid nephrotoxin, if able. -Dose meds to current eGFR    Spoke to Rona Bella MD     Thank you for allowing us to participate in care of Lady Howe . We will continue to follow. Feel free to contact me with any questions.       Lolis Carter  9/6/2020    [facility]  Office : [phone]  Fax :[phone]

## 2020-09-06 NOTE — PLAN OF CARE
Problem: Falls - Risk of:  Goal: Will remain free from falls  Description: Bed locked in lowest position and 2/4 rails up. Nonskid socks on. Call light and belongings within reach. Bed & chair alarm on. Yellow blanket in place. Instructed pt to call out for assistance to bathroom. Pt verbalized understanding. Will continue to monitor. Outcome: Ongoing  Note: D:  Patient continues to be a fall risk d/t weakness  A:  Bed alarm on, reminded to call before ambulation  R:  Patient calls before ambulation, Will continue to assess fall risk status and report changes.        Problem: Metabolic:  Goal: Ability to maintain appropriate glucose levels will improve  Description: Ability to maintain appropriate glucose levels will improve  Outcome: Ongoing  Note: Poc glucose achs, carb control diet, sliding scale humalog given per order     Problem: Cardiovascular  Goal: Hemodynamic stability  Outcome: Ongoing  Note: Pt A&OX4, /79   Pulse 80   Temp 97.3 °F (36.3 °C) (Oral)   Resp 18   Ht 5' 11\" (1.803 m)   Wt 204 lb 6.4 oz (92.7 kg)   SpO2 94%   BMI 28.51 kg/m²   Afib on tele, no c/o sob on 4 L NC per baseline, legs with edema with juhi hose applied

## 2020-09-07 NOTE — PROGRESS NOTES
[facility] GI  Gastroenterology Progress Note    Maritza Galdamez is a 70 y.o. male patient. 1. Pleural effusion    2. Increased oxygen demand        SUBJECTIVE:  No GI complaints. ROS:  Cardiovascular ROS: no chest pain or dyspnea on exertion  Gastrointestinal ROS: no abdominal pain, change in bowel habits, or black or bloody stools  Respiratory ROS: no cough, shortness of breath, or wheezing    Physical    VITALS:  /69   Pulse 78   Temp 98.5 °F (36.9 °C) (Oral)   Resp 16   Ht 5' 11\" (1.803 m)   Wt 201 lb 12.8 oz (91.5 kg)   SpO2 94%   BMI 28.15 kg/m²   TEMPERATURE:  Current - Temp: 98.5 °F (36.9 °C); Max - Temp  Av.1 °F (36.7 °C)  Min: 97 °F (36.1 °C)  Max: 98.5 °F (36.9 °C)    NAD  RRR, Nl s1s2  Lungs diminished in bases  Abdomen protuberant but soft, NT, no HSM, Bowel sounds normal  AAOx3, No asterixis   BLE edema    Data    Data Review:    Recent Labs     20  0615 20  0504   WBC 6.7 4.6 5.9   HGB 9.9* 9.8* 10.3*   HCT 31.0* 29.8* 31.4*   MCV 94.4 94.9 95.1   PLT 72* 58* 67*     Recent Labs     20  0615 20  0503   * 137 135*   K 4.7 5.2* 4.4   CL 94* 98* 96*   CO2 30 31 33*   BUN 40* 35* 34*   CREATININE 1.4* 1.2 1.2     Recent Labs     20  0615 20  0503   AST 30 35 25   ALT 27 33 30   BILIDIR 0.7* 1.0* 1.0*   BILITOT 2.1* 3.2* 3.7*   ALKPHOS 109 99 103     No results for input(s): LIPASE, AMYLASE in the last 72 hours. No results for input(s): PROTIME, INR in the last 72 hours. No results for input(s): PTT in the last 72 hours. Radiology Review:    US liver, doppler  Impression    Normal hepatopetal flow in the main portal vein.  Abdominal ascites. Cirrhotic appearance to the liver               ASSESSMENT     Ascites - s/p para 9/2. Cell count negative for neutrocytic ascites. SAAG elevated c/w portal hypertension.  Protein low (0.3) suggestive of ascites from cirrhosis rather than CHF although CHF can

## 2020-09-07 NOTE — DISCHARGE SUMMARY
[facility]    Patient Demographics    Patient. Jeanine Roblero  Date of Birth. 1945  MRN. [id number]     Primary care provider. Yosvany Rose MD  (Tel: [phone])    Admit date: 8/31/2020    Discharge date (blank if same as Note Date): Note Date: 9/7/2020     Reason for Hospitalization. Chief Complaint   Patient presents with    Shortness of Breath     Pt to er With c/o SOB for three weeks, progrssively getting worse. told to come by PCP. bilateral foot swelling. pcp wants pt tested for COVID         Significant Findings. Principal Problem:    Acute respiratory failure with hypoxia (HCC)  Active Problems:    Type 2 diabetes mellitus (HCC)    Atrial fibrillation with RVR (HCC)    Cirrhosis of liver with ascites (HCC)    Acute on chronic diastolic congestive heart failure (HCC)    Pleural effusion    Hyponatremia    Chronic obstructive pulmonary disease ([facility] [address]  Resolved Problems:    * No resolved hospital problems. *       Problems and results from this hospitalization that need follow up. 1. POONAM. Recommend BMP in 1 week, results to Dr Chelo Pena. Order in [address] [place] [address] Rd. 2. Follow up ascitic fluid culture, cytology. Significant test results and incidental findings. XR CHEST PORTABLE 9/3/2020:   Final Result   Slight worsening of right pleural effusion and hazy opacity in the right lung   base. US LIVER 9/3/2020:   Final Result   Normal hepatopetal flow in the main portal vein. Abdominal ascites. Cirrhotic appearance to the liver     US DUP ABD PEL RETRO SCROT COMPLETE 9/3/2020:   Final Result   Normal hepatopetal flow in the main portal vein. Abdominal ascites.    Cirrhotic appearance to the liver           XR CHEST 1 VIEW 9/1/2020:   Final Result   No pneumothorax following large volume right thoracentesis, 2 L removed.       There is residual moderate capacity right lower lung from residual fluid and   or right lower and middle lobe atelectasis. XR CHEST (2 VW) 8/31/2020:   Final Result   Increasing right pleural effusion. Invasive procedures and treatments. 1. Right-sided thoracentesis on 9/1/2020 with 2 liters of fluid removed. 2. Paracentesis on 9/2/2020 with 5.3 liters of fluid removed. [facility] Course. Patient is a 70year old gentleman with a history of DM, HTN, COPD on home O2, PAF, FLOYD cirrhosis. He presented to the emergency room with abdominal distention and shortness of breath, found to have right pleural effusion. Underwent right-sided thoracentesis on 9/1/2020 with 2 liters of fluid removed and paracentesis on 9/2/2020 with 5.3 liters of fluid removed. Worsening renal function post procedure concerning for hepatorenal syndrome. 1. POONAM. Baseline creatinine 0.8-1.2 Concerns for hepatorenal syndrome. Peak creatinine 1.6, improved, 1.2 on DC. Diuretics held and he received albumin. Aldactone resumed on DC, Lasix remains on hold per nephrology recs. Wife will follow weight daily at home and notify nephrology if weight trends up. BMP in 1 week. Started on midodrine for BP support.      2. Acute on chronic hypoxic respiratory failure, multifactorial: COPD, pleural effusion. Improved post thoracentesis, O2 at 4 liters which is baseline.      3. Right pleural effusion. Suspect secondary to cirrhosis. S/p thoracentesis with removal of 2 L fluid on 09/2/2020. Some reaccumulation on CXR 9/3. O2 needs stable. Lasix on hold for POONAM.       4. Cirrhosis with ascites. S/p paracentesis 9/2 with 5.3 liters removed. SAAG elevated consistent with portal HTN. Protein low suggestive ascites from cirrhosis. Continue low sodium diet. Received Octreotide in hospital, held on DC per nephrology recs. 5. A. Fib. On Nadolol for rate control , no anticoagulation due to hepatic coagulopathy and thrombocytopenia.     6. DM2, controlled. A1c 6.3 (6/11/2020).  Covered with basal and high dose correction in hospital. Oral meds resumed on DC. Consults. IP CONSULT TO HOSPITALIST  IP CONSULT TO PULMONOLOGY  IP CONSULT TO CARDIOLOGY  IP CONSULT TO RADIOLOGY  IP CONSULT TO GI  IP CONSULT TO NEPHROLOGY    Physical examination on discharge day. /69   Pulse 78   Temp 98.5 °F (36.9 °C) (Oral)   Resp 16   Ht 5' 11\" (1.803 m)   Wt 201 lb 12.8 oz (91.5 kg)   SpO2 94%   BMI 28.15 kg/m²     General:  Awake, alert, oriented in NAD  Skin:  Warm and dry. No unusual bruising or rash  Neck:  Supple. No JVD appreciated  Chest:  Normal effort. Diminished in right base, no wheezes/rhonchi/rales  Cardiovascular:  Irregular, normal S1/S2, no murmur/gallop/rub  Abdomen:  Distended, nontender, +bowel sounds  Extremities:  No edema, knee high AUGUSTO hose  Neurological: No focal deficits  Psychological: Normal mood and affect      Condition at time of discharge stable    Medication instructions provided to patient at discharge. Medication List      START taking these medications    midodrine 10 MG tablet  Commonly known as:  PROAMATINE  Take 1 tablet by mouth 3 times daily (with meals)  Notes to patient:  Use: to increase blood pressure  Side effects: blurred vision, dizziness, high blood pressure        CHANGE how you take these medications    levothyroxine 137 MCG tablet  Commonly known as:  SYNTHROID  Start taking on:  September 8, 2020  What changed:    · medication strength  · See the new instructions. Notes to patient:  Use: increases thyroid stimulating hormone  Side effects: weight loss, nervousness   For best results: take on an empty stomach, 30 minutes prior to eating     nadolol 20 MG tablet  Commonly known as:  CORGARD  Take 2 tablets by mouth daily  What changed:  how much to take  Notes to patient:  Use: decreases the workload of the heart to allow heart to pump easier. Helps lower heart rate.   Side effects: dizziness, fatigue, low HR     spironolactone 50 MG tablet  Commonly known as:  ALDACTONE  Take 0.5 tablets by mouth 2 times daily  What changed:    · how much to take  · when to take this  Notes to patient:  Use: treat heart failure, fluid retention, lower blood pressure. Side effects: frequent urination, weakness, muscle cramps, increased sensitivity to light, nausea and dizziness. CONTINUE taking these medications    aspirin 81 MG tablet  Notes to patient:  Thins blood to prevent clotting, ie heart attack or stroke  Side effects: may cause extra bruising or bleeding      b complex vitamins capsule  Notes to patient:  Use: prevent infections and helps support or promote: cell health. Side effects: excessive thirst, skin conditions, blurry vision, abdominal cramps, nausea, vomiting,increased urination, diarrhea. B-D 3CC LUER-ALEXIS SYR 25GX1\" 25G X 1\" 3 ML Misc  Generic drug:  SYRINGE-NEEDLE (DISP) 3 ML  USE WITH VITAMIN B-12 INJECTIONS     CINNAMON PO     citalopram 20 MG tablet  Commonly known as:  CELEXA  TAKE ONE TABLET BY MOUTH DAILY  Notes to patient:  Use: Treats depression   Side effects: Nausea, dry mouth, loss of appetite, tiredness, drowsiness     cyanocobalamin 1000 MCG/ML injection  INJECT 1 ML INTRAMUSCULARLY ONCE EVERY 30 DAYS     Fish Oil 500 MG Caps     glipiZIDE 5 MG tablet  Commonly known as:  GLUCOTROL  TAKE ONE TABLET BY MOUTH TWICE A DAY BEFORE MEALS  Notes to patient:  Use: Anti-diabetic medication. It can treat type 2 diabetes. Side effects: Constipation, Diarrhea, Dizziness, Drowsiness, Gas, Headache.     lactulose 10 GM/15ML solution  Commonly known as:  CHRONULAC  Take 30 mLs by mouth 3 times daily  Notes to patient:  Causes bowel movements to decrease ammonia   Side effects: intentionally causes diarrhea      MILK THISTLE PO     Onglyza 5 MG Tabs tablet  Generic drug:  saxagliptin     Xifaxan 550 MG tablet  Generic drug:  rifaximin  Notes to patient:  Uses:  It can treat traveler's diarrhea and irritable bowel syndrome with diarrhea. It can also help prevent recurrence of certain liver problems. Side effects: A Strong Desire To Have Bowel Movement, Dizziness, Fluid Retention In The Legs, Feet, Arms Or Hands, Gas. STOP taking these medications    azithromycin 250 MG tablet  Commonly known as:  Zithromax     furosemide 20 MG tablet  Commonly known as:  LASIX     SITagliptin 100 MG tablet  Commonly known as:  Januvia     vitamin D 400 units Caps  Commonly known as:  ERGOCALCIFEROL           Where to Get Your Medications      These medications were sent to [facility] [address], [address] [facility]  [address], [address] [place]    Phone:  [phone]   · midodrine 10 MG tablet     Information about where to get these medications is not yet available    Ask your nurse or doctor about these medications  · nadolol 20 MG tablet  · spironolactone 50 MG tablet         Discharge recommendations given to patient. Follow Up. PCP in 1 week, Nephrology to call for appointment   Disposition. Home, declined home health  Activity. As tolerated  Diet: DIET CARB CONTROL; Low Sodium (2 GM); Low Potassium      Discussed with Dr Dell Ibarra. Spent 45 minutes in discharge process.     Signed:  NABIL Nicole CNP     9/7/2020 11:56 AM

## 2020-09-07 NOTE — PROGRESS NOTES
Office: [phone]       Fax: [phone]      Nephrology Progress Note        Patient's Name: Gopi Orellana  Admit Date: 8/31/2020  Date of Visit: 9/7/2020    Reason for Consult:  POONAM      Subjective:      Gopi Orellana is a 70 y.o. male with PMHx of hypertension, FLOYD, DM, cirrhosis, COPD on home oxygen, PAF who was hospitalized on 8/31/2020 with complaints of shortness of breath   Patient has been managed for Afib RVR, ascites status post paracenteses on 9/2 (5.3 L), right pleural effusion status post thoracentesis 9/1 (2L)  Creat trending up.   Denies much shortness of breath   NSAID use: Denies   IV contrast: None recent   Home meds reviewed and noted to be on ald, lasix    INTERVAL HISTORY    Feels better  Shortness of breath: No   UOP: Fair  Creat: trending down     No nausea   Edema LE decreased     Past Surgical History:   Procedure Laterality Date    ACHILLES TENDON SURGERY      CARDIAC SURGERY      3 vessel cabg    CHOLECYSTECTOMY      COLONOSCOPY      CORONARY ARTERY BYPASS GRAFT  10/2011    cabg x3    ENDOSCOPY, COLON, DIAGNOSTIC      PTCA  11/23/2012    PTCA RCA & RPL    TONSILLECTOMY      UPPER GASTROINTESTINAL ENDOSCOPY  11/23/2012    multiple thin linear esophageal mucosal breaks    UPPER GASTROINTESTINAL ENDOSCOPY  3/6/2014    UPPER GASTROINTESTINAL ENDOSCOPY N/A 6/7/2019    EGD BAND LIGATION performed by Sukhi Burk MD at [facility] 5 N/A 7/15/2019    EGD BIOPSY performed by Sukhi Burk MD at [facility] 5 N/A 7/15/2019    EGD BAND LIGATION performed by Sukhi Burk MD at [address] [place] [address]       Family History   Problem Relation Age of Onset    Heart Disease Mother         arrythmia    Diabetes Father     Cancer Father    Ping Alvarez Heart Disease Father     Anesth Problems Neg Hx     Malig Hyperten Neg Hx     Hypotension Neg Hx     Malig Hypertherm Neg Hx     Pseudochol. Deficiency Neg Hx         reports that he quit smoking about 6 months ago. His smoking use included cigarettes. He started smoking about 55 years ago. He has a 19.35 pack-year smoking history. He has never used smokeless tobacco. He reports that he does not drink alcohol or use drugs. Medications: Allergies:  Cephalexin; Ciprocinonide [fluocinolone]; Quinolones; and Statins    Scheduled Meds:   midodrine  10 mg Oral TID WC    octreotide  50 mcg Subcutaneous Q8H    insulin lispro  0-18 Units Subcutaneous TID WC    insulin lispro  0-9 Units Subcutaneous Nightly    sodium chloride flush  10 mL Intravenous 2 times per day    insulin glargine  0.15 Units/kg Subcutaneous Nightly    ipratropium-albuterol  1 ampule Inhalation 4x daily    nadolol  40 mg Oral Daily    aspirin  81 mg Oral Daily    citalopram  20 mg Oral Daily    lactulose  20 g Oral TID    levothyroxine  137 mcg Oral Daily    vitamin D3  400 Units Oral Daily    rifaximin  550 mg Oral BID     Continuous Infusions:   dextrose         Labs:  CBC:   Recent Labs     09/05/20  0522 09/06/20  0615 09/07/20  0504   WBC 6.7 4.6 5.9   HGB 9.9* 9.8* 10.3*   PLT 72* 58*  --      Ca/Mg/Phos:   Recent Labs     09/05/20  0522 09/06/20  0615 09/07/20  0503   CALCIUM 9.6 9.4 9.0     UA:  No results for input(s): Karin Tamar, GLUCOSEU, BILIRUBINUR, Recardo Dowse, BLOODU, PHUR, PROTEINU, UROBILINOGEN, NITRU, LEUKOCYTESUR, Olevia Brandon in the last 72 hours. Urine Microscopic:   No results for input(s): LABCAST, BACTERIA, COMU, HYALCAST, WBCUA, RBCUA, EPIU in the last 72 hours. Urine Chemistry:   No results for input(s): Henry Blank, PROTEINUR, NAUR in the last 72 hours.     Objective:     Vitals: /67   Pulse 71   Temp 98.2 °F (36.8 °C) (Oral)   Resp 16   Ht 5' 11\" (1.803 m)   Wt 201 lb 12.8 oz (91.5 kg)   SpO2 94%   BMI 28.15 kg/m²    Wt Readings from Last 3 Encounters:   09/07/20 201 lb 12.8 oz (91.5 kg)   07/01/20 196 lb (88.9 kg)   06/11/20 191 lb (86.6 kg)      24HR INTAKE/OUTPUT:      Intake/Output Summary (Last 24 hours) at 9/7/2020 0754  Last data filed at 9/7/2020 0545  Gross per 24 hour   Intake 750 ml   Output 1300 ml   Net -550 ml     Constitutional:  awake, NAD  HEENT:  MMM, No icterus  Neck: no bruits, No JVD  Cardiovascular:  S1, S2 reg  Respiratory: CTA, no crackles  Abdomen:  +BS, soft, NT, Distended  Ext: + lower extremity edema  CNS: alert, no agitation    IMAGING:  XR CHEST PORTABLE   Final Result   Slight worsening of right pleural effusion and hazy opacity in the right lung   base. US LIVER   Final Result   Normal hepatopetal flow in the main portal vein. Abdominal ascites. Cirrhotic appearance to the liver         US DUP ABD PEL RETRO SCROT COMPLETE   Final Result   Normal hepatopetal flow in the main portal vein. Abdominal ascites. Cirrhotic appearance to the liver         IR US GUIDED PARACENTESIS   Final Result   Successful ultrasound guided paracentesis. US THORACENTESIS   Final Result   Successful ultrasound guided thoracentesis. XR CHEST 1 VIEW   Final Result   No pneumothorax following large volume right thoracentesis, 2 L removed. There is residual moderate capacity right lower lung from residual fluid and   or right lower and middle lobe atelectasis. XR CHEST (2 VW)   Final Result   Increasing right pleural effusion. Assessment :     1. POONAM  Hs HRS. Got albumin  On octreotide and midodrine   -Non-Oliguric  -Baseline creat: 0.8-1.2 Now 1.6->1.4 ----> 1.2    Ur Na <20      Recent Labs     09/05/20  0522 09/06/20  0615 09/07/20  0503   BUN 40* 35* 34*   CREATININE 1.4* 1.2 1.2     Recent Labs     09/05/20  0522 09/06/20  0615 09/07/20  0503   * 137 135*   K 4.7 5.2* 4.4   CO2 30 31 33*         2. HTN  -Blood pressure improving    BP Readings from Last 1 Encounters:   09/07/20 110/67       3. FLOYD  -portal hypertension     4. COPD on home oxygen     5. DM  -not on metformin    Plan:     - Albumin infusion  - Octreotide  - Midodrine increased  - will start aldactone 25 mg po BID   - Low K diet  - monitor BMP    -Monitor I/O, UOP  -Maintain MAP>65  -Avoid nephrotoxin, if able. -Dose meds to current eGFR      Thank you for allowing us to participate in care of Debi Cerna . We will continue to follow. Feel free to contact me with any questions.       Roscoe Weber MD   9/7/2020    [facility]  [address]  Office : [phone]  Fax :[phone]

## 2020-09-07 NOTE — PROGRESS NOTES
Or  ondansetron (ZOFRAN) injection 4 mg, Q6H PRN  insulin glargine (LANTUS;BASAGLAR) injection pen 14 Units, Nightly  ipratropium-albuterol (DUONEB) nebulizer solution 1 ampule, 4x daily  nadolol (CORGARD) tablet 40 mg, Daily  aspirin EC tablet 81 mg, Daily  citalopram (CELEXA) tablet 20 mg, Daily  lactulose (CHRONULAC) 10 GM/15ML solution 20 g, TID  levothyroxine (SYNTHROID) tablet 137 mcg, Daily  vitamin D3 (CHOLECALCIFEROL) tablet 400 Units, Daily  rifaximin (XIFAXAN) tablet 550 mg, BID      Objective:  /67   Pulse 71   Temp 98.2 °F (36.8 °C) (Oral)   Resp 16   Ht 5' 11\" (1.803 m)   Wt 201 lb 12.8 oz (91.5 kg)   SpO2 94%   BMI 28.15 kg/m²     Intake/Output Summary (Last 24 hours) at 9/7/2020 0638  Last data filed at 9/7/2020 0545  Gross per 24 hour   Intake 750 ml   Output 1300 ml   Net -550 ml      Wt Readings from Last 3 Encounters:   09/07/20 201 lb 12.8 oz (91.5 kg)   07/01/20 196 lb (88.9 kg)   06/11/20 191 lb (86.6 kg)     General:  Awake, alert, oriented in NAD  Skin:  Warm and dry. No unusual bruising or rash  Neck:  Supple. No JVD appreciated  Chest:  Normal effort.   Diminished in right base, no wheezes/rhonchi/rales  Cardiovascular:  Irregular, normal S1/S2, no murmur/gallop/rub  Abdomen:  Distended, nontender, +bowel sounds  Extremities:  No edema, knee high AUGUSTO hose  Neurological: No focal deficits  Psychological: Normal mood and affect      Labs and Tests:  CBC:   Recent Labs     09/05/20 0522 09/06/20  0615 09/07/20  0504   WBC 6.7 4.6 5.9   HGB 9.9* 9.8* 10.3*   PLT 72* 58*  --      BMP:    Recent Labs     09/05/20 0522 09/06/20  0615 09/07/20  0503   * 137 135*   K 4.7 5.2* 4.4   CL 94* 98* 96*   CO2 30 31 33*   BUN 40* 35* 34*   CREATININE 1.4* 1.2 1.2   GLUCOSE 245* 172* 181*     Hepatic:   Recent Labs     09/05/20  0522 09/06/20  0615 09/07/20  0503   AST 30 35 25   ALT 27 33 30   BILITOT 2.1* 3.2* 3.7*   ALKPHOS 109 99 103     Results for Adaline Tyrese (MRN [id number]) as of 9/7/2020 07:47   Ref. Range 9/6/2020 07:32 9/6/2020 11:12 9/6/2020 16:10 9/6/2020 20:25 9/7/2020 07:22   POC Glucose Latest Ref Range: 70 - 99 mg/dl 171 (H) 282 (H) 242 (H) 178 (H) 189 (H)       XR CHEST PORTABLE 9/3/2020:   Final Result   Slight worsening of right pleural effusion and hazy opacity in the right lung   base. US LIVER 9/3/2020:   Final Result   Normal hepatopetal flow in the main portal vein. Abdominal ascites. Cirrhotic appearance to the liver         US DUP ABD PEL RETRO SCROT COMPLETE 9/3/2020:   Final Result   Normal hepatopetal flow in the main portal vein. Abdominal ascites. Cirrhotic appearance to the liver         XR CHEST 1 VIEW 9/1/2020:   Final Result   No pneumothorax following large volume right thoracentesis, 2 L removed. There is residual moderate capacity right lower lung from residual fluid and   or right lower and middle lobe atelectasis. XR CHEST (2 VW) 8/31/2020:   Final Result   Increasing right pleural effusion. Problem List  Principal Problem:    Acute respiratory failure with hypoxia (HCC)  Active Problems:    Type 2 diabetes mellitus (HCC)    Atrial fibrillation with RVR (HCC)    Cirrhosis of liver with ascites (HCC)    Acute on chronic diastolic congestive heart failure (HCC)    Pleural effusion    Hyponatremia    Chronic obstructive pulmonary disease (HCC)  Resolved Problems:    * No resolved hospital problems. *       Assessment & Plan:  1. POONAM. Baseline creatinine 0.8-1.2 Concerns for hepatorenal syndrome. Peak creatinine 1.6, now improved, 1.2 today. Diuretics held, received albumin. Lasix resumed yesterday, aldactone to resume today. Nephrology on board. On midodrine for BP support. 2. Acute on chromic hypoxic respiratory failure, multifactorial: COPD, pleural effusion. On 4 L O2 at baseline     3. Right pleural effusion. Suspect secondary to cirrhosis. S/p thoracentesis with removal of 2 L fluid on 09/2/2020.  Some reaccumulation on CXR 9/3. O2 needs stable. Lasix has been resumed. Pulmonary has signed off.      4. Cirrhosis with ascites. S/p paracentesis 9/2 with 5.3 liters removed. SAAG elevated consistent with portal HTN. Protein low suggestive ascites from cirrhosis. Continue low sodium diet. On octreotide. 5. A. Fib. On Nadolol for rate control , no anticoagulation due to hepatic coagulopathy and thrombocytopenia. 6. DM2, controlled. A1c 6.3 (6/11/2020). Home medications on hold. Continue basal and high dose correction. Diet: DIET CARB CONTROL; Low Sodium (2 GM); Low Potassium  Code:Full Code  DVT PPX ICD    Disposition: Home when cleared by nephrology, GI and pulmonary have signed off. Patient lives with wife, declines home health nurse or PT/OT.        NABIL Espinoza CNP   9/7/2020 7:19 AM

## 2020-09-08 NOTE — CARE COORDINATION
CTN reached out to patient, he was asleep. Wife \"Natty\" answered but HIPAA form is incomplete and CTN unable to speak with her. Wife started talking and providing information, CTN did not disclose any information to wife. She reports that she weighed him this morning, he was 199#, he is ambulating, and they are watching his Na+ intake. He is eating well. She reports that his temp was 98.5 and his BP was 102/68. CTN explained that another outreach edgar would be made at a later time, wife agreed that later this afternoon after lunch patient would be available. Elkin Ingram

## 2020-09-08 NOTE — CARE COORDINATION
Second attempt at 24 hour discharge call, no answer, CTN left VM with contact information and request for return call. CTN will continue with outreach call attempts.

## 2020-09-09 NOTE — CARE COORDINATION
Third attempt at 24 hour discharge call, no answer, CTN left VM with contact information and request for return call. CTN will make final outreach attempt tomorrow.

## 2020-09-09 NOTE — TELEPHONE ENCOUNTER
Wife called the resource line asking to switch his appointment with Yony Rangel NP. Patient is to keep his appointment. Blood pressure is 105/72, Pulse 72, Pulse oxygen is 96%. Weight at discharge was 201 lbs, today it is 199 lbs. She is walking him everyday and he is improving his distance his gait is stronger. She is cooking very low sodium and monitoring his fluid intake.

## 2020-09-09 NOTE — ADT AUTH CERT
PLAN      - f/u ascitic fluid cx, cytology    - daily weights    - 2 gram low sodium diet    - continue home lactulose and xifaxan    - ok for d/c from gi standpoint once renal function improved. Nephrology--Assessment :         1. POONAM    -Non-Oliguric    -Baseline creat: 0.8-1.2 Now 1.6->1.4    -UA hyaline cast, pyuria, Ur Na <20    -Volume: Hypervolemic    -Electrolytes: Hyponatremia    -Acid-Base: Metabolic alkalosis    -POONAM suspected 2/2 renal hypoperfusion. May has HRS physiology. 2. HTN    -Blood pressure low         BP Readings from Last 1 Encounters:    09/05/20 99/66            3. FLOYD    -portal hypertension         4. COPD on home oxygen         5. DM    -not on metformin         Plan:         - Albumin infusion    - Octreotide    - increase Midodrine    - Hold diuretics today    - monitor BMP         -Monitor I/O, UOP    -Maintain MAP>65    -Avoid nephrotoxin, if able. -Dose meds to current eGFR       Internal Medicine--68 years old gentleman with a history of cirrhosis due to [place] presented to the emergency room with abdominal distention and shortness of breath found to have right pleural effusion.  Underwent right-sided thoracentesis on 9/1/2021 2 L of fluid removed. [facility] underwent paracentesis on 9/2/2000 5.3 L of fluid removed.  Worsening renal function concerns of hepatorenal syndrome.  Nephrology on consult.  On octreotide and albumin.     Subjective:  .    No major overnight issues, patient feels better, sitting in the chair,     Pitting leg edema    Alert awake oriented x3    General appearance:  Appears comfortable    Eyes: Sclera clear. Pupils equal.    ENT: Moist oral mucosa. Trachea midline, no adenopathy. Cardiovascular: Regular rhythm, normal S1, S2. No murmur.  edema in lower extremities    Respiratory: Not using accessory muscles. Good inspiratory effort. Clear to auscultation bilaterally, no wheeze or crackles.     GI: Abdomen soft, no tenderness, not distended, normal bowel sounds    Musculoskeletal: No cyanosis in digits, neck supple    Neurology: CN 2-12 grossly intact. No speech or motor deficits    Psych: Normal affect. Alert and oriented in time, place and person    Skin: Warm, dry, normal turgor    Extremity exam shows brisk capillary refill.  Peripheral pulses are palpable in lower extremities    Assessment & Plan:     POONAM    Concerns of hepatorenal, nephrology on case, diuretics on hold, on octreotide, midodrine and albumin              Acute on chromic hypoxic respiratory failure (multifactorial) On 4 L O2 at baseline         Right pleural effusion  - s/p thoracentesis ultrasound-guided thoracentesis with removal of 2 L fluid on 09/2/2020 , IV Solu-Medrol as well as DuoNeb and aggressive pulmonary toilet. IV Lasix and oral Aldactone therapy with strict I's and O's, Pulmonology following         Cirrhosis with ascites Abdomen distended Paracentesis ordered Follows up with Dr. Anibal Jacobs outpt Diuretics on hold due to low bp, low-sodium diet         A. fib RVR: Nadolol continued for rate control , no anticoagulation due to hepatic coagulopathy and thrombocytopenia           Type 2 DM: Home medication on hold,  last hemoglobin A1c from June was 6.3%, Lantus 14 units nightly, Humalog SSI every 4 hours as needed n.p.o.         IV Access: Peripheral    Dinh: No    Diet: DIET CARB CONTROL; Low Sodium (2 GM)    Code:Full Code    DVT PPX Lovenox    Disposition awaiting renal function to improve       Meds--albumin IV x 3,  ISS ac/hs,  duoneb x 4,  sandostatin sc x 3,          Respiratory Failure [facility] - Care Day 5 (9/4/2020) by Alli Gonzalez RN         Review Status  Review Entered    Completed  9/4/2020 15:44        Criteria Review       Care Day: 5 Care Date: 9/4/2020 Level of Care: Intermediate Care    Guideline Day 1    Level Of Care    ( ) ICU or intermediate care as appropriate [C]    9/4/2020 3:44 PM EDT by Marie Arellano      med surg    Clinical Status    ( ) * Clinical Indications met [D]    Interventions    (X) Inpatient interventions as needed    2020 3:44 PM EDT by Peggye Schwab      Albumin 25g IV q8hrs, Lasix 40mg IV bid, Solumedrol 40mg IV q8hrs, Octrotide 50mcg SC q8hrs    * Milestone    Additional Notes    20    IP LOC, Lactulose 20g po bid, Duonebs qid. Feeling better       /73   Pulse 88   Temp 97.4 °F (36.3 °C) (Oral)   Resp 16   Ht 5' 11\" (1.803 m)   Wt 201 lb 3.2 oz (91.3 kg)   SpO2 94%   BMI 28.06 kg/m²    TEMPERATURE:  Current - Temp: 97.4 °F (36.3 °C); Max - Temp  Av.9 °F (36.6 °C)  Min: 97.4 °F (36.3 °C)  Max: 98.4 °F (36.9 °C)         Lungs diminished in bases    Abdomen protuberant but soft    BLE edema       ASSESSMENT         Ascites - s/p para 9/2. Cell count negative for neutrocytic ascites. SAAG elevated c/w portal hypertension. Protein low (0.3) suggestive of ascites from cirrhosis rather than CHF although CHF can be contributing. Was on lasix 40 mg daily and aldactone 100 mg daily at home. Was not following 2 gram low sodium diet. Ascites on repeat US 9/3. Weight up 3 pounds    Cirrhosis -  d/t FLOYD. H/o esophageal varices s/p banding 2019. No GI bleeding. H/o ascites on diuretics. POONAM - appreciate nephrology eval. [place] to be HRS.           PLAN      - f/u ascitic fluid cx, cytology    - daily weights    - 2 gram low sodium diet    - continue home lactulose and xifaxan         Assessment/Plan :              1.  POONAM . likely has HRS    Urine studies s/o HRS    Still has significant edema    Will add midodrine, albumin and octreotide. Recommend to dose adjust all medications  based on renal functions    Maintain SBP> 90 mmHg    Daily weights    AVOID NSAIDs    Avoid Nephrotoxins    Monitor Intake/Output    Call if significant decrease in urine output         2. Hypotension. Midodrine         3. FLOYD related liver cirrhosis    Once renal function better resume aldactone         Pulmonary note    Assessment:         1. Acute hypoxemic respiratory failure    2. Large right pleural effusion    3. Acute on chronic diastolic heart failure    4. FLOYD with cirrhosis         Plan:         1. I have reviewed laboratories, medical records and images for this visit    2. 2 L thoracentesis    3. 5 L paracentesis    4. I reviewed imaging from yesterday's chest x-ray    5. He does have some reaccumulation of right pleural fluid    6. Tolerating his home dose of oxygen    7.  Does not need thoracentesis at this time       Sodium 133 chl 95 CO2 31 bun 43 creat 1.6 mag 2.50 glucose 264 hgb 11.2 hct 34.2

## 2020-09-10 NOTE — TELEPHONE ENCOUNTER
[facility] FAILURE PROGRAM  TELEPHONE ENCOUNTER FORM    Derrick Benton 1945    ASSESSMENT:   1. Baseline weight: 198 lbs  2. Current weight: 197 lbs  3. Patient weighing daily: Yes  4. Symptoms: dyspnea  5. Patient knows who to call with symptoms: Yes  6. Diet history:      Patient states sodium limitation is : 3,000 mg of sodium a day      Patient states fluid limitation is 64 ounces of fluid a day  Patient following diet as instructed: Yes  7. Medication history: taking medications as instructed Yes; medication side effects noted No  8. Patient is involved in exercise program: No  9. Patient knows date and time of follow up appointment: Yes  10. Patient has transportation to appointments: Yes    RECOMMENDATIONS:   1. Medication: none  2. Diet: low sodium  3. MD/ Clinic appointment: Denisse Holder NP 9/11  Other: Patient's wife called the hotline and stated he is having some shortness of breath with activity. Some \"smothering\" to his chest. Reviewed with Denisse Holder NP. Asked for patient to get labs today to check creatine and fluid level. BP stable at 120/70. Will discuss with Maru tomorrow.        ;

## 2020-09-10 NOTE — CARE COORDINATION
Third attempt at 24 hour discharge call, no answer, CTN left VM with contact information and request for return call. CTN will resolve episode.

## 2020-09-10 NOTE — ADT AUTH CERT
Respiratory Failure [facility] - Care Day 7 (9/6/2020) by Yamilet Santacruz RN         Review Status  Review Entered    Completed  9/10/2020 15:05        Criteria Review       Care Day: 7 Care Date: 9/6/2020 Level of Care: Intermediate Care    Guideline Day 1    Level Of Care    (X) ICU or intermediate care as appropriate [C]    Clinical Status    ( ) * Clinical Indications met [D]    Interventions    (X) Inpatient interventions as needed    9/10/2020 3:05 PM EDT by Maame Linder      Albumin infusion  - Octreotide  - Midodrine increased  - resume lasix today, Aldactone tomorrow  - Low K diet  - monitor BMP     -Monitor I/O, UOP  -Maintain MAP>65  -Avoid nephrotoxin, if able. -Dose meds to current eGFR    * Milestone    Additional Notes    9/6    /82   Pulse 87   Temp 97 °F (36.1 °C)   Resp 14   Ht 5' 11\" (1.803 m)   Wt 204 lb 6.4 oz (92.7 kg)   SpO2 95%   BMI 28.51 kg/m²       Neph    Feels better    Shortness of breath: No    UOP: Fair    Creat: trending down       Plan:         - Albumin infusion    - Octreotide    - Midodrine increased    - resume lasix today, Aldactone tomorrow    - Low K diet    - monitor BMP         -Monitor I/O, UOP    -Maintain MAP>65    -Avoid nephrotoxin, if able. -Dose meds to current eGFR    --------------    IM    Kidney functions improving, still symptomatic on edema, sitting in the chair, denies any new complaints       Assessment & Plan:     POONAM    Concerns of hepatorenal, nephrology on case, diuretics on hold, on octreotide, midodrine and albumin, discussed with nephrology, continue on octreotide, Lasix initiated today, hold on Aldactone given hyperkalemia              Acute on chromic hypoxic respiratory failure (multifactorial) On 4 L O2 at baseline         Right pleural effusion  - s/p thoracentesis ultrasound-guided thoracentesis with removal of 2 L fluid on 09/2/2020 , IV Solu-Medrol as well as DuoNeb and aggressive pulmonary toilet. IV Lasix and oral Aldactone therapy with strict I's and O's, Pulmonology following         Cirrhosis with ascites Abdomen distended Paracentesis ordered Follows up with Dr. Jamie Fabry outpt Diuretics on hold due to low bp, low-sodium diet         A. fib RVR: Nadolol continued for rate control , no anticoagulation due to hepatic coagulopathy and thrombocytopenia           Type 2 DM: Home medication on hold,  last hemoglobin A1c from June was 6.3%, Lantus 14 units nightly, Humalog SSI every 4 hours as needed n.p.o.         IV Access: Peripheral    Dinh: No    Diet: DIET CARB CONTROL; Low Sodium (2 GM);  Low Potassium    Code:Full Code    DVT PPX Lovenox    Disposition awaiting renal function to improve, concerns of hepatorenal,    ----------------    9/6/2020 06:15    Sodium: 137    Potassium: 5.2 (H)    Chloride: 98 (L)    CO2: 31    BUN: 35 (H)    Creatinine: 1.2    Anion Gap: 8    GFR Non-: 59 (A)    GFR : >60    Glucose: 172 (H)    Calcium: 9.4    Total Protein: 6.0 (L)    Albumin: 4.2    Alk Phos: 99    ALT: 33    AST: 35    Bilirubin: 3.2 (H)    Bilirubin, Direct: 1.0 (H)    Bilirubin, Indirect: 2.2 (H)    WBC: 4.6    RBC: 3.14 (L)    Hemoglobin Quant: 9.8 (L)    Hematocrit: 29.8 (L)    MCV: 94.9    MCH: 31.3    MCHC: 32.9    MPV: 7.2    RDW: 17.2 (H)    Platelet Count: 58 (L)    Neutrophils %: 70.7    Lymphocyte %: 13.1    Monocytes %: 12.6    Eosinophils %: 3.3    Basophils %: 0.3    Neutrophils Absolute: 3.3    Lymphocytes Absolute: 0.6 (L)    Monocytes Absolute: 0.6    Eosinophils Absolute: 0.2    Basophils Absolute: 0.0       9/6/2020 07:32    POC Glucose: 171 (H)       9/6/2020 11:12    POC Glucose: 282 (H)       9/6/2020 16:10    POC Glucose: 242 (H)       9/6/2020 20:25    POC Glucose: 178 (H)    ----------------    Scheduled Medications    · midodrine 10 mg Oral TID WC    · octreotide 50 mcg Subcutaneous Q8H    · insulin lispro 0-18 Units Subcutaneous TID WC    · insulin lispro 0-9 Units Subcutaneous Nightly    · sodium chloride flush 10 mL Intravenous 2 times per day    · insulin glargine 0.15 Units/kg Subcutaneous Nightly    · ipratropium-albuterol 1 ampule Inhalation 4x daily    · nadolol 40 mg Oral Daily    · aspirin 81 mg Oral Daily    · citalopram 20 mg Oral Daily    · lactulose 20 g Oral TID    · levothyroxine 137 mcg Oral Daily    · vitamin D3 400 Units Oral Daily    · rifaximin 550 mg Oral BID    · [Held by provider] furosemide 40 mg Intravenous BID       PRN    ondansetron (ZOFRAN) injection 4 mg   x1

## 2020-09-11 NOTE — PROGRESS NOTES
[address]  Progress Note    Primary Care Doctor:  Anand Coronado MD    Chief Complaint   Patient presents with    Follow-Up from Hospital    Congestive Heart Failure    Shortness of Breath     especially after morning coffee    Palpitations     ocassionally        History of Present Illness:  70 y.o. male with history of history of DM, HTN, COPD on home O2, PAF, FLOYD cirrhosis. Amyloid was ruled out    I had the pleasure of seeing Salima Chowdhury in follow up for hospitalization 8/31-9/7/2020. He was admitted for abdominal distention and SOB, AF with RVR. He had a thoracentesis of 2 L and paracentesis of 5.3L. He was sent home off his diuretics due to hyponatremia ad CKD (Dr Lucero Whalen). He is in a wheel chair on 4 L of oxygen and with his wife, Natty. His weight is stable at 201. He went home without home care as him and his wife felt that can handle everything. His wife is a . He is having episodes of SOB when he gets up to walk to the bathroom. He was instructed yesterday to get labs last night or prior to appt today. He does not feel that his abdomen is distended. He is anemic. He does not have follow up with Dr Cristhian Tanner    Past Medical History:   has a past medical history of Acute on chronic diastolic congestive heart failure ([address] ANEMIA, CAD (coronary artery disease), Chronic obstructive pulmonary disease ([address] Cirrhosis ([address] Depression, Diabetes mellitus ([address] Esophageal bleed, non-variceal, Hypertension, HYPOTHYROIDISM, Liver disease, Mixed hyperlipidemia, NEUROPATHY, Non-ST elevation MI (NSTEMI) ([address] Pancytopenia ([address] Paroxysmal atrial fibrillation ([address] Pneumonia, Sleep apnea, Thrombocytopenia ([address] and Thyroid disease. Surgical History:   has a past surgical history that includes Achilles tendon surgery; Tonsillectomy; Colonoscopy; Endoscopy, colon, diagnostic; Coronary artery bypass graft (10/2011);  Percutaneous Transluminal Coronary Angio (11/23/2012); Upper gastrointestinal endoscopy (11/23/2012); Cardiac surgery; Upper gastrointestinal endoscopy (3/6/2014); Cholecystectomy; Upper gastrointestinal endoscopy (N/A, 6/7/2019); Upper gastrointestinal endoscopy (N/A, 7/15/2019); and Upper gastrointestinal endoscopy (N/A, 7/15/2019). Social History:   reports that he quit smoking about 6 months ago. His smoking use included cigarettes. He started smoking about 55 years ago. He has a 19.35 pack-year smoking history. He has never used smokeless tobacco. He reports that he does not drink alcohol or use drugs. Family History:   Family History   Problem Relation Age of Onset    Heart Disease Mother         arrythmia    Diabetes Father     Cancer Father     Heart Disease Father     Anesth Problems Neg Hx     Malig Hyperten Neg Hx     Hypotension Neg Hx     Malig Hypertherm Neg Hx     Pseudochol. Deficiency Neg Hx        Home Medications:  Prior to Admission medications    Medication Sig Start Date End Date Taking?  Authorizing Provider   nadolol (CORGARD) 20 MG tablet Take 2 tablets by mouth daily 9/7/20  Yes NABIL Lucia CNP   spironolactone (ALDACTONE) 50 MG tablet Take 0.5 tablets by mouth 2 times daily 9/7/20  Yes NABIL Lucia CNP   levothyroxine (SYNTHROID) 137 MCG tablet Take 1 tablet by mouth Daily 9/8/20  Yes NABIL Lucia CNP   midodrine (PROAMATINE) 10 MG tablet Take 1 tablet by mouth 3 times daily (with meals) 9/7/20  Yes NABIL Lucia CNP   ONGLYZA 5 MG TABS tablet Take 5 mg by mouth daily  7/22/20  Yes Historical Provider, MD   citalopram (CELEXA) 20 MG tablet TAKE ONE TABLET BY MOUTH DAILY 6/23/20  Yes Alireza Linares MD   glipiZIDE (GLUCOTROL) 5 MG tablet TAKE ONE TABLET BY MOUTH TWICE A DAY BEFORE MEALS 6/22/20  Yes Alireza Linares MD   cyanocobalamin 1000 MCG/ML injection INJECT 1 ML INTRAMUSCULARLY ONCE EVERY 30 DAYS 6/22/20  Yes MD JANES Franco 3CC LUER-LOK Lucillie Sam 25GX1\" 25G X 1\" 3 ML MISC USE WITH VITAMIN B-12 INJECTIONS 4/20/20  Yes Corey Messer MD   lactulose [facility]) 10 GM/15ML solution Take 30 mLs by mouth 3 times daily 10/3/19  Yes Sara Johns MD   CINNAMON PO Take 1,000 mg by mouth daily   Yes Historical Provider, MD   b complex vitamins capsule Take 1 capsule by mouth daily   Yes Historical Provider, MD   MILK THISTLE PO Take 1,000 mg by mouth daily    Yes Historical Provider, MD Chavarria Reining 550 MG tablet 2 times daily  5/5/17  Yes Historical Provider, MD   Omega-3 Fatty Acids (FISH OIL) 500 MG CAPS Take 1,000 mg by mouth daily    Yes Historical Provider, MD   aspirin 81 MG tablet Take 81 mg by mouth daily. Yes Historical Provider, MD        Allergies:  Cephalexin; Ciprocinonide [fluocinolone]; Quinolones; and Statins     Review of Systems:   · Constitutional: there has been no unanticipated weight loss. There's been no change in energy level, sleep pattern, or activity level. · Eyes: No visual changes or diplopia. No scleral icterus. · ENT: No Headaches, hearing loss or vertigo. No mouth sores or sore throat. · Cardiovascular: Reviewed in HPI  · Respiratory: No cough or wheezing, no sputum production. No hematemesis. · Gastrointestinal: No abdominal pain, appetite loss, blood in stools. No change in bowel or bladder habits. · Genitourinary: No dysuria, trouble voiding, or hematuria. · Musculoskeletal:  No gait disturbance, weakness or joint complaints. · Integumentary: No rash or pruritis. · Neurological: No headache, diplopia, change in muscle strength, numbness or tingling. No change in gait, balance, coordination, mood, affect, memory, mentation, behavior. · Psychiatric: No anxiety, no depression. · Endocrine: No malaise, fatigue or temperature intolerance. No excessive thirst, fluid intake, or urination. No tremor. · Hematologic/Lymphatic: No abnormal bruising or bleeding, blood clots or swollen lymph nodes.   · Allergic/Immunologic: No nasal congestion or hives. Physical Examination:    Vitals:    09/11/20 1221   BP: 102/74   Site: Left Upper Arm   Position: Sitting   Cuff Size: Medium Adult   Pulse: 89   SpO2: 99%   Weight: 203 lb (92.1 kg)   Height: 5' 11\" (1.803 m)        Constitutional and General Appearance: Warm and dry, no apparent distress, normal coloration  HEENT:  Normocephalic, atraumatic  Respiratory:  · Normal excursion and expansion without use of accessory muscles  · Resp Auscultation: Normal breath sounds without dullness  Cardiovascular:  · The apical impulses not displaced  · Heart tones are crisp and normal  · JVP normal cm H2O  · irregular rate and rhythm  · Peripheral pulses are symmetrical and full  · There is no clubbing, cyanosis of the extremities.   · Bilateral lower ankle to right below knee edema, support stockings in place  · Pedal Pulses: 2+ and equal   Abdomen:  · No masses or tenderness  · Liver/Spleen: No Abnormalities Noted  Neurological/Psychiatric:  · Alert and oriented in all spheres  · Moves all extremities well  · Exhibits normal gait balance and coordination  · No abnormalities of mood, affect, memory, mentation, or behavior are noted    Lab Data:    CBC:   Lab Results   Component Value Date    WBC 5.9 09/07/2020    WBC 4.6 09/06/2020    WBC 6.7 09/05/2020    RBC 3.30 09/07/2020    RBC 3.14 09/06/2020    RBC 3.29 09/05/2020    HGB 10.3 09/07/2020    HGB 9.8 09/06/2020    HGB 9.9 09/05/2020    HCT 31.4 09/07/2020    HCT 29.8 09/06/2020    HCT 31.0 09/05/2020    MCV 95.1 09/07/2020    MCV 94.9 09/06/2020    MCV 94.4 09/05/2020    RDW 17.4 09/07/2020    RDW 17.2 09/06/2020    RDW 17.1 09/05/2020    PLT 67 09/07/2020    PLT 58 09/06/2020    PLT 72 09/05/2020     BMP:  Lab Results   Component Value Date     09/07/2020     09/06/2020     09/05/2020    K 4.4 09/07/2020    K 5.2 09/06/2020    K 4.7 09/05/2020    K 4.4 09/01/2020    K 4.0 04/09/2020    K 4.6 10/02/2019    CL 96 09/07/2020    CL 98 09/06/2020    CL 94 09/05/2020    CO2 33 09/07/2020    CO2 31 09/06/2020    CO2 30 09/05/2020    PHOS 4.0 09/04/2020    PHOS 3.7 05/30/2018    BUN 34 09/07/2020    BUN 35 09/06/2020    BUN 40 09/05/2020    CREATININE 1.2 09/07/2020    CREATININE 1.2 09/06/2020    CREATININE 1.4 09/05/2020     BNP:   Lab Results   Component Value Date    PROBNP 336 08/31/2020    PROBNP 650 08/17/2020    PROBNP 540 06/11/2020     Cardiac Imaging:  Echo 4/10/2020  Summary   Limited study - covid   Irregular rhythm   Left ventricular systolic function is normal with ejection fraction   estimated at 60-65 %. There is moderate concentric left ventricular hypertrophy. Left ventricle size is normal.   Moderately severe mitral regurgitation. Reported as mild MR in 4987   Diastolic dysfunction grade and filing pressure are indeterminate. There is a trivial circumferential pericardial effusion noted. There is a moderate left pleural effusion. Previous echo done 2012 - EF 55%    Assessment:    1. Chronic diastolic heart failure ([address]    2. Coronary artery disease involving native coronary artery of native heart without angina pectoris    3. Essential hypertension    4. FLOYD (nonalcoholic steatohepatitis)    5. Chronic atrial fibrillation no anticoag due to hepatic coagulopathy and thrombocytopenia   6. Hyponatremia    7. Chronic obstructive pulmonary disease, unspecified COPD type ([address]    8. Stage 3 chronic kidney disease (HCC) Dr Randi Yan       Plan:   Patient Instructions   1. Check blood work today  2. Call and make follow up with Dr Desiree Prado  3. Will send lab results to Dr Randi Yan as well  4. Continue <2000 mg sodium and <64 ounces fluid  5. Letter for wife work  10.   RTO scheduled with [facility]      I appreciate the opportunity of cooperating in the care of this individual.    GIO Crum, 9/11/2020, 3:42 PM

## 2020-09-11 NOTE — LETTER
[address] [address] [address] [address]  Phone: [phone]  Fax: [phone] NABIL [address]arsia        September 11, 2020     Patient: Swapnil Bolivar   YOB: 1945   Date of Visit: 9/11/2020       To Whom It May Concern:    Natty was with her  at an appt today 9/11/2020. If you have any questions or concerns, please don't hesitate to call.     Sincerely,        NABIL De La Paz - CNS

## 2020-09-14 NOTE — TELEPHONE ENCOUNTER
Pt wife calling asking to  pls not call home phone. call her on cell because her  will not remember the information. Pt wife asking about what new medications? Pt wife needs to know what changes are being made.  pls call to advise thank you

## 2020-09-22 NOTE — TELEPHONE ENCOUNTER
Pt's wife Noni Buerger called states she is one of your patients and her  sees Dr Mal Lawson and she is questioning if you will start seeing her  after Dr Mal Lawson retire.

## 2020-09-23 NOTE — TELEPHONE ENCOUNTER
----- Message from NABIL Sterling sent at 9/22/2020  8:45 AM EDT -----  Please see what his weight is doing and any increased sob  May need to increase lasix some more  thanks

## 2020-09-24 NOTE — TELEPHONE ENCOUNTER
Spoke to the EC-his weight is #195, some increase in SOB, but thinks it is due to his a-fib.   Call wife's cell [phone]

## 2020-09-24 NOTE — TELEPHONE ENCOUNTER
I spoke with pt wife and reiterated the message left. She was irritated that the message insinuated that they were a no show at the last appt. I told her that the notation did not reflect that at all and that it was noted that the appt was cancelled. She stated that the upcoming appointment will need to reschedule due to her availability.

## 2020-09-24 NOTE — TELEPHONE ENCOUNTER
Pt wife calling back to go over lab results and says he is taking furosemide and the the swelling has gone down.  pls call to advise thank you

## 2020-09-24 NOTE — TELEPHONE ENCOUNTER
He had an appt with Khadar Cadet today and cancelled it  Fluid level is better on his labs from 9/21  He can take an extra lasix but should also call pulmonary and schedule [facility] appt earlier

## 2020-10-01 NOTE — PROGRESS NOTES
[address]   Congestive Heart Failure  TELEHEALTH EVALUATION  Wei/Visual (During [facility]-13 public health emergency)       Pursuant to the emergency declaration under the [facility], [facility] waiver authority and the [employer] Act this  Video Visit was insisted, with patient's consent, to reduce the patient's risk of exposure to COVID-19 and provide continuity of care for an established patient. The patient was at home, while the provider was at the clinic. Services were provided through a synchronous discussion over a phone chat to substitute for in-person clinic visit, and coded as such. Chief Complaint:  CHF    History of Present Illness:  Kayla Weston is a 70 y.o. male with PMH AF, HFpEF, CAD, MI, COPD, anemia, cirrhosis, DM, HTN, HLD, and DMITRIY who is currently being interviewed via phone, no other person present, provider was in the office and patient was in his/her home. At the last OV 4 weeks ago, he c/o nothing and no med changes  Today he c/o dyspnea with palpitations, improved edema, and mild abd distention. He denies chest pain, orthopnea, PND, exertional chest pressure/discomfort, fatigue, early saiety, syncope. Today's home wt: 200lb   Home wt one week ago: 198lb    Baseline Weight: 201    Device: No       Activity level: baseline    NYHA Class: III   Class I   Patients with no limitation of activities; they suffer no symptoms from ordinary activities. Class II   Patients with slight, mild limitation of activity; they are comfortable with rest or with mild exertion.    Class III   Patients with marked limitation of activity; they are comfortable only at rest.   Class IV   Patients who should be at complete rest, confined to bed or chair; any physical activity brings on discomfort and symptoms occur at rest.    Sodium Restrictions: 3g  Fluid Restrictions: 48-64 oz/day  Sodium and fluid restriction compliance: good      EF: 60-65%  Cardiac Imaging:  Echo 4/10/2020  Summary   Limited study - covid   Irregular rhythm   Left ventricular systolic function is normal with ejection fraction   estimated at 60-65 %.   There is moderate concentric left ventricular hypertrophy.   Left ventricle size is normal.   Moderately severe mitral regurgitation. Reported as mild MR in 1486   Diastolic dysfunction grade and filing pressure are indeterminate.  Juris Justine is a trivial circumferential pericardial effusion noted.  Juris Justine is a moderate left pleural effusion.   Previous echo done 2012 - EF 55%  Past Medical History:   has a past medical history of Acute on chronic diastolic congestive heart failure ([address] ANEMIA, CAD (coronary artery disease), Chronic obstructive pulmonary disease ([address] Cirrhosis ([address] Depression, Diabetes mellitus ([address] Esophageal bleed, non-variceal, Hypertension, HYPOTHYROIDISM, Liver disease, Mixed hyperlipidemia, NEUROPATHY, Non-ST elevation MI (NSTEMI) ([address] Pancytopenia ([address] Paroxysmal atrial fibrillation ([address] Pneumonia, Sleep apnea, Thrombocytopenia ([address] and Thyroid disease. Surgical History:   has a past surgical history that includes Achilles tendon surgery; Tonsillectomy; Colonoscopy; Endoscopy, colon, diagnostic; Coronary artery bypass graft (10/2011); Percutaneous Transluminal Coronary Angio (11/23/2012); Upper gastrointestinal endoscopy (11/23/2012); Cardiac surgery; Upper gastrointestinal endoscopy (3/6/2014); Cholecystectomy; Upper gastrointestinal endoscopy (N/A, 6/7/2019); Upper gastrointestinal endoscopy (N/A, 7/15/2019); and Upper gastrointestinal endoscopy (N/A, 7/15/2019). Social History:   reports that he quit smoking about 7 months ago. His smoking use included cigarettes. He started smoking about 55 years ago. He has a 19.35 pack-year smoking history. He has never used smokeless tobacco. He reports that he does not drink alcohol or use drugs.    Family History: Family History   Problem Relation Age of Onset    Heart Disease Mother         arrythmia    Diabetes Father     Cancer Father     Heart Disease Father     Anesth Problems Neg Hx     Malig Hyperten Neg Hx     Hypotension Neg Hx     Malig Hypertherm Neg Hx     Pseudochol. Deficiency Neg Hx        HomeMedications:  Prior to Admission medications    Medication Sig Start Date End Date Taking?  Authorizing Provider   nadolol (CORGARD) 20 MG tablet TAKE ONE TABLET BY MOUTH DAILY 9/17/20   Dao Leigh MD   furosemide (LASIX) 40 MG tablet Take 1 tablet by mouth daily 9/14/20   NABIL Lawson - CNS   spironolactone (ALDACTONE) 50 MG tablet Take 0.5 tablets by mouth 2 times daily 9/7/20   Yancy Carlos APRN - CNP   levothyroxine (SYNTHROID) 137 MCG tablet Take 1 tablet by mouth Daily 9/8/20   NABIL Hinojosa - CNP   midodrine (PROAMATINE) 10 MG tablet Take 1 tablet by mouth 3 times daily (with meals) 9/7/20   NABIL Hinojosa - CNP   ONGLYZA 5 MG TABS tablet Take 5 mg by mouth daily  7/22/20   Historical Provider, MD   citalopram (CELEXA) 20 MG tablet TAKE ONE TABLET BY MOUTH DAILY 6/23/20   Dao Leigh MD   glipiZIDE (GLUCOTROL) 5 MG tablet TAKE ONE TABLET BY MOUTH TWICE A DAY BEFORE MEALS 6/22/20   Dao Leigh MD   cyanocobalamin 1000 MCG/ML injection INJECT 1 ML INTRAMUSCULARLY ONCE EVERY 30 DAYS 6/22/20   Dao Leigh MD   B-D 3CC LUER-ALEXIS SYR 25GX1\" 25G X 1\" 3 ML MISC USE WITH VITAMIN B-12 INJECTIONS 4/20/20   Dao Leigh MD   lactulose (CHRONULAC) 10 GM/15ML solution Take 30 mLs by mouth 3 times daily 10/3/19   Claudy Hensley MD   CINNAMON PO Take 1,000 mg by mouth daily    Historical Provider, MD   b complex vitamins capsule Take 1 capsule by mouth daily    Historical Provider, MD   MILK THISTLE PO Take 1,000 mg by mouth daily     Historical Provider, MD   Regina Crest 550 MG tablet 2 times daily  5/5/17   Historical Provider, MD   Omega-3 Fatty Acids ([address] OIL) 500 MG CAPS Take 1,000 mg by mouth daily     Historical Provider, MD   aspirin 81 MG tablet Take 81 mg by mouth daily. Historical Provider, MD        Allergies:  Cephalexin; Ciprocinonide [fluocinolone]; Quinolones; and Statins     ROS:   Review of Systems   Constitutional: Negative. Respiratory: Positive for shortness of breath. Cardiovascular: Positive for leg swelling. Gastrointestinal: Positive for abdominal distention. Genitourinary: Negative. Musculoskeletal: Negative. Skin: Negative. Neurological: Negative. Hematological: Negative. Psychiatric/Behavioral: Negative. Physical Examination:    VS per Pt: /74, HR 75  Pt Assessment of Edema:  mild    Physical exam deferred due to virtual visit.       Lab Data:    CBC:   Lab Results   Component Value Date    WBC 5.3 09/11/2020    WBC 5.9 09/07/2020    WBC 4.6 09/06/2020    RBC 3.44 09/11/2020    RBC 3.30 09/07/2020    RBC 3.14 09/06/2020    HGB 10.4 09/11/2020    HGB 10.3 09/07/2020    HGB 9.8 09/06/2020    HCT 32.5 09/11/2020    HCT 31.4 09/07/2020    HCT 29.8 09/06/2020    MCV 94.2 09/11/2020    MCV 95.1 09/07/2020    MCV 94.9 09/06/2020    RDW 17.4 09/11/2020    RDW 17.4 09/07/2020    RDW 17.2 09/06/2020    PLT 69 09/11/2020    PLT 67 09/07/2020    PLT 58 09/06/2020     BMP:  Lab Results   Component Value Date     09/21/2020     09/11/2020     09/07/2020    K 4.1 09/21/2020    K 4.8 09/11/2020    K 4.4 09/07/2020    K 4.4 09/01/2020    K 4.0 04/09/2020    K 4.6 10/02/2019    CL 97 09/21/2020    CL 96 09/11/2020    CL 96 09/07/2020    CO2 37 09/21/2020    CO2 33 09/11/2020    CO2 33 09/07/2020    PHOS 4.0 09/04/2020    PHOS 3.7 05/30/2018    BUN 22 09/21/2020    BUN 18 09/11/2020    BUN 34 09/07/2020    CREATININE 1.1 09/21/2020    CREATININE 1.1 09/11/2020    CREATININE 1.2 09/07/2020     BNP:   Lab Results   Component Value Date    PROBNP 1,106 09/21/2020    PROBNP 1,247 09/11/2020    PROBNP 336 08/31/2020     Iron Studies:  No components found for: FE,  TIBC,  FERRITIN        Assessment/Plan:    1. Chronic diastolic congestive heart failure ([address] - compensated, labs next week   2. Coronary artery disease involving native coronary artery of native heart without angina pectoris- stable, continue GDT    3. Essential hypertension - controlled, on midodrine, no BPs under 100   4. Atrial fibrillation with RVR ([address] - rated controlled on BB, has EP f/u         Instructions:   1. Medications: no change  2. Labs: per PCP and nephrology  3. Follow up: November      Pt Education: Patient received education regarding their diagnosis, treatment and medications while on the phone today. I appreciate the opportunity of cooperating in the care of this individual.    Sweta Munguia CNP, 10/1/2020,2:52 PM    Pursuant to the emergency declaration under the [facility], [facility] waiver authority and the [employer] Act, this Virtual  Visit was conducted, with patient's consent, to reduce the patient's risk of exposure to COVID-19 and provide continuity of care for an established patient. Services were provided through a video synchronous discussion virtually to substitute for in-person clinic visit.      Total Time: minutes: 11-20 minutes

## 2020-10-14 PROBLEM — R79.89 INCREASED AMMONIA LEVEL: Status: RESOLVED | Noted: 2018-06-02 | Resolved: 2020-01-01

## 2020-10-14 PROBLEM — J96.01 ACUTE RESPIRATORY FAILURE WITH HYPOXIA (HCC): Status: RESOLVED | Noted: 2020-01-01 | Resolved: 2020-01-01

## 2020-10-14 PROBLEM — G93.40 ACUTE ENCEPHALOPATHY: Status: RESOLVED | Noted: 2018-06-02 | Resolved: 2020-01-01

## 2020-10-25 PROBLEM — R06.02 SHORTNESS OF BREATH: Status: ACTIVE | Noted: 2020-01-01

## 2020-10-25 NOTE — TELEPHONE ENCOUNTER
On-call page from patient's spouse    Discharge from [facility] recently. Today, he has worsened at home with leg and abdominal swelling. He was taken off of diuretics in the hospital due to kidney failure. He was also treated for pneumonia. Main concern is he is confused. He is unable to walk. Given the recent hospitalization and his confusion, worsening condition, inability to ambulate, she will call 911 and have him return to the hospital for further evaluation and treatment.       Agusto Coulter, NABIL - CNP

## 2020-10-25 NOTE — LETTER
[facility]  [address] [address]  Phone: [phone]             October 29, 2020    Patient: Carine Evans   YOB: 1945   Date of Visit: 10/25/2020       To Whom It May Concern:    Jamaicacandis Davila was seen and treated in our facility  beginning 10/25/2020 until 10/29/20.       Sincerely,       Vivian Goldberg, RN         Signature:__________________________________

## 2020-10-25 NOTE — ED PROVIDER NOTES
[facility]        Pt Name: Carine Evans  MRN: [id number]  [place] 1945  Date of evaluation: 10/25/2020  Provider: NABIL Cutler - JOVANY  PCP: Yung Johnson MD     I have seen and evaluated this patient with my supervising physician Salvador Charles       Chief Complaint   Patient presents with    Shortness of Breath     shortness of breath for \"a couple months now\". pt states having trouble moving his legs and feet due to swelling and pain. HISTORY OF PRESENT ILLNESS   (Location, Timing/Onset, Context/Setting, Quality, Duration, Modifying Factors, Severity, Associated Signs and Symptoms)  Note limiting factors. Carine Evans is a 70 y.o. male presents to the emergency department tonight via stretcher transport. Patient reports that he has chronic shortness of breath and is now having difficulty walking. He does live at home with his wife, states that they take care of each other. He does not receive home care. This patient did arrive to [facility] following a syncopal event causing a fall at home. He was found to have mildly displaced left sixth through eighth rib fractures posterior laterally. Concerning to be subacute versus chronic. This patient was intubated and required vasopressors at [facility] and extubated on 10/19/2020. He does wear supplemental oxygen at 4 L/min at all times. He has chronic hypertension and is on midodrine. History of liver cirrhosis, Cuevas with ascites. He has a recurrent right pleural effusion, likely hepatic.   There was a chest tube placed on 10/16/2020    Recently admitted to [facility] for the following:  Encephalopathy    Other ascites    Atrial fibrillation with rapid ventricular response (HCC)    Recurrent right pleural effusion    Acute respiratory failure with hypoxemia (HCC)    Acute renal failure, unspecified acute renal failure type (HCC)    Multiple fractures of ribs, left side, initial encounter for closed fracture    Acute respiratory failure     Patient is awake and alert, he is not tachypneic or in acute respiratory distress. He is wearing his home oxygen. Nursing Notes were all reviewed and agreed with or any disagreements were addressed in the HPI. REVIEW OF SYSTEMS    (2-9 systems for level 4, 10 or more for level 5)     Review of Systems   Constitutional: Negative for activity change, chills and fever. HENT: Positive for congestion. Respiratory: Positive for chest tightness, shortness of breath and wheezing. Cardiovascular: Negative for chest pain. Gastrointestinal: Negative for abdominal pain, diarrhea, nausea and vomiting. Genitourinary: Negative for dysuria. Skin: Positive for wound. Neurological: Positive for weakness. All other systems reviewed and are negative. Positives and Pertinent negatives as per HPI. Except as noted above in the ROS, all other systems were reviewed and negative.        PAST MEDICAL HISTORY     Past Medical History:   Diagnosis Date    Acute on chronic diastolic congestive heart failure ([address]     ANEMIA     CAD (coronary artery disease) 10/12/2011    Chronic obstructive pulmonary disease (HCC)     Cirrhosis (HCC)     Depression     Diabetes mellitus ([address]     Esophageal bleed, non-variceal 11/23/2012    Hypertension     HYPOTHYROIDISM     Liver disease 1/6/2012    Mixed hyperlipidemia     NEUROPATHY     Non-ST elevation MI (NSTEMI) ([address] 11/21/2012    Pancytopenia ([address]     Paroxysmal atrial fibrillation ([address] 1/6/2012    Pneumonia     Sleep apnea     no cpap    Thrombocytopenia ([address]     Thyroid disease          SURGICAL HISTORY     Past Surgical History:   Procedure Laterality Date    ACHILLES TENDON SURGERY      CARDIAC SURGERY      3 vessel cabg    CHOLECYSTECTOMY      COLONOSCOPY      CORONARY ARTERY BYPASS GRAFT  10/2011 DIAGNOSTIC RESULTS   LABS:    Labs Reviewed   BASIC METABOLIC PANEL - Abnormal; Notable for the following components:       Result Value    Sodium 134 (*)     Chloride 95 (*)     Glucose 174 (*)     BUN 44 (*)     GFR Non- 59 (*)     All other components within normal limits    Narrative:     Performed at:  [facility] [address]  [place]   Phone [phone]   BRAIN NATRIURETIC PEPTIDE - Abnormal; Notable for the following components:    Pro-BNP 2,184 (*)     All other components within normal limits    Narrative:     Performed at:  [facility] [address]  [place]   Phone [phone]   CBC WITH AUTO DIFFERENTIAL - Abnormal; Notable for the following components:    RBC 3.28 (*)     Hemoglobin 10.1 (*)     Hematocrit 31.5 (*)     RDW 22.9 (*)     Platelets 55 (*)     Lymphocytes Absolute 0.5 (*)     Anisocytosis 1+ (*)     Microcytes Occasional (*)     Ovalocytes Occasional (*)     Stomatocytes Occasional (*)     All other components within normal limits    Narrative:     Performed at:  [facility] [address]  [place]   Phone [phone]   TROPONIN - Abnormal; Notable for the following components:    Troponin 0.03 (*)     All other components within normal limits    Narrative:     Performed at:  [facility] [address]  [place]   Phone [phone] - Abnormal; Notable for the following components:    Protime 20.8 (*)     INR 1.78 (*)     All other components within normal limits    Narrative:     Performed at:  [facility] [address],  [place]   Phone [phone]   LACTATE, SEPSIS - Abnormal; Notable for the following components:    Lactic Acid, Sepsis 2.1 (*)     All other components within normal limits    Narrative: Performed at:  [facility]  [address], [place]  [place], [address]   Phone [phone]   POCT GLUCOSE - Abnormal; Notable for the following components:    POC Glucose 169 (*)     All other components within normal limits    Narrative:     Performed at:  [facility]  [address], [place],  [place], [address]   Phone [phone]   CULTURE, BLOOD 1   CULTURE, BLOOD 2   LACTATE, SEPSIS    Narrative:     Performed at:  [facility]  [address], [place],  [place], [address]   Phone [phone]   LACTIC ACID, PLASMA   LACTIC ACID, PLASMA       All other labs were within normal range or not returned as of this dictation. EKG: All EKG's are interpreted by the Emergency Department Physician in the absence of a cardiologist.  Please see their note for interpretation of EKG. RADIOLOGY:   Non-plain film images such as CT, Ultrasound and MRI are read by the radiologist. Plain radiographic images are visualized and preliminarily interpreted by the ED Provider with the below findings:        Interpretation per the Radiologist below, if available at the time of this note:    XR CHEST PORTABLE   Final Result   1. Bibasilar opacities favored to reflect atelectasis and effusion, left   greater than right. Superimposed infiltrate difficult to entirely exclude in   the appropriate clinical setting. 2. Stable cardiomegaly. Xr Chest Portable    Result Date: 10/19/2020  INDICATION: Respiratory distress. PORTABLE CHEST 10/19/2020 4:52 AM Comparison: None. Orogastric tube noted with tip in the proximal stomach. Endotracheal tube visualized with tip approximately 1 cm superior to the krzysztof. Sternotomy noted. Cardiomediastinal silhouette appears normal in size and configuration. Patchy and consolidated airspace densities are identified in the right lung base without significant change.  Mild blunting of right costophrenic back or how long it has been present. Medical records were reviewed extensively from recent hospitalization at [address] [place] [address],[address] reviewed. This patient does have a slightly elevated troponin 0.03. Lactate 2.1. Blood pressure 93/62 after a liter of fluid, he does have chronic hypotension on midodrine. This patient will be admitted for elevated troponin, weakness, inability to walk. FINAL IMPRESSION      1. Generalized weakness    2. Dehydration    3. Elevated troponin          DISPOSITION/PLAN   DISPOSITION        PATIENT REFERREDTO:  No follow-up provider specified.     DISCHARGE MEDICATIONS:  New Prescriptions    No medications on file       DISCONTINUED MEDICATIONS:  Discontinued Medications    No medications on file              (Please note that portions of this note were completed with a voice recognition program.  Efforts were made to edit the dictations but occasionally words are mis-transcribed.)    NABIL Obrien CNP (electronically signed)           NABIL Obrien CNP  10/25/20 [id number], APRN - CNP  10/25/20 [id number]

## 2020-10-26 NOTE — PROGRESS NOTES
breth sounds remain diminished at bases, but fine distant crackles heard let base with DB effort (stronger effort than earlier today); spouse at bedside; BP mildly improved since Midodrine administered this AM.

## 2020-10-26 NOTE — PLAN OF CARE
Problem: Falls - Risk of:  Goal: Will remain free from falls  Description: Will remain free from falls  10/26/2020 0751 by Jareth Michel RN  Outcome: Ongoing  Note: Call light in reach; bed in low position; SR up x2; pt affirms awareness and understanding of need to call for and await assist with OOB mobility; alarm active. 10/26/2020 0339 by Jassi Galicia RN  Outcome: Ongoing  Note: Pt stated \"I can stand\"  Wife (at bedside) stated [facility] has fallen two times recently. Sage Blake Sage Blake Sage Blake \" requiring hospitalization.   Pt can pivot to bedside commode, per pt & wife; bed alarm in place, hourly rounding, call light within reach; will continue to assess  Goal: Absence of physical injury  Description: Absence of physical injury  10/26/2020 0339 by Jassi Galicia RN  Outcome: Ongoing     Problem: Skin Integrity:  Goal: Will show no infection signs and symptoms  Description: Will show no infection signs and symptoms  10/26/2020 0751 by Jareth Michel RN  Outcome: Ongoing  Note: Afebrile; serum WBC WNL  10/26/2020 0339 by Jassi Galicia RN  Outcome: Ongoing  Goal: Absence of new skin breakdown  Description: Absence of new skin breakdown  10/26/2020 0751 by Jareth Michel RN  Outcome: Ongoing  Note: Dressing CD&I to Rt lat chest (recent chest tube site); foam dressing CD&I to Rt elbow; no new breakdown evident  10/26/2020 0339 by Jassi Galicia RN  Outcome: Ongoing     Problem: Physical Regulation:  Goal: Ability to maintain clinical measurements within normal limits will improve  Description: Ability to maintain clinical measurements within normal limits will improve  10/26/2020 0751 by Jareth Michel RN  Outcome: Ongoing  Note: Remains hypotensive, but VS otherwise WNL  10/26/2020 0339 by Jassi Galicia RN  Outcome: Ongoing  Goal: Diagnostic test results will improve  Description: Diagnostic test results will improve  10/26/2020 0751 by Jareth Michel RN  Outcome: Ongoing  Note: Multiple abnormal lab values but none in critical range  10/26/2020 0339 by Maria G Ghosh RN  Outcome: Ongoing     Problem: Safety:  Goal: Ability to remain free from injury will improve  Description: Ability to remain free from injury will improve  10/26/2020 0339 by Maria G Ghosh RN  Outcome: Ongoing     Problem: Pain:  Goal: Pain level will decrease  Description: Pain level will decrease  Outcome: Ongoing  Note: Denies pain at this time; affirms awareness to report onset of pain to staff     Problem: Cardiovascular  Goal: No DVT, peripheral vascular complications  Outcome: Ongoing  Note: No calf tenderness or s/s of dvt; periph pulses weak; extremities warm to touch  Goal: Hemodynamic stability  Outcome: Ongoing  Note: BP 88/53 - awaiting breakfast to administer Midodrine; remains in AF with CVR  Goal: Anticoagulate/Hct stable  Outcome: Ongoing  Note: Only anticoag at this time is ASA     Problem: GI  Goal: No bowel complications  Outcome: Ongoing  Note: States last BM 10/25, and was normal     Problem:   Goal: No urinary complication  Outcome: Ongoing  Note: Continent of urine per urinal - voided 275 ml verna clear     Problem: Neurological  Intervention: Neurological Status Assessment  Note: Slow to respond, but responds appropriately to question, command; forgetful

## 2020-10-26 NOTE — CONSULTS
[facility] Pulmonary and Critical Care   Consult Note      Reason for Consult: Pleural effusion  Requesting Physician: Dr Jeanine Mackey:   [address] [facility]:                The patient is a 70 y.o. male with significant past medical history of:      Diagnosis Date    Acute on chronic diastolic congestive heart failure ([address]     ANEMIA     CAD (coronary artery disease) 10/12/2011    Chronic obstructive pulmonary disease (HCC)     Cir[facility]osis [facility]     Depression     Diabetes mellitus ([address]     Esophageal bleed, non-variceal 11/23/2012    Hypertension     HYPOTHYROIDISM     Liver disease 1/6/2012    Mixed hyperlipidemia     NEUROPATHY     Non-ST elevation MI (NSTEMI) ([address] 11/21/2012    Pancytopenia ([address]     Paroxysmal atrial fibrillation ([facility] 1/6/2012    Pneumonia     Sleep apnea     no cpap    Thrombocytopenia (HCC)     Thyroid disease      The patient presents to the hospital with a chief complaint of increasingly severe shortness of breath of several days duration. He has mild associated cough. He also has associated weakness. Modifying factor for him is a history of liver disease with chronic pleural effusion and repeated thoracentesis/paracentesis. He was in an outside facility recently and had thoracentesis. September 1, 2020, he had 2 L thoracentesis here as well.       Past Surgical History:        Procedure Laterality Date    ACHILLES TENDON SURGERY      CARDIAC SURGERY      3 vessel cabg    CHOLECYSTECTOMY      COLONOSCOPY      CORONARY ARTERY BYPASS GRAFT  10/2011    cabg x3    ENDOSCOPY, COLON, DIAGNOSTIC      PTCA  11/23/2012    PTCA RCA & RPL    TONSILLECTOMY      UPPER GASTROINTESTINAL ENDOSCOPY  11/23/2012    multiple thin linear esophageal mucosal breaks    UPPER GASTROINTESTINAL ENDOSCOPY  3/6/2014    UPPER GASTROINTESTINAL ENDOSCOPY N/A 6/7/2019    EGD BAND LIGATION performed by Lilian Beckett MD at [address] [place] [address] N/A 7/15/2019    EGD BIOPSY performed by Francois Ontiveros MD at [address] [facility] N/A 7/15/2019    EGD BAND LIGATION performed by Francois Ontiveros MD at [address] [facility] ENDOSCOPY     Current Medications:    Current Facility-Administered Medications: aspirin chewable tablet 81 mg, 81 mg, Oral, Daily  vitamin B and C (TOTAL B-C) 1 tablet, 1 tablet, Oral, Daily  citalopram (CELEXA) tablet 20 mg, 20 mg, Oral, Daily  lactulose (CHRONULAC) 10 GM/15ML solution 20 g, 20 g, Oral, TID  ipratropium-albuterol (DUONEB) nebulizer solution 1 ampule, 1 ampule, Inhalation, Q4H PRN  levothyroxine (SYNTHROID) tablet 137 mcg, 137 mcg, Oral, Daily  midodrine (PROAMATINE) tablet 15 mg, 15 mg, Oral, TID WC  rifaximin (XIFAXAN) tablet 550 mg, 550 mg, Oral, BID  insulin lispro (1 Unit Dial) 0-6 Units, 0-6 Units, Subcutaneous, TID WC  insulin lispro (1 Unit Dial) 0-3 Units, 0-3 Units, Subcutaneous, Nightly  glucose (GLUTOSE) 40 % oral gel 15 g, 15 g, Oral, PRN  dextrose 50 % IV solution, 12.5 g, Intravenous, PRN  glucagon (rDNA) injection 1 mg, 1 mg, Intramuscular, PRN  dextrose 5 % solution, 100 mL/hr, Intravenous, PRN  sodium chloride flush 0.9 % injection 10 mL, 10 mL, Intravenous, 2 times per day  sodium chloride flush 0.9 % injection 10 mL, 10 mL, Intravenous, PRN  acetaminophen (TYLENOL) tablet 650 mg, 650 mg, Oral, Q6H PRN **OR** acetaminophen (TYLENOL) suppository 650 mg, 650 mg, Rectal, Q6H PRN  polyethylene glycol (GLYCOLAX) packet 17 g, 17 g, Oral, Daily PRN  promethazine (PHENERGAN) tablet 12.5 mg, 12.5 mg, Oral, Q6H PRN **OR** ondansetron (ZOFRAN) injection 4 mg, 4 mg, Intravenous, Q6H PRN  pantoprazole (PROTONIX) tablet 40 mg, 40 mg, Oral, QAM AC  midodrine (PROAMATINE) tablet 15 mg, 15 mg, Oral, Once    Allergies   Allergen Reactions    Cephalexin Hives    Ciprocinonide [Fluocinolone]     Quinolones Other (See Comments)     Confusion      Statins Other (See Comments)     Liver toxicity Social History:    TOBACCO:   reports that he quit smoking about 7 months ago. His smoking use included cigarettes. He started smoking about 55 years ago. He has a 19.35 pack-year smoking history. He has never used smokeless tobacco.  ETOH:   reports no history of alcohol use. Patient currently lives independently  Environmental/chemical exposure: None known    Family History:       Problem Relation Age of Onset    Heart Disease Mother         arrythmia    Diabetes Father     Cancer Father     Heart Disease Father     Anesth Problems Neg Hx     Malig Hyperten Neg Hx     Hypotension Neg Hx     Malig Hypertherm Neg Hx     Pseudochol. Deficiency Neg Hx      REVIEW OF SYSTEMS:    CONSTITUTIONAL:  negative for fevers, chills, diaphoresis, activity change, appetite change, fatigue, night sweats and unexpected weight change.    EYES:  negative for blurred vision, eye discharge, visual disturbance and icterus  HEENT:  negative for hearing loss, tinnitus, ear drainage, sinus pressure, nasal congestion, epistaxis and snoring  RESPIRATORY:  See HPI  CARDIOVASCULAR:  negative for chest pain, palpitations, exertional chest pressure/discomfort, edema, syncope  GASTROINTESTINAL:  negative for nausea, vomiting, diarrhea, constipation, blood in stool and abdominal pain  GENITOURINARY:  negative for frequency, dysuria, urinary incontinence, decreased urine volume, and hematuria  HEMATOLOGIC/LYMPHATIC:  negative for easy bruising, bleeding and lymphadenopathy  ALLERGIC/IMMUNOLOGIC:  negative for recurrent infections, angioedema, anaphylaxis and drug reactions  ENDOCRINE:  negative for weight changes and diabetic symptoms including polyuria, polydipsia and polyphagia  MUSCULOSKELETAL:  negative for  pain, joint swelling, decreased range of motion and muscle weakness  NEUROLOGICAL:  negative for headaches, slurred speech, unilateral weakness  PSYCHIATRIC/BEHAVIORAL: negative for hallucinations, behavioral problems, confusion and agitation. Objective:   PHYSICAL EXAM:      VITALS:  BP 95/62   Pulse 80   Temp 98.1 °F (36.7 °C) (Oral)   Resp 16   Ht 5' 11\" (1.803 m)   Wt 205 lb 4.8 oz (93.1 kg)   SpO2 97%   BMI 28.63 kg/m²      24HR INTAKE/OUTPUT:      Intake/Output Summary (Last 24 hours) at 10/26/2020 1315  Last data filed at 10/26/2020 0945  Gross per 24 hour   Intake 240 ml   Output 275 ml   Net -35 ml     CONSTITUTIONAL:  awake, alert, cooperative, no apparent distress, and appears stated age  NECK:  Supple, symmetrical, trachea midline, no adenopathy, thyroid symmetric, not enlarged and no tenderness, skin normal  LUNGS:  no increased work of breathing and decreased on the right to auscultation. No accessory muscle use  CARDIOVASCULAR: S1 and S2, no edema and no JVD  ABDOMEN:  normal bowel sounds, non-distended and no masses palpated, and no tenderness to palpation. No hepatospleenomegaly  LYMPHADENOPATHY:  no axillary or supraclavicular adenopathy. No cervical adnenopathy  PSYCHIATRIC: Oriented to person place and time. No obvious depression or anxiety. MUSCULOSKELETAL: No obvious misalignment or effusion of the joints. No clubbing, cyanosis of the digits. SKIN:  normal skin color, texture, turgor and no redness, warmth, or swelling. No palpable nodules    DATA:    Old records have been reviewed    CBC:  Recent Labs     10/25/20  2022 10/26/20  0659   WBC 7.6 6.2   RBC 3.28* 2.96*   HGB 10.1* 9.1*   HCT 31.5* 28.5*   PLT 55* 45*   MCV 96.3 96.1   MCH 30.7 30.8   MCHC 31.9 32.0   RDW 22.9* 22.8*   BANDSPCT 5  --       BMP:  Recent Labs     10/25/20  2022 10/26/20  0659   * 134*   K 4.3 3.9   CL 95* 98*   CO2 31 31   BUN 44* 46*   CREATININE 1.2 1.5*   CALCIUM 9.1 8.4   GLUCOSE 174* 177*      ABG:  No results for input(s): PHART, DVZ5RUP, PO2ART, XPS7CWU, G2EPRGVM, BEART in the last 72 hours.     Lab Results   Component Value Date     (H) 11/08/2011     Lab Results   Component Value Date    CKTOTAL 78 04/08/2020    TROPONINI 0.02 (H) 10/26/2020       Cultures:     Abx:    Radiology Review:  All pertinent images / reports were reviewed as a part of this visit. 1. Recurrent pleural effusion  2. Recurrent ascites  3. Cirrhosis  4. History of COPD  5. Chronic hypoxemic respiratory failure    I have reviewed laboratories, medical records and images for this visit  Chest imaging reveals bibasilar airspace disease and effusions. I reviewed imaging from his previous thoracentesis. At that time he had a large right pleural effusion which was drained for 2 L of transudate of fluid. At that same time he also had 5 L ascites fluid drained  His wife states that he had thoracentesis at [facility] for 3 L of fluid 10/15. Also has chronic kidney disease  Concerned about repeat thoracentesis contributing to hypoalbuminemia  He is on 4 L nasal cannula oxygen supplement  His normal dose of oxygen at home is also 4 L/min  Not in respiratory distress  Discussed options with the patient and his wife  Decided to take an observational course though it is likely he will need thoracentesis in the future at some point.

## 2020-10-26 NOTE — H&P
Hospital Medicine History and Physical    10/25/2020    Date of Admission: 10/25/2020    Date of Service: Pt seen/examined on 10/25/2020 and admitted to inpatient. Assessment/plan:  1. Shortness of breath, chronic. Ongoing for the past \"couple of months now. \"  Patient has chronic left pleural effusion, status post recent thoracentesis. He is chronically on supplemental oxygen (4 L/min) which is currently unchanged. So far, no clear etiology of current presentation, although I suspect may be secondary to underlying chronic pleural effusion. Continue breathing treatments. Consider pulmonology evaluation in the morning. 2. Acute metabolic encephalopathy. Oriented X3, slow to respond. Check ammonia level. Avoid sedating medications. Monitor mental status closely. 3. Hypotension. Patient does have history of chronic hypotension, on midodrine. Continue midodrine. Receiving fluid bolus in the emergency room. 4. Nonzero troponin of 0.03. Likely secondary to hypoperfusion. Obtain serial troponin, monitor on telemetry. 5. Chronic left pleural effusion status post recent thoracentesis. Obtain pulmonology input. 6. Lactic acidosis of 2.1, likely secondary to hypoperfusion from hypotension. Received fluid boluses in the emergency room; will trend lactic acid. 7. Generalized weakness. Likely secondary to ongoing medical conditions. 8. Other comorbidities: chronic diastolic CHF, CAD, COPD, cirrhosis, diabetes type 2 with hyperglycemia, chronic respiratory failure with hypoxia on 4 L/min supplemental oxygen, chronic right pleural effusion, cirrhosis secondary to FLOYD, chronic thrombocytopenia, normocytic anemia. Activities: Up with assist/fall precautions. Prophylaxis: PPI, SCD. Code status: Full code    ==========================================================  Chief complaint:  Chief Complaint   Patient presents with    Shortness of Breath     shortness of breath for \"a couple months now\". pt states having trouble moving his legs and feet due to swelling and pain. History of Presenting Illness: This is a pleasant 70 y.o. male with history of chronic diastolic CHF, CAD, COPD, cirrhosis, diabetes type 2 with hyperglycemia, chronic respiratory failure with hypoxia on 4 L/min supplemental oxygen, chronic right pleural effusion, cirrhosis secondary to FLOYD, chronic thrombocytopenia, normocytic anemia, who presents to the emergency room with complaints of increasing shortness of breath (chronic), generalized weakness, difficulty with ambulation. No fever or chills or chest pain or SOB. He was recently admitted at [facility] for evaluation of syncope, associated left-sided rib fractures (ribs 6 through 8), required endotracheal intubation and extubated on 10/19/2020, also had a thoracentesis for drainage of pleural effusion on his most recent hospitalization. On evaluation in the emergency room, he was found to be confused. He was also found to have slightly elevated troponin of 0.03, lactic acid of 2.1. Systolic blood pressure is on the low 80s in the emergency room. Patient received a dose of Levaquin in the emergency room (last dose from his recent discharge for mgmt of pneumonia). He currently has fluid bolus that is ongoing.     Past Medical History:      Diagnosis Date    Acute on chronic diastolic congestive heart failure (HCC)     ANEMIA     CAD (coronary artery disease) 10/12/2011    Chronic obstructive pulmonary disease (HCC)     Cirrhosis (HCC)     Depression     Diabetes mellitus ([address]     Esophageal bleed, non-variceal 11/23/2012    Hypertension     HYPOTHYROIDISM     Liver disease 1/6/2012    Mixed hyperlipidemia     NEUROPATHY     Non-ST elevation MI (NSTEMI) ([address] 11/21/2012    Pancytopenia ([address]     Paroxysmal atrial fibrillation ([address] 1/6/2012    Pneumonia     Sleep apnea     no cpap    Thrombocytopenia ([address]     Thyroid disease        Past Surgical History:      Procedure Laterality Date    ACHILLES TENDON SURGERY      CARDIAC SURGERY      3 vessel cabg    CHOLECYSTECTOMY      COLONOSCOPY      CORONARY ARTERY BYPASS GRAFT  10/2011    cabg x3    ENDOSCOPY, COLON, DIAGNOSTIC      PTCA  11/23/2012    PTCA RCA & RPL    TONSILLECTOMY      UPPER GASTROINTESTINAL ENDOSCOPY  11/23/2012    multiple thin linear esophageal mucosal breaks    UPPER GASTROINTESTINAL ENDOSCOPY  3/6/2014    UPPER GASTROINTESTINAL ENDOSCOPY N/A 6/7/2019    EGD BAND LIGATION performed by Rebecca Calderon MD at [facility] 7/15/2019    EGD BIOPSY performed by Rebecca Calderon MD at [address] [facility] N/A 7/15/2019    EGD BAND LIGATION performed by Rebecca Calderon MD at [address]       Medications (prior to admission):  Prior to Admission medications    Medication Sig Start Date End Date Taking?  Authorizing Provider   nadolol (CORGARD) 20 MG tablet TAKE ONE TABLET BY MOUTH DAILY  Patient taking differently: 20 mg 2 times daily  9/17/20  Yes Tonya Denis MD   furosemide (LASIX) 40 MG tablet Take 1 tablet by mouth daily 9/14/20  Yes Maru Candelaria e, APRN - CNS   spironolactone (ALDACTONE) 50 MG tablet Take 0.5 tablets by mouth 2 times daily 9/7/20  Yes Areatha Province, APRN - CNP   levothyroxine (SYNTHROID) 137 MCG tablet Take 1 tablet by mouth Daily 9/8/20  Yes Areatha Province, APRN - CNP   midodrine (PROAMATINE) 10 MG tablet Take 1 tablet by mouth 3 times daily (with meals) 9/7/20  Yes [facility]   ONGLYZA 5 MG TABS tablet Take 5 mg by mouth daily  7/22/20  Yes Historical Provider, MD   citalopram (CELEXA) 20 MG tablet TAKE ONE TABLET BY MOUTH DAILY 6/23/20  Yes Tonya Denis MD   glipiZIDE (GLUCOTROL) 5 MG tablet TAKE ONE TABLET BY MOUTH TWICE A DAY BEFORE MEALS 6/22/20  Yes Tonya Denis MD   cyanocobalamin 1000 MCG/ML injection INJECT 1 ML INTRAMUSCULARLY ONCE EVERY 30 DAYS 6/22/20  Yes Anatoliy Saldana MD   B-D 3CC LUER-ALEXIS SYR 25GX1\" 25G X 1\" 3 ML MISC USE WITH VITAMIN B-12 INJECTIONS 4/20/20  Yes Anatoliy Saldana MD   lactulose [facility]) 10 GM/15ML solution Take 30 mLs by mouth 3 times daily 10/3/19  Yes Xiomara Perera MD   CINNAMON PO Take 1,000 mg by mouth daily   Yes Historical Provider, MD   b complex vitamins capsule Take 1 capsule by mouth daily   Yes Historical Provider, MD   MILK THISTLE PO Take 1,000 mg by mouth daily    Yes Historical Provider, MD   Jeanette David 550 MG tablet 2 times daily  5/5/17  Yes Historical Provider, MD   Omega-3 Fatty Acids (FISH OIL) 500 MG CAPS Take 1,000 mg by mouth daily    Yes Historical Provider, MD   aspirin 81 MG tablet Take 81 mg by mouth daily. Yes Historical Provider, MD       Allergy(ies):  Cephalexin; Ciprocinonide [fluocinolone]; Quinolones; and Statins    Social History:  TOBACCO:  reports that he quit smoking about 7 months ago. His smoking use included cigarettes. He started smoking about 55 years ago. He has a 19.35 pack-year smoking history. He has never used smokeless tobacco.  ETOH:  reports no history of alcohol use. Family History:      Problem Relation Age of Onset    Heart Disease Mother         arrythmia    Diabetes Father     Cancer Father     Heart Disease Father     Anesth Problems Neg Hx     Malig Hyperten Neg Hx     Hypotension Neg Hx     Malig Hypertherm Neg Hx     Pseudochol. Deficiency Neg Hx        Review of Systems:  Pertinent positives are listed in HPI. At least 10-point ROS reviewed and were negative. Vitals and physical examination:  BP 97/67   Pulse 86   Temp 96.6 °F (35.9 °C) (Temporal)   Resp 14   Wt 197 lb (89.4 kg)   SpO2 100%   BMI 27.48 kg/m²   Gen/overall appearance: Not in acute distress. Alert. Oriented X3  Head: Normocephalic, atraumatic  Eyes: EOMI, good acuity  ENT: Oral mucosa moist  Neck: No JVD, thyromegaly  CVS: Nml S1S2, no MRG. Irregular  Pulm: Dependent bibasilar crackles. Gastrointestinal: Morbidly obese. Soft, NT, +BS  Musculoskeletal: Bilateral LE lymphedema. Warm  Neuro: No focal deficit. Moves extremity spontaneously. Psychiatry: Appropriate affect. Not agitated. Skin: Warm, dry with normal turgor. No rash  Capillary refill: Brisk,< 3 seconds   Peripheral Pulses: +2 palpable, equal bilaterally       Labs/imaging/EKG:  CBC:   Recent Labs     10/25/20  2022   WBC 7.6   HGB 10.1*   PLT 55*     BMP:    Recent Labs     10/25/20  2022   *   K 4.3   CL 95*   CO2 31   BUN 44*   CREATININE 1.2   GLUCOSE 174*     Hepatic: No results for input(s): AST, ALT, ALB, BILITOT, ALKPHOS in the last 72 hours. Ct Head Wo Contrast    Result Date: 10/15/2020  Patient: Breana Lopez  Time Out: 01:25 Exam(s): CT HEAD Without Contrast  EXAM:   CT Head Without Intravenous Contrast  CLINICAL HISTORY:   Reason for Exam: Altered mental status. Tech Notes: Altered mental status; syncope, fall, ams  TECHNIQUE:   Axial computed tomography images of the head/brain without intravenous contrast.  Up-to-date CT equipment and radiation dose reduction techniques to meet ALARA standards were utilized. COMPARISON:   No relevant prior studies available. FINDINGS:   Brain:  Prominent diffuse cerebral atrophy is noted. No evidence for cortical infarct. Periventricular presumed microvascular deep white matter hypodense changes noted. No hemorrhage. Ventricles:  Unremarkable. No ventriculomegaly. Bones/joints:  Unremarkable. No acute fracture. Soft tissues:  No significant overlying acute traumatic soft tissue abnormality identified radiographically. No radiopaque foreign body. Sinuses:  Mucosal thickening involving the inferior left maxillary sinus. The remaining paranasal sinuses are well-aerated. Mastoid air cells:  Unremarkable as visualized. No mastoid effusion.   Electronically Signed by: Vikcy Plunkett MD on 10/15/20 01:25  [IMPRESSION] No acute intracranial process identified. 8 - Indicated     Ct Chest W Contrast    Result Date: 10/15/2020  Patient: Breana Lopez  Time Out: 01:31 Exam(s): CTA CHEST IV Amt: 85 mL OMNIPAQUE 350 mg/mL injection  EXAM:   CT Angiography Chest With Intravenous Contrast  CLINICAL HISTORY:   Reason for Exam: . Tech Notes: syncope, ams, fall; intubated  TECHNIQUE:   Axial computed tomographic angiography images of the chest with intravenous contrast.  Up-to-date CT equipment and radiation dose reduction techniques to meet ALARA standards were utilized. MIP reconstructed images were created and reviewed. COMPARISON:   No relevant prior studies available. FINDINGS:   Limitations: There is respiratory artifact which degrades image quality on multiple image slices and limits detailed evaluation of the distal subsegmental pulmonary artery branches, where affected. Pulmonary arteries:  No evidence for pulmonary embolism. Aorta:  No acute findings. No thoracic aortic aneurysm. Lungs:  See below. Pleural space:  Large volume right pleural effusion occupying greater than 50% of the thoracic volume. The posterior component of the right pleural effusion measures 7.7 cm. There is presumed compressive atelectatic changes of the right middle lobe and right lower lobe within the pleural effusion. Minimal changes in the posterior right upper lobe is also noted. There is subsegmental changes noted in the left lobe anteriorly and posteriorly, most consistent with subsegmental atelectasis. No evidence for significant contusive injury. No pneumothorax. Heart: The thoracic aorta demonstrates atherosclerotic changes with evidence of prior CABG involving the ascending aorta. No acute traumatic injury. The cardiac chambers are enlarged. No pericardial effusion. Prominent coronary artery calcification. Mediastinum:  Postsurgical changes noted involving the mediastinum. No evidence for mediastinal traumatic injury. Bones/joints: There are mildly displaced left sixth through eighth rib fractures posterolaterally. Intact sternotomy wires noted. No dislocation. Soft tissues:  No significant overlying traumatic soft tissue abnormality identified radiographically. No radiopaque foreign body. Lymph nodes:  Nonspecific paratracheal and precarinal lymph nodes. Tubes, lines and devices: The endotracheal tube is identified with the tip approximately 5.5 cm from the krzysztof. An orogastric tube traverses the thoracic esophagus. Electronically Signed by: Bezty Cruz MD on 10/15/20 01:31  [IMPRESSION] 1. There are mildly displaced left sixth through eighth rib fractures posterolaterally. The sixth rib fracture is somewhat age indeterminant. However, the seventh and eighth rib fractures superior subacute to chronic and are presumed nonunited fractures. Please correlate with patient's clinical symptoms. No other acute osseous traumatic injury. 2.  No evidence for pulmonary embolism. No evidence for traumatic mediastinal or thoracic injury. 3.  Large volume right pleural effusion occupying greater than 50% of the thoracic volume. The posterior component of the right pleural effusion measures 7.7 cm. Density of the pleural effusion is not consistent with acute hemorrhagic products. 4.  There is presumed compressive atelectatic changes of the right middle lobe and right lower lobe within the pleural effusion. Minimal changes in the posterior right upper lobe is also noted. There is subsegmental changes noted in the left lobe anteriorly and posteriorly, most consistent with subsegmental atelectasis. No evidence for significant contusive injury. No pneumothorax. Not Validated     Ct Cervical Spine Wo Contrast    Result Date: 10/15/2020  Patient: Javier Hoffmann  Time Out: 01:38 Exam(s): CT C SPINE  EXAM:   CT Cervical Spine Without Intravenous Contrast  CLINICAL HISTORY:   Reason for Exam: . Tech Notes:  Altered mental status; syncope, fall, ams  TECHNIQUE:   Axial computed tomography images of the cervical spine without intravenous contrast.  Up-to-date CT equipment and radiation dose reduction techniques to meet ALARA standards were utilized. COMPARISON:   No relevant prior studies available. FINDINGS:   Vertebrae: The vertebral bodies are intact without acute osseous traumatic injury. No anterolisthesis or retrolisthesis is identified. The facet joints are well aligned without subluxation or dislocation. The spinous processes are intact. Facet hypertrophic changes are noted throughout the cervical spine. Discs/spinal canal/neural foramina:  Multilevel disc space narrowing with marginal hypertrophic changes are noted throughout the cervical spine. No significant osseous central canal stenosis identified. Incidental multilevel moderate to severe osseous neural foraminal encroachment noted at several levels. Soft tissues:  See below. Pleural space:  A large right pleural effusion is noted obscuring the apex. The visualized left upper lobe is unremarkable. Tubes, lines and devices: The endotracheal tube and orogastric tube traverses the prevertebral soft tissues. No obvious soft tissue swelling. Electronically Signed by: Alfredo Foley MD on 10/15/20 01:38  [IMPRESSION]   No acute osseous traumatic injury or significant abnormal alignment involving the cervical spine. Extensive moderate to severe degenerative changes incidentally noted throughout the cervical spine. No severe osseous canal stenosis.   Not Validated     Ct Abdomen Pelvis W Iv Contrast    Result Date: 10/15/2020  Patient: Orlando Watson  Time Out: 01:23 Exam(s): CT ABDOMEN + PELVIS With Contrast IV Amt: 85 mL OMNIPAQUE 350 mg/mL injection  EXAM:   CT Abdomen and Pelvis With Intravenous Contrast  CLINICAL HISTORY:   Reason for Exam: . Tech Notes: syncope, ams, fall; intubated  TECHNIQUE:   Axial computed tomography images of the abdomen and pelvis with intravenous contrast.  Up-to-date CT equipment and radiation dose reduction techniques to meet ALARA standards were utilized. COMPARISON:   No relevant prior studies available. FINDINGS:   Lung bases:  Unremarkable. No mass. No consolidation. Pleural space:  A large volume right pleural effusion is noted occupying the majority of the right hemithorax with subsegmental changes at the lung bases, right greater than left. ABDOMEN:   Liver: The liver is markedly abnormal and small in caliber with irregular peripheral contours. No obvious focal abnormality or evidence for traumatic injury identified. For limitations with lack of IV contrast.  Mucosal prominence of the stomach is presumed related to underdistention and probable cirrhotic gastropathy. Gallbladder and bile ducts:  Unremarkable. No calcified stones. No ductal dilation. Pancreas:  Unremarkable. No mass. No ductal dilation. Spleen: The spleen appears intact without evidence for traumatic injury. Adrenals:  Unremarkable. No mass. Kidneys and ureters: There are multiple cysts noted in the left kidney with the largest rim calcified cyst noted at the superior pole the left kidney measuring 3.7 x 3 x 3.9 cm. There is a single cortical cyst noted involving the superior lateral aspect of the right kidney. The kidneys demonstrate normal enhancement without evidence for traumatic injury. Stomach and bowel:  See below. PELVIS:   Appendix:  No findings to suggest acute appendicitis. Bladder:  Unremarkable. No mass. Reproductive:  Unremarkable as visualized. ABDOMEN and PELVIS:   Intraperitoneal space: There is at least moderate free fluid throughout the abdomen and pelvis. Evaluation of bowel is somewhat limited with prominent ascites. There is extensive diverticulosis of the descending and sigmoid colon with areas of mucosal thickening, most prominent in the sigmoid colon in the left pelvis.   No evidence for bowel obstruction. No free air. Bones/joints: There are nonunited subacute to chronic appearing posterior left inferior rib fractures. Please see the CT examination of the chest for further details. There are multilevel degenerative changes of the lumbar spine with narrowing and marginal hypertrophic osteophytes. No acute osseous traumatic injury or significant abnormal alignment identified involving the lumbar spine, pelvic bones or proximal femurs. Soft tissues: There is edema in the periumbilical region. No obvious hernia. Subcutaneous edema noted in the overlying soft tissues and pelvis bilaterally and dorsally. This is presumed incidental dependent edema from positive fluid status. No hematoma or radiopaque foreign body. Vasculature: There is marginal calcification with partially occlusive thrombus in the proximal superior mesenteric vein adjacent to the confluence of the splenic vein. Atherosclerotic changes are noted involving the abdominal aorta. No abdominal aortic aneurysm. Lymph nodes:  Unremarkable. No enlarged lymph nodes. Tubes, lines and devices:  A nasogastric tube is noted extending into the distal body of the stomach. Electronically Signed by: Betzy Cruz MD on 10/15/20 01:23  [IMPRESSION] 1. No definite evidence for significant acute traumatic injury involving the abdomen or pelvis. 2.  There is at least moderate free fluid throughout the abdomen and pelvis. The liver demonstrates changes consistent with advanced cirrhosis. No obvious layering hematocrit identified in the presumed ascites. 3.  Subacute to chronic nonunited left rib fractures noted in the inferior thorax. For full details, please see the CT report of the chest performed the same day. No other acute osseous traumatic injury or significant overlying soft tissue abnormality. Incidental subcutaneous overlying soft tissue edema is presumed related to anasarca/positive fluid status.  4.  There is marginal calcification with partially occlusive thrombus in the proximal superior mesenteric vein adjacent to the confluence of the splenic vein. This is a presumed incidental chronic finding. The main portal vein is patent, as is the splenic vein. Not Validated     Xr Chest Portable    Result Date: 10/25/2020  EXAMINATION: ONE XRAY VIEW OF THE CHEST 10/25/2020 7:48 pm COMPARISON: Chest x-ray September 3, 2020 HISTORY: ORDERING SYSTEM PROVIDED HISTORY: shortness of breath TECHNOLOGIST PROVIDED HISTORY: Reason for exam:->shortness of breath Reason for Exam: (shortness of breath for \"a couple months now\". pt states having trouble moving his legs and feet due to swelling and pain Acuity: Acute Type of Exam: Initial FINDINGS: Cardiac silhouette is enlarged but stable. Postoperative changes of median sternotomy. Bibasilar opacities favored to reflect atelectasis and effusions with superimposed infiltrate difficult to entirely exclude. No pneumothorax. Osseous structures appear stable. 1. Bibasilar opacities favored to reflect atelectasis and effusion, left greater than right. Superimposed infiltrate difficult to entirely exclude in the appropriate clinical setting. 2. Stable cardiomegaly. Xr Chest Portable    Result Date: 10/19/2020  INDICATION: Respiratory distress. PORTABLE CHEST 10/19/2020 4:52 AM Comparison: None. Orogastric tube noted with tip in the proximal stomach. Endotracheal tube visualized with tip approximately 1 cm superior to the krzysztof. Sternotomy noted. Cardiomediastinal silhouette appears normal in size and configuration. Patchy and consolidated airspace densities are identified in the right lung base without significant change. Mild blunting of right costophrenic angle noted. Pulmonary vascularity appears within normal limits. Bony thorax appears unremarkable. [IMPRESSION] 1. Right lower lobe pneumonia. No significant change. 2. Tubes as described.     Electronically Signed by: Noe Og Ed Hall MD, 10/19/2020 7:38 AM     Xr Chest Portable    Result Date: 10/18/2020  EXAM: XR-CHEST PORTABLE STAT, XR-CHEST PORTABLE INDICATION: Chest tube COMPARISON: October 17, 2020 FINDINGS: Single frontal image of the chest obtained on October 18, 2020 at 0 4:30. The tip of the endotracheal tube projects at the mid trachea. A nasogastric tube extends into the stomach. The right basilar chest tube has been removed. There is extensive airspace consolidation at the right lung base, increased from the prior study. No pneumothorax or pleural effusion. The cardiomediastinal silhouette is unchanged. Sternotomy wires and CABG clips are present. Single frontal chest x-ray dated October 18, 2020 at 0 5:30. No interval change is present. [IMPRESSION] 1. Removal of the right basilar chest tube. 2. Increasing right basilar airspace disease. Electronically Signed by: Lynnette Alarcon, 10/18/2020 7:12 AM     Xr Chest Portable    Result Date: 10/18/2020  EXAM: XR-CHEST PORTABLE STAT, XR-CHEST PORTABLE INDICATION: Chest tube COMPARISON: October 17, 2020 FINDINGS: Single frontal image of the chest obtained on October 18, 2020 at 0 4:30. The tip of the endotracheal tube projects at the mid trachea. A nasogastric tube extends into the stomach. The right basilar chest tube has been removed. There is extensive airspace consolidation at the right lung base, increased from the prior study. No pneumothorax or pleural effusion. The cardiomediastinal silhouette is unchanged. Sternotomy wires and CABG clips are present. Single frontal chest x-ray dated October 18, 2020 at 0 5:30. No interval change is present. [IMPRESSION] 1. Removal of the right basilar chest tube. 2. Increasing right basilar airspace disease.  Electronically Signed by: Lynnette Alarcon, 10/18/2020 7:12 AM     Xr Chest Portable    Result Date: 10/17/2020  EXAM: XR-CHEST PORTABLE INDICATION: Intubated with orogastric tube in place, chest tube COMPARISON: October 16, 2020 FINDINGS: The tip of the endotracheal tube projects over the mid trachea. The orogastric tube extends into the stomach with the tip not imaged. A right sided chest tube is present along the lateral right lung base. There is a small right pleural effusion with increased patchy opacities in the right mid and lower lung zone. No pneumothorax. The left lung is clear. The cardiomediastinal silhouette is unchanged. [IMPRESSION] Increased small right pleural effusion and right mid and lower lung zone airspace disease with a chest tube in place. Electronically Signed by: Brittanie Gomez, 10/17/2020 7:15 AM     Xr Chest Portable    Result Date: 10/16/2020  Clinical History:  Intubated patient. Respiratory failure. Comparisons:  10/14/2020. Findings: Single projection timed at 0425 hours. Previous median sternotomy. Endotracheal tube is identified, tube tip extending approximately 4 cm above the level of the krzysztof. Nasogastric or orogastric tube extends into the left upper quadrant of the abdomen. The transverse diameter of the heart is mildly enlarged, stable. There is airspace disease redemonstrated in the right lung base, slightly increased in severity since the previous examination. There is mild interstitial pulmonary edema. [IMPRESSION] 1. Right basilar airspace disease, compatible with atelectasis versus pneumonia, increased from prior examination. Persistent mild cardiomegaly and mild interstitial pulmonary edema, unchanged. Electronically Signed by: Jane Ambriz MD, 10/16/2020 7:39 AM     Xr Chest Portable    Result Date: 10/15/2020  Normal slight background stress the right urinary history: Respiratory failure. Altered mental status. Intubation. AP chest. COMPARISON: 10/14/2020. FINDINGS: Endotracheal tube tip approximately 3 cm above the krzysztof. Right-sided pleural effusion and right-sided airspace disease suggesting atelectasis or pneumonia. Linear opacity at the left lung base compatible with subsegmental atelectasis.

## 2020-10-26 NOTE — PROGRESS NOTES
Physical Therapy    Facility/Department: [address]  Initial Assessment    NAME: Eusebio Hodgkin  : 89  MRN: [id number]    Date of Service: 10/26/2020    Discharge Recommendations: Eusebio Hodgkin scored a 7/24 on the AM-PAC short mobility form. Current research shows that an AM-PAC score of 17 or less is typically not associated with a discharge to the patient's home setting. Based on the patient's AM-PAC score and their current functional mobility deficits, it is recommended that the patient have 3-5 sessions per week of Physical Therapy at d/c to increase the patient's independence. Please see assessment section for further patient specific details. If patient discharges prior to next session this note will serve as a discharge summary. Please see below for the latest assessment towards goals. PT Equipment Recommendations  Equipment Needed: No    Assessment   Body structures, Functions, Activity limitations: Decreased functional mobility ; Decreased strength;Decreased safe awareness;Decreased balance;Decreased endurance;Decreased cognition  Assessment: Pt presents with impaired functional strength and endurance and decreased sitting/standing balance impairing his ability to perform functional mobility safely and independently. Pt with PLOF of MOD I with use of walker however requires 2 person assist for mobility. Pt would benefit from acute PT services to address deficits. Treatment Diagnosis: impaired functional mobility  Prognosis: Good  Decision Making: Low Complexity  Clinical Presentation: stable  PT Education: Goals;PT Role;Plan of Care;Precautions;Transfer Training;Family Education;Orientation;General Safety; Functional Mobility Training  Patient Education: d/c recommendations--pt and family verbalizing understanding  Barriers to Learning: hearing  REQUIRES PT FOLLOW UP: Yes  Activity Tolerance  Activity Tolerance: Patient limited by fatigue;Patient limited by endurance intubated and required vasopressors at [facility] and extubated on 10/19/2020. He does wear supplemental oxygen at 4 L/min at all times. Vision/Hearing  Vision: Impaired  Vision Exceptions: Wears glasses at all times  Hearing: Exceptions to [facility]  Hearing Exceptions: Hard of hearing/hearing concerns; No hearing aid       Subjective  General  Chart Reviewed: Yes  Response To Previous Treatment: Not applicable  Family / Caregiver Present: guillermina Fitch throughout session)  Diagnosis: SOB  Follows Commands: Within Functional Limits  General Comment  Comments: Pt supine in bed upon arrival, [place]. Subjective  Subjective: Pt agreeable to PT/OT eval, reporting no pain. Pain Screening  Patient Currently in Pain: Denies  Vital Signs  Patient Currently in Pain: Denies       Orientation  Orientation  Overall Orientation Status: Within Functional Limits     Social/Functional History  Social/Functional History  Lives With: Spouse  Type of Home: House  Home Layout: One level  Home Access: Stairs to enter with rails  Entrance Stairs - Number of Steps: 2 ENRIQUE  Entrance Stairs - Rails: Right  Bathroom Shower/Tub: Tub/Shower unit  Bathroom Toilet: Handicap height(wife states pt has been using BSC since fall on Oct 14)  Bathroom Equipment: Shower chair  Home Equipment: Standard walker, Rolling walker, Wheelchair-manual(wife reports pressure relief pad at home)  ADL Assistance: Needs assistance(supervision with bathing; independent with dressing)  Homemaking Responsibilities: No  Ambulation Assistance: Independent(using SW prior to fall; using RW since fall)  Transfer Assistance: Needs assistance(independent with chair and toilet transfers; supervision for tub transfers)  Active : No  Patient's  Info: Wife drives when needed  Occupation: Retired  Additional Comments: Wife reports 2 falls in October. Wife reports pt was admitted to [facility] on 10/14 and d/c 10/22.  Wife reports pt was on vent during hospitalization and working with therapy. Wife states pt has been having HHPT/OT since return to home. Cognition   Cognition  Overall Cognitive Status: Exceptions  Arousal/Alertness: Delayed responses to stimuli  Following Commands: Follows one step commands with repetition; Follows one step commands with increased time  Attention Span: Difficulty attending to directions; Difficulty dividing attention  Safety Judgement: Decreased awareness of need for safety;Decreased awareness of need for assistance  Problem Solving: Decreased awareness of errors;Assistance required to identify errors made  Insights: Decreased awareness of deficits  Initiation: Requires cues for some  Sequencing: Requires cues for some    Objective  AROM RLE (degrees)  RLE AROM: WFL  AROM LLE (degrees)  LLE AROM : WFL  Strength RLE  Strength RLE: Exception  Comment: grossly 3/5  Strength LLE  Strength LLE: Exception  Comment: grossly 3/5  Tone RLE  RLE Tone: Normotonic  Tone LLE  LLE Tone: Normotonic  Motor Control  Gross Motor?: WFL  Sensation  Overall Sensation Status: WFL  Bed mobility  Supine to Sit: Dependent/Total(Mod+Min)  Scooting: Minimal assistance(VC for initiation)  Comment: required increased time to perform all tasks  Transfers  Sit to Stand: Dependent/Total(Min Ax2)  Stand to sit: Dependent/Total(Min Ax2)  Bed to Chair: Dependent/Total(Mod+Min A stand step EOB>recliner, pt required mod A to advancing BLE)  Comment: Pt with difficulty initiating transfers  Ambulation  Ambulation?: No  Stairs/Curb  Stairs?: No     Balance  Posture: Good  Sitting - Static: Fair  Sitting - Dynamic: Fair;+  Standing - Static: Poor  Standing - Dynamic: Poor  Comments: CGA/Min A seated at EOB as pt with L lateral lean. Pt required 2 person assist to maintain standing balance and 2 person assist for transfers.         Plan   Plan  Times per week: 3-5x  Times per day: Daily  Current Treatment Recommendations: Strengthening, Balance Training, Functional Mobility Training, Transfer Training, Endurance Training, Gait Training, Neuromuscular Re-education, Safety Education & Training, Positioning, Modalities, Equipment Evaluation, Education, & procurement, Patient/Caregiver Education & Training  Safety Devices  Type of devices:  All fall risk precautions in place, Left in chair, Call light within reach, Chair alarm in place, Gait belt, Patient at risk for falls, Nurse notified(Kenneth, RN, aware and educated on use of STEDY for safe pt transfers)  Restraints  Initially in place: No    G-Code       OutComes Score                 AM-PAC Score  AM-PAC Inpatient Mobility Raw Score : 7 (10/26/20 1321)  AM-PAC Inpatient T-Scale Score : 26.42 (10/26/20 1321)  Mobility Inpatient CMS 0-100% Score: 92.36 (10/26/20 1321)  Mobility Inpatient CMS G-Code Modifier : CM (10/26/20 1321)          Goals  Short term goals  Time Frame for Short term goals: upon d/c  Short term goal 1: Pt will perform bed mobility with mod Ax1  Short term goal 2: Pt will perform transfers with LRAD and mod Ax1  Short term goal 3: Pt will ambulate 13' with LRAD and mod Ax1  Patient Goals   Patient goals : pt did not state       Therapy Time   Individual Concurrent Group Co-treatment   Time In 1150         Time Out 1228         Minutes 38              Timed Code Treatment Minutes:   23    Total Treatment Minutes:  SIOMARA Slater [address], [place] [address]

## 2020-10-26 NOTE — CARE COORDINATION
Discharge Planning Assessment    RN/SW discharge planner met with patient/ (and family member) to discuss reason for admission, current living situation, and potential needs at the time of discharge    Demographics/Insurance verified Yes    Current type of dwelling:home    Living arrangements: spouse    Level of function/Support: ambulates with cane    PCP: Eduard Carpio MD    DME: cane, grab bars, [employer]    Active with any community resources/agencies/skilled home care :  [facility] in past  Medication compliance issues:no    Financial issues that could impact healthcare:    Transportation at the time of discharge: spouse    Home with spouse pending therapy recommendations.

## 2020-10-26 NOTE — PROGRESS NOTES
Increased breath sounds with DB effort reveal fine, scattered, distant crackles at bases; SBP remains 90's - spouse states that is his baseline; no other significant changes since earlier assessment, except as noted; no s/s of acute distress at present; spouse states pt normally takes Lactulose only 2x/day as long as he has at least 2 BM's/day - instructed pt and spouse on plan for GI consult.

## 2020-10-26 NOTE — PROGRESS NOTES
Occupational Therapy   Occupational Therapy Initial Assessment  Date: 10/26/2020   Patient Name: Carine Evans  MRN: [id number]     : 1945    Date of Service: 10/26/2020    Discharge Recommendations: Carine Evans scored a 15/24 on the AM-PAC ADL Inpatient form. Current research shows that an AM-PAC score of 17 or less is typically not associated with a discharge to the patient's home setting. Based on the patient's AM-PAC score and their current ADL deficits, it is recommended that the patient have 3-5 sessions per week of Occupational Therapy at d/c to increase the patient's independence. Please see assessment section for further patient specific details. If patient discharges prior to next session this note will serve as a discharge summary. Please see below for the latest assessment towards goals. OT Equipment Recommendations  Equipment Needed: No    Assessment   Performance deficits / Impairments: Decreased functional mobility ; Decreased strength;Decreased endurance;Decreased coordination;Decreased safe awareness;Decreased ADL status; Decreased cognition;Decreased balance;Decreased high-level IADLs  Assessment: Pt below baseline of function and presents with the above deficits, would benefit from further skilled OT services to increase participation in functional ADL tasks as well as functional transfers/ mobility  Treatment Diagnosis: Above deficits associated with SOB  Prognosis: Fair  Decision Making: Medium Complexity  OT Education: OT Role;Plan of Care;Transfer Training  Patient Education: Pt verbalized understanding, would benefit from further reinforcement  Barriers to Learning: cognition  REQUIRES OT FOLLOW UP: Yes  Activity Tolerance  Activity Tolerance: Patient Tolerated treatment well  Safety Devices  Safety Devices in place: Yes  Type of devices: Nurse notified; All fall risk precautions in place;Gait belt;Patient at risk for falls;Call light within reach; Chair alarm in place; Left in chair  Restraints  Initially in place: No           Patient Diagnosis(es): The primary encounter diagnosis was Generalized weakness. Diagnoses of Dehydration and Elevated troponin were also pertinent to this visit. has a past medical history of Acute on chronic diastolic congestive heart failure ([facility] [address] ANEMIA, CAD (coronary artery disease), Chronic obstructive pulmonary disease ([address] Cirrhosis ([address] Depression, Diabetes mellitus ([facility] [address] Esophageal bleed, non-variceal, Hypertension, HYPOTHYROIDISM, Liver disease, Mixed hyperlipidemia, NEUROPATHY, Non-ST elevation MI (NSTEMI) ([address] Pancytopenia ([address] Paroxysmal atrial fibrillation ([facility] [address] Pneumonia, Sleep apnea, Thrombocytopenia ([facility] [address] and Thyroid disease. has a past surgical history that includes Achilles tendon surgery; Tonsillectomy; Colonoscopy; Endoscopy, colon, diagnostic; Coronary artery bypass graft (10/2011); Percutaneous Transluminal Coronary Angio (11/23/2012); Upper gastrointestinal endoscopy (11/23/2012); Cardiac surgery; Upper gastrointestinal endoscopy (3/6/2014); Cholecystectomy; Upper gastrointestinal endoscopy (N/A, 6/7/2019); Upper gastrointestinal endoscopy (N/A, 7/15/2019); and Upper gastrointestinal endoscopy (N/A, 7/15/2019). Treatment Diagnosis: Above deficits associated with SOB      Restrictions  Restrictions/Precautions  Restrictions/Precautions: Fall Risk(High fall risk)  Required Braces or Orthoses?: No  Position Activity Restriction  Other position/activity restrictions: Carine Evans is a 70 y.o. male presents to the emergency department [facility] via stretcher transport. Patient reports that he has chronic shortness of breath and is now having difficulty walking. He does live at home with his wife, states that they take care of each other. He does not receive home care. This patient did arrive to [facility] following a syncopal event causing a fall at home.   He was found to have mildly displaced left sixth through eighth rib fractures posterior laterally. Concerning to be subacute versus chronic. This patient was intubated and required vasopressors at [facility] and extubated on 10/19/2020. He does wear supplemental oxygen at 4 L/min at all times. Subjective   General  Chart Reviewed: Yes  Additional Pertinent Hx: COPD, CAD, NSTEMI, CABG, Stage IV Cirrhosis  Family / Caregiver Present: Yes(Wife)  Diagnosis: SOB  Subjective  Subjective: Pt supine in bed upon arrival, minimal encouragement to participate in OT/PT evaluation.   Patient Currently in Pain: Denies  Pain Assessment  Pain Assessment: Faces  Pain Level: 0  Vital Signs  Temp: 98.1 °F (36.7 °C)  Temp Source: Oral  Pulse: 80  Heart Rate Source: Radial  Resp: 16  BP: 95/62  BP Location: Left upper arm  MAP (mmHg): 73  Patient Position: High fowlers  Level of Consciousness: Alert  MEWS Score: 2  Patient Currently in Pain: Denies  Oxygen Therapy  SpO2: 97 %  Pulse Oximeter Device Mode: Intermittent  Pulse Oximeter Device Location: Finger  O2 Device: Nasal cannula  O2 Flow Rate (L/min): 4 L/min  Social/Functional History  Social/Functional History  Lives With: Spouse  Type of Home: House  Home Layout: One level  Home Access: Stairs to enter with rails  Entrance Stairs - Number of Steps: 2 ENRIQUE  Entrance Stairs - Rails: Right  Bathroom Shower/Tub: Tub/Shower unit  Bathroom Toilet: Handicap height(wife states pt has been using BSC since fall on Oct 14)  Bathroom Equipment: Shower chair  Home Equipment: Standard walker, Rolling walker, Wheelchair-manual(wife reports pressure relief pad at home)  ADL Assistance: Needs assistance(supervision with bathing; independent with dressing)  Homemaking Responsibilities: No  Ambulation Assistance: Independent(using SW prior to fall; using RW since fall)  Transfer Assistance: Needs assistance(independent with chair and toilet transfers; supervision for tub transfers)  Active : No  Patient's  Info: Wife drives when needed  Occupation: Retired  Additional Comments: Wife reports 2 falls in October. Wife reports pt was admitted to [facility] on 10/14 and d/c 10/22. Wife reports pt was on vent during hospitalization and working with therapy. Wife states pt has been having HHPT/OT since return to home. Objective   Vision: Impaired  Vision Exceptions: Wears glasses at all times  Hearing: Exceptions to [facility]  Hearing Exceptions: Hard of hearing/hearing concerns; No hearing aid    Orientation  Overall Orientation Status: Within Functional Limits(Increased time needed for responses)  Observation/Palpation  Posture: Fair  Balance  Sitting Balance: Contact guard assistance(CGA - Min(a) to correct Left lateral lean on EOB)  Standing Balance: Minimal assistance  Standing Balance  Time: ~1 min  Activity: stand pivot transfer w/ 2 person assist  Functional Mobility  Functional Mobility Comments: Fxl mobility not appropriate at this time; requiring 2 person assist for transfer  ADL  Feeding: Setup  Additional Comments: Pt adamant about eating, declined ADL tasks  Tone RUE  RUE Tone: Normotonic  Tone LUE  LUE Tone: Normotonic  Coordination  Movements Are Fluid And Coordinated: No  Coordination and Movement description: Decreased speed;Decreased accuracy     Bed mobility  Supine to Sit: 2 Person assistance(verbal and tactile cue to use HR, Mod(a) + Min(a))  Scooting: Minimal assistance(VCs)  Transfers  Stand Pivot Transfers: 2 Person assistance(Mod(a) and Min(a))  Sit to stand: 2 Person assistance(Min of 2)  Stand to sit: 2 Person assistance     Cognition  Overall Cognitive Status: Exceptions  Arousal/Alertness: Delayed responses to stimuli  Following Commands: Follows one step commands with repetition; Follows one step commands with increased time  Attention Span: Difficulty attending to directions; Difficulty dividing attention  Safety Judgement: Decreased awareness of need for safety;Decreased awareness of need for assistance  Problem Solving: Decreased awareness of errors;Assistance required to identify errors made  Insights: Decreased awareness of deficits  Initiation: Requires cues for some  Sequencing: Requires cues for some  Perception  Overall Perceptual Status: Impaired  Initiation: Cues to initiate tasks  Motor Planning: Cues to use objects appropriately     Sensation  Overall Sensation Status: WFL(Does not report numbness/tingling)        LUE AROM (degrees)  LUE AROM : WFL  RUE AROM (degrees)  RUE AROM : WFL      Plan   Plan  Times per week: 3-5x/wk  Times per day: Daily  Current Treatment Recommendations: Endurance Training, Strengthening, Functional Mobility Training, Safety Education & Training, Self-Care / ADL, Balance Training, Gait Training, Patient/Caregiver Education & Training      AM-PAC Score  AM-[facility] Inpatient Daily Activity Raw Score: 15 (10/26/20 1249)  AM-PAC Inpatient ADL T-Scale Score : 34.69 (10/26/20 1249)  ADL Inpatient CMS 0-100% Score: 56.46 (10/26/20 1249)  ADL Inpatient CMS G-Code Modifier : CK (10/26/20 1249)    Goals  Short term goals  Time Frame for Short term goals: Discharge  Short term goal 1: Mod(a) Bed Mobility  Short term goal 2: SBA Sitting Balance  Short term goal 3: SBA Standing Balance  Short term goal 4: Mod(a) Fxl transfers  Short term goal 5: Min(a) Dressing tasks  Short term goal 6: Min(a) Bathing tasks  Long term goals  Time Frame for Long term goals : LTG=STG       Therapy Time   Individual Concurrent Group Co-treatment   Time In 1150         Time Out 1228         Minutes 38         Timed Code Treatment Minutes: 23 Minutes     Total Treatment Minutes:   38 minutes    ANTOINETTE Irwin    OT provided direct supervision to student, facilitated in making skilled judgements throughout duration of session.     JEANNE Romero OTR/L [id number]     Nathalie Vargas OT

## 2020-10-26 NOTE — ED PROVIDER NOTES
This patient was seen by the Mid-Level Provider. I have seen and examined the patient, agree with the workup, evaluation, management and diagnosis. Care plan has been discussed. My assessment reveals a 21-year-old male who presents with weakness. This is a 74-year-old male with a history of dementia and a recent admission to another hospital who presents today with increased weakness. He denies any pain. He denies any fevers or chills. He does complain of a cough. Patient has been short of breath for the last several months. He also has a recently known pleural effusion requiring drainage. Radiology results:    XR CHEST PORTABLE   Final Result   1. Bibasilar opacities favored to reflect atelectasis and effusion, left   greater than right. Superimposed infiltrate difficult to entirely exclude in   the appropriate clinical setting. 2. Stable cardiomegaly.                LABS:    Labs Reviewed   BASIC METABOLIC PANEL - Abnormal; Notable for the following components:       Result Value    Sodium 134 (*)     Chloride 95 (*)     Glucose 174 (*)     BUN 44 (*)     GFR Non- 59 (*)     All other components within normal limits    Narrative:     Performed at:  [facility] [address] [address] [facility]   Phone [phone]   BRAIN NATRIURETIC PEPTIDE - Abnormal; Notable for the following components:    Pro-BNP 2,184 (*)     All other components within normal limits    Narrative:     Performed at:  [facility]  [address] [facility]   Phone [phone]   CBC WITH AUTO DIFFERENTIAL - Abnormal; Notable for the following components:    RBC 3.28 (*)     Hemoglobin 10.1 (*)     Hematocrit 31.5 (*)     RDW 22.9 (*)     All other components within normal limits    Narrative:     Performed at:  [facility]  [address] [facility] [address]   Phone [phone] TROPONIN - Abnormal; Notable for the following components:    Troponin 0.03 (*)     All other components within normal limits    Narrative:     Performed at:  [facility] [address], [place], [facility]   Phone [phone]   PROTIME-INR - Abnormal; Notable for the following components:    Protime 20.8 (*)     INR 1.78 (*)     All other components within normal limits    Narrative:     Performed at:  [facility] [address], [place], [address] [place] [facility]   Phone [phone]   LACTATE, SEPSIS - Abnormal; Notable for the following components:    Lactic Acid, Sepsis 2.1 (*)     All other components within normal limits    Narrative:     Performed at:  [facility] [address], [place], [facility]   Phone [phone]   POCT GLUCOSE - Abnormal; Notable for the following components:    POC Glucose 169 (*)     All other components within normal limits    Narrative:     Performed at:  [facility] [address], [place]   Phone [phone]   CULTURE, BLOOD 1   CULTURE, BLOOD 2   LACTATE, SEPSIS           EKG:    Atrial fibrillation at a rate of 90 beats a minute with no acute ST elevations or depressions or pathologic Q waves. Exam:    Well-nourished male no acute distress. Abdomen is soft and benign with no guarding or rebound. Appear to be alert and answers all my questions without difficulty. Medical decision makin-year-old male presents with increased weakness. His chest x-ray shows possibility of superimposed infiltrate on effusions. The patient does have some findings consistent with dehydration. The patient be admitted for some IV hydration and further work-up as necessary. FINAL IMPRESSION:    1. Generalized weakness    2.  Dehydration           Seymour Cast MD  10/25/20 4809

## 2020-10-26 NOTE — PLAN OF CARE
Problem: Falls - Risk of:  Goal: Will remain free from falls  Description: Will remain free from falls  Outcome: Ongoing  Note: Pt stated \"I can stand\"  Wife (at bedside) stated \"He has fallen two times recently. Heddy  Heddy  Heddy  \" requiring hospitalization.   Pt can pivot to bedside commode, per pt & wife; bed alarm in place, hourly rounding, call light within reach; will continue to assess  Goal: Absence of physical injury  Description: Absence of physical injury  Outcome: Ongoing     Problem: Skin Integrity:  Goal: Will show no infection signs and symptoms  Description: Will show no infection signs and symptoms  Outcome: Ongoing  Goal: Absence of new skin breakdown  Description: Absence of new skin breakdown  Outcome: Ongoing     Problem: Physical Regulation:  Goal: Ability to maintain clinical measurements within normal limits will improve  Description: Ability to maintain clinical measurements within normal limits will improve  Outcome: Ongoing  Goal: Diagnostic test results will improve  Description: Diagnostic test results will improve  Outcome: Ongoing     Problem: Safety:  Goal: Ability to remain free from injury will improve  Description: Ability to remain free from injury will improve  Outcome: Ongoing

## 2020-10-27 NOTE — CARE COORDINATION
Gave brief update to Palliative care RN, Aretha Murray regarding wife's plan to discharge home with hospice follow-up at home

## 2020-10-27 NOTE — PLAN OF CARE
Problem: Falls - Risk of:  Goal: Will remain free from falls  Description: Will remain free from falls  Outcome: Ongoing  Note: Pt remains free from falls and is able to pull himself up in bed and roll from side to side; will continue to assess. Goal: Absence of physical injury  Description: Absence of physical injury  Outcome: Ongoing     Problem: Skin Integrity:  Goal: Will show no infection signs and symptoms  Description: Will show no infection signs and symptoms  Outcome: Ongoing  Note: Sacral Heart applied to protect skin & zinc oxide applied to buttocks & scrotal area; no new s/s of infection or skin breakdown.   Goal: Absence of new skin breakdown  Description: Absence of new skin breakdown  Outcome: Ongoing     Problem: Physical Regulation:  Goal: Ability to maintain clinical measurements within normal limits will improve  Description: Ability to maintain clinical measurements within normal limits will improve  Outcome: Ongoing  Goal: Diagnostic test results will improve  Description: Diagnostic test results will improve  Outcome: Ongoing     Problem: Safety:  Goal: Ability to remain free from injury will improve  Description: Ability to remain free from injury will improve  Outcome: Ongoing     Problem: Pain:  Goal: Pain level will decrease  Description: Pain level will decrease  Outcome: Ongoing  Note: Pt denies pain; will continue to assess    Goal: Control of acute pain  Description: Control of acute pain  Outcome: Ongoing  Goal: Control of chronic pain  Description: Control of chronic pain  Outcome: Ongoing     Problem: Cardiovascular  Goal: No DVT, peripheral vascular complications  Outcome: Ongoing  Goal: Hemodynamic stability  Outcome: Ongoing  Goal: Anticoagulate/Hct stable  Outcome: Ongoing     Problem: Respiratory  Goal: No pulmonary complications  Outcome: Ongoing  Goal: O2 Sat > 90%  Outcome: Ongoing  Goal: Supplemental O2 requirements decreased  Outcome: Ongoing     Problem: GI  Goal: No bowel complications  Outcome: Ongoing  Note: Pt is able to request assistance to use bedpan; pt able to roll on & off of bed pan with minimal assistance; no BM during evening shift; will continue to assess & document appropriately. Problem:   Goal: No urinary complication  Outcome: Ongoing  Note: Pt is able to use urinal with assistance; pt is also comfortable using bedpan to urinate.  Will continue to assess and document

## 2020-10-27 NOTE — PROGRESS NOTES
Hospitalist Progress Note      PCP: Natty Moon MD    Date of Admission: 10/25/2020    Chief Complaint: Shortness of Breath    shortness of breath for \"a couple months now\". pt states having trouble moving his legs and feet due to swelling and pain.         Hospital Course: \"History of Presenting Illness: This is a pleasant 70 y.o. male with history of chronic diastolic CHF, CAD, COPD, cirrhosis, diabetes type 2 with hyperglycemia, chronic respiratory failure with hypoxia on 4 L/min supplemental oxygen, chronic right pleural effusion, cirrhosis secondary to FLOYD, chronic thrombocytopenia, normocytic anemia, who presents to the emergency room with complaints of increasing shortness of breath (chronic), generalized weakness, difficulty with ambulation. No fever or chills or chest pain or SOB. He was recently admitted at [facility] for evaluation of syncope, associated left-sided rib fractures (ribs 6 through 8), required endotracheal intubation and extubated on 10/19/2020, also had a thoracentesis for drainage of pleural effusion on his most recent hospitalization. On evaluation in the emergency room, he was found to be confused. He was also found to have slightly elevated troponin of 0.03, lactic acid of 2.1. Systolic blood pressure is on the low 80s in the emergency room. Patient received a dose of Levaquin in the emergency room (last dose from his recent discharge for mgmt of pneumonia). He currently has fluid bolus that is ongoing. \"     Pt was admitted by [facility] ist service     Palliative care/goals and plans discussion started, Wife has decided on Hospice    Subjective: EMR notes reviewed patient seen and examined. No acute issues overnight, sitting up eating lunch NAD, no complaints.      Medications:  Reviewed    Infusion Medications    dextrose       Scheduled Medications    albumin human  25 g Intravenous Q6H    aspirin  81 mg Oral Daily    vitamin B and C  1 tablet Oral Daily    citalopram  20 mg Oral Daily    lactulose  20 g Oral TID    levothyroxine  137 mcg Oral Daily    midodrine  15 mg Oral TID     rifaximin  550 mg Oral BID    insulin lispro  0-6 Units Subcutaneous TID     insulin lispro  0-3 Units Subcutaneous Nightly    sodium chloride flush  10 mL Intravenous 2 times per day    pantoprazole  40 mg Oral QAM AC     PRN Meds: ipratropium-albuterol, glucose, dextrose, glucagon (rDNA), dextrose, sodium chloride flush, acetaminophen **OR** acetaminophen, polyethylene glycol, promethazine **OR** ondansetron      Intake/Output Summary (Last 24 hours) at 10/27/2020 1636  Last data filed at 10/27/2020 0231  Gross per 24 hour   Intake 240 ml   Output 450 ml   Net -210 ml       Physical Exam Performed:    BP 99/67   Pulse 86   Temp 98.2 °F (36.8 °C) (Oral)   Resp 16   Ht 5' 11\" (1.803 m)   Wt 206 lb 2.1 oz (93.5 kg)   SpO2 100%   BMI 28.75 kg/m²     General appearance: No apparent distress, appears older than age and chronically ill. HEENT: Pupils equal, round, and reactive to light. Conjunctivae/corneas clear. Neck: Supple, with full range of motion. No jugular venous distention. Trachea midline. Respiratory:  Normal respiratory effort at rest , decreased bilaterally   Cardiovascular: Regular rate and rhythm with normal S1/S2 without murmurs, rubs or gallops. Abdomen: Soft, non-tender, distended with normal bowel sounds. Musculoskeletal: No clubbing, cyanosis. +  edema bilaterally. Full range of motion without deformity. Skin: Skin color, texture, turgor normal.  No rashes or lesions. Neurologic:  Neurovascularly intact without any focal sensory/motor deficits.  Cranial nerves: II-XII intact, grossly non-focal.  Psychiatric: defer   Capillary Refill: Brisk,< 3 seconds   Peripheral Pulses: +2 palpable, equal bilaterally       Labs:   Recent Labs     10/25/20  2022 10/26/20  0659   WBC 7.6 6.2   HGB 10.1* 9.1*   HCT 31.5* 28.5*   PLT 55* 45*     Recent Labs 10/25/20  2022 10/26/20  0659 10/27/20  0419   * 134* 133*   K 4.3 3.9 4.1   CL 95* 98* 100   CO2 31 31 27   BUN 44* 46* 43*   CREATININE 1.2 1.5* 1.5*   CALCIUM 9.1 8.4 8.4   PHOS  --  2.6 2.9     Recent Labs     10/26/20  0659 10/27/20  0419   AST 19 22   ALT 16 16   BILITOT 2.9* 2.7*   ALKPHOS 89 91     Recent Labs     10/25/20  2022 10/27/20  0419   INR 1.78* 1.84*     Recent Labs     10/26/20  0228 10/26/20  0659 10/26/20  1045   TROPONINI 0.02* 0.03* 0.02*       Urinalysis:      Lab Results   Component Value Date    NITRU Negative 10/02/2019    WBCUA 5 09/03/2020    BACTERIA 4+ 01/26/2014    RBCUA 12 09/03/2020    BLOODU Negative 10/02/2019    SPECGRAV 1.024 10/02/2019    GLUCOSEU 250 10/02/2019    GLUCOSEU 500 10/13/2011       Radiology:  US ABDOMEN COMPLETE   Final Result   1. Findings are consistent with chronic cirrhosis and portal hypertension,   including splenomegaly and moderate ascites. 2. Unremarkable sonographic appearance of the bilateral kidneys. 3. Nonvisualization of the gallbladder, common bile duct, pancreas, aorta,   and IVC. XR CHEST PORTABLE   Final Result   1. Bibasilar opacities favored to reflect atelectasis and effusion, left   greater than right. Superimposed infiltrate difficult to entirely exclude in   the appropriate clinical setting. 2. Stable cardiomegaly.                  Assessment/Plan:    Active Hospital Problems    Diagnosis    Shortness of breath [R06.02]     Dyspnea- multifactorial in the setting of chronic left pleural effusion, with underlying FLOYD liver disease, CHF, COPD chronically on 4 L per nasal cannula  - s/p thoracentesis x2  - consult Pulmonary - appreciate input     Iacute metabolic encephalopathy: Again multifactorial in the no cirrhosis  -Continue rifaximin  -Supportive care    Hypertension: Chronic continue midodrine    Nonzero troponin likely secondary to hypoperfusion continue serial troponins monitor on telemetry  -Suspect

## 2020-10-27 NOTE — CONSULTS
Palliative Care:     a pleasant 75 y. o. male with history of chronic diastolic CHF, CAD, COPD, cirrhosis, diabetes type 2 with hyperglycemia, chronic respiratory failure with hypoxia on 4 L/min supplemental oxygen, chronic right pleural effusion, cirrhosis secondary to FLOYD, chronic thrombocytopenia, normocytic anemia, who presents to the emergency room with complaints of increasing shortness of breath (chronic), generalized weakness, difficulty with ambulation.  No fever or chills or chest pain or SOB. He was recently admitted [facility] for evaluation of syncope, associated left-sided rib fractures (ribs 6 through 8), required endotracheal intubation and extubated on 10/19/2020, also had a thoracentesis for drainage of pleural effusion on his most recent hospitalization.  On evaluation in the emergency room, he was found to be confused. [facility] was also found to have slightly elevated troponin of 0.03, lactic acid of 2.1.  Systolic blood pressure is on the low 80s in the emergency room.  Patient received a dose of Levaquin in the emergency room (last dose from his recent discharge for mgmt of pneumonia). Past Medical History:   has a past medical history of Acute on chronic diastolic congestive heart failure ([address] ANEMIA, CAD (coronary artery disease), Chronic obstructive pulmonary disease ([address] Cirrhosis ([address] Depression, Diabetes mellitus ([address] Esophageal bleed, non-variceal, Hypertension, HYPOTHYROIDISM, Liver disease, Mixed hyperlipidemia, NEUROPATHY, Non-ST elevation MI (NSTEMI) ([address] Pancytopenia ([address] Paroxysmal atrial fibrillation ([address] Pneumonia, Sleep apnea, Thrombocytopenia ([address] and Thyroid disease. Past Surgical History:   has a past surgical history that includes Achilles tendon surgery; Tonsillectomy; Colonoscopy; Endoscopy, colon, diagnostic; Coronary artery bypass graft (10/2011); Percutaneous Transluminal Coronary Angio (11/23/2012);  Upper gastrointestinal endoscopy (11/23/2012); Cardiac surgery; Upper gastrointestinal endoscopy (3/6/2014); Cholecystectomy; Upper gastrointestinal endoscopy (N/A, 6/7/2019); Upper gastrointestinal endoscopy (N/A, 7/15/2019); and Upper gastrointestinal endoscopy (N/A, 7/15/2019). Advance Directives:  DNR-CC as of 10/27/20      Problem Severity: Pain/Other Symptoms:   SOB. Admits to pain in BLE due to swelling       Bed Mobility/Toileting/Transfer: Limited moving legs due to feet swelling/pain. Performance Status:        Palliative Performance Scale:  100% []Full Normal activity & work No evidence of disease  90%   [] Full Normal activity & work Some evidence of disease  80%   [] Full Normal activity with Effort Some evidence of disease  70%   [] Reduced Unable Normal Job/Work Significant disease Full Normal or reduced  60%   [] Ambulation reduced; Significant disease; Can't do hobbies/housework; intake normal   or reduced; occasional assist; LOC full/confusion  50%   [] Mainly sit/lie; Extensive disease; Can't do any work; Considerable assist; intake normal  Or reduced; LOC full/confusion  40%   [] Mainly in bed; Extensive disease; Mainly assist; intake normal or reduced; occasional assist; LOC full/confusion  30%   [x] Bed Bound; Extensive disease; Total care; intake reduced; LOC full/confusion  20%   [] Bed Bound; Extensive disease; Total care; intake minimal; Drowsy/coma  10%   [] Bed Bound; Extensive disease; Total care; Mouth care only; Drowsy/coma    PPS 30%    Symptom Assessment: Appetite/Nausea/Bowels/Fatigue:       lbs. BMI 28.75  Albumin low @ 2.8    Social History:   reports that he quit smoking about 7 months ago. His smoking use included cigarettes. He started smoking about 55 years ago. He has a 19.35 pack-year smoking history. He has never used smokeless tobacco. He reports that he does not drink alcohol or use drugs.     Family History:  family history includes Cancer in his father; Diabetes in his father; Heart Disease in his father and mother. Psychological/Spiritual:  . Mandaeism unknown. Family Discussion:        Patient sitting up in bed. Admits to pain in BLE with movement. Wife at bedside. Discussed overall care and decline. Wife requesting to speak separately due to patient becomes anxious/depressed. Wife states she and patient have had discussions at home setting regarding his physical decline. Discussed Advance Directives/Code. Patient wishes are to remain comfortable. Does not want intubation/CPR. Discussed Mandaeism. None at this time. No desire for  visit. Discussed hospice philosophy vs. Aggressive care. Wife supportive with doing \"what it takes\" to keep my  in his home and comfortable. Requesting consult with hospice. [facility]. Desire for patient and wife is to be in home setting. Patient feels he is unable to tolerate PT/OT extensively and wife does not want an skilled facility as she could not see him during that timeframe. Will continue to offer support/education as patient and family need.

## 2020-10-27 NOTE — DISCHARGE INSTR - DIET
For nutrition questions after discharge please call the Registered Dietitian at [phone]. Low Sodium Nutrition Therapy  This diet will help you feel better and support your heart by reducing symptoms of fluid retention, shortness of breath and swelling. You should focus on:   Limiting sodium in your diet by reading labels and limiting foods high in sodium. o Limit your daily sodium intake to 2,000 mg per day.  o Select foods with 140 mg of sodium or less per serving. o Foods with more than 300 mg of sodium per serving may not fit into a reduced-sodium meal plan.  o Check serving sizes. If you eat more than 1 serving, you will get more sodium than the amount listed. o   Foods to choose and avoid:   Avoid processed foods. Eat more fresh foods. o Fresh and frozen fruits and vegetables are good choices. o Choose fresh meats. Avoid processed meats such as roth, sausage and hot dogs.  Do not add salt to your food while cooking or at the table. o Try dry or fresh herbs, pepper, lemon juice, or a sodium-free seasoning blend such as Mrs. Dash to add flavor to food. o Do not use a salt substitute.  Use caution at restaurants  o Restaurant foods are high in sodium. Ask for your food to be cooked without salt and request sauces and dressing to come on the side. 

## 2020-10-27 NOTE — PROGRESS NOTES
P Pulmonary and Critical Care   Progress Note      Reason for Consult: Pleural effusion  Requesting Physician: Dr Rosalia Barnes:   [facility]:                The patient is a 70 y.o. male with significant past medical history of:      Diagnosis Date    Acute on chronic diastolic congestive heart failure ([address]     ANEMIA     CAD (coronary artery disease) 10/12/2011    Chronic obstructive pulmonary disease (HCC)     [facility]     Depression     Diabetes mellitus ([facility]     Esophageal bleed, non-variceal 11/23/2012    Hypertension     HYPOTHYROIDISM     Liver disease 1/6/2012    Mixed hyperlipidemia     NEUROPATHY     Non-ST elevation MI (NSTEMI) ([address] 11/21/2012    Pancytopenia ([facility]     Paroxysmal atrial fibrillation ([facility] 1/6/2012    Pneumonia     Sleep apnea     no cpap    Thrombocytopenia (HCC)     Thyroid disease      Interval History: The patient is about the same. Tolerating 4 LPM O2 supplement with good saturations. This is his home dose.  No distress at rest.      Past Surgical History:        Procedure Laterality Date    ACHILLES TENDON SURGERY      CARDIAC SURGERY      3 vessel cabg    CHOLECYSTECTOMY      COLONOSCOPY      CORONARY ARTERY BYPASS GRAFT  10/2011    cabg x3    ENDOSCOPY, COLON, DIAGNOSTIC      PTCA  11/23/2012    PTCA RCA & RPL    TONSILLECTOMY      UPPER GASTROINTESTINAL ENDOSCOPY  11/23/2012    multiple thin linear esophageal mucosal breaks    UPPER GASTROINTESTINAL ENDOSCOPY  3/6/2014    UPPER GASTROINTESTINAL ENDOSCOPY N/A 6/7/2019    EGD BAND LIGATION performed by Blossom Blizzard, MD at [facility] 7/15/2019    EGD BIOPSY performed by Blossom Blizzard, MD at [place] [address] N/A 7/15/2019    EGD BAND LIGATION performed by Blossom Blizzard, MD at [address] [facility] ENDOSCOPY     Current Medications:    Current Facility-Administered Medications: albumin human 25 % IV solution 25 g, 25 g, Intravenous, Q6H  aspirin chewable tablet 81 mg, 81 mg, Oral, Daily  vitamin B and C (TOTAL B-C) 1 tablet, 1 tablet, Oral, Daily  citalopram (CELEXA) tablet 20 mg, 20 mg, Oral, Daily  lactulose (CHRONULAC) 10 GM/15ML solution 20 g, 20 g, Oral, TID  ipratropium-albuterol (DUONEB) nebulizer solution 1 ampule, 1 ampule, Inhalation, Q4H PRN  levothyroxine (SYNTHROID) tablet 137 mcg, 137 mcg, Oral, Daily  midodrine (PROAMATINE) tablet 15 mg, 15 mg, Oral, TID WC  rifaximin (XIFAXAN) tablet 550 mg, 550 mg, Oral, BID  insulin lispro (1 Unit Dial) 0-6 Units, 0-6 Units, Subcutaneous, TID WC  insulin lispro (1 Unit Dial) 0-3 Units, 0-3 Units, Subcutaneous, Nightly  glucose (GLUTOSE) 40 % oral gel 15 g, 15 g, Oral, PRN  dextrose 50 % IV solution, 12.5 g, Intravenous, PRN  glucagon (rDNA) injection 1 mg, 1 mg, Intramuscular, PRN  dextrose 5 % solution, 100 mL/hr, Intravenous, PRN  sodium chloride flush 0.9 % injection 10 mL, 10 mL, Intravenous, 2 times per day  sodium chloride flush 0.9 % injection 10 mL, 10 mL, Intravenous, PRN  acetaminophen (TYLENOL) tablet 650 mg, 650 mg, Oral, Q6H PRN **OR** acetaminophen (TYLENOL) suppository 650 mg, 650 mg, Rectal, Q6H PRN  polyethylene glycol (GLYCOLAX) packet 17 g, 17 g, Oral, Daily PRN  promethazine (PHENERGAN) tablet 12.5 mg, 12.5 mg, Oral, Q6H PRN **OR** ondansetron (ZOFRAN) injection 4 mg, 4 mg, Intravenous, Q6H PRN  pantoprazole (PROTONIX) tablet 40 mg, 40 mg, Oral, QAM AC  midodrine (PROAMATINE) tablet 15 mg, 15 mg, Oral, Once    Allergies   Allergen Reactions    Cephalexin Hives    Ciprocinonide [Fluocinolone]     Quinolones Other (See Comments)     Confusion      Statins Other (See Comments)     Liver toxicity       Social History:    TOBACCO:   reports that he quit smoking about 7 months ago. His smoking use included cigarettes. He started smoking about 55 years ago. He has a 19.35 pack-year smoking history.  He has never used smokeless tobacco.  ETOH:   reports no history of alcohol use. Patient currently lives independently  Environmental/chemical exposure: None known    Family History:       Problem Relation Age of Onset    Heart Disease Mother         arrythmia    Diabetes Father     Cancer Father     Heart Disease Father     Anesth Problems Neg Hx     Malig Hyperten Neg Hx     Hypotension Neg Hx     Malig Hypertherm Neg Hx     Pseudochol. Deficiency Neg Hx      REVIEW OF SYSTEMS:    CONSTITUTIONAL:  negative for fevers, chills, diaphoresis, activity change, appetite change, fatigue, night sweats and unexpected weight change. EYES:  negative for blurred vision, eye discharge, visual disturbance and icterus  HEENT:  negative for hearing loss, tinnitus, ear drainage, sinus pressure, nasal congestion, epistaxis and snoring  RESPIRATORY:  See HPI  CARDIOVASCULAR:  negative for chest pain, palpitations, exertional chest pressure/discomfort, edema, syncope  GASTROINTESTINAL:  negative for nausea, vomiting, diarrhea, constipation, blood in stool and abdominal pain  GENITOURINARY:  negative for frequency, dysuria, urinary incontinence, decreased urine volume, and hematuria  HEMATOLOGIC/LYMPHATIC:  negative for easy bruising, bleeding and lymphadenopathy  ALLERGIC/IMMUNOLOGIC:  negative for recurrent infections, angioedema, anaphylaxis and drug reactions  ENDOCRINE:  negative for weight changes and diabetic symptoms including polyuria, polydipsia and polyphagia  MUSCULOSKELETAL:  negative for  pain, joint swelling, decreased range of motion and muscle weakness  NEUROLOGICAL:  negative for headaches, slurred speech, unilateral weakness  PSYCHIATRIC/BEHAVIORAL: negative for hallucinations, behavioral problems, confusion and agitation.      Objective:   PHYSICAL EXAM:      VITALS:  BP 94/60   Pulse 84   Temp 98.1 °F (36.7 °C) (Oral)   Resp 16   Ht 5' 11\" (1.803 m)   Wt 206 lb 2.1 oz (93.5 kg)   SpO2 97%   BMI 28.75 kg/m²      24HR INTAKE/OUTPUT:      Intake/Output Summary (Last 24 hours) at 10/27/2020 1115  Last data filed at 10/27/2020 1356  Gross per 24 hour   Intake 540 ml   Output 450 ml   Net 90 ml     CONSTITUTIONAL:  awake, alert, cooperative, no apparent distress, and appears stated age  NECK:  Supple, symmetrical, trachea midline, no adenopathy, thyroid symmetric, not enlarged and no tenderness, skin normal  LUNGS:  no increased work of breathing and decreased on the right to auscultation. No accessory muscle use  CARDIOVASCULAR: S1 and S2, no edema and no JVD  ABDOMEN:  normal bowel sounds, non-distended and no masses palpated, and no tenderness to palpation. No hepatospleenomegaly  LYMPHADENOPATHY:  no axillary or supraclavicular adenopathy. No cervical adnenopathy  PSYCHIATRIC: Oriented to person place and time. No obvious depression or anxiety. MUSCULOSKELETAL: No obvious misalignment or effusion of the joints. No clubbing, cyanosis of the digits. SKIN:  normal skin color, texture, turgor and no redness, warmth, or swelling. No palpable nodules    DATA:    Old records have been reviewed    CBC:  Recent Labs     10/25/20  2022 10/26/20  0659   WBC 7.6 6.2   RBC 3.28* 2.96*   HGB 10.1* 9.1*   HCT 31.5* 28.5*   PLT 55* 45*   MCV 96.3 96.1   MCH 30.7 30.8   MCHC 31.9 32.0   RDW 22.9* 22.8*   BANDSPCT 5  --       BMP:  Recent Labs     10/25/20  2022 10/26/20  0659 10/27/20  0419   * 134* 133*   K 4.3 3.9 4.1   CL 95* 98* 100   CO2 31 31 27   BUN 44* 46* 43*   CREATININE 1.2 1.5* 1.5*   CALCIUM 9.1 8.4 8.4   GLUCOSE 174* 177* 160*      ABG:  No results for input(s): PHART, HNK8UNM, PO2ART, XAS6XFK, U2QTCLUX, BEART in the last 72 hours. Lab Results   Component Value Date     (H) 11/08/2011     Lab Results   Component Value Date    CKTOTAL 78 04/08/2020    TROPONINI 0.02 (H) 10/26/2020       Cultures:     Abx:    Radiology Review:  All pertinent images / reports were reviewed as a part of this visit.        1. Recurrent pleural effusion  2. Recurrent ascites  3. Cirrhosis  4. History of COPD  5. Chronic hypoxemic respiratory failure    I have reviewed laboratories, medical records and images for this visit  Chest imaging reveals bibasilar airspace disease and effusions. Abd US shows moderate ascites as well  Renal now following and helping to manage diuretics  Would like to try to avoid thoracentesis if possible. Discussed with Dr Minor Lamas  Discussed with the patient and his wife at the bedside.

## 2020-10-27 NOTE — PLAN OF CARE
Problem: Falls - Risk of:  Goal: Will remain free from falls  Description: Will remain free from falls  10/27/2020 0858 by Makeda Kumar RN  Outcome: Ongoing  Note: Call light in reach; bed in low position; SR up x2; pt affirms awareness and understanding of need to call for and await assist with OOB mobility, and demonstrates no impulsive behavior. 10/27/2020 0351 by Gwen Saenz RN  Outcome: Ongoing  Note: Pt remains free from falls and is able to pull himself up in bed and roll from side to side; will continue to assess. Goal: Absence of physical injury  Description: Absence of physical injury  10/27/2020 0351 by Gwen Saenz RN  Outcome: Ongoing     Problem: Skin Integrity:  Goal: Will show no infection signs and symptoms  Description: Will show no infection signs and symptoms  10/27/2020 0858 by Makeda Kumar RN  Outcome: Ongoing  Note: Afebrile; no recent WBC elevation  10/27/2020 0351 by Gwen Saenz RN  Outcome: Ongoing  Note: Sacral Heart applied to protect skin & zinc oxide applied to buttocks & scrotal area; no new s/s of infection or skin breakdown.   Goal: Absence of new skin breakdown  Description: Absence of new skin breakdown  10/27/2020 0858 by Makeda Kumar RN  Outcome: Ongoing  Note: Dressing intact to Rt lat chest wall  10/27/2020 0351 by Gwen Saenz RN  Outcome: Ongoing     Problem: Physical Regulation:  Goal: Ability to maintain clinical measurements within normal limits will improve  Description: Ability to maintain clinical measurements within normal limits will improve  10/27/2020 0351 by Gwen Saenz RN  Outcome: Ongoing  Goal: Diagnostic test results will improve  Description: Diagnostic test results will improve  10/27/2020 0858 by Makeda Kumar RN  Outcome: Ongoing  Note: No critical values evident; abnormal values consistent with recent testing  10/27/2020 0351 by Gwen Saenz RN  Outcome: Ongoing     Problem: Safety:  Goal: Ability to remain free from injury will improve  Description: Ability to remain free from injury will improve  10/27/2020 0351 by Andrea Mora RN  Outcome: Ongoing     Problem: Pain:  Goal: Pain level will decrease  Description: Pain level will decrease  10/27/2020 0858 by Stephanie Terrazas RN  Outcome: Ongoing  Note: Denies pain this AM; affirms awareness to report onset of pain to staff  10/27/2020 0351 by Andrea Mora RN  Outcome: Ongoing  Note: Pt denies pain; will continue to assess    Goal: Control of acute pain  Description: Control of acute pain  10/27/2020 0351 by Andrea Mora RN  Outcome: Ongoing  Goal: Control of chronic pain  Description: Control of chronic pain  10/27/2020 0351 by Andrea Mora RN  Outcome: Ongoing     Problem: Cardiovascular  Goal: No DVT, peripheral vascular complications  65/73/6754 0858 by Stephanie Terrazas RN  Outcome: Ongoing  Note: No calf tenderness or s/s of dvt; SCD's in place  10/27/2020 0351 by Andrea Mora RN  Outcome: Ongoing  Goal: Hemodynamic stability  10/27/2020 0858 by Stephanie Terrazas RN  Outcome: Ongoing  Note: BP 94/60  10/27/2020 0351 by Andrea Mora RN  Outcome: Ongoing  Goal: Anticoagulate/Hct stable  10/27/2020 0858 by Stephanie Terrazas RN  Outcome: Ongoing  Note: Remains on ASA only  10/27/2020 0351 by Andrea Mora RN  Outcome: Ongoing     Problem: Respiratory  Goal: No pulmonary complications  35/40/5009 0858 by Stephanie Terrazas RN  Outcome: Ongoing  Note: Breath sounds generally diminished T/O bilat - minimal air movement heard over mid and upper lobes with weak DB efffort  10/27/2020 0351 by Andrea Mora RN  Outcome: Ongoing  Goal: O2 Sat > 90%  10/27/2020 0858 by Stephanie Terrazas RN  Outcome: Ongoing  Note: WNL on 4: O2 via NC  10/27/2020 0351 by Andrea Mora RN  Outcome: Ongoing  Goal:

## 2020-10-27 NOTE — PROGRESS NOTES
Instructed pt and spouse on plan for and purpose of Albumin infusions as R/T ascites; ; no s/s of acute distress at present.

## 2020-10-27 NOTE — PROGRESS NOTES
Continent of sm to med amt brown loose and soft stool per bedpan; dressing to previous chest tube site (Rt lat chest wall) saturated with serosang drainage - irrigated with saline, dry dressing with ABD pad applied.

## 2020-10-27 NOTE — CONSULTS
Office : [phone]     Fax :[phone]       Nephrology Consult Note      Patient's Name: Rebecca Rene  8:21 AM  10/27/2020    Reason for Consult: POONAM      Requesting Physician:  Manisha Wiley MD      Chief Complaint:    Chief Complaint   Patient presents with    Shortness of Breath     shortness of breath for \"a couple months now\". pt states having trouble moving his legs and feet due to swelling and pain. History of Present Ilness:    Rebecca Rene is a 70 y.o. 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 No fever or chills or chest pain or SOB. He was recently admitted at [facility] for evaluation of syncope, associated left-sided rib fractures (ribs 6 through 8), required endotracheal intubation and extubated on 10/19/2020, also had a thoracentesis for drainage of pleural effusion on his most recent hospitalization. On evaluation in the emergency room, he was found to be confused. Systolic blood pressure is on the low 80s in the emergency room. His creatinine level is 1.5   Baseline is 1.1   He has been seen by me in past . Had Hepatorenal syndrome     Today no SOB   No fever   BP running low     I/O last 3 completed shifts:   In: 80 [P.O.:780]  Out: 65 [Urine:725]    Past Medical History:   Diagnosis Date    Acute on chronic diastolic congestive heart failure (HCC)     ANEMIA     CAD (coronary artery disease) 10/12/2011    Chronic obstructive pulmonary disease (HCC)     Cirrhosis (HCC)     Depression     Diabetes mellitus ([facility]     Esophageal bleed, non-variceal 11/23/2012    Hypertension     HYPOTHYROIDISM     Liver disease 1/6/2012    Mixed hyperlipidemia     NEUROPATHY     Non-ST elevation MI (NSTEMI) ([facility] 11/21/2012    Pancytopenia ([facility]     Paroxysmal atrial fibrillation ([facility] 1/6/2012    Pneumonia     Sleep apnea     no cpap    Thrombocytopenia ([facility]     Thyroid disease        Past Surgical History:   Procedure Laterality Date    ACHILLES TENDON SURGERY      CARDIAC SURGERY      3 vessel cabg    CHOLECYSTECTOMY      COLONOSCOPY      CORONARY ARTERY BYPASS GRAFT  10/2011    cabg x3    ENDOSCOPY, COLON, DIAGNOSTIC      PTCA  11/23/2012    PTCA RCA & RPL    TONSILLECTOMY      UPPER GASTROINTESTINAL ENDOSCOPY  11/23/2012    multiple thin linear esophageal mucosal breaks    UPPER GASTROINTESTINAL ENDOSCOPY  3/6/2014    UPPER GASTROINTESTINAL ENDOSCOPY N/A 6/7/2019    EGD BAND LIGATION performed by Basilio Mak MD at [address] N/A 7/15/2019    EGD BIOPSY performed by Basilio Mak MD at [address] N/[address] 7/15/2019    EGD BAND LIGATION performed by Basilio Mak MD at [address] [facility] ENDOSCOPY       Family History   Problem Relation Age of Onset    Heart Disease Mother         arrythmia    Diabetes Father     Cancer Father     Heart Disease Father     Anesth Problems Neg Hx     Malig Hyperten Neg Hx     Hypotension Neg Hx     Malig Hypertherm Neg Hx     Pseudochol. Deficiency Neg Hx         reports that he quit smoking about 7 months ago. His smoking use included cigarettes. He started smoking about 55 years ago. He has a 19.35 pack-year smoking history. He has never used smokeless tobacco. He reports that he does not drink alcohol or use drugs.         Allergies: Cephalexin; Ciprocinonide [fluocinolone];  Quinolones; and Statins    Current Medications:    aspirin chewable tablet 81 mg, Daily  vitamin B and C (TOTAL B-C) 1 tablet, Daily  citalopram (CELEXA) tablet 20 mg, Daily  lactulose (CHRONULAC) 10 GM/15ML solution 20 g, TID  ipratropium-albuterol (DUONEB) nebulizer solution 1 ampule, Q4H PRN  levothyroxine (SYNTHROID) tablet 137 mcg, Daily  midodrine (PROAMATINE) tablet 15 mg, TID WC  rifaximin (XIFAXAN) tablet 550 mg, BID  insulin lispro (1 Unit Dial) 0-6 Units, TID WC  insulin lispro (1 Unit Dial) 0-3 Units, Nightly  glucose (GLUTOSE) 40 % oral gel 15 g, PRN  dextrose 50 % IV solution, PRN  glucagon (rDNA) injection 1 mg, PRN  dextrose 5 % solution, PRN  sodium chloride flush 0.9 % injection 10 mL, 2 times per day  sodium chloride flush 0.9 % injection 10 mL, PRN  acetaminophen (TYLENOL) tablet 650 mg, Q6H PRN    Or  acetaminophen (TYLENOL) suppository 650 mg, Q6H PRN  polyethylene glycol (GLYCOLAX) packet 17 g, Daily PRN  promethazine (PHENERGAN) tablet 12.5 mg, Q6H PRN    Or  ondansetron (ZOFRAN) injection 4 mg, Q6H PRN  pantoprazole (PROTONIX) tablet 40 mg, QAM AC  midodrine (PROAMATINE) tablet 15 mg, Once        Review of Systems:   14 point ROS obtained but were negative except mentioned in HPI      Physical exam:     Vitals:  BP (!) 94/58   Pulse 83   Temp 98.4 °F (36.9 °C) (Oral)   Resp 16   Ht 5' 11\" (1.803 m)   Wt 206 lb 2.1 oz (93.5 kg)   SpO2 96%   BMI 28.75 kg/m²   Constitutional:  OAA X3 NAD  Skin: no rash, turgor wnl  Heent:  eomi, mmm  Neck: no bruits or jvd noted  Cardiovascular:  S1, S2 without m/r/g  Respiratory: CTA B without w/r/r  Abdomen:  +bs, soft, nt, mild distention   Ext: mild  lower extremity edema  Psychiatric: mood and affect appropriate  Musculoskeletal:  Rom, muscular strength intact    Labs:  CBC:   Recent Labs     10/25/20  2022 10/26/20  0659   WBC 7.6 6.2   HGB 10.1* 9.1*   PLT 55* 45*     BMP:    Recent Labs 10/25/20  2022 10/26/20  0659 10/27/20  0419   * 134* 133*   K 4.3 3.9 4.1   CL 95* 98* 100   CO2 31 31 27   BUN 44* 46* 43*   CREATININE 1.2 1.5* 1.5*   GLUCOSE 174* 177* 160*     Ca/Mg/Phos:   Recent Labs     10/25/20  2022 10/26/20  0659 10/27/20  0419   CALCIUM 9.1 8.4 8.4   MG  --  2.50* 2.60*   PHOS  --  2.6 2.9     Hepatic:   Recent Labs     10/26/20  0659 10/27/20  0419   AST 19 22   ALT 16 16   BILITOT 2.9* 2.7*   ALKPHOS 89 91     Troponin:   Recent Labs     10/26/20  0228 10/26/20  0659 10/26/20  1045   TROPONINI 0.02* 0.03* 0.02*     BNP: No results for input(s): BNP in the last 72 hours. Lipids: No results for input(s): CHOL, TRIG, HDL, LDLCALC, LABVLDL in the last 72 hours. ABGs: No results for input(s): PHART, PO2ART, [id number] in the last 72 hours. INR:   Recent Labs     10/25/20  2022 10/27/20  0419   INR 1.78* 1.84*     UA:No results for input(s): Gerold Renard, GLUCOSEU, BILIRUBINUR, Londell Kaylen, BLOODU, PHUR, PROTEINU, UROBILINOGEN, NITRU, LEUKOCYTESUR, Tami Sames in the last 72 hours. Urine Microscopic: No results for input(s): LABCAST, BACTERIA, COMU, HYALCAST, WBCUA, RBCUA, EPIU in the last 72 hours. Urine Culture: No results for input(s): LABURIN in the last 72 hours. Urine Chemistry: No results for input(s): Brendia Flavors, PROTEINUR, NAUR in the last 72 hours. IMAGING:  XR CHEST PORTABLE   Final Result   1. Bibasilar opacities favored to reflect atelectasis and effusion, left   greater than right. Superimposed infiltrate difficult to entirely exclude in   the appropriate clinical setting. 2. Stable cardiomegaly. US ABDOMEN COMPLETE    (Results Pending)         Assessment/Plan :      1.  POONAM   likley HRS  Get urine studies   Will continue midodrine   Start albumin iv     2. Hypotension   Midodrine     3. Anemia. Monitor   Transfuse if Hb < 7.0     4. Acid- base disorder. Monitor     5. Hyponatremia 2/2 hypervolemia     6.  Liver cirrhosis with ascites.  Once BP allows will start aldactone     Recommend to dose adjust all medications  based on renal functions  Maintain SBP> 90 mmHg   Daily weights   AVOID NSAIDs  Avoid Nephrotoxins  Monitor Intake/Output  Call if significant decrease in urine output       D/w primary team      Thank you for allowing us to participate in care of Eusebio Hodgkin         Electronically signed by: Nery Black MD, 10/27/2020, 8:21 AM      [facility] [address]  Office : [phone]  Fax :[phone]

## 2020-10-27 NOTE — CONSULTS
[address] [facility]  GI Consult Note    Patient: Marija Moran  : 1945  Acct#:      Date:  10/26/2020    Subjective:       History of Present Illness  Patient is a 70 y.o.  male whom we are consulted for ascities and pleural effusion. H/o cirrhosis due to FLOYD and is followed by Dr Jesus Nicholas. H/o HE and has been prescribed lactulose and xifaxan. H/o esophageal variceal bleed in the past.   Large varices banded 7/15/2019. Repeat egd rec 6 months (overdue).  H/o ascites with prior paracentesis a year ago. was On lasix 40 mg daily, aldactone 100 mg daily. then admit 2020 with right pleural effusion and underwent thoracentesis and ascities tx with  paracentesis with 5.3 L fluid removed. His wife keeps him on a low salt diet. 2 weeks ago fell and cracked rib, went to [facility] and per wife required intubated, then also renal insuff so lasix/aldactone held, then dc home 4 days ago and was told resume diuretics today but in meantime recurrent sob/recurrent pleural effusion so readmitted. Sleepy today but per wife has been on lactulose and xifaxan to avoid encephalopathy.      Past Medical History:   Diagnosis Date    Acute on chronic diastolic congestive heart failure (HCC)     ANEMIA     CAD (coronary artery disease) 10/12/2011    Chronic obstructive pulmonary disease (HCC)     Cirrhosis (HCC)     Depression     Diabetes mellitus ([address]     Esophageal bleed, non-variceal 2012    Hypertension     HYPOTHYROIDISM     Liver disease 2012    Mixed hyperlipidemia     NEUROPATHY     Non-ST elevation MI (NSTEMI) ([address] 2012    Pancytopenia ([address]     Paroxysmal atrial fibrillation ([address] 2012    Pneumonia     Sleep apnea     no cpap    Thrombocytopenia ([address]     Thyroid disease       Past Surgical History:   Procedure Laterality Date    ACHILLES TENDON SURGERY      CARDIAC SURGERY      3 vessel cabg    CHOLECYSTECTOMY      COLONOSCOPY      CORONARY ARTERY BYPASS GRAFT  10/2011    cabg x3    ENDOSCOPY, COLON, DIAGNOSTIC      PTCA  11/23/2012    PTCA RCA & RPL    TONSILLECTOMY      UPPER GASTROINTESTINAL ENDOSCOPY  11/23/2012    multiple thin linear esophageal mucosal breaks    UPPER GASTROINTESTINAL ENDOSCOPY  3/6/2014    UPPER GASTROINTESTINAL ENDOSCOPY N/A 6/7/2019    EGD BAND LIGATION performed by Rosa Pereyra MD at [facility] 7/15/2019    EGD BIOPSY performed by Rosa Pereyra MD at [facility] N/A 7/15/2019    EGD BAND LIGATION performed by Rosa Pereyra MD at [address]        Admission Meds  No current facility-administered medications on file prior to encounter.       Current Outpatient Medications on File Prior to Encounter   Medication Sig Dispense Refill    nadolol (CORGARD) 20 MG tablet TAKE ONE TABLET BY MOUTH DAILY (Patient taking differently: 20 mg 2 times daily ) 90 tablet 2    furosemide (LASIX) 40 MG tablet Take 1 tablet by mouth daily 30 tablet 0    spironolactone (ALDACTONE) 50 MG tablet Take 0.5 tablets by mouth 2 times daily 60 tablet 5    levothyroxine (SYNTHROID) 137 MCG tablet Take 1 tablet by mouth Daily 30 tablet 0    midodrine (PROAMATINE) 10 MG tablet Take 1 tablet by mouth 3 times daily (with meals) (Patient taking differently: Take 15 mg by mouth 3 times daily (with meals) ) 90 tablet 1    ONGLYZA 5 MG TABS tablet Take 5 mg by mouth daily       citalopram (CELEXA) 20 MG tablet TAKE ONE TABLET BY MOUTH DAILY 90 tablet 2    glipiZIDE (GLUCOTROL) 5 MG tablet TAKE ONE TABLET BY MOUTH TWICE A DAY BEFORE MEALS 180 tablet 2    cyanocobalamin 1000 MCG/ML injection INJECT 1 ML INTRAMUSCULARLY ONCE EVERY 30 DAYS 10 mL 1    B-D 3CC LUER-ALEXIS SYR 25GX1\" 25G X 1\" 3 ML MISC USE WITH VITAMIN B-12 INJECTIONS 12 each 2    lactulose (CHRONULAC) 10 GM/15ML solution Take 30 mLs by mouth 3 times daily 5 mL 0    CINNAMON PO Take 1,000 mg by mouth daily      b complex vitamins capsule Take 1 capsule by mouth daily      MILK THISTLE PO Take 1,000 mg by mouth daily       XIFAXAN 550 MG tablet 2 times daily       Omega-3 Fatty Acids (FISH OIL) 500 MG CAPS Take 1,000 mg by mouth daily       aspirin 81 MG tablet Take 81 mg by mouth daily. Allergies  Allergies   Allergen Reactions    Cephalexin Hives    Ciprocinonide [Fluocinolone]     Quinolones Other (See Comments)     Confusion      Statins Other (See Comments)     Liver toxicity        Family history     Family History   Problem Relation Age of Onset    Heart Disease Mother         arrythmia    Diabetes Father     Cancer Father     Heart Disease Father     Anesth Problems Neg Hx     Malig Hyperten Neg Hx     Hypotension Neg Hx     Malig Hypertherm Neg Hx     Pseudochol. Deficiency Neg Hx         Social   Social History     Tobacco Use    Smoking status: Former Smoker     Packs/day: 0.45     Years: 43.00     Pack years: 19.35     Types: Cigarettes     Start date: 1965     Last attempt to quit: 3/1/2020     Years since quittin.6    Smokeless tobacco: Never Used   Substance Use Topics    Alcohol use: No     Comment: RARELY;  approx once every 6 months          Review of Systems    Constitutional: negative  Respiratory: sob  Cardiovascular: negative  Gastrointestinal: ascities  Musculoskeletal:negative  Neurological: negative         Physical Exam  Blood pressure 97/68, pulse 86, temperature 97.9 °F (36.6 °C), temperature source Oral, resp. rate 17, height 5' 11\" (1.803 m), weight 205 lb 4.8 oz (93.1 kg), SpO2 98 %.     General appearance: alert, cooperative, no distress, appears stated age  Anicteric, No Jaundice  Head: Normocephalic, without obvious abnormality  Lungs: decrease breath sound right base  Heart: regular rate and rhythm  Abdomen: soft, non-tender, mild distended, Bowel sounds normal.    Extremities: + edema  Skin: warm and dry  Neuro: drousy      Data Review:    Recent Labs     10/25/20  2022 10/26/20  0659   WBC 7.6 6.2   HGB 10.1* 9.1*   HCT 31.5* 28.5*   MCV 96.3 96.1   PLT 55* 45*     Recent Labs     10/25/20  2022 10/26/20  0659   * 134*   K 4.3 3.9   CL 95* 98*   CO2 31 31   PHOS  --  2.6   BUN 44* 46*   CREATININE 1.2 1.5*     Recent Labs     10/26/20  0659   AST 19   ALT 16   BILITOT 2.9*   ALKPHOS 89     No results for input(s): LIPASE, AMYLASE in the last 72 hours. Recent Labs     10/25/20  2022   PROTIME 20.8*   INR 1.78*     No results for input(s): PTT in the last 72 hours. No results for input(s): OCCULTBLD in the last 72 hours. Assessment / Plan :    1. Cirrhosis/pleural effusion/ascities: recurrent. Was held [place] di[place]tics [place] Rec:  - discuss thoracentesis/paracentesis with albumin vs resume diuretics with pulm and renal teams  - consult renal  - low na diet  - dietician teaching low na  - u/s in am assess ascities/liver    2. Neuro: drowsy but awakens. Ammonia nl  Rec:  - continue lactulose goal 3-4 soft stools per day  - continue xifaxan    3 heme: hbg stable, no sign gi bleed. Follow inr.     4. Cirrhosis: from merrill. lft stable    5. Cardio: pro bnp >2,000.  Per primary team.          Melissa Deluna MD  [facility] and [address]

## 2020-10-27 NOTE — PROGRESS NOTES
Prosper Mahmood and plan is to hold off on paracentesis with POONAM. Rec:  - low na diet  - dietician teaching low na     2. Neuro: drowsy but awakens. Ammonia nl  Rec:  - continue lactulose goal 3-4 soft stools per day  - continue xifaxan     3 heme: hbg stable, no sign gi bleed. Follow inr.      4. Cirrhosis: from [place]. lft stable     5. Cardio: pro bnp >2,000. Per primary team.        Discussed with Dr. Dalia Macias, [address]    I have personally performed a face to face diagnostic evaluation on this patient. I have interviewed and examined the patient and I agree with the findings and recommended plan of care. In summary, my findings and plan are the following: cirrhosis/pleural effusion/ascities/renal insuff, abd mild distended but nontender, currently renal insuff larger issue and prior paracentesis neg sbp,  discussed with dr Leandra Joseph and dr Janina Montez, hold off paracentesis while address renal insuff acutely, reassess over time, palliative care team discussion reasonable.        Andrew Caraballo, MD  [address] and [address]

## 2020-10-27 NOTE — PROGRESS NOTES
95* 98*   CO2 31 31   BUN 44* 46*   CREATININE 1.2 1.5*   CALCIUM 9.1 8.4   PHOS  --  2.6     Recent Labs     10/26/20  0659   AST 19   ALT 16   BILITOT 2.9*   ALKPHOS 89     Recent Labs     10/25/20  2022   INR 1.78*     Recent Labs     10/26/20  0228 10/26/20  0659 10/26/20  1045   TROPONINI 0.02* 0.03* 0.02*       Urinalysis:      Lab Results   Component Value Date    NITRU Negative 10/02/2019    WBCUA 5 09/03/2020    BACTERIA 4+ 01/26/2014    RBCUA 12 09/03/2020    BLOODU Negative 10/02/2019    SPECGRAV 1.024 10/02/2019    GLUCOSEU 250 10/02/2019    GLUCOSEU 500 10/13/2011       Radiology:  XR CHEST PORTABLE   Final Result   1. Bibasilar opacities favored to reflect atelectasis and effusion, left   greater than right. Superimposed infiltrate difficult to entirely exclude in   the appropriate clinical setting. 2. Stable cardiomegaly.          US ABDOMEN COMPLETE    (Results Pending)           Assessment/Plan:    Active Hospital Problems    Diagnosis    Shortness of breath [R06.02]     Dyspnea- multifactorial in the setting of chronic left pleural effusion, with underlying FLOYD liver disease, CHF, COPD chronically on 4 L per nasal cannula  - s/p thoracentesis x2  - consult Pulmonary     Iacute metabolic encephalopathy: Again multifactorial in the no cirrhosis  -Continue rifaximin  -Supportive care    Hypertension: Chronic continue midodrine    Nonzero troponin likely secondary to hypoperfusion continue serial troponins monitor on telemetry  -Suspect mismatch    Lactic acidosis no signs of sepsis on admission  -Lactic 2.1 present on arrival    Chronic diastolic Heart failure: Monitor fluid balance difficult to treat in the setting    Coronary artery disease: Currently without signs of ischemia    Cirrhosis secondary to Alvaro Breaux  -Not a transplant candidate  -GI consult  -Palliative care consult    Anemia/thrombocytopenia encephalitides monitor closely    Goals and plans of care:   Started discussion with the patient's wife and patient today regarding moving forward with diagnosis and multiple comorbidities. Patient continues to decondition over time with recurrent hospitalizations.   Wife is willing and wanting to discuss with GI team and consideration of hospice and palliative care overall goal she wants patient to have the best quality of life and take him home    DVT Prophylaxis: SCDs  Diet: DIET GENERAL; Low Sodium (2 GM)  Diet NPO, After Midnight Exceptions are: Sips with Meds  Code Status: Full Code    PT/OT Eval Status: Ordered    Dispo -uncertain at this point    Leodan Raman, APRN - CNP

## 2020-10-28 NOTE — PROGRESS NOTES
[facility] GI  Gastroenterology Progress Note    Bridgette Medina is a 70 y.o. male patient. Active Problems:    Shortness of breath  Resolved Problems:    * No resolved hospital problems. *      SUBJECTIVE:  No abdominal pain. ROS:  No fever, chills  No chest pain, palpitations  No SOB, cough  Gastrointestinal ROS: no N/V     Physical    VITALS:  BP 94/63   Pulse 92   Temp 97.7 °F (36.5 °C) (Oral)   Resp 16   Ht 5' 11\" (1.803 m)   Wt 207 lb 7.3 oz (94.1 kg)   SpO2 96%   BMI 28.93 kg/m²   TEMPERATURE:  Current - Temp: 97.7 °F (36.5 °C); Max - Temp  Av.1 °F (36.7 °C)  Min: 97.7 °F (36.5 °C)  Max: 98.6 °F (37 °C)    NAD  Regular rate   Lungs: decreased breath sounds right base  Abdomen soft, ND, mild distended with fluid wave,  Bowel sounds normal.    Data    Data Review:    Recent Labs     10/25/20  2022 10/26/20  0659   WBC 7.6 6.2   HGB 10.1* 9.1*   HCT 31.5* 28.5*   MCV 96.3 96.1   PLT 55* 45*     Recent Labs     10/26/20  0659 10/27/20  0419 10/28/20  0904   * 133* 134*   K 3.9 4.1 3.9   CL 98* 100 97*   CO2 31 27 30   PHOS 2.6 2.9  --    BUN 46* 43* 36*   CREATININE 1.5* 1.5* 1.3     Recent Labs     10/26/20  0659 10/27/20  0419   AST 19 22   ALT 16 16   BILITOT 2.9* 2.7*   ALKPHOS 89 91     No results for input(s): LIPASE, AMYLASE in the last 72 hours. Recent Labs     10/25/20  2022 10/27/20  0419   PROTIME 20.8* 21.5*   INR 1.78* 1.84*     No results for input(s): PTT in the last 72 hours. US 10/27  Impression:      1. Findings are consistent with chronic cirrhosis and portal hypertension,   including splenomegaly and moderate ascites. 2. Unremarkable sonographic appearance of the bilateral kidneys. 3. Nonvisualization of the gallbladder, common bile duct, pancreas, aorta,   and IVC. Assessment / Plan :     1. Cirrhosis/pleural effusion/ascities: recurrent. Was held [place] diuretics per [place] with moderate ascites. Renal consulted.  Dr Germán Solano d/w Dr Sandie Christiansen and plan is to hold off on paracentesis with POONAM. Rec:  - low na diet  - dietician teaching low na     2. Neuro: drowsy but awakens. Ammonia nl  Rec:  - continue lactulose goal 3-4 soft stools per day  - continue xifaxan     3 heme: hbg stable, no sign gi bleed. Follow inr.      4. Cirrhosis: from [place]. lft stable     5. Cardio: pro bnp >2,000. Per primary team.    6. POONAM - felt to be HRS per nephrology eval. On midodrine and albumin. Creat improved today.        Discussed with Dr. Juan Yates, [address]    I have personally performed a face to face diagnostic evaluation on this patient. I have interviewed and examined the patient and I agree with the findings and recommended plan of care. In summary, my findings and plan are the following: more alert when I rounded this am, abd soft/mild distended/ascities, creat improved, inr simlar to yesterday, cont tx hepatorenal, hold off diuretics/paracentesis for now.        Joshua Murphy MD  [address] and [facility] [place] [facility]

## 2020-10-28 NOTE — PLAN OF CARE
Problem: Falls - Risk of:  Goal: Will remain free from falls  Description: Will remain free from falls  Outcome: Ongoing  Goal: Absence of physical injury  Description: Absence of physical injury  Outcome: Ongoing     Problem: Skin Integrity:  Goal: Will show no infection signs and symptoms  Description: Will show no infection signs and symptoms  Outcome: Ongoing  Goal: Absence of new skin breakdown  Description: Absence of new skin breakdown  Outcome: Ongoing     Problem: Physical Regulation:  Goal: Ability to maintain clinical measurements within normal limits will improve  Description: Ability to maintain clinical measurements within normal limits will improve  Outcome: Ongoing  Goal: Diagnostic test results will improve  Description: Diagnostic test results will improve  Outcome: Ongoing     Problem: Safety:  Goal: Ability to remain free from injury will improve  Description: Ability to remain free from injury will improve  Outcome: Ongoing     Problem: Pain:  Goal: Pain level will decrease  Description: Pain level will decrease  Outcome: Ongoing  Goal: Control of acute pain  Description: Control of acute pain  Outcome: Ongoing  Goal: Control of chronic pain  Description: Control of chronic pain  Outcome: Ongoing     Problem: Cardiovascular  Goal: No DVT, peripheral vascular complications  Outcome: Ongoing  Goal: Hemodynamic stability  Outcome: Ongoing  Goal: Anticoagulate/Hct stable  Outcome: Ongoing     Problem: Respiratory  Goal: No pulmonary complications  Outcome: Ongoing  Goal: O2 Sat > 90%  Outcome: Ongoing  Goal: Supplemental O2 requirements decreased  Outcome: Ongoing     Problem: GI  Goal: No bowel complications  Outcome: Ongoing     Problem:   Goal: No urinary complication  Outcome: Ongoing     Problem: FLUID AND ELECTROLYTE IMBALANCE  Goal: Fluid and electrolyte balance are achieved/maintained  Outcome: Ongoing

## 2020-10-28 NOTE — PROGRESS NOTES
Office : [phone]     Fax :[phone]       Nephrology progress Note      Patient's Name: Eusebio Hodgkin  8:54 AM  10/28/2020    Reason for Consult: POONAM      Chief Complaint:    Chief Complaint   Patient presents with    Shortness of Breath     shortness of breath for \"a couple months now\". pt states having trouble moving his legs and feet due to swelling and pain. History of Present Ilness:    Eusebio Hodgkin is a 70 y.o. 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 No fever or chills or chest pain or SOB. He was recently admitted at [facility] for evaluation of syncope, associated left-sided rib fractures (ribs 6 through 8), required endotracheal intubation and extubated on 10/19/2020, also had a thoracentesis for drainage of pleural effusion on his most recent hospitalization. On evaluation in the emergency room, he was found to be confused. Systolic blood pressure is on the low 80s in the emergency room. His creatinine level is 1.5   Baseline is 1.1   He has been seen by me in past . Had Hepatorenal syndrome       Interval hx :     Today no SOB   No fever. BP running low. Creatinine better 1.5 -----> 1.3 . I/O last 3 completed shifts:   In: 65 [P.O.:660]  Out: -     Past Medical History:   Diagnosis Date    Acute on chronic diastolic congestive heart failure (HCC)     ANEMIA     input(s): Dawitmarcus Miranda, GLUCOSEU, BILIRUBINUR, Pedro Whalen, BLOODU, PHUR, PROTEINU, UROBILINOGEN, NITRU, LEUKOCYTESUR, Charanjit Laity in the last 72 hours. Urine Microscopic: No results for input(s): LABCAST, BACTERIA, COMU, HYALCAST, WBCUA, RBCUA, EPIU in the last 72 hours. Urine Culture: No results for input(s): LABURIN in the last 72 hours. Urine Chemistry: No results for input(s): Murray Kinds, PROTEINUR, NAUR in the last 72 hours. IMAGING:  US ABDOMEN COMPLETE   Final Result   1. Findings are consistent with chronic cirrhosis and portal hypertension,   including splenomegaly and moderate ascites. 2. Unremarkable sonographic appearance of the bilateral kidneys. 3. Nonvisualization of the gallbladder, common bile duct, pancreas, aorta,   and IVC. XR CHEST PORTABLE   Final Result   1. Bibasilar opacities favored to reflect atelectasis and effusion, left   greater than right. Superimposed infiltrate difficult to entirely exclude in   the appropriate clinical setting. 2. Stable cardiomegaly. Assessment/Plan :      1. POONAM   Has  HRS  continue midodrine   albumin iv today     2. Hypotension . BP better   Continue Midodrine     3. Anemia. Monitor   Transfuse if Hb < 7.0     4. Acid- base disorder. Monitor     5. Hyponatremia 2/2 hypervolemia     6. Liver cirrhosis with ascites.  Once BP allows will start aldactone     Recommend to dose adjust all medications  based on renal functions  Maintain SBP> 90 mmHg   Daily weights   AVOID NSAIDs  Avoid Nephrotoxins  Monitor Intake/Output  Call if significant decrease in urine output         Thank you for allowing us to participate in care of Sima Klein         Electronically signed by: Garnell Bence, MD, 10/28/2020, 8:54 AM      [facility] [address]  Office : [phone]  Fax :[phone]

## 2020-10-28 NOTE — PROGRESS NOTES
Hospitalist Progress Note      PCP: Eduard Carpio MD    Date of Admission: 10/25/2020    Chief Complaint: Shortness of Breath    shortness of breath for \"a couple months now\". pt states having trouble moving his legs and feet due to swelling and pain.         Hospital Course: \"History of Presenting Illness: This is a pleasant 70 y.o. male with history of chronic diastolic CHF, CAD, COPD, cirrhosis, diabetes type 2 with hyperglycemia, chronic respiratory failure with hypoxia on 4 L/min supplemental oxygen, chronic right pleural effusion, cirrhosis secondary to FLOYD, chronic thrombocytopenia, normocytic anemia, who presents to the emergency room with complaints of increasing shortness of breath (chronic), generalized weakness, difficulty with ambulation. No fever or chills or chest pain or SOB. He was recently admitted at [facility] for evaluation of syncope, associated left-sided rib fractures (ribs 6 through 8), required endotracheal intubation and extubated on 10/19/2020, also had a thoracentesis for drainage of pleural effusion on his most recent hospitalization. On evaluation in the emergency room, he was found to be confused. He was also found to have slightly elevated troponin of 0.03, lactic acid of 2.1. Systolic blood pressure is on the low 80s in the emergency room. Patient received a dose of Levaquin in the emergency room (last dose from his recent discharge for mgmt of pneumonia). He currently has fluid bolus that is ongoing. \"     Pt was admitted by [facility] ist service     Palliative care/goals and plans discussion started, Wife has decided on Hospice    Subjective: EMR notes reviewed patient seen and examined.  No acute issues overnight, no pain or shortness of breath    Medications:  Reviewed    Infusion Medications    dextrose       Scheduled Medications    aspirin  81 mg Oral Daily    vitamin B and C  1 tablet Oral Daily    citalopram  20 mg Oral Daily    lactulose 20 g Oral TID    levothyroxine  137 mcg Oral Daily    midodrine  15 mg Oral TID     rifaximin  550 mg Oral BID    insulin lispro  0-6 Units Subcutaneous TID     insulin lispro  0-3 Units Subcutaneous Nightly    sodium chloride flush  10 mL Intravenous 2 times per day    pantoprazole  40 mg Oral QAM AC     PRN Meds: ipratropium-albuterol, glucose, dextrose, glucagon (rDNA), dextrose, sodium chloride flush, acetaminophen **OR** acetaminophen, polyethylene glycol, promethazine **OR** ondansetron      Intake/Output Summary (Last 24 hours) at 10/28/2020 1201  Last data filed at 10/27/2020 1810  Gross per 24 hour   Intake 660 ml   Output --   Net 660 ml       Physical Exam Performed:    BP 94/63   Pulse 92   Temp 97.7 °F (36.5 °C) (Oral)   Resp 16   Ht 5' 11\" (1.803 m)   Wt 207 lb 7.3 oz (94.1 kg)   SpO2 96%   BMI 28.93 kg/m²     General appearance: No apparent distress, appears older than age and chronically ill. HEENT: Pupils equal, round, and reactive to light. Conjunctivae/corneas clear. Neck: Supple, with full range of motion. No jugular venous distention. Trachea midline. Respiratory:  Normal respiratory effort at rest , decreased bilaterally   Cardiovascular: Regular rate and rhythm with normal S1/S2 without murmurs, rubs or gallops. Abdomen: Soft, non-tender, distended with normal bowel sounds. Musculoskeletal: No clubbing, cyanosis. +  edema bilaterally. Full range of motion without deformity. Skin: Skin color, texture, turgor normal.  No rashes or lesions. Neurologic:  Neurovascularly intact without any focal sensory/motor deficits.  Cranial nerves: II-XII intact, grossly non-focal.  Psychiatric: defer   Capillary Refill: Brisk,< 3 seconds   Peripheral Pulses: +2 palpable, equal bilaterally       Labs:   Recent Labs     10/25/20  2022 10/26/20  0659   WBC 7.6 6.2   HGB 10.1* 9.1*   HCT 31.5* 28.5*   PLT 55* 45*     Recent Labs     10/26/20  0659 10/27/20  0419 10/28/20  0904   * 133* 134*   K 3.9 4.1 3.9   CL 98* 100 97*   CO2 31 27 30   BUN 46* 43* 36*   CREATININE 1.5* 1.5* 1.3   CALCIUM 8.4 8.4 8.6   PHOS 2.6 2.9  --      Recent Labs     10/26/20  0659 10/27/20  0419   AST 19 22   ALT 16 16   BILITOT 2.9* 2.7*   ALKPHOS 89 91     Recent Labs     10/25/20  2022 10/27/20  0419   INR 1.78* 1.84*     Recent Labs     10/26/20  0228 10/26/20  0659 10/26/20  1045   TROPONINI 0.02* 0.03* 0.02*       Urinalysis:      Lab Results   Component Value Date    NITRU Negative 10/02/2019    WBCUA 5 09/03/2020    BACTERIA 4+ 01/26/2014    RBCUA 12 09/03/2020    BLOODU Negative 10/02/2019    SPECGRAV 1.024 10/02/2019    GLUCOSEU 250 10/02/2019    GLUCOSEU 500 10/13/2011       Radiology:  US ABDOMEN COMPLETE   Final Result   1. Findings are consistent with chronic cirrhosis and portal hypertension,   including splenomegaly and moderate ascites. 2. Unremarkable sonographic appearance of the bilateral kidneys. 3. Nonvisualization of the gallbladder, common bile duct, pancreas, aorta,   and IVC. XR CHEST PORTABLE   Final Result   1. Bibasilar opacities favored to reflect atelectasis and effusion, left   greater than right. Superimposed infiltrate difficult to entirely exclude in   the appropriate clinical setting. 2. Stable cardiomegaly.          XR CHEST PORTABLE    (Results Pending)           Assessment/Plan:    Active Hospital Problems    Diagnosis    Shortness of breath [R06.02]     Dyspnea- multifactorial in the setting of chronic left pleural effusion, with underlying FLOYD liver disease, CHF, COPD chronically on 4 L per nasal cannula  - s/p thoracentesis x2  - consult Pulmonary - appreciate input   -No worsening signs are additional oxygen requirements needed at this point    Iacute metabolic encephalopathy: Again multifactorial in the no cirrhosis  -Continue rifaximin  -Supportive care    Hypertension: Chronic continue midodrine    Nonzero troponin likely secondary to hypoperfusion continue serial troponins monitor on telemetry  -Suspect mismatch    Lactic acidosis no signs of sepsis on admission  -Lactic 2.1 present on arrival    Chronic diastolic Heart failure: Monitor fluid balance difficult to treat in the setting    Coronary artery disease: Currently without signs of ischemia    Cirrhosis secondary to [facility]  -Not a transplant candidate  -GI consult  -Palliative care consult    Anemia/thrombocytopenia encephalitides monitor closely    Goals and plans of care:   Change to Hospice oriented care. HOC to meet with pt   DVT Prophylaxis: SCDs  Diet: DIET CARB CONTROL; Low Sodium (2 GM); Cardiac  Code Status: DNR-CC    PT/OT Eval Status: Ordered    Dispo-patient is medically ready for discharge to home with hospice care rescue prescription for pain and anxiety placed on chart. Awaiting hospice meeting with wife at bedside to determine DME needs and plans for care.     Radha Glass, NABIL - CNP

## 2020-10-28 NOTE — CARE COORDINATION
SW spoke with palliative care nurse Bean Cook. Per Bean Cook, spouse to meet with [address] this date to discuss bring pt home with [address]     9:10 am- VM to Sunil Sterling with [address] regarding time for meeting. 11:30 am- Call to Sunil Sterling with [address]. Per Lizbeth Layman with [address] covering case. She will have Vidhi call back this SW with update.      Fina Ye MSW, [address]

## 2020-10-28 NOTE — PROGRESS NOTES
Wife Burgess Osorio) called for status; VSS stable through night; pt rec'g albumin; called from pt phone; pt was able to carry on phone call with wife.     Santos Cancion will be in later today

## 2020-10-28 NOTE — PLAN OF CARE
Problem: Falls - Risk of:  Goal: Will remain free from falls  Description: Will remain free from falls  10/28/2020 1541 by Rosie Maxwell RN  Outcome: Ongoing  10/28/2020 0335 by Nikole Simmons RN  Outcome: Ongoing  Goal: Absence of physical injury  Description: Absence of physical injury  10/28/2020 1541 by Rosie Maxwell RN  Outcome: Ongoing  10/28/2020 0335 by Nikole Simmons RN  Outcome: Ongoing     Problem: Skin Integrity:  Goal: Will show no infection signs and symptoms  Description: Will show no infection signs and symptoms  10/28/2020 1541 by Rosie Maxwell RN  Outcome: Ongoing  10/28/2020 0335 by Nikole Simmons RN  Outcome: Ongoing  Goal: Absence of new skin breakdown  Description: Absence of new skin breakdown  10/28/2020 1541 by Rosie Maxwell RN  Outcome: Ongoing  10/28/2020 0335 by Nikole Simmons RN  Outcome: Ongoing     Problem: Physical Regulation:  Goal: Ability to maintain clinical measurements within normal limits will improve  Description: Ability to maintain clinical measurements within normal limits will improve  10/28/2020 1541 by Rosie Maxwell RN  Outcome: Ongoing  10/28/2020 0335 by Nikole Simmons RN  Outcome: Ongoing  Goal: Diagnostic test results will improve  Description: Diagnostic test results will improve  10/28/2020 1541 by Rosie Maxwell RN  Outcome: Ongoing  10/28/2020 0335 by Nikole Simmons RN  Outcome: Ongoing     Problem: Safety:  Goal: Ability to remain free from injury will improve  Description: Ability to remain free from injury will improve  10/28/2020 1541 by Rosie Maxwell RN  Outcome: Ongoing  10/28/2020 0335 by Nikole Simmons RN  Outcome: Ongoing     Problem: Pain:  Goal: Pain level will decrease  Description: Pain level will decrease  10/28/2020 1541 by Rosie Maxwell RN  Outcome: Ongoing  10/28/2020 0335 by Nikole Simmons RN  Outcome: Ongoing  Goal: Control of acute pain  Description: Control of acute pain  10/28/2020 1541 by Kushal Byrnes RN  Outcome: Ongoing  10/28/2020 0335 by Yasmine Killian RN  Outcome: Ongoing  Goal: Control of chronic pain  Description: Control of chronic pain  10/28/2020 1541 by Kushal Byrnes RN  Outcome: Ongoing  10/28/2020 0335 by Yasmine Killian RN  Outcome: Ongoing     Problem: Cardiovascular  Goal: No DVT, peripheral vascular complications  07/48/7821 1541 by Kushal Byrnes RN  Outcome: Ongoing  10/28/2020 0335 by Yasmine Killian RN  Outcome: Ongoing  Goal: Hemodynamic stability  10/28/2020 1541 by Kushal Byrnes RN  Outcome: Ongoing  10/28/2020 0335 by Yasmine Killian RN  Outcome: Ongoing  Goal: Anticoagulate/Hct stable  10/28/2020 1541 by Kushal Byrnes RN  Outcome: Ongoing  10/28/2020 0335 by Yasmine Killian RN  Outcome: Ongoing     Problem: Respiratory  Goal: No pulmonary complications  51/58/7985 1541 by Kushal Byrnes RN  Outcome: Ongoing  10/28/2020 0335 by Yasmine Killian RN  Outcome: Ongoing  Goal: O2 Sat > 90%  10/28/2020 1541 by Kushal Byrnes RN  Outcome: Ongoing  10/28/2020 0335 by Yasmine Killian RN  Outcome: Ongoing  Goal: Supplemental O2 requirements decreased  10/28/2020 1541 by Kushal Byrnes RN  Outcome: Ongoing  10/28/2020 0335 by Yasmine Killian RN  Outcome: Ongoing     Problem: GI  Goal: No bowel complications  04/77/9747 1541 by Kushal Byrnes RN  Outcome: Ongoing  10/28/2020 0335 by Yasmine Killian RN  Outcome: Ongoing     Problem:   Goal: No urinary complication  77/36/8313 1541 by Kushal Byrnes RN  Outcome: Ongoing  10/28/2020 0335 by Yasmine Killian RN  Outcome: Ongoing     Problem: FLUID AND ELECTROLYTE IMBALANCE  Goal: Fluid and electrolyte balance are achieved/maintained  10/28/2020 1541 by Kushal Byrnes RN  Outcome: Ongoing  10/28/2020 0335 by Yasmine Killian RN  Outcome: Ongoing

## 2020-10-28 NOTE — CARE COORDINATION
SW spoke with Yusuf Cornejo at [facility] who states DME being delivered tonight and she will back back to hospital in AM to set up transport to home.     Terence Martinez MSW, [address]

## 2020-10-29 NOTE — DISCHARGE INSTR - COC
Continuity of Care Form    Patient Name: Efra Flowers   :    MRN:  [id number]    Admit date:  10/25/2020  Discharge date:  10/29/20    Code Status Order: [facility]   Advance Directives:   [facility] Documentation       Date/Time Healthcare Directive Type of Healthcare Directive Copy in [address] Agent's Name Healthcare Agent's Phone Number    10/26/20 0045  Yes, patient has an advance directive for healthcare treatment  Health care treatment directive  Yes, copy in chart given to ED  --  --  --            Admitting Physician:  Maritza Oquendo MD  PCP: Hero Hoskins MD    Discharging Nurse: [facility] Unit/Room#: 3VH-9842/7831-26  Discharging Unit Phone Number: [phone]    Emergency Contact:   Extended Emergency Contact Information  Primary Emergency Contact: [facility] [place] [facility]  Address: [address] Phone: [phone]  Mobile Phone: [phone]  Relation: Spouse  Secondary Emergency Contact:  [facility] [address] Phone: [phone]  Mobile Phone: [phone]  Relation: Child    Past Surgical History:  Past Surgical History:   Procedure Laterality Date    ACHILLES TENDON SURGERY      CARDIAC SURGERY      3 vessel cabg    CHOLECYSTECTOMY      COLONOSCOPY      CORONARY ARTERY BYPASS GRAFT  10/2011    cabg x3    ENDOSCOPY, COLON, DIAGNOSTIC      PTCA  2012    PTCA RCA & RPL    TONSILLECTOMY      UPPER GASTROINTESTINAL ENDOSCOPY  2012    multiple thin linear esophageal mucosal breaks    UPPER GASTROINTESTINAL ENDOSCOPY  3/6/2014    UPPER GASTROINTESTINAL ENDOSCOPY N/A 2019    EGD BAND LIGATION performed by Cleo Li MD at [facility] 7/15/2019    EGD BIOPSY performed by Cleo Li MD at [place] [id number] 7/15/2019    EGD BAND LIGATION performed by Markus Pierre MD at [id number] [facility] ENDOSCOPY       Immunization History:   Immunization History   Administered Date(s) Administered    Influenza 11/02/2012    Influenza Virus Vaccine 10/26/2011, 10/16/2013, 10/13/2018    Influenza, High Dose (Fluzone 65 yrs and older) 09/16/2014, 08/31/2016    Influenza, [place], IM, PF (6 mo and older Fluzone, Flulaval, Fluarix, and 3 yrs and older Afluria) 10/13/2018    Influenza, Triv, inactivated, subunit, adjuvanted, IM (Fluad 65 yrs and older) 11/06/2019    Pneumococcal Conjugate 13-valent (Adnxazx63) 06/15/2016    Pneumococcal Polysaccharide (Veqftpcox92) 10/11/2006, 05/14/2013       Active Problems:  Patient Active Problem List   Diagnosis Code    Type 2 diabetes mellitus ([facility] [address] E11.9    CAD (coronary artery disease) I25.10    HTN (hypertension) I10    HLD (hyperlipidemia) E78.5    DMITRIY (obstructive sleep apnea) G47.33    Depression F32.9    Liver disease K76.9    Paroxysmal atrial fibrillation (HCC) I48.0    Cholelithiasis K80.20    Thrombocytopenia (HCC) D69.6    Esophageal bleeding not due to varices K22.8    Pancytopenia (HCC) D61.818    Warfarin-induced coagulopathy (HCC) D68.32, T45.515A    Hepatic encephalopathy (HCC) K72.90    Cirrhosis of liver with ascites (HCC) K74.60, R18.8    Generalized weakness R53.1    Hypotension, postural I95.1    Hyperammonemia (HCC) E72.20    Malaise and fatigue R53.81, R53.83    Chronic diastolic congestive heart failure (HCC) I50.32    Nonrheumatic mitral valve regurgitation I34.0    Chronic obstructive pulmonary disease (HCC) J44.9    Shortness of breath R06.02       Isolation/Infection:   Isolation            No Isolation          Patient Infection Status       Infection Onset Added Last Indicated Last Indicated By Review Planned Expiration Resolved Resolved By    None active    Resolved    COVID-19 Rule Out 04/08/20 04/08/20 04/08/20 Emergent Disease Panel (Ordered) 04/14/20 Rule-Out Test Resulted            Nurse Assessment:  Last Vital Signs: /77   Pulse 95   Temp 98 °F (36.7 °C) (Oral)   Resp 18   Ht 5' 11\" (1.803 m)   Wt 207 lb 3.7 oz (94 kg)   SpO2 98%   BMI 28.90 kg/m²     Last documented pain score (0-10 scale): Pain Level: 10  Last Weight:   Wt Readings from Last 1 Encounters:   10/29/20 207 lb 3.7 oz (94 kg)     Mental Status:  oriented and alert    IV Access:  - None    Nursing Mobility/ADLs:  Walking   Dependent  Transfer  Dependent  Bathing  Dependent  Dressing  Dependent  Toileting  Dependent  Feeding  Assisted  Med Admin  Dependent  Med Delivery   whole    Wound Care Documentation and Therapy:        Elimination:  Continence:   · Bowel: No  · Bladder: No  Urinary Catheter: None   Colostomy/Ileostomy/Ileal Conduit: No       Date of Last BM: unknown    Intake/Output Summary (Last 24 hours) at 10/29/2020 1241  Last data filed at 10/29/2020 0837  Gross per 24 hour   Intake 665 ml   Output 300 ml   Net 365 ml     I/O last 3 completed shifts: In: 840 [P.O.:840]  Out: 300 [Urine:300]    Safety Concerns:     None and At Risk for Falls    Impairments/Disabilities:      Hearing    Nutrition Therapy:  Current Nutrition Therapy:   [address] Diet List:[id number]:::0}    Routes of Feeding: Oral  Liquids: Thin Liquids  Daily Fluid Restriction: no  Last Modified Barium Swallow with Video (Video Swallowing Test): not done    Treatments at the Time of Hospital Discharge:   Respiratory Treatments:   Oxygen Therapy:  is not on home oxygen therapy. Ventilator:    - No ventilator support    Rehab Therapies: keep comfortably  Weight Bearing Status/Restrictions: No weight bearing restirctions  Other Medical Equipment (for information only, NOT a DME order):  hospital bed  Other Treatments: breathing treatments/pain management.      Patient's personal belongings (please select all that are sent with patient):  None    RN SIGNATURE:  Electronically signed by Hoang Saldana RN on 10/29/20 at 1:25 PM EDT    CASE MANAGEMENT/SOCIAL WORK SECTION    Inpatient Status Date:     Readmission Risk Assessment Score:  Readmission Risk              Risk of Unplanned Readmission:        27           Discharging to Facility/ Agency   · Name:   · Address:  · Phone:  · Fax:    Dialysis Facility (if applicable)   · Name:  · Address:  · Dialysis Schedule:  · Phone:  · Fax:    / signature: {Esignature:[id number]:::0}    PHYSICIAN SECTION    Prognosis: Poor    Condition at Discharge: Terminal    Rehab Potential (if transferring to Rehab): {Prognosis:[id number]:::0}    Recommended Labs or Other Treatments After Discharge:   Recommended Follow-up, Labs or Other Treatments After Discharge:    Hospice Care                Physician Certification: I certify the above information and transfer of Chen Harper  is necessary for the continuing treatment of the diagnosis listed and that he requires Hospice for greater 30 days.      Update Admission H&P: Changes in H&P as follows -      PHYSICIAN SIGNATURE:  Electronically signed by NABIL Sibley CNP on 10/29/20 at 12:42 PM EDT

## 2020-10-29 NOTE — PROGRESS NOTES
[facility] GI  Gastroenterology Progress Note    Carine Evans is a 70 y.o. male patient. Active Problems:    Shortness of breath  Resolved Problems:    * No resolved hospital problems. *      SUBJECTIVE:  No abdominal pain. ROS:  No fever, chills  No chest pain, palpitations  + mild SOB. No cough  Gastrointestinal ROS: no N/V     Physical    VITALS:  /77   Pulse 95   Temp 98 °F (36.7 °C) (Oral)   Resp 19   Ht 5' 11\" (1.803 m)   Wt 207 lb 3.7 oz (94 kg)   SpO2 98%   BMI 28.90 kg/m²   TEMPERATURE:  Current - Temp: 98 °F (36.7 °C); Max - Temp  Av.2 °F (36.8 °C)  Min: 98 °F (36.7 °C)  Max: 98.7 °F (37.1 °C)    NAD  Regular rate   Lungs CTA   Abdomen soft, ND, mild distended with fluid wave,  Bowel sounds normal.    Data    Data Review:    No results for input(s): WBC, HGB, HCT, MCV, PLT in the last 72 hours. Recent Labs     10/27/20  0419 10/28/20  0904   * 134*   K 4.1 3.9    97*   CO2 27 30   PHOS 2.9  --    BUN 43* 36*   CREATININE 1.5* 1.3     Recent Labs     10/27/20  0419   AST 22   ALT 16   BILITOT 2.7*   ALKPHOS 91     No results for input(s): LIPASE, AMYLASE in the last 72 hours. Recent Labs     10/27/20  0419   PROTIME 21.5*   INR 1.84*     No results for input(s): PTT in the last 72 hours. US 10/27  Impression:      1. Findings are consistent with chronic cirrhosis and portal hypertension,   including splenomegaly and moderate ascites. 2. Unremarkable sonographic appearance of the bilateral kidneys. 3. Nonvisualization of the gallbladder, common bile duct, pancreas, aorta,   and IVC. Assessment / Plan :     1. Cirrhosis/pleural effusion/ascities: recurrent. Was held off diuretics per [place] US with moderate ascites. Renal consulted. Dr Pemberton Miss d/w Dr Nadine Krause and plan was to hold off on paracentesis with POONAM. Abd distention stable today. Rec:  - low na diet  - dietician teaching low na     2. Neuro: drowsy but awakens.  Ammonia nl  Rec:  - continue lactulose goal 3-4 soft stools per day  - continue xifaxan     3 heme: hbg stable, no sign gi bleed.       4. Cirrhosis: from [place]. lft stable.       5. Cardio: pro bnp >2,000. Per primary team.    6. POONAM - felt to be HRS per nephrology eval. On midodrine and albumin.       Per report, plan is for d/c home with hospice. Will be available if needed. Discussed with Dr. Muriel Bence, [facility]    I have personally performed a face to face diagnostic evaluation on this patient. I have interviewed and examined the patient and I agree with the findings and recommended plan of care. In summary, my findings and plan are the following: I spoke with pts wife and examined pt prior to dc, no abd pain. In d/w wife pt just wants go home, tired of hospital admits and deteriorated clinical status, with cirrhosis/pleural effusions/ tenuous renal status they spoke with hospice about home hospice.        John Alvarado MD  [address] and [address]

## 2020-10-29 NOTE — PROGRESS NOTES
[facility] Pulmonary and Critical Care   Progress Note      Reason for Consult: Pleural effusion  Requesting Physician: Dr Melene Olszewski:   [facility]:                The patient is a 70 y.o. male with significant past medical history of:      Diagnosis Date    Acute on chronic diastolic congestive heart failure ([address]     ANEMIA     CAD (coronary artery disease) 10/12/2011    Chronic obstructive pulmonary disease (HCC)     Cir[facility]osis [facility]     Depression     Diabetes mellitus ([facility]     Esophageal bleed, non-variceal 11/23/2012    Hypertension     HYPOTHYROIDISM     Liver disease 1/6/2012    Mixed hyperlipidemia     NEUROPATHY     Non-ST elevation MI (NSTEMI) ([address] 11/21/2012    Pancytopenia ([facility]     Paroxysmal atrial fibrillation ([facility] 1/6/2012    Pneumonia     Sleep apnea     no cpap    Thrombocytopenia (HCC)     Thyroid disease      Interval History:He looks about the same but his CXR is worse. His BP is also soft and has limited his ability to accept diuretics.       Past Surgical History:        Procedure Laterality Date    ACHILLES TENDON SURGERY      CARDIAC SURGERY      3 vessel cabg    CHOLECYSTECTOMY      COLONOSCOPY      CORONARY ARTERY BYPASS GRAFT  10/2011    cabg x3    ENDOSCOPY, COLON, DIAGNOSTIC      PTCA  11/23/2012    PTCA RCA & RPL    TONSILLECTOMY      UPPER GASTROINTESTINAL ENDOSCOPY  11/23/2012    multiple thin linear esophageal mucosal breaks    UPPER GASTROINTESTINAL ENDOSCOPY  3/6/2014    UPPER GASTROINTESTINAL ENDOSCOPY N/A 6/7/2019    EGD BAND LIGATION performed by Rhiannon Marx MD at [facility] 5 7/15/2019    EGD BIOPSY performed by Rhiannon Marx MD at [facility] N/A 7/15/2019    EGD BAND LIGATION performed by Rhiannon Marx MD at [address] [facility] ENDOSCOPY     Current Medications:    Current Facility-Administered Medications: spironolactone (ALDACTONE) tablet 25 mg, 25 mg, Oral, Daily  aspirin chewable tablet 81 mg, 81 mg, Oral, Daily  vitamin B and C (TOTAL B-C) 1 tablet, 1 tablet, Oral, Daily  citalopram (CELEXA) tablet 20 mg, 20 mg, Oral, Daily  lactulose (CHRONULAC) 10 GM/15ML solution 20 g, 20 g, Oral, TID  ipratropium-albuterol (DUONEB) nebulizer solution 1 ampule, 1 ampule, Inhalation, Q4H PRN  levothyroxine (SYNTHROID) tablet 137 mcg, 137 mcg, Oral, Daily  midodrine (PROAMATINE) tablet 15 mg, 15 mg, Oral, TID WC  rifaximin (XIFAXAN) tablet 550 mg, 550 mg, Oral, BID  insulin lispro (1 Unit Dial) 0-6 Units, 0-6 Units, Subcutaneous, TID WC  insulin lispro (1 Unit Dial) 0-3 Units, 0-3 Units, Subcutaneous, Nightly  glucose (GLUTOSE) 40 % oral gel 15 g, 15 g, Oral, PRN  dextrose 50 % IV solution, 12.5 g, Intravenous, PRN  glucagon (rDNA) injection 1 mg, 1 mg, Intramuscular, PRN  dextrose 5 % solution, 100 mL/hr, Intravenous, PRN  sodium chloride flush 0.9 % injection 10 mL, 10 mL, Intravenous, 2 times per day  sodium chloride flush 0.9 % injection 10 mL, 10 mL, Intravenous, PRN  acetaminophen (TYLENOL) tablet 650 mg, 650 mg, Oral, Q6H PRN **OR** acetaminophen (TYLENOL) suppository 650 mg, 650 mg, Rectal, Q6H PRN  polyethylene glycol (GLYCOLAX) packet 17 g, 17 g, Oral, Daily PRN  promethazine (PHENERGAN) tablet 12.5 mg, 12.5 mg, Oral, Q6H PRN **OR** ondansetron (ZOFRAN) injection 4 mg, 4 mg, Intravenous, Q6H PRN  pantoprazole (PROTONIX) tablet 40 mg, 40 mg, Oral, QAM AC    Allergies   Allergen Reactions    Cephalexin Hives    Ciprocinonide [Fluocinolone]     Quinolones Other (See Comments)     Confusion      Statins Other (See Comments)     Liver toxicity       Social History:    TOBACCO:   reports that he quit smoking about 7 months ago. His smoking use included cigarettes. He started smoking about 55 years ago. He has a 19.35 pack-year smoking history. He has never used smokeless tobacco.  ETOH:   reports no history of alcohol use.   Patient currently lives independently  Environmental/chemical exposure: None known    Family History:       Problem Relation Age of Onset    Heart Disease Mother         arrythmia    Diabetes Father     Cancer Father     Heart Disease Father     Anesth Problems Neg Hx     Malig Hyperten Neg Hx     Hypotension Neg Hx     Malig Hypertherm Neg Hx     Pseudochol. Deficiency Neg Hx      REVIEW OF SYSTEMS:    CONSTITUTIONAL:  negative for fevers, chills, diaphoresis, activity change, appetite change, fatigue, night sweats and unexpected weight change. EYES:  negative for blurred vision, eye discharge, visual disturbance and icterus  HEENT:  negative for hearing loss, tinnitus, ear drainage, sinus pressure, nasal congestion, epistaxis and snoring  RESPIRATORY:  See HPI  CARDIOVASCULAR:  negative for chest pain, palpitations, exertional chest pressure/discomfort, edema, syncope  GASTROINTESTINAL:  negative for nausea, vomiting, diarrhea, constipation, blood in stool and abdominal pain  GENITOURINARY:  negative for frequency, dysuria, urinary incontinence, decreased urine volume, and hematuria  HEMATOLOGIC/LYMPHATIC:  negative for easy bruising, bleeding and lymphadenopathy  ALLERGIC/IMMUNOLOGIC:  negative for recurrent infections, angioedema, anaphylaxis and drug reactions  ENDOCRINE:  negative for weight changes and diabetic symptoms including polyuria, polydipsia and polyphagia  MUSCULOSKELETAL:  negative for  pain, joint swelling, decreased range of motion and muscle weakness  NEUROLOGICAL:  negative for headaches, slurred speech, unilateral weakness  PSYCHIATRIC/BEHAVIORAL: negative for hallucinations, behavioral problems, confusion and agitation.      Objective:   PHYSICAL EXAM:      VITALS:  /77   Pulse 95   Temp 98 °F (36.7 °C) (Oral)   Resp 18   Ht 5' 11\" (1.803 m)   Wt 207 lb 3.7 oz (94 kg)   SpO2 98%   BMI 28.90 kg/m²      24HR INTAKE/OUTPUT:      Intake/Output Summary (Last 24 hours) at 10/29/2020 1104  Last data filed at 10/29/2020 3260  Gross per 24 hour   Intake 665 ml   Output 300 ml   Net 365 ml     CONSTITUTIONAL:  awake, alert, cooperative, no apparent distress, and appears stated age  NECK:  Supple, symmetrical, trachea midline, no adenopathy, thyroid symmetric, not enlarged and no tenderness, skin normal  LUNGS:  no increased work of breathing and decreased on the right to auscultation. No accessory muscle use  CARDIOVASCULAR: S1 and S2, no edema and no JVD  ABDOMEN:  normal bowel sounds, non-distended and no masses palpated, and no tenderness to palpation. No hepatospleenomegaly  LYMPHADENOPATHY:  no axillary or supraclavicular adenopathy. No cervical adnenopathy  PSYCHIATRIC: Oriented to person place and time. No obvious depression or anxiety. MUSCULOSKELETAL: No obvious misalignment or effusion of the joints. No clubbing, cyanosis of the digits. SKIN:  normal skin color, texture, turgor and no redness, warmth, or swelling. No palpable nodules    DATA:    Old records have been reviewed    CBC:  No results for input(s): WBC, RBC, HGB, HCT, PLT, MCV, MCH, MCHC, RDW, NRBC, SEGSPCT, BANDSPCT in the last 72 hours. BMP:  Recent Labs     10/27/20  0419 10/28/20  0904 10/29/20  1020   * 134* 136   K 4.1 3.9 4.0    97* 99   CO2 27 30 30   BUN 43* 36* 30*   CREATININE 1.5* 1.3 1.1   CALCIUM 8.4 8.6 8.6   GLUCOSE 160* 176* 231*      ABG:  No results for input(s): PHART, IST3KCF, PO2ART, LSY3AQL, M4THPPMZ, BEART in the last 72 hours. Lab Results   Component Value Date     (H) 11/08/2011     Lab Results   Component Value Date    CKTOTAL 78 04/08/2020    TROPONINI 0.02 (H) 10/26/2020       Cultures:     Abx:    Radiology Review:  All pertinent images / reports were reviewed as a part of this visit. 1. Recurrent pleural effusion  2. Recurrent ascites  3. Cirrhosis  4. History of COPD  5.  Chronic hypoxemic respiratory failure    I have reviewed laboratories, medical records and images for this visit  He has been stable on 4 L/min nasal cannula oxygen supplement.   However, his exam is more congested  CXR looks like pulmonary edema and worse right effusion  May have paracentesis   Challenging management  Discussed with Dr Quiñonez Anis

## 2020-10-29 NOTE — PROGRESS NOTES
Patient resting in bed at this time, bed in lowest position. Call light with in reach. Wife at bedside. No further needs. Tami care done.

## 2020-10-29 NOTE — PROGRESS NOTES
CLINICAL PHARMACY NOTE: MEDS TO [facility] [employer] [facility] Select Patient?: No  Total # of Prescriptions Filled: 2   The following medications were delivered to the patient:  · Morphine sulfate   · Lorazepam 1mg  Total # of Interventions Completed: 0  Time Spent (min): 15    Additional Documentation:    Mike Gonzalez RN from Hospice picked up from [address] [place] [address]

## 2020-10-29 NOTE — DISCHARGE SUMMARY
Hospital Medicine Discharge Summary    Patient ID: Phyllis Ramos      Patient's PCP: Sheri Echols MD    Admit Date: 10/25/2020     Discharge Date:  10/29/2020    Admitting Physician: Matthias Cerda MD     Discharge Physician: NABIL Chapman - CNP     Discharge Diagnoses: Active Hospital Problems    Diagnosis    Shortness of breath [R06.02]       The patient was seen and examined on day of discharge and this discharge summary is in conjunction with any daily progress note from day of discharge. Hospital Course: \"History of Presenting Illness: This is a pleasant 75 y. o. male with history of chronic diastolic CHF, CAD, COPD, cirrhosis, diabetes type 2 with hyperglycemia, chronic respiratory failure with hypoxia on 4 L/min supplemental oxygen, chronic right pleural effusion, cirrhosis secondary to FLOYD, chronic thrombocytopenia, normocytic anemia, who presents to the emergency room with complaints of increasing shortness of breath (chronic), generalized weakness, difficulty with ambulation.  No fever or chills or chest pain or SOB. He was recently admitted at [facility] for evaluation of syncope, associated left-sided rib fractures (ribs 6 through 8), required endotracheal intubation and extubated on 10/19/2020, also had a thoracentesis for drainage of pleural effusion on his most recent hospitalization.  On evaluation in the emergency room, he was found to be confused. [facility] was also found to have slightly elevated troponin of 0.03, lactic acid of 2.1.  Systolic blood pressure is on the low 80s in the emergency room.  Patient received a dose of Levaquin in the emergency room (last dose from his recent discharge for mgmt of pneumonia).  He currently has fluid bolus that is ongoing. \"     Pt was admitted by [facility] ist service      Palliative care/goals and plans discussion started, Wife has decided on Hospice.  Meet [facility] Hospice rep, transferred to home with [facility]  Dyspnea- multifactorial in the setting of chronic left pleural effusion, with underlying FLOYD liver disease, CHF, COPD chronically on 4 L per nasal cannula  - s/p thoracentesis x2  - consult Pulmonary - appreciate input   -No worsening signs are additional oxygen requirements needed at this point     Iacute metabolic encephalopathy: Again multifactorial in the no cirrhosis  -Continue rifaximin  -Supportive care     Hypertension: Chronic continue midodrine     Nonzero troponin likely secondary to hypoperfusion continue serial troponins monitor on telemetry  -Suspect mismatch     Lactic acidosis no signs of sepsis on admission  -Lactic 2.1 present on arrival     Chronic diastolic Heart failure: Monitor fluid balance difficult to treat in the setting     Coronary artery disease: Currently without signs of ischemia     Cirrhosis secondary to [place] Carine  -Not a transplant candidate  -GI consult  -Palliative care consult     Anemia/thrombocytopenia encephalitides monitor closely         Labs: For convenience and continuity at follow-up the following most recent labs are provided:      CBC:    Lab Results   Component Value Date    WBC 6.2 10/26/2020    HGB 9.1 10/26/2020    HCT 28.5 10/26/2020    PLT 45 10/26/2020       Renal:    Lab Results   Component Value Date     10/29/2020    K 4.0 10/29/2020    K 4.4 09/01/2020    CL 99 10/29/2020    CO2 30 10/29/2020    BUN 30 10/29/2020    CREATININE 1.1 10/29/2020    CALCIUM 8.6 10/29/2020    PHOS 2.9 10/27/2020         Significant Diagnostic Studies    Radiology:   XR CHEST PORTABLE   Final Result   Increasing pulmonary edema as compared to prior. Pneumonia is not excluded   in the appropriate clinical setting. Small bilateral pleural effusions,   right greater than left. US ABDOMEN COMPLETE   Final Result   1. Findings are consistent with chronic cirrhosis and portal hypertension,   including splenomegaly and moderate ascites.    2. Unremarkable sonographic appearance of the bilateral kidneys. 3. Nonvisualization of the gallbladder, common bile duct, pancreas, aorta,   and IVC. XR CHEST PORTABLE   Final Result   1. Bibasilar opacities favored to reflect atelectasis and effusion, left   greater than right. Superimposed infiltrate difficult to entirely exclude in   the appropriate clinical setting. 2. Stable cardiomegaly. Consults:     IP CONSULT TO PULMONOLOGY  IP CONSULT TO PULMONOLOGY  IP CONSULT TO PALLIATIVE CARE  IP CONSULT TO GI  IP CONSULT TO NEPHROLOGY  IP CONSULT TO DIETITIAN  IP CONSULT TO HOSPICE    Disposition:  Home with hospice     Condition at Discharge: Terminal    Discharge Instructions/Follow-up:  Hospice care     Code Status:  DNR-CC    Activity: activity as tolerated    Diet: as tolerated       Discharge Medications:     Current Discharge Medication List           Details   pantoprazole (PROTONIX) 40 MG tablet Take 1 tablet by mouth every morning (before breakfast)  Qty: 30 tablet, Refills: 3      morphine sulfate 20 MG/ML concentrated oral solution Take 0.5 mLs by mouth every 2 hours as needed for Pain for up to 7 days. Qty: 30 mL, Refills: 0    Comments: Reduce doses taken as pain becomes manageable  Associated Diagnoses: Hospice care patient      LORazepam (ATIVAN) 1 MG tablet Take 1 tablet by mouth every 6 hours as needed for Anxiety for up to 30 days.   Qty: 10 tablet, Refills: 0    Associated Diagnoses: Hospice care              Details   spironolactone (ALDACTONE) 25 MG tablet Take 1 tablet by mouth daily  Qty: 30 tablet, Refills: 0      midodrine (PROAMATINE) 5 MG tablet Take 3 tablets by mouth 3 times daily (with meals)  Qty: 90 tablet, Refills: 3              Details   furosemide (LASIX) 40 MG tablet Take 1 tablet by mouth daily  Qty: 30 tablet, Refills: 0      levothyroxine (SYNTHROID) 137 MCG tablet Take 1 tablet by mouth Daily  Qty: 30 tablet, Refills: 0      ONGLYZA 5 MG TABS tablet Take 5 mg by mouth daily       citalopram (CELEXA) 20 MG tablet TAKE ONE TABLET BY MOUTH DAILY  Qty: 90 tablet, Refills: 2      glipiZIDE (GLUCOTROL) 5 MG tablet TAKE ONE TABLET BY MOUTH TWICE A DAY BEFORE MEALS  Qty: 180 tablet, Refills: 2      cyanocobalamin 1000 MCG/ML injection INJECT 1 ML INTRAMUSCULARLY ONCE EVERY 30 DAYS  Qty: 10 mL, Refills: 1      B-D 3CC LUER-ALEXIS SYR 25GX1\" 25G X 1\" 3 ML MISC USE WITH VITAMIN B-12 INJECTIONS  Qty: 12 each, Refills: 2      lactulose (CHRONULAC) 10 GM/15ML solution Take 30 mLs by mouth 3 times daily  Qty: 5 mL, Refills: 0      CINNAMON PO Take 1,000 mg by mouth daily      b complex vitamins capsule Take 1 capsule by mouth daily      MILK THISTLE PO Take 1,000 mg by mouth daily       XIFAXAN 550 MG tablet 2 times daily       Omega-3 Fatty Acids (FISH OIL) 500 MG CAPS Take 1,000 mg by mouth daily       aspirin 81 MG tablet Take 81 mg by mouth daily. Time Spent on discharge is more than 30 minutes in the examination, evaluation, counseling and review of medications and discharge plan. Signed:    Eneida Escamilla, NABIL - CNP   10/29/2020      Thank you Steve Hameed MD for the opportunity to be involved in this patient's care. If you have any questions or concerns please feel free to contact me at [phone].

## 2020-10-29 NOTE — PROGRESS NOTES
Office : [phone]     Fax :[phone]       Nephrology progress Note      Patient's Name: Kaye Wesley  11:01 AM  10/29/2020    Reason for Consult: POONAM      Chief Complaint:    Chief Complaint   Patient presents with    Shortness of Breath     shortness of breath for \"a couple months now\". pt states having trouble moving his legs and feet due to swelling and pain. History of Present Ilness:    Kaye Wesley is a 70 y.o. 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 No fever or chills or chest pain or SOB. He was recently admitted at [facility] for evaluation of syncope, associated left-sided rib fractures (ribs 6 through 8), required endotracheal intubation and extubated on 10/19/2020, also had a thoracentesis for drainage of pleural effusion on his most recent hospitalization. On evaluation in the emergency room, he was found to be confused. Systolic blood pressure is on the low 80s in the emergency room. His creatinine level is 1.5   Baseline is 1.1   He has been seen by me in past . Had Hepatorenal syndrome       Interval hx :     Today no SOB   No fever. BP running low. Creatinine better 1.5 -----> 1.3----> 1.1. I/O last 3 completed shifts:   In: 5 [P.O.:840]  Out: 5 [Urine:300]    Past Medical History:   Diagnosis Date    [facility] on chronic diastolic [facility] ([facility]     ANEMIA     CAD (coronary artery disease) 10/12/2011    Chronic obstructive pulmonary disease (HCC)     Cirrhosis ([facility]     Depression     [facility]     Esophageal bleed, non-variceal 11/23/2012    Hypertension     HYPOTHYROIDISM     Liver disease 1/6/2012    Mixed hyperlipidemia     NEUROPATHY     Non-ST elevation MI (NSTEMI) ([facility] 11/21/2012    Pancytopenia ([facility]     Paroxysmal atrial fibrillation ([facility] 1/6/2012    Pneumonia     Sleep apnea     no cpap    Thrombocytopenia ([facility]     Thyroid disease        Past Surgical History:   Procedure Laterality Date    ACHILLES TENDON SURGERY      CARDIAC SURGERY      3 vessel cabg    CHOLECYSTECTOMY      COLONOSCOPY      CORONARY ARTERY BYPASS GRAFT  10/2011    cabg x3    ENDOSCOPY, COLON, DIAGNOSTIC      PTCA  11/23/2012    PTCA RCA & RPL    TONSILLECTOMY      UPPER GASTROINTESTINAL ENDOSCOPY  11/23/2012    multiple thin linear esophageal mucosal breaks    UPPER GASTROINTESTINAL ENDOSCOPY  3/6/2014    UPPER GASTROINTESTINAL ENDOSCOPY N/A 6/7/2019    EGD BAND LIGATION performed by Juan Francisco Hilliard MD at [address] 7/15/2019    EGD BIOPSY performed by Juan Francisco Hilliard MD at [address] 7/15/2019    EGD BAND LIGATION performed by Juan Francisco Hilliard MD at [address] [place] [address]         Current Medications:    spironolactone (ALDACTONE) tablet 25 mg, Daily  aspirin chewable tablet 81 mg, Daily  vitamin B and C (TOTAL B-C) 1 tablet, Daily  citalopram (CELEXA) tablet 20 mg, Daily  lactulose (CHRONULAC) 10 GM/15ML solution 20 g, TID  ipratropium-albuterol (DUONEB) nebulizer solution 1 ampule, Q4H PRN  levothyroxine (SYNTHROID) tablet 137 mcg, Daily  midodrine (PROAMATINE) tablet 15 mg, TID WC  rifaximin (XIFAXAN) tablet 550 mg, BID  insulin lispro (1 Unit Dial) 0-6 Units, TID WC  insulin lispro (1 Unit Dial) 0-3 Units, Nightly  glucose (GLUTOSE) 40 % oral gel 15 g, PRN  dextrose 50 % IV solution, PRN  glucagon (rDNA) injection 1 mg, PRN  dextrose 5 % solution, PRN  sodium chloride flush 0.9 % injection 10 mL, 2 times per day  sodium chloride flush 0.9 % injection 10 mL, PRN  acetaminophen (TYLENOL) tablet 650 mg, Q6H PRN    Or  acetaminophen (TYLENOL) suppository 650 mg, Q6H PRN  polyethylene glycol (GLYCOLAX) packet 17 g, Daily PRN  promethazine (PHENERGAN) tablet 12.5 mg, Q6H PRN    Or  ondansetron (ZOFRAN) injection 4 mg, Q6H PRN  pantoprazole (PROTONIX) tablet 40 mg, QAM AC          Physical exam:     Vitals:  /77   Pulse 95   Temp 98 °F (36.7 °C) (Oral)   Resp 18   Ht 5' 11\" (1.803 m)   Wt 207 lb 3.7 oz (94 kg)   SpO2 98%   BMI 28.90 kg/m²   Constitutional:  OAA X3 NAD  Skin: no rash, turgor wnl  Heent:  eomi, mmm  Neck: no bruits or jvd noted  Cardiovascular:  S1, S2 without m/r/g  Respiratory: CTA B without w/r/r  Abdomen:  +bs, soft, nt, mild distention   Ext: mild  lower extremity edema    Labs:  CBC:   No results for input(s): WBC, HGB, PLT in the last 72 hours. BMP:    Recent Labs     10/27/20  0419 10/28/20  0904 10/29/20  1020   * 134* 136   K 4.1 3.9 4.0    97* 99   CO2 27 30 30   BUN 43* 36* 30*   CREATININE 1.5* 1.3 1.1   GLUCOSE 160* 176* 231*     Ca/Mg/Phos:   Recent Labs     10/27/20  0419 10/28/20  0904 10/29/20  1020   CALCIUM 8.4 8.6 8.6   MG 2.60*  --   --    PHOS 2.9  --   --      Hepatic:   Recent Labs     10/27/20  0419   AST 22   ALT 16   BILITOT 2.7*   ALKPHOS 91     Troponin:   No results for input(s): TROPONINI in the last 72 hours. BNP: No results for input(s): BNP in the last 72 hours. Lipids: No results for input(s): CHOL, TRIG, HDL, LDLCALC, LABVLDL in the last 72 hours. ABGs: No results for input(s): PHART, PO2ART, SFH7UGG in the last 72 hours.   INR:   Recent Labs     10/27/20  0419   INR 1.84*     UA:No results for input(s): Daron Borja, GLUCOSEU, BILIRUBINUR, Fransisca Buchanan, Trenia Moots, PROTEINU, UROBILINOGEN, NITRU, LEUKOCYTESUR, Madalyn Nunnery in the last 72 hours. Urine Microscopic: No results for input(s): LABCAST, BACTERIA, COMU, HYALCAST, WBCUA, RBCUA, EPIU in the last 72 hours. Urine Culture: No results for input(s): LABURIN in the last 72 hours. Urine Chemistry: No results for input(s): Jacquetta Bath, PROTEINUR, NAUR in the last 72 hours. IMAGING:  XR CHEST PORTABLE   Final Result   Increasing pulmonary edema as compared to prior. Pneumonia is not excluded   in the appropriate clinical setting. Small bilateral pleural effusions,   right greater than left. US ABDOMEN COMPLETE   Final Result   1. Findings are consistent with chronic cirrhosis and portal hypertension,   including splenomegaly and moderate ascites. 2. Unremarkable sonographic appearance of the bilateral kidneys. 3. Nonvisualization of the gallbladder, common bile duct, pancreas, aorta,   and IVC. XR CHEST PORTABLE   Final Result   1. Bibasilar opacities favored to reflect atelectasis and effusion, left   greater than right. Superimposed infiltrate difficult to entirely exclude in   the appropriate clinical setting. 2. Stable cardiomegaly. Assessment/Plan :      1.  POONAM   Improved  Creat back to baseline   Has  HRS  continue midodrine     2. Hypotension . BP better   Continue Midodrine     3. Anemia. Monitor   Transfuse if Hb < 7.0     4. Acid- base disorder. Monitor     5. Hyponatremia 2/2 hypervolemia     6. Liver cirrhosis with ascites.   start aldactone 25 mg po daily         Thank you for allowing us to participate in care of Camron Pereyra         Electronically signed by: Martina Molina MD, 10/29/2020, 11:01 AM      [facility] [address]  Office : [phone]  Fax :[phone]

## 2020-10-30 NOTE — ADT AUTH CERT
Assessment/Plan :         1.  POONAM    Improved    Creat back to baseline    Has  HRS    continue midodrine         2. Hypotension . BP better    Continue Midodrine         3. Anemia. Monitor    Transfuse if Hb < 7.0         4. Acid- base disorder. Monitor         5. Hyponatremia 2/2 hypervolemia         6. Liver cirrhosis with ascites. start aldactone 25 mg po daily     ------------------------    Pulm    He looks about the same but his CXR is worse. His BP is also soft and has limited his ability to accept diuretics.         1. Recurrent pleural effusion    2. Recurrent ascites    3. Cirrhosis    4. History of COPD    5. Chronic hypoxemic respiratory failure         I have reviewed laboratories, medical records and images for this visit    He has been stable on 4 L/min nasal cannula oxygen supplement. However, his exam is more congested    CXR looks like pulmonary edema and worse right effusion    May have paracentesis    Challenging management    Discussed with Dr Elsy Hooks    ----------------------    GI    ROS:    No fever, chills    No chest pain, palpitations    + mild SOB. No cough    Gastrointestinal ROS: no N/V       Assessment / Plan :         1. Cirrhosis/pleural effusion/ascities: recurrent. Was held off diuretics per wife due to renal insuff at [facility].  with moderate ascites.  Renal consulted. Dr Rubia Tsai d/w Dr Elsy Hooks and plan was to hold off on paracentesis with POONAM. Abd distention stable today. Rec:    - low na diet    - dietician teaching low na         2. Neuro: drowsy but awakens. Ammonia nl    Rec:    - continue lactulose goal 3-4 soft stools per day    - continue xifaxan         3 heme: hbg stable, no sign gi bleed.           4. Cirrhosis: from [place]. lft stable.           5. Cardio: pro bnp >2,000. Per primary team.         6. POONAM - felt to be HRS per nephrology eval. On midodrine and albumin.           Per report, plan is for d/c home with hospice.     ----------------       10/29/2020 08:37 POC Glucose: 162 (H)       10/29/2020 10:20    Sodium: 136    Potassium: 4.0    Chloride: 99    CO2: 30    BUN: 30 (H)    Creatinine: 1.1    Anion Gap: 7    GFR Non-: >60    GFR African American: >60    Glucose: 231 (H)    Calcium: 8.6    -----------    CXR:Increasing pulmonary edema as compared to prior.  Pneumonia is not excluded in the appropriate clinical setting.  Small bilateral pleural effusions,    right greater than left.    ------    Scheduled Medications    · albumin human 25 g Intravenous Q6H    · aspirin 81 mg Oral Daily    · vitamin B and C 1 tablet Oral Daily    · citalopram 20 mg Oral Daily    · lactulose 20 g Oral TID    · levothyroxine 137 mcg Oral Daily    · midodrine 15 mg Oral TID WC    · rifaximin 550 mg Oral BID    · insulin lispro 0-6 Units Subcutaneous TID WC    · insulin lispro 0-3 Units Subcutaneous Nightly    · sodium chloride flush 10 mL Intravenous 2 times per day    · pantoprazole 40 mg Oral QAM AC           Chronic Obstructive Pulmonary Disease - Care Day 3 (10/27/2020) by Marlena He RN         Review Status  Review Entered    Completed  10/29/2020 14:28        Criteria Review       Care Day: 3 Care Date: 10/27/2020 Level of Care: Intermediate Care    Guideline Day 2    Level Of Care    (X) Floor or intermediate care    10/29/2020 2:28 PM EDT by [employer]      INtermed    Clinical Status    (X) * Hemodynamic stability    (X) * Mechanical and noninvasive ventilation absent    (X) * Uncompensated acidosis absent    Activity    (X) Ambulatory    10/29/2020 2:28 PM EDT by [employer]      up w/ assist    Routes    (X) Diet as tolerated    10/29/2020 2:28 PM EDT by [employer]      DIET CARB CONTROL; Low Sodium (2 GM);  Cardiac    Interventions    (X) Oxygen    10/29/2020 2:28 PM EDT by [employer]      4l/nc    (X) Pulse oximetry    10/29/2020 2:28 PM EDT by [employer]      %    (X) Respiratory therapy    (X) DVT prophylaxis 10/29/2020 2:28 PM EDT by Gerardo Merino      SCD    * Milestone    Additional Notes    10/27       BP (!) 94/58   Pulse 83   Temp 98.4 °F (36.9 °C) (Oral)   Resp 16   Ht 5' 11\" (1.803 m)   Wt 206 lb 2.1 oz (93.5 kg)   SpO2 96%   BMI 28.75 kg/m²       Neph    Assessment/Plan :              1.  POONAM    likley HRS    Get urine studies    Will continue midodrine    Start albumin iv         2. Hypotension    Midodrine         3. Anemia. Monitor    Transfuse if Hb < 7.0         4. Acid- base disorder. Monitor         5. Hyponatremia 2/2 hypervolemia         6. Liver cirrhosis with ascites. Once BP allows will start aldactone         Recommend to dose adjust all medications  based on renal functions    Maintain SBP> 90 mmHg    Daily weights    AVOID NSAIDs    Avoid Nephrotoxins    Monitor Intake/Output    Call if significant decrease in urine output     ----------------------------    GI    ROS:    No fever, chills    No chest pain, palpitations    No SOB, cough    Gastrointestinal ROS: no N/V    Assessment / Plan :         1. Cirrhosis/pleural effusion/ascities: recurrent. Was held off di[place]tics per [place]ites.  Renal consulted. Dr Maylin Hurt d/w Dr Citlaly Braxton and plan is to hold off on paracentesis with POONAM. Rec:    - low na diet    - dietician teaching low na         2. Neuro: drowsy but awakens. Ammonia nl    Rec:    - continue lactulose goal 3-4 soft stools per day    - continue xifaxan         3 heme: hbg stable, no sign gi bleed. Follow inr.         4. Cirrhosis: from [place]. lft stable         5. Cardio: pro bnp >2,000. Per primary team.     -----------------    Pulm    The patient is about the same. Tolerating 4 LPM O2 supplement with good saturations. This is his home dose. No distress at rest.         1. Recurrent pleural effusion    2. Recurrent ascites    3. Cirrhosis    4. History of COPD    5.  Chronic hypoxemic respiratory failure         I have reviewed laboratories, medical records and images for this visit    Chest imaging reveals bibasilar airspace disease and effusions. Abd US shows moderate ascites as well    Renal now following and helping to manage diuretics    Would like to try to avoid thoracentesis if possible. Discussed with Dr Jefferson Ernst    Discussed with the patient and his wife at the bedside. -------------------------    IM    sitting up eating lunch NAD, no complaints. Assessment/Plan:         Active Hospital Problems      Diagnosis    · Shortness of breath [R06.02]         Dyspnea- multifactorial in the setting of chronic left pleural effusion, with underlying FLOYD liver disease, CHF, COPD chronically on 4 L per nasal cannula    - s/p thoracentesis x2    - consult Pulmonary - appreciate input         Iacute metabolic encephalopathy: Again multifactorial in the no cirrhosis    -Continue rifaximin    -Supportive care         Hypertension: Chronic continue midodrine         Nonzero troponin likely secondary to hypoperfusion continue serial troponins monitor on telemetry    -Suspect mismatch         Lactic acidosis no signs of sepsis on admission    -Lactic 2.1 present on arrival         Chronic diastolic Heart failure: Monitor fluid balance difficult to treat in the setting         Coronary artery disease: Currently without signs of ischemia         Cirrhosis secondary to Alvaro Breaux    -Not a transplant candidate    -GI consult    -Palliative care consult         Anemia/thrombocytopenia encephalitides monitor closely         Goals and plans of care:    Change to Hospice oriented care. HOC to meet with pt    DVT Prophylaxis: SCDs    Diet: DIET CARB CONTROL; Low Sodium (2 GM);  Cardiac    Code Status: DNR-CC         PT/OT Eval Status: Ordered         Dispo- 1-2 days when [facility] can be arranged    -----------------------------       10/27/2020 04:19    Sodium: 133 (L)    Potassium: 4.1    Chloride: 100    CO2: 27    BUN: 43 (H)    Creatinine: 1.5 (H) Anion Gap: 6    GFR Non-: 46 (A)    GFR : 55 (A)    Magnesium: 2.60 (H)    Glucose: 160 (H)    Calcium: 8.4    Phosphorus: 2.9    Total Protein: 4.5 (L)    Albumin: 2.8 (L)    Globulin: 1.7    Albumin/Globulin Ratio: 1.6    Alk Phos: 91    ALT: 16    AST: 22    Bilirubin: 2.7 (H)    Prothrombin Time: 21.5 (H)    INR: 1.84 (H)       10/27/2020 07:28    POC Glucose: 154 (H)       10/27/2020 11:24    POC Glucose: 137 (H)       10/27/2020 16:07    POC Glucose: 225 (H)       10/27/2020 20:24    POC Glucose: 255 (H)    ------------------    US abd    1. Findings are consistent with chronic cirrhosis and portal hypertension,    including splenomegaly and moderate ascites. 2. Unremarkable sonographic appearance of the bilateral kidneys.     3. Nonvisualization of the gallbladder, common bile duct, pancreas, aorta,    and IVC.    -------------------    Scheduled Medications    · albumin human 25 g Intravenous Q6H    · aspirin 81 mg Oral Daily    · vitamin B and C 1 tablet Oral Daily    · citalopram 20 mg Oral Daily    · lactulose 20 g Oral TID    · levothyroxine 137 mcg Oral Daily    · midodrine 15 mg Oral TID WC    · rifaximin 550 mg Oral BID    · insulin lispro 0-6 Units Subcutaneous TID WC    · insulin lispro 0-3 Units Subcutaneous Nightly    · sodium chloride flush 10 mL Intravenous 2 times per day    · pantoprazole 40 mg Oral QAM AC

## 2020-10-30 NOTE — CARE COORDINATION
CTN reached out to [address] and confirmed with \"[place]\" that patient was active with [address]

## 2020-11-09 NOTE — TELEPHONE ENCOUNTER
Pt wife calling to all know that her  is now on hospice and to cancel all upcoming appointments pls call to advise thank you

## 2020-11-10 NOTE — TELEPHONE ENCOUNTER
Asked Call Center to cancel appts. We will wait on notification of death before changing status on his chart.

## 2020-12-15 RX ORDER — SAXAGLIPTIN 5 MG/1
TABLET, FILM COATED ORAL
Qty: 30 TABLET | Refills: 2 | OUTPATIENT
Start: 2020-12-15

## 2020-12-20 RX ORDER — SAXAGLIPTIN 5 MG/1
TABLET, FILM COATED ORAL
Qty: 30 TABLET | Refills: 2 | OUTPATIENT
Start: 2020-12-20

## 2021-01-11 RX ORDER — SAXAGLIPTIN 5 MG/1
TABLET, FILM COATED ORAL
Qty: 30 TABLET | Refills: 2 | OUTPATIENT
Start: 2021-01-11

## 2022-06-08 NOTE — PROGRESS NOTES
Nutrition Assessment (Low Risk)    Type and Reason for Visit: Positive Nutrition Screen(MST 3)    Nutrition Recommendations:   No nutrition related recommendations at this time    Nutrition Assessment:   Patient assessed for nutritional risk. Pt triggered nursing nutrition screen for MST 3, indicating poor PO intake and weight loss PTA. Pt currently on 2 gram Na+ diet, consuming % of meals. No weight loss is noted per weight hx in EMR; in fact, pt with weight gain. Deemed to be at low risk at this time. Will continue to monitor for changes in status.     Malnutrition Assessment:  · Malnutrition Status: No malnutrition    Nutrition Risk Level   Risk Level: Low    Nutrition Diagnosis:   · Problem: No nutrition diagnosis at this time    Nutrition Intervention:  Food and/or Delivery: Continue current diet  Nutrition Education/Counseling/Coordination of Care:  Continued Inpatient Monitoring, Education Not Indicated      Electronically signed by Oleg May RD, LD on 4/9/20 at 1:15 PM EDT    Contact Number: [phone] [General Appearance - Alert] : alert [General Appearance - In No Acute Distress] : in no acute distress [Sclera] : the sclera and conjunctiva were normal [PERRL With Normal Accommodation] : pupils were equal in size, round, and reactive to light [Extraocular Movements] : extraocular movements were intact [Outer Ear] : the ears and nose were normal in appearance [Oropharynx] : the oropharynx was normal [Neck Appearance] : the appearance of the neck was normal [Neck Cervical Mass (___cm)] : no neck mass was observed [Jugular Venous Distention Increased] : there was no jugular-venous distention [Thyroid Diffuse Enlargement] : the thyroid was not enlarged [Thyroid Nodule] : there were no palpable thyroid nodules [Bowel Sounds] : normal bowel sounds [Abdomen Soft] : soft [Abdomen Tenderness] : non-tender [Abdomen Mass (___ Cm)] : no abdominal mass palpated [Abnormal Walk] : normal gait [Nail Clubbing] : no clubbing  or cyanosis of the fingernails [Musculoskeletal - Swelling] : no joint swelling seen [Motor Tone] : muscle strength and tone were normal [Skin Color & Pigmentation] : normal skin color and pigmentation [Skin Turgor] : normal skin turgor [] : no rash [Deep Tendon Reflexes (DTR)] : deep tendon reflexes were 2+ and symmetric [Sensation] : the sensory exam was normal to light touch and pinprick [No Focal Deficits] : no focal deficits [Oriented To Time, Place, And Person] : oriented to person, place, and time [Impaired Insight] : insight and judgment were intact [Affect] : the affect was normal
